# Patient Record
Sex: MALE | Race: BLACK OR AFRICAN AMERICAN | NOT HISPANIC OR LATINO | URBAN - METROPOLITAN AREA
[De-identification: names, ages, dates, MRNs, and addresses within clinical notes are randomized per-mention and may not be internally consistent; named-entity substitution may affect disease eponyms.]

---

## 2023-12-13 ENCOUNTER — INPATIENT (INPATIENT)
Facility: HOSPITAL | Age: 68
LOS: 9 days | Discharge: HOME | DRG: 853 | End: 2023-12-23
Attending: INTERNAL MEDICINE | Admitting: INTERNAL MEDICINE
Payer: MEDICARE

## 2023-12-13 VITALS
OXYGEN SATURATION: 97 % | HEIGHT: 72 IN | HEART RATE: 148 BPM | RESPIRATION RATE: 23 BRPM | DIASTOLIC BLOOD PRESSURE: 67 MMHG | TEMPERATURE: 101 F | SYSTOLIC BLOOD PRESSURE: 114 MMHG | WEIGHT: 133.38 LBS

## 2023-12-13 DIAGNOSIS — J18.9 PNEUMONIA, UNSPECIFIED ORGANISM: ICD-10-CM

## 2023-12-13 LAB
ALBUMIN SERPL ELPH-MCNC: 1.9 G/DL — LOW (ref 3.5–5)
ALBUMIN SERPL ELPH-MCNC: 1.9 G/DL — LOW (ref 3.5–5)
ALP SERPL-CCNC: 76 U/L — SIGNIFICANT CHANGE UP (ref 40–120)
ALP SERPL-CCNC: 76 U/L — SIGNIFICANT CHANGE UP (ref 40–120)
ALT FLD-CCNC: 17 U/L DA — SIGNIFICANT CHANGE UP (ref 10–60)
ALT FLD-CCNC: 17 U/L DA — SIGNIFICANT CHANGE UP (ref 10–60)
ANION GAP SERPL CALC-SCNC: 6 MMOL/L — SIGNIFICANT CHANGE UP (ref 5–17)
ANION GAP SERPL CALC-SCNC: 6 MMOL/L — SIGNIFICANT CHANGE UP (ref 5–17)
APTT BLD: 33.5 SEC — SIGNIFICANT CHANGE UP (ref 24.5–35.6)
APTT BLD: 33.5 SEC — SIGNIFICANT CHANGE UP (ref 24.5–35.6)
AST SERPL-CCNC: 10 U/L — SIGNIFICANT CHANGE UP (ref 10–40)
AST SERPL-CCNC: 10 U/L — SIGNIFICANT CHANGE UP (ref 10–40)
BASOPHILS # BLD AUTO: 0.06 K/UL — SIGNIFICANT CHANGE UP (ref 0–0.2)
BASOPHILS # BLD AUTO: 0.06 K/UL — SIGNIFICANT CHANGE UP (ref 0–0.2)
BASOPHILS NFR BLD AUTO: 0.4 % — SIGNIFICANT CHANGE UP (ref 0–2)
BASOPHILS NFR BLD AUTO: 0.4 % — SIGNIFICANT CHANGE UP (ref 0–2)
BILIRUB SERPL-MCNC: 0.5 MG/DL — SIGNIFICANT CHANGE UP (ref 0.2–1.2)
BILIRUB SERPL-MCNC: 0.5 MG/DL — SIGNIFICANT CHANGE UP (ref 0.2–1.2)
BUN SERPL-MCNC: 28 MG/DL — HIGH (ref 7–18)
BUN SERPL-MCNC: 28 MG/DL — HIGH (ref 7–18)
CALCIUM SERPL-MCNC: 10 MG/DL — SIGNIFICANT CHANGE UP (ref 8.4–10.5)
CALCIUM SERPL-MCNC: 10 MG/DL — SIGNIFICANT CHANGE UP (ref 8.4–10.5)
CHLORIDE SERPL-SCNC: 102 MMOL/L — SIGNIFICANT CHANGE UP (ref 96–108)
CHLORIDE SERPL-SCNC: 102 MMOL/L — SIGNIFICANT CHANGE UP (ref 96–108)
CO2 SERPL-SCNC: 26 MMOL/L — SIGNIFICANT CHANGE UP (ref 22–31)
CO2 SERPL-SCNC: 26 MMOL/L — SIGNIFICANT CHANGE UP (ref 22–31)
CREAT SERPL-MCNC: 1.5 MG/DL — HIGH (ref 0.5–1.3)
CREAT SERPL-MCNC: 1.5 MG/DL — HIGH (ref 0.5–1.3)
EGFR: 51 ML/MIN/1.73M2 — LOW
EGFR: 51 ML/MIN/1.73M2 — LOW
EOSINOPHIL # BLD AUTO: 0 K/UL — SIGNIFICANT CHANGE UP (ref 0–0.5)
EOSINOPHIL # BLD AUTO: 0 K/UL — SIGNIFICANT CHANGE UP (ref 0–0.5)
EOSINOPHIL NFR BLD AUTO: 0 % — SIGNIFICANT CHANGE UP (ref 0–6)
EOSINOPHIL NFR BLD AUTO: 0 % — SIGNIFICANT CHANGE UP (ref 0–6)
GLUCOSE SERPL-MCNC: 241 MG/DL — HIGH (ref 70–99)
GLUCOSE SERPL-MCNC: 241 MG/DL — HIGH (ref 70–99)
HCT VFR BLD CALC: 29.1 % — LOW (ref 39–50)
HCT VFR BLD CALC: 29.1 % — LOW (ref 39–50)
HGB BLD-MCNC: 9.3 G/DL — LOW (ref 13–17)
HGB BLD-MCNC: 9.3 G/DL — LOW (ref 13–17)
IMM GRANULOCYTES NFR BLD AUTO: 0.8 % — SIGNIFICANT CHANGE UP (ref 0–0.9)
IMM GRANULOCYTES NFR BLD AUTO: 0.8 % — SIGNIFICANT CHANGE UP (ref 0–0.9)
INR BLD: 1.24 RATIO — HIGH (ref 0.85–1.18)
INR BLD: 1.24 RATIO — HIGH (ref 0.85–1.18)
LYMPHOCYTES # BLD AUTO: 1.04 K/UL — SIGNIFICANT CHANGE UP (ref 1–3.3)
LYMPHOCYTES # BLD AUTO: 1.04 K/UL — SIGNIFICANT CHANGE UP (ref 1–3.3)
LYMPHOCYTES # BLD AUTO: 7 % — LOW (ref 13–44)
LYMPHOCYTES # BLD AUTO: 7 % — LOW (ref 13–44)
MCHC RBC-ENTMCNC: 25.5 PG — LOW (ref 27–34)
MCHC RBC-ENTMCNC: 25.5 PG — LOW (ref 27–34)
MCHC RBC-ENTMCNC: 32 GM/DL — SIGNIFICANT CHANGE UP (ref 32–36)
MCHC RBC-ENTMCNC: 32 GM/DL — SIGNIFICANT CHANGE UP (ref 32–36)
MCV RBC AUTO: 79.7 FL — LOW (ref 80–100)
MCV RBC AUTO: 79.7 FL — LOW (ref 80–100)
MONOCYTES # BLD AUTO: 0.88 K/UL — SIGNIFICANT CHANGE UP (ref 0–0.9)
MONOCYTES # BLD AUTO: 0.88 K/UL — SIGNIFICANT CHANGE UP (ref 0–0.9)
MONOCYTES NFR BLD AUTO: 5.9 % — SIGNIFICANT CHANGE UP (ref 2–14)
MONOCYTES NFR BLD AUTO: 5.9 % — SIGNIFICANT CHANGE UP (ref 2–14)
NEUTROPHILS # BLD AUTO: 12.75 K/UL — HIGH (ref 1.8–7.4)
NEUTROPHILS # BLD AUTO: 12.75 K/UL — HIGH (ref 1.8–7.4)
NEUTROPHILS NFR BLD AUTO: 85.9 % — HIGH (ref 43–77)
NEUTROPHILS NFR BLD AUTO: 85.9 % — HIGH (ref 43–77)
NRBC # BLD: 0 /100 WBCS — SIGNIFICANT CHANGE UP (ref 0–0)
NRBC # BLD: 0 /100 WBCS — SIGNIFICANT CHANGE UP (ref 0–0)
PLATELET # BLD AUTO: 648 K/UL — HIGH (ref 150–400)
PLATELET # BLD AUTO: 648 K/UL — HIGH (ref 150–400)
POTASSIUM SERPL-MCNC: 5.9 MMOL/L — HIGH (ref 3.5–5.3)
POTASSIUM SERPL-MCNC: 5.9 MMOL/L — HIGH (ref 3.5–5.3)
POTASSIUM SERPL-SCNC: 5.9 MMOL/L — HIGH (ref 3.5–5.3)
POTASSIUM SERPL-SCNC: 5.9 MMOL/L — HIGH (ref 3.5–5.3)
PROT SERPL-MCNC: 7.9 G/DL — SIGNIFICANT CHANGE UP (ref 6–8.3)
PROT SERPL-MCNC: 7.9 G/DL — SIGNIFICANT CHANGE UP (ref 6–8.3)
PROTHROM AB SERPL-ACNC: 14.1 SEC — HIGH (ref 9.5–13)
PROTHROM AB SERPL-ACNC: 14.1 SEC — HIGH (ref 9.5–13)
RBC # BLD: 3.65 M/UL — LOW (ref 4.2–5.8)
RBC # BLD: 3.65 M/UL — LOW (ref 4.2–5.8)
RBC # FLD: 15.4 % — HIGH (ref 10.3–14.5)
RBC # FLD: 15.4 % — HIGH (ref 10.3–14.5)
SODIUM SERPL-SCNC: 134 MMOL/L — LOW (ref 135–145)
SODIUM SERPL-SCNC: 134 MMOL/L — LOW (ref 135–145)
WBC # BLD: 14.85 K/UL — HIGH (ref 3.8–10.5)
WBC # BLD: 14.85 K/UL — HIGH (ref 3.8–10.5)
WBC # FLD AUTO: 14.85 K/UL — HIGH (ref 3.8–10.5)
WBC # FLD AUTO: 14.85 K/UL — HIGH (ref 3.8–10.5)

## 2023-12-13 PROCEDURE — 99285 EMERGENCY DEPT VISIT HI MDM: CPT

## 2023-12-13 PROCEDURE — 71045 X-RAY EXAM CHEST 1 VIEW: CPT | Mod: 26

## 2023-12-13 RX ORDER — CEFEPIME 1 G/1
2000 INJECTION, POWDER, FOR SOLUTION INTRAMUSCULAR; INTRAVENOUS ONCE
Refills: 0 | Status: COMPLETED | OUTPATIENT
Start: 2023-12-13 | End: 2023-12-13

## 2023-12-13 RX ORDER — ACETAMINOPHEN 500 MG
1000 TABLET ORAL ONCE
Refills: 0 | Status: COMPLETED | OUTPATIENT
Start: 2023-12-13 | End: 2023-12-13

## 2023-12-13 RX ORDER — SODIUM CHLORIDE 9 MG/ML
1900 INJECTION INTRAMUSCULAR; INTRAVENOUS; SUBCUTANEOUS ONCE
Refills: 0 | Status: COMPLETED | OUTPATIENT
Start: 2023-12-13 | End: 2023-12-13

## 2023-12-13 RX ORDER — DEXTROSE 50 % IN WATER 50 %
50 SYRINGE (ML) INTRAVENOUS ONCE
Refills: 0 | Status: COMPLETED | OUTPATIENT
Start: 2023-12-13 | End: 2023-12-13

## 2023-12-13 RX ORDER — INSULIN HUMAN 100 [IU]/ML
5 INJECTION, SOLUTION SUBCUTANEOUS ONCE
Refills: 0 | Status: COMPLETED | OUTPATIENT
Start: 2023-12-13 | End: 2023-12-13

## 2023-12-13 RX ORDER — CEFEPIME 1 G/1
2000 INJECTION, POWDER, FOR SOLUTION INTRAMUSCULAR; INTRAVENOUS EVERY 8 HOURS
Refills: 0 | Status: COMPLETED | OUTPATIENT
Start: 2023-12-14 | End: 2023-12-14

## 2023-12-13 RX ORDER — CEFEPIME 1 G/1
INJECTION, POWDER, FOR SOLUTION INTRAMUSCULAR; INTRAVENOUS
Refills: 0 | Status: COMPLETED | OUTPATIENT
Start: 2023-12-13 | End: 2023-12-14

## 2023-12-13 RX ORDER — ALBUTEROL 90 UG/1
10 AEROSOL, METERED ORAL ONCE
Refills: 0 | Status: COMPLETED | OUTPATIENT
Start: 2023-12-13 | End: 2023-12-13

## 2023-12-13 RX ADMIN — Medication 400 MILLIGRAM(S): at 21:40

## 2023-12-13 RX ADMIN — Medication 1000 MILLIGRAM(S): at 22:10

## 2023-12-13 RX ADMIN — SODIUM CHLORIDE 1900 MILLILITER(S): 9 INJECTION INTRAMUSCULAR; INTRAVENOUS; SUBCUTANEOUS at 21:39

## 2023-12-13 RX ADMIN — CEFEPIME 100 MILLIGRAM(S): 1 INJECTION, POWDER, FOR SOLUTION INTRAMUSCULAR; INTRAVENOUS at 21:41

## 2023-12-13 RX ADMIN — Medication 1000 MILLIGRAM(S): at 21:55

## 2023-12-13 RX ADMIN — CEFEPIME 2000 MILLIGRAM(S): 1 INJECTION, POWDER, FOR SOLUTION INTRAMUSCULAR; INTRAVENOUS at 22:11

## 2023-12-13 RX ADMIN — SODIUM CHLORIDE 1900 MILLILITER(S): 9 INJECTION INTRAMUSCULAR; INTRAVENOUS; SUBCUTANEOUS at 22:39

## 2023-12-13 NOTE — ED PROVIDER NOTE - CLINICAL SUMMARY MEDICAL DECISION MAKING FREE TEXT BOX
68-year-old male brought in for fever, shortness of breath and reported hypoxia.  Patient not hypoxic on room air.  Sepsis labs ordered.  Chest x-ray with apparent pneumonia on my examination of the x-ray.  Will admit.

## 2023-12-13 NOTE — ED ADULT NURSE NOTE - ED STAT RN HANDOFF DETAILS
Report given to SALOMÓN Desai. Pt in NAD. Communicated to RN and NP that pt is not eating or taking his pills. Insulin held due to pt not eating. Pills not swallowed by pt. Pt in NAD. IV intact.

## 2023-12-13 NOTE — ED PROVIDER NOTE - PHYSICAL EXAMINATION
General: cachetic appearing male, no acute distress   HEENT: normocephalic, atraumatic   Respiratory: normal work of breathing, lungs clear to auscultation bilaterally   Cardiac: tachycardic   Abdomen: soft, non-tender, no guarding or rebound   MSK: no swelling or tenderness of lower extremities, moving all extremities spontaneously   Skin: warm, dry   Neuro: A&Ox3  Psych: appropriate affect

## 2023-12-13 NOTE — ED ADULT NURSE NOTE - NSFALLRISKINTERV_ED_ALL_ED
Assistance OOB with selected safe patient handling equipment if applicable/Assistance with ambulation/Communicate fall risk and risk factors to all staff, patient, and family/Provide visual cue: yellow wristband, yellow gown, etc/Reinforce activity limits and safety measures with patient and family/Call bell, personal items and telephone in reach/Instruct patient to call for assistance before getting out of bed/chair/stretcher/Non-slip footwear applied when patient is off stretcher/[place] to call system/Physically safe environment - no spills, clutter or unnecessary equipment/Purposeful Proactive Rounding/Room/bathroom lighting operational, light cord in reach Assistance OOB with selected safe patient handling equipment if applicable/Assistance with ambulation/Communicate fall risk and risk factors to all staff, patient, and family/Provide visual cue: yellow wristband, yellow gown, etc/Reinforce activity limits and safety measures with patient and family/Call bell, personal items and telephone in reach/Instruct patient to call for assistance before getting out of bed/chair/stretcher/Non-slip footwear applied when patient is off stretcher/[place] to call system/Physically safe environment - no spills, clutter or unnecessary equipment/Purposeful Proactive Rounding/Room/bathroom lighting operational, light cord in reach

## 2023-12-13 NOTE — ED PROVIDER NOTE - OBJECTIVE STATEMENT
68-year-old male, PMH of traumatic brain injury, pressure ulcer, sent from [facility] for reported fever and oxygen saturation in low 90s.  Patient unable to provide a viable history but states that he feels weak.  Denies any chest pain, trouble breathing, nausea or vomiting. 68-year-old male, PMH of traumatic brain injury, pressure ulcer, sent from [facility] for reported fever and oxygen saturation in low 90s.  Patient unable to provide a viable history but states that he feels weak.  Denies any chest pain, trouble breathing, nausea or vomiting.

## 2023-12-14 DIAGNOSIS — L89.159 PRESSURE ULCER OF SACRAL REGION, UNSPECIFIED STAGE: ICD-10-CM

## 2023-12-14 DIAGNOSIS — U07.1 COVID-19: ICD-10-CM

## 2023-12-14 DIAGNOSIS — N17.9 ACUTE KIDNEY FAILURE, UNSPECIFIED: ICD-10-CM

## 2023-12-14 DIAGNOSIS — S06.9XAA UNSPECIFIED INTRACRANIAL INJURY WITH LOSS OF CONSCIOUSNESS STATUS UNKNOWN, INITIAL ENCOUNTER: ICD-10-CM

## 2023-12-14 DIAGNOSIS — A41.9 SEPSIS, UNSPECIFIED ORGANISM: ICD-10-CM

## 2023-12-14 DIAGNOSIS — G40.909 EPILEPSY, UNSPECIFIED, NOT INTRACTABLE, WITHOUT STATUS EPILEPTICUS: ICD-10-CM

## 2023-12-14 DIAGNOSIS — E87.5 HYPERKALEMIA: ICD-10-CM

## 2023-12-14 DIAGNOSIS — E11.9 TYPE 2 DIABETES MELLITUS WITHOUT COMPLICATIONS: ICD-10-CM

## 2023-12-14 DIAGNOSIS — G93.41 METABOLIC ENCEPHALOPATHY: ICD-10-CM

## 2023-12-14 DIAGNOSIS — Z29.9 ENCOUNTER FOR PROPHYLACTIC MEASURES, UNSPECIFIED: ICD-10-CM

## 2023-12-14 LAB
ALBUMIN SERPL ELPH-MCNC: 1.7 G/DL — LOW (ref 3.5–5)
ALBUMIN SERPL ELPH-MCNC: 1.7 G/DL — LOW (ref 3.5–5)
ALP SERPL-CCNC: 73 U/L — SIGNIFICANT CHANGE UP (ref 40–120)
ALP SERPL-CCNC: 73 U/L — SIGNIFICANT CHANGE UP (ref 40–120)
ALT FLD-CCNC: 15 U/L DA — SIGNIFICANT CHANGE UP (ref 10–60)
ALT FLD-CCNC: 15 U/L DA — SIGNIFICANT CHANGE UP (ref 10–60)
ANION GAP SERPL CALC-SCNC: 9 MMOL/L — SIGNIFICANT CHANGE UP (ref 5–17)
ANION GAP SERPL CALC-SCNC: 9 MMOL/L — SIGNIFICANT CHANGE UP (ref 5–17)
ANISOCYTOSIS BLD QL: SLIGHT — SIGNIFICANT CHANGE UP
ANISOCYTOSIS BLD QL: SLIGHT — SIGNIFICANT CHANGE UP
APPEARANCE UR: ABNORMAL
APPEARANCE UR: ABNORMAL
AST SERPL-CCNC: 11 U/L — SIGNIFICANT CHANGE UP (ref 10–40)
AST SERPL-CCNC: 11 U/L — SIGNIFICANT CHANGE UP (ref 10–40)
B PERT DNA SPEC QL NAA+PROBE: SIGNIFICANT CHANGE UP
B PERT DNA SPEC QL NAA+PROBE: SIGNIFICANT CHANGE UP
BACTERIA # UR AUTO: ABNORMAL /HPF
BACTERIA # UR AUTO: ABNORMAL /HPF
BASOPHILS # BLD AUTO: 0 K/UL — SIGNIFICANT CHANGE UP (ref 0–0.2)
BASOPHILS # BLD AUTO: 0 K/UL — SIGNIFICANT CHANGE UP (ref 0–0.2)
BASOPHILS NFR BLD AUTO: 0 % — SIGNIFICANT CHANGE UP (ref 0–2)
BASOPHILS NFR BLD AUTO: 0 % — SIGNIFICANT CHANGE UP (ref 0–2)
BILIRUB SERPL-MCNC: 0.3 MG/DL — SIGNIFICANT CHANGE UP (ref 0.2–1.2)
BILIRUB SERPL-MCNC: 0.3 MG/DL — SIGNIFICANT CHANGE UP (ref 0.2–1.2)
BILIRUB UR-MCNC: NEGATIVE — SIGNIFICANT CHANGE UP
BILIRUB UR-MCNC: NEGATIVE — SIGNIFICANT CHANGE UP
BUN SERPL-MCNC: 26 MG/DL — HIGH (ref 7–18)
BUN SERPL-MCNC: 26 MG/DL — HIGH (ref 7–18)
C PNEUM DNA SPEC QL NAA+PROBE: SIGNIFICANT CHANGE UP
C PNEUM DNA SPEC QL NAA+PROBE: SIGNIFICANT CHANGE UP
CALCIUM SERPL-MCNC: 9.4 MG/DL — SIGNIFICANT CHANGE UP (ref 8.4–10.5)
CALCIUM SERPL-MCNC: 9.4 MG/DL — SIGNIFICANT CHANGE UP (ref 8.4–10.5)
CHLORIDE SERPL-SCNC: 102 MMOL/L — SIGNIFICANT CHANGE UP (ref 96–108)
CHLORIDE SERPL-SCNC: 102 MMOL/L — SIGNIFICANT CHANGE UP (ref 96–108)
CO2 SERPL-SCNC: 21 MMOL/L — LOW (ref 22–31)
CO2 SERPL-SCNC: 21 MMOL/L — LOW (ref 22–31)
COLOR SPEC: SIGNIFICANT CHANGE UP
COLOR SPEC: SIGNIFICANT CHANGE UP
COMMENT - URINE: SIGNIFICANT CHANGE UP
COMMENT - URINE: SIGNIFICANT CHANGE UP
CREAT SERPL-MCNC: 1.23 MG/DL — SIGNIFICANT CHANGE UP (ref 0.5–1.3)
CREAT SERPL-MCNC: 1.23 MG/DL — SIGNIFICANT CHANGE UP (ref 0.5–1.3)
CRP SERPL-MCNC: 255 MG/L — HIGH
CRP SERPL-MCNC: 255 MG/L — HIGH
DIFF PNL FLD: NEGATIVE — SIGNIFICANT CHANGE UP
DIFF PNL FLD: NEGATIVE — SIGNIFICANT CHANGE UP
EGFR: 65 ML/MIN/1.73M2 — SIGNIFICANT CHANGE UP
EGFR: 65 ML/MIN/1.73M2 — SIGNIFICANT CHANGE UP
EOSINOPHIL # BLD AUTO: 0 K/UL — SIGNIFICANT CHANGE UP (ref 0–0.5)
EOSINOPHIL # BLD AUTO: 0 K/UL — SIGNIFICANT CHANGE UP (ref 0–0.5)
EOSINOPHIL NFR BLD AUTO: 0 % — SIGNIFICANT CHANGE UP (ref 0–6)
EOSINOPHIL NFR BLD AUTO: 0 % — SIGNIFICANT CHANGE UP (ref 0–6)
EPI CELLS # UR: PRESENT
EPI CELLS # UR: PRESENT
FLUAV H1 2009 PAND RNA SPEC QL NAA+PROBE: SIGNIFICANT CHANGE UP
FLUAV H1 2009 PAND RNA SPEC QL NAA+PROBE: SIGNIFICANT CHANGE UP
FLUAV H1 RNA SPEC QL NAA+PROBE: SIGNIFICANT CHANGE UP
FLUAV H1 RNA SPEC QL NAA+PROBE: SIGNIFICANT CHANGE UP
FLUAV H3 RNA SPEC QL NAA+PROBE: SIGNIFICANT CHANGE UP
FLUAV H3 RNA SPEC QL NAA+PROBE: SIGNIFICANT CHANGE UP
FLUAV SUBTYP SPEC NAA+PROBE: SIGNIFICANT CHANGE UP
FLUAV SUBTYP SPEC NAA+PROBE: SIGNIFICANT CHANGE UP
FLUBV RNA SPEC QL NAA+PROBE: SIGNIFICANT CHANGE UP
FLUBV RNA SPEC QL NAA+PROBE: SIGNIFICANT CHANGE UP
GLUCOSE BLDC GLUCOMTR-MCNC: 191 MG/DL — HIGH (ref 70–99)
GLUCOSE BLDC GLUCOMTR-MCNC: 191 MG/DL — HIGH (ref 70–99)
GLUCOSE BLDC GLUCOMTR-MCNC: 208 MG/DL — HIGH (ref 70–99)
GLUCOSE BLDC GLUCOMTR-MCNC: 208 MG/DL — HIGH (ref 70–99)
GLUCOSE BLDC GLUCOMTR-MCNC: 213 MG/DL — HIGH (ref 70–99)
GLUCOSE BLDC GLUCOMTR-MCNC: 213 MG/DL — HIGH (ref 70–99)
GLUCOSE BLDC GLUCOMTR-MCNC: 220 MG/DL — HIGH (ref 70–99)
GLUCOSE BLDC GLUCOMTR-MCNC: 220 MG/DL — HIGH (ref 70–99)
GLUCOSE SERPL-MCNC: 262 MG/DL — HIGH (ref 70–99)
GLUCOSE SERPL-MCNC: 262 MG/DL — HIGH (ref 70–99)
GLUCOSE UR QL: 250 MG/DL
GLUCOSE UR QL: 250 MG/DL
GRAN CASTS # UR COMP ASSIST: PRESENT
GRAN CASTS # UR COMP ASSIST: PRESENT
HADV DNA SPEC QL NAA+PROBE: SIGNIFICANT CHANGE UP
HADV DNA SPEC QL NAA+PROBE: SIGNIFICANT CHANGE UP
HCOV PNL SPEC NAA+PROBE: SIGNIFICANT CHANGE UP
HCOV PNL SPEC NAA+PROBE: SIGNIFICANT CHANGE UP
HCT VFR BLD CALC: 34.9 % — LOW (ref 39–50)
HCT VFR BLD CALC: 34.9 % — LOW (ref 39–50)
HGB BLD-MCNC: 10.7 G/DL — LOW (ref 13–17)
HGB BLD-MCNC: 10.7 G/DL — LOW (ref 13–17)
HIV 1+2 AB+HIV1 P24 AG SERPL QL IA: SIGNIFICANT CHANGE UP
HIV 1+2 AB+HIV1 P24 AG SERPL QL IA: SIGNIFICANT CHANGE UP
HMPV RNA SPEC QL NAA+PROBE: SIGNIFICANT CHANGE UP
HMPV RNA SPEC QL NAA+PROBE: SIGNIFICANT CHANGE UP
HPIV1 RNA SPEC QL NAA+PROBE: SIGNIFICANT CHANGE UP
HPIV1 RNA SPEC QL NAA+PROBE: SIGNIFICANT CHANGE UP
HPIV2 RNA SPEC QL NAA+PROBE: SIGNIFICANT CHANGE UP
HPIV2 RNA SPEC QL NAA+PROBE: SIGNIFICANT CHANGE UP
HPIV3 RNA SPEC QL NAA+PROBE: SIGNIFICANT CHANGE UP
HPIV3 RNA SPEC QL NAA+PROBE: SIGNIFICANT CHANGE UP
HPIV4 RNA SPEC QL NAA+PROBE: SIGNIFICANT CHANGE UP
HPIV4 RNA SPEC QL NAA+PROBE: SIGNIFICANT CHANGE UP
HYALINE CASTS # UR AUTO: PRESENT
HYALINE CASTS # UR AUTO: PRESENT
KETONES UR-MCNC: ABNORMAL MG/DL
KETONES UR-MCNC: ABNORMAL MG/DL
LACTATE SERPL-SCNC: 1.6 MMOL/L — SIGNIFICANT CHANGE UP (ref 0.7–2)
LACTATE SERPL-SCNC: 1.6 MMOL/L — SIGNIFICANT CHANGE UP (ref 0.7–2)
LACTATE SERPL-SCNC: 2.8 MMOL/L — HIGH (ref 0.7–2)
LACTATE SERPL-SCNC: 2.8 MMOL/L — HIGH (ref 0.7–2)
LACTATE SERPL-SCNC: 5.4 MMOL/L — CRITICAL HIGH (ref 0.7–2)
LACTATE SERPL-SCNC: 5.4 MMOL/L — CRITICAL HIGH (ref 0.7–2)
LEUKOCYTE ESTERASE UR-ACNC: NEGATIVE — SIGNIFICANT CHANGE UP
LEUKOCYTE ESTERASE UR-ACNC: NEGATIVE — SIGNIFICANT CHANGE UP
LYMPHOCYTES # BLD AUTO: 12 % — LOW (ref 13–44)
LYMPHOCYTES # BLD AUTO: 12 % — LOW (ref 13–44)
LYMPHOCYTES # BLD AUTO: 2.09 K/UL — SIGNIFICANT CHANGE UP (ref 1–3.3)
LYMPHOCYTES # BLD AUTO: 2.09 K/UL — SIGNIFICANT CHANGE UP (ref 1–3.3)
MAGNESIUM SERPL-MCNC: 1.9 MG/DL — SIGNIFICANT CHANGE UP (ref 1.6–2.6)
MAGNESIUM SERPL-MCNC: 1.9 MG/DL — SIGNIFICANT CHANGE UP (ref 1.6–2.6)
MANUAL SMEAR VERIFICATION: SIGNIFICANT CHANGE UP
MANUAL SMEAR VERIFICATION: SIGNIFICANT CHANGE UP
MCHC RBC-ENTMCNC: 24.7 PG — LOW (ref 27–34)
MCHC RBC-ENTMCNC: 24.7 PG — LOW (ref 27–34)
MCHC RBC-ENTMCNC: 30.7 GM/DL — LOW (ref 32–36)
MCHC RBC-ENTMCNC: 30.7 GM/DL — LOW (ref 32–36)
MCV RBC AUTO: 80.4 FL — SIGNIFICANT CHANGE UP (ref 80–100)
MCV RBC AUTO: 80.4 FL — SIGNIFICANT CHANGE UP (ref 80–100)
MONOCYTES # BLD AUTO: 0.52 K/UL — SIGNIFICANT CHANGE UP (ref 0–0.9)
MONOCYTES # BLD AUTO: 0.52 K/UL — SIGNIFICANT CHANGE UP (ref 0–0.9)
MONOCYTES NFR BLD AUTO: 3 % — SIGNIFICANT CHANGE UP (ref 2–14)
MONOCYTES NFR BLD AUTO: 3 % — SIGNIFICANT CHANGE UP (ref 2–14)
NEUTROPHILS # BLD AUTO: 14.83 K/UL — HIGH (ref 1.8–7.4)
NEUTROPHILS # BLD AUTO: 14.83 K/UL — HIGH (ref 1.8–7.4)
NEUTROPHILS NFR BLD AUTO: 85 % — HIGH (ref 43–77)
NEUTROPHILS NFR BLD AUTO: 85 % — HIGH (ref 43–77)
NITRITE UR-MCNC: NEGATIVE — SIGNIFICANT CHANGE UP
NITRITE UR-MCNC: NEGATIVE — SIGNIFICANT CHANGE UP
NRBC # BLD: 0 /100 — SIGNIFICANT CHANGE UP (ref 0–0)
NRBC # BLD: 0 /100 — SIGNIFICANT CHANGE UP (ref 0–0)
PH UR: 5 — SIGNIFICANT CHANGE UP (ref 5–8)
PH UR: 5 — SIGNIFICANT CHANGE UP (ref 5–8)
PHOSPHATE SERPL-MCNC: 3.3 MG/DL — SIGNIFICANT CHANGE UP (ref 2.5–4.5)
PHOSPHATE SERPL-MCNC: 3.3 MG/DL — SIGNIFICANT CHANGE UP (ref 2.5–4.5)
PLAT MORPH BLD: NORMAL — SIGNIFICANT CHANGE UP
PLAT MORPH BLD: NORMAL — SIGNIFICANT CHANGE UP
PLATELET # BLD AUTO: 352 K/UL — SIGNIFICANT CHANGE UP (ref 150–400)
PLATELET # BLD AUTO: 352 K/UL — SIGNIFICANT CHANGE UP (ref 150–400)
POIKILOCYTOSIS BLD QL AUTO: SLIGHT — SIGNIFICANT CHANGE UP
POIKILOCYTOSIS BLD QL AUTO: SLIGHT — SIGNIFICANT CHANGE UP
POTASSIUM SERPL-MCNC: 4.6 MMOL/L — SIGNIFICANT CHANGE UP (ref 3.5–5.3)
POTASSIUM SERPL-MCNC: 4.6 MMOL/L — SIGNIFICANT CHANGE UP (ref 3.5–5.3)
POTASSIUM SERPL-SCNC: 4.6 MMOL/L — SIGNIFICANT CHANGE UP (ref 3.5–5.3)
POTASSIUM SERPL-SCNC: 4.6 MMOL/L — SIGNIFICANT CHANGE UP (ref 3.5–5.3)
PROT SERPL-MCNC: 7.9 G/DL — SIGNIFICANT CHANGE UP (ref 6–8.3)
PROT SERPL-MCNC: 7.9 G/DL — SIGNIFICANT CHANGE UP (ref 6–8.3)
PROT UR-MCNC: 30 MG/DL
PROT UR-MCNC: 30 MG/DL
RAPID RVP RESULT: DETECTED
RAPID RVP RESULT: DETECTED
RBC # BLD: 4.34 M/UL — SIGNIFICANT CHANGE UP (ref 4.2–5.8)
RBC # BLD: 4.34 M/UL — SIGNIFICANT CHANGE UP (ref 4.2–5.8)
RBC # FLD: 15.8 % — HIGH (ref 10.3–14.5)
RBC # FLD: 15.8 % — HIGH (ref 10.3–14.5)
RBC BLD AUTO: ABNORMAL
RBC BLD AUTO: ABNORMAL
RBC CASTS # UR COMP ASSIST: 2 /HPF — SIGNIFICANT CHANGE UP (ref 0–4)
RBC CASTS # UR COMP ASSIST: 2 /HPF — SIGNIFICANT CHANGE UP (ref 0–4)
RV+EV RNA SPEC QL NAA+PROBE: SIGNIFICANT CHANGE UP
RV+EV RNA SPEC QL NAA+PROBE: SIGNIFICANT CHANGE UP
SARS-COV-2 RNA SPEC QL NAA+PROBE: DETECTED
SARS-COV-2 RNA SPEC QL NAA+PROBE: DETECTED
SODIUM SERPL-SCNC: 132 MMOL/L — LOW (ref 135–145)
SODIUM SERPL-SCNC: 132 MMOL/L — LOW (ref 135–145)
SP GR SPEC: 1.03 — SIGNIFICANT CHANGE UP (ref 1–1.03)
SP GR SPEC: 1.03 — SIGNIFICANT CHANGE UP (ref 1–1.03)
UROBILINOGEN FLD QL: 1 MG/DL — SIGNIFICANT CHANGE UP (ref 0.2–1)
UROBILINOGEN FLD QL: 1 MG/DL — SIGNIFICANT CHANGE UP (ref 0.2–1)
WBC # BLD: 17.45 K/UL — HIGH (ref 3.8–10.5)
WBC # BLD: 17.45 K/UL — HIGH (ref 3.8–10.5)
WBC # FLD AUTO: 17.45 K/UL — HIGH (ref 3.8–10.5)
WBC # FLD AUTO: 17.45 K/UL — HIGH (ref 3.8–10.5)
WBC UR QL: 4 /HPF — SIGNIFICANT CHANGE UP (ref 0–5)
WBC UR QL: 4 /HPF — SIGNIFICANT CHANGE UP (ref 0–5)

## 2023-12-14 PROCEDURE — 74176 CT ABD & PELVIS W/O CONTRAST: CPT | Mod: 26

## 2023-12-14 PROCEDURE — 93010 ELECTROCARDIOGRAM REPORT: CPT

## 2023-12-14 PROCEDURE — 99222 1ST HOSP IP/OBS MODERATE 55: CPT | Mod: GC

## 2023-12-14 RX ORDER — DEXTROSE 50 % IN WATER 50 %
25 SYRINGE (ML) INTRAVENOUS ONCE
Refills: 0 | Status: DISCONTINUED | OUTPATIENT
Start: 2023-12-14 | End: 2023-12-23

## 2023-12-14 RX ORDER — VANCOMYCIN HCL 1 G
1000 VIAL (EA) INTRAVENOUS EVERY 12 HOURS
Refills: 0 | Status: DISCONTINUED | OUTPATIENT
Start: 2023-12-14 | End: 2023-12-19

## 2023-12-14 RX ORDER — CEFEPIME 1 G/1
2000 INJECTION, POWDER, FOR SOLUTION INTRAMUSCULAR; INTRAVENOUS EVERY 8 HOURS
Refills: 0 | Status: DISCONTINUED | OUTPATIENT
Start: 2023-12-14 | End: 2023-12-14

## 2023-12-14 RX ORDER — CEFEPIME 1 G/1
1000 INJECTION, POWDER, FOR SOLUTION INTRAMUSCULAR; INTRAVENOUS EVERY 12 HOURS
Refills: 0 | Status: DISCONTINUED | OUTPATIENT
Start: 2023-12-14 | End: 2023-12-19

## 2023-12-14 RX ORDER — LEVETIRACETAM 250 MG/1
750 TABLET, FILM COATED ORAL
Refills: 0 | Status: DISCONTINUED | OUTPATIENT
Start: 2023-12-14 | End: 2023-12-23

## 2023-12-14 RX ORDER — INSULIN LISPRO 100/ML
VIAL (ML) SUBCUTANEOUS
Refills: 0 | Status: DISCONTINUED | OUTPATIENT
Start: 2023-12-14 | End: 2023-12-14

## 2023-12-14 RX ORDER — INSULIN LISPRO 100/ML
VIAL (ML) SUBCUTANEOUS AT BEDTIME
Refills: 0 | Status: DISCONTINUED | OUTPATIENT
Start: 2023-12-14 | End: 2023-12-14

## 2023-12-14 RX ORDER — POLYETHYLENE GLYCOL 3350 17 G/17G
17 POWDER, FOR SOLUTION ORAL
Refills: 0 | Status: DISCONTINUED | OUTPATIENT
Start: 2023-12-14 | End: 2023-12-23

## 2023-12-14 RX ORDER — INSULIN LISPRO 100/ML
VIAL (ML) SUBCUTANEOUS
Refills: 0 | Status: DISCONTINUED | OUTPATIENT
Start: 2023-12-14 | End: 2023-12-23

## 2023-12-14 RX ORDER — SODIUM CHLORIDE 9 MG/ML
1000 INJECTION INTRAMUSCULAR; INTRAVENOUS; SUBCUTANEOUS
Refills: 0 | Status: DISCONTINUED | OUTPATIENT
Start: 2023-12-14 | End: 2023-12-18

## 2023-12-14 RX ORDER — REMDESIVIR 5 MG/ML
INJECTION INTRAVENOUS
Refills: 0 | Status: COMPLETED | OUTPATIENT
Start: 2023-12-14 | End: 2023-12-18

## 2023-12-14 RX ORDER — INSULIN LISPRO 100/ML
VIAL (ML) SUBCUTANEOUS AT BEDTIME
Refills: 0 | Status: DISCONTINUED | OUTPATIENT
Start: 2023-12-14 | End: 2023-12-23

## 2023-12-14 RX ORDER — SODIUM CHLORIDE 9 MG/ML
1000 INJECTION, SOLUTION INTRAVENOUS ONCE
Refills: 0 | Status: COMPLETED | OUTPATIENT
Start: 2023-12-14 | End: 2023-12-14

## 2023-12-14 RX ORDER — REMDESIVIR 5 MG/ML
200 INJECTION INTRAVENOUS EVERY 24 HOURS
Refills: 0 | Status: COMPLETED | OUTPATIENT
Start: 2023-12-14 | End: 2023-12-14

## 2023-12-14 RX ORDER — ASCORBIC ACID 60 MG
500 TABLET,CHEWABLE ORAL DAILY
Refills: 0 | Status: DISCONTINUED | OUTPATIENT
Start: 2023-12-14 | End: 2023-12-23

## 2023-12-14 RX ORDER — SENNA PLUS 8.6 MG/1
2 TABLET ORAL AT BEDTIME
Refills: 0 | Status: DISCONTINUED | OUTPATIENT
Start: 2023-12-14 | End: 2023-12-23

## 2023-12-14 RX ORDER — INSULIN GLARGINE 100 [IU]/ML
12 INJECTION, SOLUTION SUBCUTANEOUS AT BEDTIME
Refills: 0 | Status: DISCONTINUED | OUTPATIENT
Start: 2023-12-14 | End: 2023-12-18

## 2023-12-14 RX ORDER — ENOXAPARIN SODIUM 100 MG/ML
40 INJECTION SUBCUTANEOUS EVERY 24 HOURS
Refills: 0 | Status: DISCONTINUED | OUTPATIENT
Start: 2023-12-14 | End: 2023-12-14

## 2023-12-14 RX ORDER — INSULIN GLARGINE 100 [IU]/ML
9 INJECTION, SOLUTION SUBCUTANEOUS AT BEDTIME
Refills: 0 | Status: DISCONTINUED | OUTPATIENT
Start: 2023-12-14 | End: 2023-12-14

## 2023-12-14 RX ORDER — VANCOMYCIN HCL 1 G
VIAL (EA) INTRAVENOUS
Refills: 0 | Status: DISCONTINUED | OUTPATIENT
Start: 2023-12-14 | End: 2023-12-19

## 2023-12-14 RX ORDER — COLLAGENASE CLOSTRIDIUM HIST. 250 UNIT/G
1 OINTMENT (GRAM) TOPICAL EVERY 8 HOURS
Refills: 0 | Status: DISCONTINUED | OUTPATIENT
Start: 2023-12-14 | End: 2023-12-23

## 2023-12-14 RX ORDER — ACETAMINOPHEN 500 MG
650 TABLET ORAL EVERY 6 HOURS
Refills: 0 | Status: DISCONTINUED | OUTPATIENT
Start: 2023-12-14 | End: 2023-12-23

## 2023-12-14 RX ORDER — VANCOMYCIN HCL 1 G
1000 VIAL (EA) INTRAVENOUS ONCE
Refills: 0 | Status: COMPLETED | OUTPATIENT
Start: 2023-12-14 | End: 2023-12-14

## 2023-12-14 RX ORDER — INSULIN LISPRO 100/ML
3 VIAL (ML) SUBCUTANEOUS
Refills: 0 | Status: DISCONTINUED | OUTPATIENT
Start: 2023-12-14 | End: 2023-12-14

## 2023-12-14 RX ORDER — REMDESIVIR 5 MG/ML
100 INJECTION INTRAVENOUS EVERY 24 HOURS
Refills: 0 | Status: COMPLETED | OUTPATIENT
Start: 2023-12-15 | End: 2023-12-18

## 2023-12-14 RX ORDER — HEPARIN SODIUM 5000 [USP'U]/ML
5000 INJECTION INTRAVENOUS; SUBCUTANEOUS EVERY 8 HOURS
Refills: 0 | Status: DISCONTINUED | OUTPATIENT
Start: 2023-12-14 | End: 2023-12-19

## 2023-12-14 RX ORDER — DEXAMETHASONE 0.5 MG/5ML
6 ELIXIR ORAL DAILY
Refills: 0 | Status: DISCONTINUED | OUTPATIENT
Start: 2023-12-14 | End: 2023-12-23

## 2023-12-14 RX ORDER — LACTULOSE 10 G/15ML
10 SOLUTION ORAL DAILY
Refills: 0 | Status: DISCONTINUED | OUTPATIENT
Start: 2023-12-14 | End: 2023-12-23

## 2023-12-14 RX ADMIN — HEPARIN SODIUM 5000 UNIT(S): 5000 INJECTION INTRAVENOUS; SUBCUTANEOUS at 23:23

## 2023-12-14 RX ADMIN — INSULIN GLARGINE 12 UNIT(S): 100 INJECTION, SOLUTION SUBCUTANEOUS at 23:24

## 2023-12-14 RX ADMIN — POLYETHYLENE GLYCOL 3350 17 GRAM(S): 17 POWDER, FOR SOLUTION ORAL at 09:12

## 2023-12-14 RX ADMIN — SENNA PLUS 2 TABLET(S): 8.6 TABLET ORAL at 23:25

## 2023-12-14 RX ADMIN — Medication 250 MILLIGRAM(S): at 09:13

## 2023-12-14 RX ADMIN — Medication 6 MILLIGRAM(S): at 17:10

## 2023-12-14 RX ADMIN — Medication 250 MILLIGRAM(S): at 23:23

## 2023-12-14 RX ADMIN — ALBUTEROL 10 MILLIGRAM(S): 90 AEROSOL, METERED ORAL at 00:07

## 2023-12-14 RX ADMIN — HEPARIN SODIUM 5000 UNIT(S): 5000 INJECTION INTRAVENOUS; SUBCUTANEOUS at 09:13

## 2023-12-14 RX ADMIN — INSULIN HUMAN 5 UNIT(S): 100 INJECTION, SOLUTION SUBCUTANEOUS at 00:06

## 2023-12-14 RX ADMIN — SODIUM CHLORIDE 1000 MILLILITER(S): 9 INJECTION, SOLUTION INTRAVENOUS at 02:09

## 2023-12-14 RX ADMIN — CEFEPIME 100 MILLIGRAM(S): 1 INJECTION, POWDER, FOR SOLUTION INTRAMUSCULAR; INTRAVENOUS at 23:23

## 2023-12-14 RX ADMIN — CEFEPIME 100 MILLIGRAM(S): 1 INJECTION, POWDER, FOR SOLUTION INTRAMUSCULAR; INTRAVENOUS at 06:58

## 2023-12-14 RX ADMIN — Medication 50 MILLILITER(S): at 00:07

## 2023-12-14 RX ADMIN — LEVETIRACETAM 750 MILLIGRAM(S): 250 TABLET, FILM COATED ORAL at 09:13

## 2023-12-14 RX ADMIN — Medication 1 APPLICATION(S): at 23:24

## 2023-12-14 RX ADMIN — REMDESIVIR 200 MILLIGRAM(S): 5 INJECTION INTRAVENOUS at 13:45

## 2023-12-14 NOTE — H&P ADULT - PROBLEM SELECTOR PLAN 6
lovenox Pritesh juárez/w keppra - plan as above   - might need MRI of the pelvis w/ bone biopsy to r/o osteomyelitis   - wound care consult  - c/w cefepime and vanc
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
## 2023-12-14 NOTE — H&P ADULT - PROBLEM SELECTOR PLAN 1
- sent from [facility] for reported fever and oxygen saturation in low 90s.   - Pt reporting lethargy/weakness, A&Ox0, weeping purulent sacral ulcer noticed on exam   - Vital signs sig for temp 101 F (resolved, 98.6F),  > 101, 97% on RA, intermittently hypoxic to low 90s on RA   - EKG sinus tachycardia 106 HR  - Labs sig for WC 14, Cr 1.5, lac 2.8  - RVP - covid positive , U/A negative   - CXR shows questionable RML infiltrate  - s/p cefepime 2g and 2.9 L in ED  - Admitted for Sepsis 2/2 pneumonia vs. infected sacral ulcer vs. COVID   - f/u blood cultures, urine cultures   - c/w cefepime and vanc   - IVF for maintenance - sent from [facility] for reported fever and oxygen saturation in low 90s.   - Pt reporting lethargy/weakness, A&Ox0, weeping purulent sacral ulcer noticed on exam   - Vital signs sig for temp 101 F (resolved, 98.6F),  > 101, 97% on RA, intermittently hypoxic to low 90s on RA   - EKG sinus tachycardia 106 HR  - Labs sig for WC 14, Cr 1.5, lac 2.8  - RVP - covid positive , U/A negative   - CXR shows questionable RML infiltrate  - s/p cefepime 2g and 2.9 L in ED  - Admitted for Sepsis 2/2 pneumonia vs. infected sacral ulcer vs. COVID   - f/u blood cultures, urine cultures   - c/w cefepime and vanc   - IVF for maintenance

## 2023-12-14 NOTE — H&P ADULT - NSICDXPASTMEDICALHX_GEN_ALL_CORE_FT
PAST MEDICAL HISTORY:  DM (diabetes mellitus)     Seizure disorder     TBI (traumatic brain injury)

## 2023-12-14 NOTE — CHART NOTE - NSCHARTNOTEFT_GEN_A_CORE
HPI:  68-year-old male, PMH of traumatic brain injury w/ seizure disorder, DM, pressure ulcer, sent from [facility] for reported fever and oxygen saturation in low 90s.  Patient unable to provide a viable history but states that he feels weak.  Denies any chest pain, trouble breathing, nausea or vomiting. Unable to assess mental status, patient not answering orientation questions , A&Ox0.  (14 Dec 2023 02:22)        OBJECTIVE:  Vital Signs Last 24 Hrs  T(C): 37.6 (14 Dec 2023 12:42), Max: 38.3 (13 Dec 2023 20:10)  T(F): 99.6 (14 Dec 2023 12:42), Max: 101 (13 Dec 2023 20:10)  HR: 112 (14 Dec 2023 12:42) (81 - 148)  BP: 107/69 (14 Dec 2023 12:42) (102/68 - 118/77)  BP(mean): --  RR: 18 (14 Dec 2023 12:42) (18 - 23)  SpO2: 98% (14 Dec 2023 12:42) (96% - 99%)    Parameters below as of 14 Dec 2023 12:42  Patient On (Oxygen Delivery Method): room air  O2 Flow (L/min): 2      LABS:                        10.7   17.45 )-----------( 352      ( 14 Dec 2023 10:52 )             34.9     12-14    132<L>  |  102  |  26<H>  ----------------------------<  262<H>  4.6   |  21<L>  |  1.23    Ca    9.4      14 Dec 2023 10:52  Phos  3.3     12-14  Mg     1.9     12-14    TPro  7.9  /  Alb  1.7<L>  /  TBili  0.3  /  DBili  x   /  AST  11  /  ALT  15  /  AlkPhos  73  12-14      ASSESSMENT:  1:  Sepsis - Dr. Narvaez consulted  2:  Hyperkalemia - 5.9 on admission, cocktail given in ED  3:  COVID (+)  4:  Sacral decub - Wound APRN consulted  5:  DM - Uncontrolled but downtrending, pt not eating.  Speech consulted    PLAN:   1:  Cefepime and vanc, MRSA swab  2:  Repeat 4.6  3:  Isolation, supportive care  4:  Cefepime and vanc  5:  DC premeal in standing dose, increase LANTUS to 12U QHS and SSI to mod scale    FOLLOW UP/RESULTS:    1:  Temp curve, BP, ID recs  2:  Trend BMP  3:  Oxygenation and WOB  4:  Wound & ID care recs  5:  Consider increasing regimen if glucose remains uncontrolled in 24H HPI:  68-year-old male, PMH of traumatic brain injury w/ seizure disorder, DM, pressure ulcer, sent from [facility] for reported fever and oxygen saturation in low 90s.  Patient unable to provide a viable history but states that he feels weak.  Denies any chest pain, trouble breathing, nausea or vomiting. Unable to assess mental status, patient not answering orientation questions , A&Ox0.  (14 Dec 2023 02:22)        OBJECTIVE:  Vital Signs Last 24 Hrs  T(C): 37.6 (14 Dec 2023 12:42), Max: 38.3 (13 Dec 2023 20:10)  T(F): 99.6 (14 Dec 2023 12:42), Max: 101 (13 Dec 2023 20:10)  HR: 112 (14 Dec 2023 12:42) (81 - 148)  BP: 107/69 (14 Dec 2023 12:42) (102/68 - 118/77)  BP(mean): --  RR: 18 (14 Dec 2023 12:42) (18 - 23)  SpO2: 98% (14 Dec 2023 12:42) (96% - 99%)    Parameters below as of 14 Dec 2023 12:42  Patient On (Oxygen Delivery Method): room air  O2 Flow (L/min): 2      LABS:                        10.7   17.45 )-----------( 352      ( 14 Dec 2023 10:52 )             34.9     12-14    132<L>  |  102  |  26<H>  ----------------------------<  262<H>  4.6   |  21<L>  |  1.23    Ca    9.4      14 Dec 2023 10:52  Phos  3.3     12-14  Mg     1.9     12-14    TPro  7.9  /  Alb  1.7<L>  /  TBili  0.3  /  DBili  x   /  AST  11  /  ALT  15  /  AlkPhos  73  12-14      ASSESSMENT:  1:  Sepsis - Dr. Narvaez consulted  2:  Hyperkalemia - 5.9 on admission, cocktail given in ED  3:  COVID (+)  4:  Sacral decub - Wound APRN consulted  5:  DM - Uncontrolled but downtrending, pt not eating.  Speech consulted    PLAN:   1:  Cefepime and vanc, MRSA swab  2:  Repeat 4.6  3:  Isolation, supportive care  4:  Cefepime and vanc  5:  DC premeal in standing dose, increase LANTUS to 12U QHS and SSI to mod scale    FOLLOW UP/RESULTS:    1:  Temp curve, BP, ID recs  2:  Trend BMP  3:  Oxygenation and WOB  4:  Wound & ID care recs  5:  Consider increasing regimen if glucose remains uncontrolled in 24H

## 2023-12-14 NOTE — SWALLOW BEDSIDE ASSESSMENT ADULT - ORAL PREPARATORY PHASE
Reduced oral grading/Bolus falls into right lateral sulci Reduced oral grading/Decreased mastication ability Reduced oral grading

## 2023-12-14 NOTE — H&P ADULT - PROBLEM SELECTOR PLAN 3
- hx of TBI in the past with seizure disorder  - c/w keppra COVID + on RVP 12/13  - plan as above  - pt is mildly hypoxic, will consider starting remdesivir cr 1.5, unknown baseline  - likely pre-renal due to dehydration   - c/w IVF, montior Cr in AM

## 2023-12-14 NOTE — H&P ADULT - NSHPPHYSICALEXAM_GEN_ALL_CORE
T(C): 37 (12-14-23 @ 03:15), Max: 38.3 (12-13-23 @ 20:10)  HR: 101 (12-14-23 @ 03:15) (101 - 148)  BP: 102/68 (12-14-23 @ 03:15) (102/68 - 114/67)  RR: 18 (12-14-23 @ 03:15) (18 - 23)  SpO2: 99% (12-14-23 @ 03:15) (97% - 99%)    CONSTITUTIONAL: cachectic elderly male   EYES: PERRLA and symmetric, EOMI, No conjunctival or scleral injection, non-icteric  ENMT: Oral mucosa with dry mucous membranes.   RESP: No respiratory distress, no use of accessory muscles; (+) mildly diminished breath sounds on the right hand side   CV: RRR, +S1S2, no MRG; no JVD; no peripheral edema  GI: Soft, NT, ND, no rebound, no guarding; no palpable masses; no hepatosplenomegaly; no hernia palpated  MSK: unable to assess due to mental status   SKIN: (+) 4cm x 4cm sacral decubitus ulcer noted on the sacrum with probe to bone, purulent drainage   NEURO: unable to assess due to mental status, A&Ox0

## 2023-12-14 NOTE — SWALLOW BEDSIDE ASSESSMENT ADULT - ADDITIONAL RECOMMENDATIONS
+ [place] Free Water Protocol: Pt may have ice chips any time ONLY after oral care is completed, for QOL.

## 2023-12-14 NOTE — H&P ADULT - HISTORY OF PRESENT ILLNESS
68-year-old male, PMH of traumatic brain injury, pressure ulcer, sent from [facility] for reported fever and oxygen saturation in low 90s.  Patient unable to provide a viable history but states that he feels weak.  Denies any chest pain, trouble breathing, nausea or vomiting. 68-year-old male, PMH of traumatic brain injury, pressure ulcer, sent from [facility] for reported fever and oxygen saturation in low 90s.  Patient unable to provide a viable history but states that he feels weak.  Denies any chest pain, trouble breathing, nausea or vomiting. 68-year-old male, PMH of traumatic brain injury w/ seizure disorder, DM, pressure ulcer, sent from [facility] for reported fever and oxygen saturation in low 90s.  Patient unable to provide a viable history but states that he feels weak.  Denies any chest pain, trouble breathing, nausea or vomiting. Unable to assess mental status, patient not answering orientation questions , A&Ox0.  68-year-old male, PMH of traumatic brain injury w/ seizure disorder, DM, pressure ulcer, sent from [facility] for reported fever and oxygen saturation in low 90s.  Patient unable to provide a viable history but states that he feels weak.  Denies any chest pain, trouble breathing, nausea or vomiting. Unable to assess mental status, patient not answering orientation questions , A&Ox0.

## 2023-12-14 NOTE — H&P ADULT - PROBLEM SELECTOR PROBLEM 3
Prophylactic measure TBI (traumatic brain injury) 2019 novel coronavirus disease (COVID-19) LEATHA (acute kidney injury)

## 2023-12-14 NOTE — PHARMACOTHERAPY INTERVENTION NOTE - COMMENTS
Patient identified by [place] LUCILA LIST for pneumonia. MRSA/MSSA nasal PCR ordered. Medication list was reviewed and no additional interventions at this time. Patient identified by [place] LUCILA LIST for pneumonia. MRSA/MSSA nasal PCR ordered. Medication list was reviewed and no additional interventions at this time.

## 2023-12-14 NOTE — PATIENT PROFILE ADULT - ARRIVAL FROM
As medically feasible/tolerable, initiate Nepro @ 20 ml/hr and advance 10 ml/hr q4 hrs until goal rate of 50 ml/hr (x20 hrs) to provide 1000 ml, 1800 kcal, 81g protein, 727 ml free water, and 100% of RDIs for vitamins/minerals; additional free water per MD discretion.
Nursing home

## 2023-12-14 NOTE — ED ADULT NURSE REASSESSMENT NOTE - NS ED NURSE REASSESS COMMENT FT1
Received report from ED holding RN; was instructed to endorse pt to oncoming shift by leadership. Size Of Lesion In Cm: 0.9

## 2023-12-14 NOTE — CONSULT NOTE ADULT - SKIN COMMENTS
sacral DU stage 3-4 with a dull dark base , has some brownish drainage on dressing, no surrounding cellulitis, no palpable bone in the base

## 2023-12-14 NOTE — SWALLOW BEDSIDE ASSESSMENT ADULT - COMMENTS
suctioning to clear all PO; exam terminated. Pt A+Ox0, confused, HOB elevated to 90°. +Mild cachexia. +COVID. Brother at bedside, attests that PTA Pt had fair appetite & needed assistance w/ feeding. Pt required oral care & suctioning to clear oral residue.

## 2023-12-14 NOTE — H&P ADULT - PROBLEM SELECTOR PLAN 9
- t2DM on metformin   - blood sugars 200s  - will start lantus 9u, 3 TID   - ISS fs qhs and before meals

## 2023-12-14 NOTE — CONSULT NOTE ADULT - ASSESSMENT
Pneumonia ( ? bacterial)  COVID - 19 infection with hypoxia  Infected Sacral Decubitus ulcer  Leukocytosis  Fevers    Plan - Cont Vancomycin 1gm iv q12hrs  Cont Maxipime 1gm iv q12hrs add flagyl 500mgs iv q8hrs  Cont Remdesivir 100mgs iv q24hrs from tomorrow  Cont Decadron 6mgs iv q24hrs.

## 2023-12-14 NOTE — SWALLOW BEDSIDE ASSESSMENT ADULT - ORAL PHASE
prolonged oral bolus holding/Decreased anterior-posterior movement of the bolus/Delayed oral transit time Decreased anterior-posterior movement of the bolus/Delayed oral transit time/Stasis in anterior sulcus prolonged oral bolus holding ; base-of-tongue pumping/Decreased anterior-posterior movement of the bolus/Delayed oral transit time/Stasis in anterior sulcus/Stasis in lateral sulci
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
## 2023-12-14 NOTE — PATIENT PROFILE ADULT - FALL HARM RISK - HARM RISK INTERVENTIONS
Assistance OOB with selected safe patient handling equipment/Communicate Risk of Fall with Harm to all staff/Discuss with provider need for PT consult/Monitor gait and stability/Provide patient with walking aids - walker, cane, crutches/Reinforce activity limits and safety measures with patient and family/Tailored Fall Risk Interventions/Visual Cue: Yellow wristband and red socks/Bed in lowest position, wheels locked, appropriate side rails in place/Call bell, personal items and telephone in reach/Instruct patient to call for assistance before getting out of bed or chair/Non-slip footwear when patient is out of bed/[place] to call system/Physically safe environment - no spills, clutter or unnecessary equipment/Purposeful Proactive Rounding/Room/bathroom lighting operational, light cord in reach Assistance OOB with selected safe patient handling equipment/Communicate Risk of Fall with Harm to all staff/Discuss with provider need for PT consult/Monitor gait and stability/Provide patient with walking aids - walker, cane, crutches/Reinforce activity limits and safety measures with patient and family/Tailored Fall Risk Interventions/Visual Cue: Yellow wristband and red socks/Bed in lowest position, wheels locked, appropriate side rails in place/Call bell, personal items and telephone in reach/Instruct patient to call for assistance before getting out of bed or chair/Non-slip footwear when patient is out of bed/[place] to call system/Physically safe environment - no spills, clutter or unnecessary equipment/Purposeful Proactive Rounding/Room/bathroom lighting operational, light cord in reach

## 2023-12-14 NOTE — H&P ADULT - PROBLEM SELECTOR PLAN 7
- t2DM on metformin   - ISS fs qhs and before meals - hx of TBI in the past with seizure disorder  - c/w keppra

## 2023-12-14 NOTE — SWALLOW BEDSIDE ASSESSMENT ADULT - PHARYNGEAL PHASE
Delayed pharyngeal swallow/Decreased laryngeal elevation no swallow palpated/Absent laryngeal elevation/Absent trigger of swallow swallow initiation >30 secs/Delayed pharyngeal swallow/Decreased laryngeal elevation

## 2023-12-14 NOTE — PATIENT PROFILE ADULT - NSPROHMDIABETBLDGLCTARGET_GEN_A_NUR
[facility] MAN  [address] [address]  [phone]        April 21, 2023      Patient: Jeraime Ortiz  YOB: 1927  Date of Visit: 4/17/2023      For admission to inpatient hospice at [facility]  Referring Hilda Youngblood  Certifying Dr Tamia Polo MSW CSW  [phone]       unknown

## 2023-12-14 NOTE — H&P ADULT - PROBLEM SELECTOR PLAN 4
Pritesh juárez/w keppra - plan as above   - might need MRI of the pelvis w/ bone biopsy to r/o osteomyelitis   - wound care consult  - c/w cefepime and vanc K 5.9, s/p humulin 5, and d50 x1 in ED  - monitor K in the AM

## 2023-12-14 NOTE — ED ADULT NURSE REASSESSMENT NOTE - NS ED NURSE REASSESS COMMENT FT1
Report received from night shift RN. Pt awake in bed in no acute distress. Awaiting room assignment,. Safety maintained.

## 2023-12-14 NOTE — SWALLOW BEDSIDE ASSESSMENT ADULT - SWALLOW EVAL: DIAGNOSIS
Pt p/w s&s oropharyngeal dysphagia with neurogenic component c/b poor oral aperture (resulting in limited PO acceptance), impaired bolus formation, prolonged oral bolus holding (>1 min), oral stasis, significantly delayed initiation of swallow, and reduced hyolaryngeal elevation. Suspected premature spillage of bolus to the pharynx. Pt required oral care & suctioning to clear oral residue; oropharyngeal suctioning was administered, yielding copious phlegm/sputum. Pt is not a candidate for PO feeding at this time. +Malnutrition risk

## 2023-12-14 NOTE — CHART NOTE - NSCHARTNOTEFT_GEN_A_CORE
HPI on Admission: 68-year-old male, PMH of traumatic brain injury w/ seizure disorder, DM, pressure ulcer, sent from [facility] for reported fever and oxygen saturation in low 90s.  Patient unable to provide a viable history but states that he feels weak.  Denies any chest pain, trouble breathing, nausea or vomiting. Unable to assess mental status, patient not answering orientation questions , A&Ox0.     Mental status appears to be stable, able to respond to simple questions- yes and no, unable to offer further history    Vital Signs Last 24 Hrs  T(C): 37.6 (14 Dec 2023 12:42), Max: 38.3 (13 Dec 2023 20:10)  T(F): 99.6 (14 Dec 2023 12:42), Max: 101 (13 Dec 2023 20:10)  HR: 112 (14 Dec 2023 12:42) (81 - 148)  BP: 107/69 (14 Dec 2023 12:42) (102/68 - 118/77)  BP(mean): --  RR: 18 (14 Dec 2023 12:42) (18 - 23)  SpO2: 98% (14 Dec 2023 12:42) (96% - 99%)    Parameters below as of 14 Dec 2023 12:42  Patient On (Oxygen Delivery Method): room air  O2 Flow (L/min): 2    Physical Exam: AAOx?, heart regular tachy, lungs clear, diffuse muscle atrophy, temporal wasting, very poor dentition, lower extremity no edema, sacral wound    # Sepsis 2/2 Infected Sacral Ulcer and COVID and pneumonia  - continue cefepime and vancomycin  - vanc trough prior 4th dose  - started remdesivir and dexamethasone for hypoxic respiratory failure due to covid  - Need wound care cons to eval sacral wound +/- surgery    # Acute Hypoxic respiratory Failure   - Resolved  - continue abx and treatment for covid    # Lactic Acidosis  - Worsening today in the afternoon  - giving 500cc bolus over 5 hours and repeat lactate tonight    # LEATHA vs CKD  - improving, continue IVF hydration    # Hyperkalemia  - improved, continue routine monitoring    # Hx of DM  - will need strict glycemic control  - minimal po intake, will continue to monitor avoid hypoglycemia    # Hx of HTN  - Hold anti-hypertensive medications in setting of sepsis    # Hx of TBI, SAH  - Continue home medications Keppra    # Hx of Constipation  - Continue home medication Miralax, Senna    # DVT PPx  - heparin subcutaneous, can be changed to lovenox improvement of renal function    - Aspiration Precautions    FULL CODE    Patient case and management was discussed with ER Attending  I did examine all labs and imaging. HPI on Admission: 68-year-old male, PMH of traumatic brain injury w/ seizure disorder, DM, pressure ulcer, sent from [facility] for reported fever and oxygen saturation in low 90s.  Patient unable to provide a viable history but states that he feels weak.  Denies any chest pain, trouble breathing, nausea or vomiting. Unable to assess mental status, patient not answering orientation questions , A&Ox0.     Mental status appears to be stable, able to respond to simple questions- yes and no, unable to offer further history    Vital Signs Last 24 Hrs  T(C): 37.6 (14 Dec 2023 12:42), Max: 38.3 (13 Dec 2023 20:10)  T(F): 99.6 (14 Dec 2023 12:42), Max: 101 (13 Dec 2023 20:10)  HR: 112 (14 Dec 2023 12:42) (81 - 148)  BP: 107/69 (14 Dec 2023 12:42) (102/68 - 118/77)  BP(mean): --  RR: 18 (14 Dec 2023 12:42) (18 - 23)  SpO2: 98% (14 Dec 2023 12:42) (96% - 99%)    Parameters below as of 14 Dec 2023 12:42  Patient On (Oxygen Delivery Method): room air  O2 Flow (L/min): 2    Physical Exam: AAOx?, heart regular tachy, lungs clear, diffuse muscle atrophy, temporal wasting, very poor dentition, lower extremity no edema, sacral wound    # Sepsis 2/2 Infected Sacral Ulcer and COVID and pneumonia  - continue cefepime and vancomycin  - vanc trough prior 4th dose  - started remdesivir and dexamethasone for hypoxic respiratory failure due to covid  - Need wound care cons to eval sacral wound +/- surgery    # Acute Hypoxic respiratory Failure   - Resolved  - continue abx and treatment for covid    # Lactic Acidosis  - Worsening today in the afternoon  - giving 500cc bolus over 5 hours and repeat lactate tonight    # LEATHA vs CKD  - improving, continue IVF hydration    # Hyperkalemia  - improved, continue routine monitoring    # Hx of DM  - will need strict glycemic control  - minimal po intake, will continue to monitor avoid hypoglycemia    # Hx of HTN  - Hold anti-hypertensive medications in setting of sepsis    # Hx of TBI, SAH  - Continue home medications Keppra    # Hx of Constipation  - Continue home medication Miralax, Senna    # DVT PPx  - heparin subcutaneous, can be changed to lovenox improvement of renal function    - Aspiration Precautions    FULL CODE    Patient case and management was discussed with ER Attending  I did examine all labs and imaging.

## 2023-12-14 NOTE — H&P ADULT - ASSESSMENT
68-year-old male, PMH of traumatic brain injury, pressure ulcer, sent from [facility] for reported fever and oxygen saturation in low 90s, pt reporting lethargy/weakness. Vital signs sig for temp 101 F (resolved, 98.6F),  > 112, 97% on RA. EKG___, Labs sig for WC 14, Cr 1.5, lac 2.8>___. CXR shows questionable RML infiltrate. s/p cefepime 2g and 2.9 L in ED, d50 x1, humulin 5 u. Admitted for Sepsis 2/2 pneumonia.    68-year-old male, PMH of traumatic brain injury, pressure ulcer, sent from [facility] for reported fever and oxygen saturation in low 90s, pt reporting lethargy/weakness. Vital signs sig for temp 101 F (resolved, 98.6F),  > 112, 97% on RA. EKG___, Labs sig for WC 14, Cr 1.5, lac 2.8>___. CXR shows questionable RML infiltrate. s/p cefepime 2g and 2.9 L in ED, d50 x1, humulin 5 u. Admitted for Sepsis 2/2 pneumonia.    68-year-old male, PMH of traumatic brain injury w/ seizure disorder, DM, sacral decubitus ulcer, sent from [facility] for reported fever and oxygen saturation in low 90s. Pt reporting lethargy/weakness, A&Ox0. Vital signs sig for temp 101 F (resolved, 98.6F),  > 101, 97% on RA. EKG sinus tachycardia 106 HR, Labs sig for WC 14, Cr 1.5, lac 2.8. CXR shows questionable RML infiltrate. s/p cefepime 2g and 2.9 L in ED. COVID +. Admitted for Sepsis likely 2/2 sacral ulcer vs. pneumonia.    68-year-old male, PMH of traumatic brain injury w/ seizure disorder, DM, sacral decubitus ulcer, sent from [facility] for reported fever and oxygen saturation in low 90s. Pt reporting lethargy/weakness, A&Ox0. Vital signs sig for temp 101 F (resolved, 98.6F),  > 101, 97% on RA. EKG sinus tachycardia 106 HR, Labs sig for WC 14, Cr 1.5, lac 2.8. CXR shows questionable RML infiltrate. s/p cefepime 2g and 2.9 L in ED. COVID +. Admitted for Sepsis likely 2/2 sacral ulcer vs. pneumonia.    68-year-old male, PMH of traumatic brain injury w/ seizure disorder, DM, sacral decubitus ulcer, sent from [facility] for reported fever and oxygen saturation in low 90s. Pt reporting lethargy/weakness, A&Ox0. Vital signs sig for temp 101 F (resolved, 98.6F),  > 101, 97% on RA. EKG sinus tachycardia 106 HR, Labs sig for WC 14, Cr 1.5, lac 2.8, K 5.9. CXR shows questionable RML infiltrate. s/p cefepime 2g and 2.9 L in ED. COVID +. Admitted for Sepsis likely 2/2 sacral ulcer vs. pneumonia.    68-year-old male, PMH of traumatic brain injury w/ seizure disorder, DM, sacral decubitus ulcer, sent from [facility] for reported fever and oxygen saturation in low 90s. Pt reporting lethargy/weakness, A&Ox0. Vital signs sig for temp 101 F (resolved, 98.6F),  > 101, 97% on RA. EKG sinus tachycardia 106 HR, Labs sig for WC 14, Cr 1.5, lac 2.8, K 5.9. CXR shows questionable RML infiltrate. s/p cefepime 2g and 2.9 L in ED. COVID +. Admitted for Sepsis likely 2/2 sacral ulcer vs. pneumonia.

## 2023-12-14 NOTE — H&P ADULT - PROBLEM SELECTOR PROBLEM 2
MRI Contrast Discharge Instructions    The IV contrast you received today will pass out of your body in your  urine. This will happen in the next 24 hours. You will not feel this process.  Your urine will not change color.    Drink at least 4 extra glasses of water or juice today (unless your doctor  has restricted your fluids). This reduces the stress on your kidneys.  You may take your regular medicines.    If you are on dialysis: It is best to have dialysis today.    If you have a reaction: Most reactions happen right away. If you have  any new symptoms after leaving the hospital (such as hives or swelling),  call your hospital at the correct number below. Or call your family doctor.  If you have breathing distress or wheezing, call 911.    Special instructions: ***    I have read and understand the above information.    Signature:______________________________________ Date:___________    Staff:__________________________________________ Date:___________     Time:__________    [place] [facility] Departments:    ___[place]: [phone]  ___[facility]: [phone]  ___[place]: [phone] ___[facility]: [phone]  ___[facility]: [phone]  ___[facility]: [phone]  ___[phone]  ___[facility]: [phone]  ___[phone]   Sacral pressure ulcer Sacral decubitus ulcer LEATHA (acute kidney injury) Acute metabolic encephalopathy

## 2023-12-14 NOTE — H&P ADULT - PROBLEM SELECTOR PLAN 5
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

## 2023-12-14 NOTE — SWALLOW BEDSIDE ASSESSMENT ADULT - SWALLOW EVAL: ORAL MUSCULATURE
Attending Attestation (For Attendings USE Only)...
unable to assess due to poor participation/comprehension

## 2023-12-14 NOTE — PATIENT PROFILE ADULT - FUNCTIONAL ASSESSMENT - BASIC MOBILITY 6.
1-calculated by average/Not able to assess (calculate score using [facility] averaging method)  1-calculated by average/Not able to assess (calculate score using [facility] averaging method)

## 2023-12-14 NOTE — GOALS OF CARE CONVERSATION - ADVANCED CARE PLANNING - CONVERSATION DETAILS
Unable to reach emergency contact.  Retrieved brothers number from NH records.  Brother confirmed emergency contact in the system.  Discussed patients current diagnosis and his treatment plan.  Discussed goals of care.  Rambo spoke briefly with their mother who was also present at the time of the call and confirmed that patient to remain FULL CODE.  Support offered.  CIERA drafted and placed in chart.

## 2023-12-14 NOTE — H&P ADULT - PROBLEM SELECTOR PLAN 2
- plan as above   - might need MRI of the pelvis w/ bone biopsy to r/o osteomyelitis   - wound care consult  - c/w cefepime and vanc cr 1.5, unknown baseline  - likely pre-renal due to dehydration   - c/w IVF, montior Cr in AM - pt A&Ox0, unknown baseline  - possible acute metabolic encephalopathy 2/2 sepsis  - continue to monitor mental status

## 2023-12-14 NOTE — H&P ADULT - ATTENDING COMMENTS
IMAGING  Chest X-Ray  Pending official read; on my read there is a right perihilar infiltrate with increased vascular markings in right lung.    EKG  Sinus Tachycardia, 106bpm, QTc 441ms, AR 160ms, on my read no ST segment elevation or depression, TWI in aVL and V2, mildly peaked T-waves    HPI  68 year old male patient with pmhx traumatic brain injury, subarachnoid hemorrhage, sacral pressure ulcer, Constipation, DM, HTN, Dry eye syndrome of right lacrimal gland who presented to the ER from [facility] due to fever with O2 saturation in the low 90s.  Patient at bedside in the ER with 96% O2 saturation on room air.  In the ER, patient is SIRS positive with WBC count of 14.85, Fever 101.0F, Tachycardic 148 bpm, Tachypneic 23 bpm. Patient required 6 lpm nasal cannula oxygen, but able to be weaned down due to room air with 96% O2 saturation on pulse ox. Patient has a large sacral ulcer that looks infected. Lactic acid of 2.8. Potassium of 5.9 (non-hemolyzed). Creatinine of 1.50 with unknown baseline.    Review Of Systems included: + fever, + weakness,   no shortness of breath, no chest pain, no nausea, no vomiting, no diarrhea, no constipation, no dysuria, no cough    Physical Exam  General: Awake, Alert, Confused, Oriented x0  Cardiac: Tachycardic, regular rhythm  Pulmonary: CTA b/l  Abdominal: Soft, ND, NT  Back: Large Stage 4 pressure ulcer with purulent drainage  Extremities: No edema    A/P  # Sepsis 2/2 Infected Sacral Ulcer and COVID  # Acute Hypoxia - resolved  # Lactic Acidosis  Patient required 6 lpm Nasal Cannula and with tachypnea 23 bpm, now with good O2 saturation on room air  Lactic acid of 2.8  - IV Cefepime, IV Vancomycin  - Follow up blood cultures  - Follow up repeat lactic acid  - Consult Wound Care    # LEATHA vs CKD  Creatinine of 1.5 in the ER; unknown baseline  - IV Fluid Hydration    # Hyperkalemia  Potassium of 5.9  s/p D50W and 5 units IV regular insulin  - Follow up repeat serum potassium    # Hx of DM  Blood Glucose of 241 in the ER; on Metformin only as a home medication  - Insulin Sliding Scale  - Blood Glucose Monitoring  - Can monitor blood glucose for 24 hours before starting long acting insulin if needed    # Hx of HTN  - Hold anti-hypertensive medications in setting of sepsis    # Hx of TBI, SAH  - Continue home medications Keppra    # Hx of Constipation  - Continue home medication Miralax, Senna    # DVT PPx  - Lovenox    # FEN  - No Beef, No Pork, Diabetic, Low Sodium Soft-Solids/Thin Liquids (per nursing home records)  - Boost (low carb), Pro-Source supplements  - Monitor and replete electrolytes as needed  - IV Fluid Hydration  - Aspiration Precautions    FULL CODE    Patient case and management was discussed with ER Attending  I did examine all labs and imaging IMAGING  Chest X-Ray  Pending official read; on my read there is a right perihilar infiltrate with increased vascular markings in right lung.    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  Patient at bedside in the ER with 96% O2 saturation on room air.  In the ER, patient is SIRS positive with WBC count of 14.85, Fever 101.0F, Tachycardic 148 bpm, Tachypneic 23 bpm. Patient required 6 lpm nasal cannula oxygen, but able to be weaned down due to room air with 96% O2 saturation on pulse ox. Patient has a large sacral ulcer that looks infected. Lactic acid of 2.8. Potassium of 5.9 (non-hemolyzed). Creatinine of 1.50 with unknown baseline.    Review Of Systems included: + fever, + weakness,   no shortness of breath, no chest pain, no nausea, no vomiting, no diarrhea, no constipation, no dysuria, no cough    Physical Exam  General: Awake, Alert, Confused, Oriented x0  Cardiac: Tachycardic, regular rhythm  Pulmonary: CTA b/l  Abdominal: Soft, ND, NT  Back: Large Stage 4 pressure ulcer with purulent drainage  Extremities: No edema    A/P  # Sepsis 2/2 Infected Sacral Ulcer and COVID  # Acute Hypoxia - resolved  # Lactic Acidosis  Patient required 6 lpm Nasal Cannula and with tachypnea 23 bpm, now with good O2 saturation on room air  Lactic acid of 2.8  - IV Cefepime, IV Vancomycin  - Follow up blood cultures  - Follow up repeat lactic acid  - Consult Wound Care    # LEATHA vs CKD  Creatinine of 1.5 in the ER; unknown baseline  - IV Fluid Hydration    # Hyperkalemia  Potassium of 5.9  s/p D50W and 5 units IV regular insulin  - Follow up repeat serum potassium    # Hx of DM  Blood Glucose of 241 in the ER; on Metformin only as a home medication  - Insulin Sliding Scale  - Blood Glucose Monitoring  - Can monitor blood glucose for 24 hours before starting long acting insulin if needed    # Hx of HTN  - Hold anti-hypertensive medications in setting of sepsis    # Hx of TBI, SAH  - Continue home medications Keppra    # Hx of Constipation  - Continue home medication Miralax, Senna    # DVT PPx  - Lovenox    # FEN  - No Beef, No Pork, Diabetic, Low Sodium Soft-Solids/Thin Liquids (per nursing home records)  - Boost (low carb), Pro-Source supplements  - Monitor and replete electrolytes as needed  - IV Fluid Hydration  - Aspiration Precautions    FULL CODE    Patient case and management was discussed with ER Attending  I did examine all labs and imaging

## 2023-12-15 DIAGNOSIS — D64.9 ANEMIA, UNSPECIFIED: ICD-10-CM

## 2023-12-15 DIAGNOSIS — Z01.818 ENCOUNTER FOR OTHER PREPROCEDURAL EXAMINATION: ICD-10-CM

## 2023-12-15 LAB
ANION GAP SERPL CALC-SCNC: 7 MMOL/L — SIGNIFICANT CHANGE UP (ref 5–17)
ANION GAP SERPL CALC-SCNC: 7 MMOL/L — SIGNIFICANT CHANGE UP (ref 5–17)
BLD GP AB SCN SERPL QL: SIGNIFICANT CHANGE UP
BLD GP AB SCN SERPL QL: SIGNIFICANT CHANGE UP
BUN SERPL-MCNC: 26 MG/DL — HIGH (ref 7–18)
BUN SERPL-MCNC: 26 MG/DL — HIGH (ref 7–18)
CALCIUM SERPL-MCNC: 8.4 MG/DL — SIGNIFICANT CHANGE UP (ref 8.4–10.5)
CALCIUM SERPL-MCNC: 8.4 MG/DL — SIGNIFICANT CHANGE UP (ref 8.4–10.5)
CHLORIDE SERPL-SCNC: 107 MMOL/L — SIGNIFICANT CHANGE UP (ref 96–108)
CHLORIDE SERPL-SCNC: 107 MMOL/L — SIGNIFICANT CHANGE UP (ref 96–108)
CO2 SERPL-SCNC: 24 MMOL/L — SIGNIFICANT CHANGE UP (ref 22–31)
CO2 SERPL-SCNC: 24 MMOL/L — SIGNIFICANT CHANGE UP (ref 22–31)
CREAT SERPL-MCNC: 1 MG/DL — SIGNIFICANT CHANGE UP (ref 0.5–1.3)
CREAT SERPL-MCNC: 1 MG/DL — SIGNIFICANT CHANGE UP (ref 0.5–1.3)
CULTURE RESULTS: NO GROWTH — SIGNIFICANT CHANGE UP
CULTURE RESULTS: NO GROWTH — SIGNIFICANT CHANGE UP
EGFR: 83 ML/MIN/1.73M2 — SIGNIFICANT CHANGE UP
EGFR: 83 ML/MIN/1.73M2 — SIGNIFICANT CHANGE UP
GLUCOSE BLDC GLUCOMTR-MCNC: 123 MG/DL — HIGH (ref 70–99)
GLUCOSE BLDC GLUCOMTR-MCNC: 123 MG/DL — HIGH (ref 70–99)
GLUCOSE BLDC GLUCOMTR-MCNC: 131 MG/DL — HIGH (ref 70–99)
GLUCOSE BLDC GLUCOMTR-MCNC: 131 MG/DL — HIGH (ref 70–99)
GLUCOSE BLDC GLUCOMTR-MCNC: 162 MG/DL — HIGH (ref 70–99)
GLUCOSE BLDC GLUCOMTR-MCNC: 162 MG/DL — HIGH (ref 70–99)
GLUCOSE BLDC GLUCOMTR-MCNC: 174 MG/DL — HIGH (ref 70–99)
GLUCOSE BLDC GLUCOMTR-MCNC: 174 MG/DL — HIGH (ref 70–99)
GLUCOSE BLDC GLUCOMTR-MCNC: 226 MG/DL — HIGH (ref 70–99)
GLUCOSE BLDC GLUCOMTR-MCNC: 226 MG/DL — HIGH (ref 70–99)
GLUCOSE SERPL-MCNC: 175 MG/DL — HIGH (ref 70–99)
GLUCOSE SERPL-MCNC: 175 MG/DL — HIGH (ref 70–99)
HCT VFR BLD CALC: 22.1 % — LOW (ref 39–50)
HCT VFR BLD CALC: 22.1 % — LOW (ref 39–50)
HCT VFR BLD CALC: 24.2 % — LOW (ref 39–50)
HCT VFR BLD CALC: 24.2 % — LOW (ref 39–50)
HCV AB S/CO SERPL IA: 0.11 S/CO — SIGNIFICANT CHANGE UP (ref 0–0.99)
HCV AB S/CO SERPL IA: 0.11 S/CO — SIGNIFICANT CHANGE UP (ref 0–0.99)
HCV AB SERPL-IMP: SIGNIFICANT CHANGE UP
HCV AB SERPL-IMP: SIGNIFICANT CHANGE UP
HGB BLD-MCNC: 7.2 G/DL — LOW (ref 13–17)
HGB BLD-MCNC: 7.2 G/DL — LOW (ref 13–17)
HGB BLD-MCNC: 7.9 G/DL — LOW (ref 13–17)
HGB BLD-MCNC: 7.9 G/DL — LOW (ref 13–17)
MAGNESIUM SERPL-MCNC: 1.8 MG/DL — SIGNIFICANT CHANGE UP (ref 1.6–2.6)
MAGNESIUM SERPL-MCNC: 1.8 MG/DL — SIGNIFICANT CHANGE UP (ref 1.6–2.6)
MCHC RBC-ENTMCNC: 25.1 PG — LOW (ref 27–34)
MCHC RBC-ENTMCNC: 25.1 PG — LOW (ref 27–34)
MCHC RBC-ENTMCNC: 25.2 PG — LOW (ref 27–34)
MCHC RBC-ENTMCNC: 25.2 PG — LOW (ref 27–34)
MCHC RBC-ENTMCNC: 32.6 GM/DL — SIGNIFICANT CHANGE UP (ref 32–36)
MCV RBC AUTO: 76.8 FL — LOW (ref 80–100)
MCV RBC AUTO: 76.8 FL — LOW (ref 80–100)
MCV RBC AUTO: 77.3 FL — LOW (ref 80–100)
MCV RBC AUTO: 77.3 FL — LOW (ref 80–100)
MRSA PCR RESULT.: SIGNIFICANT CHANGE UP
MRSA PCR RESULT.: SIGNIFICANT CHANGE UP
NRBC # BLD: 0 /100 WBCS — SIGNIFICANT CHANGE UP (ref 0–0)
PHOSPHATE SERPL-MCNC: 3.2 MG/DL — SIGNIFICANT CHANGE UP (ref 2.5–4.5)
PHOSPHATE SERPL-MCNC: 3.2 MG/DL — SIGNIFICANT CHANGE UP (ref 2.5–4.5)
PLATELET # BLD AUTO: 547 K/UL — HIGH (ref 150–400)
PLATELET # BLD AUTO: 547 K/UL — HIGH (ref 150–400)
PLATELET # BLD AUTO: 559 K/UL — HIGH (ref 150–400)
PLATELET # BLD AUTO: 559 K/UL — HIGH (ref 150–400)
POTASSIUM SERPL-MCNC: 3.9 MMOL/L — SIGNIFICANT CHANGE UP (ref 3.5–5.3)
POTASSIUM SERPL-MCNC: 3.9 MMOL/L — SIGNIFICANT CHANGE UP (ref 3.5–5.3)
POTASSIUM SERPL-SCNC: 3.9 MMOL/L — SIGNIFICANT CHANGE UP (ref 3.5–5.3)
POTASSIUM SERPL-SCNC: 3.9 MMOL/L — SIGNIFICANT CHANGE UP (ref 3.5–5.3)
RBC # BLD: 2.86 M/UL — LOW (ref 4.2–5.8)
RBC # BLD: 2.86 M/UL — LOW (ref 4.2–5.8)
RBC # BLD: 3.15 M/UL — LOW (ref 4.2–5.8)
RBC # BLD: 3.15 M/UL — LOW (ref 4.2–5.8)
RBC # FLD: 15.5 % — HIGH (ref 10.3–14.5)
RBC # FLD: 15.5 % — HIGH (ref 10.3–14.5)
RBC # FLD: 15.9 % — HIGH (ref 10.3–14.5)
RBC # FLD: 15.9 % — HIGH (ref 10.3–14.5)
S AUREUS DNA NOSE QL NAA+PROBE: DETECTED
S AUREUS DNA NOSE QL NAA+PROBE: DETECTED
SODIUM SERPL-SCNC: 138 MMOL/L — SIGNIFICANT CHANGE UP (ref 135–145)
SODIUM SERPL-SCNC: 138 MMOL/L — SIGNIFICANT CHANGE UP (ref 135–145)
SPECIMEN SOURCE: SIGNIFICANT CHANGE UP
SPECIMEN SOURCE: SIGNIFICANT CHANGE UP
VANCOMYCIN TROUGH SERPL-MCNC: 18.1 UG/ML — SIGNIFICANT CHANGE UP (ref 10–20)
VANCOMYCIN TROUGH SERPL-MCNC: 18.1 UG/ML — SIGNIFICANT CHANGE UP (ref 10–20)
WBC # BLD: 18.21 K/UL — HIGH (ref 3.8–10.5)
WBC # BLD: 18.21 K/UL — HIGH (ref 3.8–10.5)
WBC # BLD: 18.54 K/UL — HIGH (ref 3.8–10.5)
WBC # BLD: 18.54 K/UL — HIGH (ref 3.8–10.5)
WBC # FLD AUTO: 18.21 K/UL — HIGH (ref 3.8–10.5)
WBC # FLD AUTO: 18.21 K/UL — HIGH (ref 3.8–10.5)
WBC # FLD AUTO: 18.54 K/UL — HIGH (ref 3.8–10.5)
WBC # FLD AUTO: 18.54 K/UL — HIGH (ref 3.8–10.5)

## 2023-12-15 PROCEDURE — 99233 SBSQ HOSP IP/OBS HIGH 50: CPT | Mod: GC

## 2023-12-15 RX ORDER — METRONIDAZOLE 500 MG
500 TABLET ORAL ONCE
Refills: 0 | Status: COMPLETED | OUTPATIENT
Start: 2023-12-15 | End: 2023-12-15

## 2023-12-15 RX ORDER — SODIUM HYPOCHLORITE 0.125 %
1 SOLUTION, NON-ORAL MISCELLANEOUS DAILY
Refills: 0 | Status: DISCONTINUED | OUTPATIENT
Start: 2023-12-15 | End: 2023-12-23

## 2023-12-15 RX ORDER — METRONIDAZOLE 500 MG
TABLET ORAL
Refills: 0 | Status: DISCONTINUED | OUTPATIENT
Start: 2023-12-15 | End: 2023-12-22

## 2023-12-15 RX ORDER — METRONIDAZOLE 500 MG
500 TABLET ORAL EVERY 8 HOURS
Refills: 0 | Status: DISCONTINUED | OUTPATIENT
Start: 2023-12-15 | End: 2023-12-22

## 2023-12-15 RX ADMIN — LEVETIRACETAM 750 MILLIGRAM(S): 250 TABLET, FILM COATED ORAL at 18:41

## 2023-12-15 RX ADMIN — Medication 1 APPLICATION(S): at 22:01

## 2023-12-15 RX ADMIN — Medication 100 MILLIGRAM(S): at 12:37

## 2023-12-15 RX ADMIN — LEVETIRACETAM 750 MILLIGRAM(S): 250 TABLET, FILM COATED ORAL at 05:52

## 2023-12-15 RX ADMIN — INSULIN GLARGINE 12 UNIT(S): 100 INJECTION, SOLUTION SUBCUTANEOUS at 22:01

## 2023-12-15 RX ADMIN — Medication 6 MILLIGRAM(S): at 05:52

## 2023-12-15 RX ADMIN — HEPARIN SODIUM 5000 UNIT(S): 5000 INJECTION INTRAVENOUS; SUBCUTANEOUS at 05:52

## 2023-12-15 RX ADMIN — REMDESIVIR 200 MILLIGRAM(S): 5 INJECTION INTRAVENOUS at 14:58

## 2023-12-15 RX ADMIN — Medication 1 APPLICATION(S): at 13:36

## 2023-12-15 RX ADMIN — Medication 2: at 12:47

## 2023-12-15 RX ADMIN — Medication 4: at 08:11

## 2023-12-15 RX ADMIN — SENNA PLUS 2 TABLET(S): 8.6 TABLET ORAL at 22:01

## 2023-12-15 RX ADMIN — Medication 1 TABLET(S): at 12:38

## 2023-12-15 RX ADMIN — HEPARIN SODIUM 5000 UNIT(S): 5000 INJECTION INTRAVENOUS; SUBCUTANEOUS at 13:35

## 2023-12-15 RX ADMIN — Medication 250 MILLIGRAM(S): at 07:31

## 2023-12-15 RX ADMIN — Medication 500 MILLIGRAM(S): at 12:38

## 2023-12-15 RX ADMIN — Medication 100 MILLIGRAM(S): at 22:45

## 2023-12-15 RX ADMIN — CEFEPIME 100 MILLIGRAM(S): 1 INJECTION, POWDER, FOR SOLUTION INTRAMUSCULAR; INTRAVENOUS at 08:10

## 2023-12-15 RX ADMIN — HEPARIN SODIUM 5000 UNIT(S): 5000 INJECTION INTRAVENOUS; SUBCUTANEOUS at 22:01

## 2023-12-15 RX ADMIN — Medication 250 MILLIGRAM(S): at 18:41

## 2023-12-15 RX ADMIN — Medication 1 APPLICATION(S): at 05:51

## 2023-12-15 RX ADMIN — CEFEPIME 100 MILLIGRAM(S): 1 INJECTION, POWDER, FOR SOLUTION INTRAMUSCULAR; INTRAVENOUS at 22:00

## 2023-12-15 RX ADMIN — POLYETHYLENE GLYCOL 3350 17 GRAM(S): 17 POWDER, FOR SOLUTION ORAL at 05:52

## 2023-12-15 RX ADMIN — LACTULOSE 10 GRAM(S): 10 SOLUTION ORAL at 14:58

## 2023-12-15 NOTE — CONSULT NOTE ADULT - SUBJECTIVE AND OBJECTIVE BOX
HPI:  68-year-old male, PMH of traumatic brain injury w/ seizure disorder, DM, pressure ulcer, sent from [facility] for reported fever and oxygen saturation in low 90s.  Patient unable to provide a viable history but states that he feels weak.  Denies any chest pain, trouble breathing, nausea or vomiting. Unable to assess mental status, patient not answering orientation questions , A&Ox0.  (14 Dec 2023 02:22)    Patient seen and examined at bedside for surgical consult for evaluation of sacral decubitus ulcer. On isolation for COVID PNA.     PAST MEDICAL & SURGICAL HISTORY:  TBI (traumatic brain injury)  Seizure disorder  DM (diabetes mellitus)    Review of Systems: Contained within HPI    MEDICATIONS  (STANDING):  ascorbic acid 500 milliGRAM(s) Oral daily  cefepime   IVPB 1000 milliGRAM(s) IV Intermittent every 12 hours  collagenase Ointment 1 Application(s) Topical every 8 hours  dexAMETHasone  Injectable 6 milliGRAM(s) IV Push daily  dextrose 50% Injectable 25 Gram(s) IV Push once  heparin   Injectable 5000 Unit(s) SubCutaneous every 8 hours  insulin glargine Injectable (LANTUS) 12 Unit(s) SubCutaneous at bedtime  insulin lispro (ADMELOG) corrective regimen sliding scale   SubCutaneous three times a day before meals  insulin lispro (ADMELOG) corrective regimen sliding scale   SubCutaneous at bedtime  lactulose Syrup 10 Gram(s) Oral daily  levETIRAcetam 750 milliGRAM(s) Oral two times a day  metroNIDAZOLE  IVPB      metroNIDAZOLE  IVPB 500 milliGRAM(s) IV Intermittent once  metroNIDAZOLE  IVPB 500 milliGRAM(s) IV Intermittent every 8 hours  multivitamin 1 Tablet(s) Oral daily  polyethylene glycol 3350 17 Gram(s) Oral two times a day  remdesivir  IVPB 100 milliGRAM(s) IV Intermittent every 24 hours  remdesivir  IVPB   IV Intermittent   senna 2 Tablet(s) Oral at bedtime  sodium chloride 0.9%. 1000 milliLiter(s) (100 mL/Hr) IV Continuous <Continuous>  vancomycin  IVPB 1000 milliGRAM(s) IV Intermittent every 12 hours  vancomycin  IVPB        MEDICATIONS  (PRN):  acetaminophen     Tablet .. 650 milliGRAM(s) Oral every 6 hours PRN Mild Pain (1 - 3)    Allergies: No Known Allergies  FAMILY HISTORY:    Vital Signs Last 24 Hrs  T(C): 37.3 (15 Dec 2023 04:43), Max: 38 (14 Dec 2023 22:28)  T(F): 99.1 (15 Dec 2023 04:43), Max: 100.4 (14 Dec 2023 22:28)  HR: 98 (15 Dec 2023 04:43) (98 - 112)  BP: 108/63 (15 Dec 2023 04:43) (90/62 - 108/63)  BP(mean): 72 (14 Dec 2023 22:28) (72 - 72)  RR: 18 (15 Dec 2023 04:43) (17 - 18)  SpO2: 95% (15 Dec 2023 04:43) (92% - 98%)  Parameters below as of 15 Dec 2023 04:43  Patient On (Oxygen Delivery Method): nasal cannula  O2 Flow (L/min): 2    Physical Exam:  General:  Appears stated age, well-groomed, well-nourished, no distress  Eyes: EOMI  HENT:  WNL, no JVD  Chest: respirations nonlabored  Cardiovascular:  Regular rate & rhythm  Abdomen: soft, NT/ND  BACK: There is a Stage 4 Pressure Injury to the Coccyx (3.5cm x 2.5cm x 1cm) with necrotic tissue, bone, moderate purulent drainage, strong odor, and periwound maceration  Erythema of the overlying skin which extends superiorly to the mid back and inferiorly. Wound with undermining from 5oclock to 7oclock position. Purulent drainage expressed from inferior and superior portion of the wound.   Extremities: no edema bilaterally  Skin: warm and dry  Musculoskeletal: no calf tenderness  Neuro:  Alert  Psych: normal affect    LABS:                        7.9    18.54 )-----------( 547      ( 15 Dec 2023 09:50 )             24.2     12-15    138  |  107  |  26<H>  ----------------------------<  175<H>  3.9   |  24  |  1.00    Ca    8.4      15 Dec 2023 05:49  Phos  3.2     12-15  Mg     1.8     12-15    TPro  7.9  /  Alb  1.7<L>  /  TBili  0.3  /  DBili  x   /  AST  11  /  ALT  15  /  AlkPhos  73  12-14    PT/INR - ( 13 Dec 2023 21:25 )   PT: 14.1 sec;   INR: 1.24 ratio       PTT - ( 13 Dec 2023 21:25 )  PTT:33.5 sec  Urinalysis Basic - ( 15 Dec 2023 05:49 )    Color: x / Appearance: x / SG: x / pH: x  Gluc: 175 mg/dL / Ketone: x  / Bili: x / Urobili: x   Blood: x / Protein: x / Nitrite: x   Leuk Esterase: x / RBC: x / WBC x   Sq Epi: x / Non Sq Epi: x / Bacteria: x    RADIOLOGY & ADDITIONAL STUDIES:  < from: CT Abdomen and Pelvis No Cont (12.14.23 @ 18:33) >  IMPRESSION:  Complex extensive sacral decubitus ulcer with fragmentation/osteomyelitis   of the the coccyx. Locules of subcutaneous gas extending from the   decubitus ulcer superiorly to the soft tissues of the lower back at the   level of the lower lumbosacral spine.    Bibasilar pulmonary opacities, which likely reflect known Covid pneumonia.    Large amount stool in the rectum and colon.    --- End of Report ---    MONCHO DOLAN MD; Attending Radiologist  This document has been electronically signed. Dec 14 2023  7:43PM    < end of copied text >   HPI:  68-year-old male, PMH of traumatic brain injury w/ seizure disorder, DM, pressure ulcer, sent from [facility] for reported fever and oxygen saturation in low 90s.  Patient unable to provide a viable history but states that he feels weak.  Denies any chest pain, trouble breathing, nausea or vomiting. Unable to assess mental status, patient not answering orientation questions , A&Ox0.  (14 Dec 2023 02:22)    Patient seen and examined at bedside for surgical consult for evaluation of sacral decubitus ulcer. On isolation for COVID PNA.     PAST MEDICAL & SURGICAL HISTORY:  TBI (traumatic brain injury)  Seizure disorder  DM (diabetes mellitus)    Review of Systems: Contained within HPI    MEDICATIONS  (STANDING):  ascorbic acid 500 milliGRAM(s) Oral daily  cefepime   IVPB 1000 milliGRAM(s) IV Intermittent every 12 hours  collagenase Ointment 1 Application(s) Topical every 8 hours  dexAMETHasone  Injectable 6 milliGRAM(s) IV Push daily  dextrose 50% Injectable 25 Gram(s) IV Push once  heparin   Injectable 5000 Unit(s) SubCutaneous every 8 hours  insulin glargine Injectable (LANTUS) 12 Unit(s) SubCutaneous at bedtime  insulin lispro (ADMELOG) corrective regimen sliding scale   SubCutaneous three times a day before meals  insulin lispro (ADMELOG) corrective regimen sliding scale   SubCutaneous at bedtime  lactulose Syrup 10 Gram(s) Oral daily  levETIRAcetam 750 milliGRAM(s) Oral two times a day  metroNIDAZOLE  IVPB      metroNIDAZOLE  IVPB 500 milliGRAM(s) IV Intermittent once  metroNIDAZOLE  IVPB 500 milliGRAM(s) IV Intermittent every 8 hours  multivitamin 1 Tablet(s) Oral daily  polyethylene glycol 3350 17 Gram(s) Oral two times a day  remdesivir  IVPB 100 milliGRAM(s) IV Intermittent every 24 hours  remdesivir  IVPB   IV Intermittent   senna 2 Tablet(s) Oral at bedtime  sodium chloride 0.9%. 1000 milliLiter(s) (100 mL/Hr) IV Continuous <Continuous>  vancomycin  IVPB 1000 milliGRAM(s) IV Intermittent every 12 hours  vancomycin  IVPB        MEDICATIONS  (PRN):  acetaminophen     Tablet .. 650 milliGRAM(s) Oral every 6 hours PRN Mild Pain (1 - 3)    Allergies: No Known Allergies  FAMILY HISTORY:    Vital Signs Last 24 Hrs  T(C): 37.3 (15 Dec 2023 04:43), Max: 38 (14 Dec 2023 22:28)  T(F): 99.1 (15 Dec 2023 04:43), Max: 100.4 (14 Dec 2023 22:28)  HR: 98 (15 Dec 2023 04:43) (98 - 112)  BP: 108/63 (15 Dec 2023 04:43) (90/62 - 108/63)  BP(mean): 72 (14 Dec 2023 22:28) (72 - 72)  RR: 18 (15 Dec 2023 04:43) (17 - 18)  SpO2: 95% (15 Dec 2023 04:43) (92% - 98%)  Parameters below as of 15 Dec 2023 04:43  Patient On (Oxygen Delivery Method): nasal cannula  O2 Flow (L/min): 2    Physical Exam:  General:  Appears stated age, well-groomed, well-nourished, no distress  Eyes: EOMI  HENT:  WNL, no JVD  Chest: respirations nonlabored  Cardiovascular:  Regular rate & rhythm  Abdomen: soft, NT/ND  BACK: There is a Stage 4 Pressure Injury to the Coccyx (3.5cm x 2.5cm x 1cm) with necrotic tissue, bone, moderate purulent drainage, strong odor, and periwound maceration  Erythema of the overlying skin which extends superiorly to the mid back and inferiorly. Wound with undermining from 5oclock to 7oclock position. Purulent drainage expressed from inferior and superior portion of the wound.   Extremities: no edema bilaterally  Skin: warm and dry  Musculoskeletal: no calf tenderness  Neuro:  Alert  Psych: normal affect    LABS:                        7.9    18.54 )-----------( 547      ( 15 Dec 2023 09:50 )             24.2     12-15    138  |  107  |  26<H>  ----------------------------<  175<H>  3.9   |  24  |  1.00    Ca    8.4      15 Dec 2023 05:49  Phos  3.2     12-15  Mg     1.8     12-15    TPro  7.9  /  Alb  1.7<L>  /  TBili  0.3  /  DBili  x   /  AST  11  /  ALT  15  /  AlkPhos  73  12-14    PT/INR - ( 13 Dec 2023 21:25 )   PT: 14.1 sec;   INR: 1.24 ratio       PTT - ( 13 Dec 2023 21:25 )  PTT:33.5 sec  Urinalysis Basic - ( 15 Dec 2023 05:49 )    Color: x / Appearance: x / SG: x / pH: x  Gluc: 175 mg/dL / Ketone: x  / Bili: x / Urobili: x   Blood: x / Protein: x / Nitrite: x   Leuk Esterase: x / RBC: x / WBC x   Sq Epi: x / Non Sq Epi: x / Bacteria: x    RADIOLOGY & ADDITIONAL STUDIES:  < from: CT Abdomen and Pelvis No Cont (12.14.23 @ 18:33) >  IMPRESSION:  Complex extensive sacral decubitus ulcer with fragmentation/osteomyelitis   of the the coccyx. Locules of subcutaneous gas extending from the   decubitus ulcer superiorly to the soft tissues of the lower back at the   level of the lower lumbosacral spine.    Bibasilar pulmonary opacities, which likely reflect known Covid pneumonia.    Large amount stool in the rectum and colon.    --- End of Report ---    MONCHO DOLAN MD; Attending Radiologist  This document has been electronically signed. Dec 14 2023  7:43PM    < end of copied text >

## 2023-12-15 NOTE — PROGRESS NOTE ADULT - ASSESSMENT
Pneumonia ( ? bacterial)  COVID - 19 infection with hypoxia  Infected Sacral Decubitus ulcer  Leukocytosis  Fevers    Plan - Cont Vancomycin 1gm iv q12hrs  Cont Maxipime 1gm iv q12hrs add flagyl 500mgs iv q8hrs  Cont Remdesivir 100mgs iv q24hrs  Cont Decadron 6mgs iv q24hrs.   Pneumonia ( ? bacterial)  COVID - 19 infection with hypoxia  Infected Sacral Decubitus ulcer  Leukocytosis  Fevers  Osteomyelitis of coccyx    Plan - Cont Vancomycin 1gm iv q12hrs  Cont Maxipime 1gm iv q12hrs add flagyl 500mgs iv q8hrs  Cont Remdesivir 100mgs iv q24hrs  Cont Decadron 6mgs iv q24hrs.  Patient will need a PICC line and IV antibiotics for 6 weeks to treat for osteomyelitis   Pneumonia ( ? bacterial)  COVID - 19 infection with hypoxia  Infected Sacral Decubitus ulcer  Leukocytosis  Fevers  Osteomyelitis of coccyx    Plan - Cont Vancomycin 1gm iv q12hrs  Cont Maxipime 1gm iv q12hrs   add flagyl 500mgs iv q8hrs  Cont Remdesivir 100mgs iv q24hrs  Cont Decadron 6mgs iv q24hrs.  Patient will need a PICC line and IV antibiotics for 6 weeks to treat for osteomyelitis

## 2023-12-15 NOTE — PROGRESS NOTE ADULT - ASSESSMENT
68-year-old male, PMH of traumatic brain injury w/ seizure disorder, DM, sacral decubitus ulcer, sent from [facility] for reported fever and oxygen saturation in low 90s. Pt reporting lethargy/weakness, A&Ox0. Vital signs sig for temp 101 F (resolved, 98.6F),  > 101, 97% on RA. EKG sinus tachycardia 106 HR, Labs sig for WC 14, Cr 1.5, lac 2.8, K 5.9. CXR shows questionable RML infiltrate. s/p cefepime 2g and 2.9 L in ED. COVID +. Admitted for Sepsis likely 2/2 sacral ulcer vs. pneumonia.    68-year-old male, PMH of traumatic brain injury w/ seizure disorder, DM, sacral decubitus ulcer, sent from [facility] for reported fever and oxygen saturation in low 90s. Pt reporting lethargy/weakness, A&Ox0. Vital signs sig for temp 101 F (resolved, 98.6F),  > 101, 97% on RA. EKG sinus tachycardia 106 HR, Labs sig for WC 14, Cr 1.5, lac 2.8, K 5.9. CXR shows questionable RML infiltrate. s/p cefepime 2g and 2.9 L in ED. COVID +. Admitted for Sepsis likely 2/2 sacral ulcer vs. pneumonia.    68-year-old male, PMH of traumatic brain injury w/ seizure disorder, DM, sacral decubitus ulcer, sent from [facility] for reported fever and oxygen saturation in low 90s. Pt reporting lethargy/weakness, A&Ox0. Vital signs sig for temp 101 F (resolved, 98.6F),  > 101, 97% on RA. EKG sinus tachycardia 106 HR, Labs sig for WC 14, Cr 1.5, lac 2.8, K 5.9. CXR shows questionable RML infiltrate. s/p cefepime 2g and 2.9 L in ED. COVID +. Admitted for Sepsis likely 2/2 sacral ulcer vs. pneumonia.     12/15 flagyl added to vanc/cefepime for anaerobic coverage. surgery will see pt for gas from decub ulcer. Also with osteo of coccyx. Also with likely covid pna, receiving remdesivir thru 12/18 and decadron through 12/24.          68-year-old male, PMH of traumatic brain injury w/ seizure disorder, DM, sacral decubitus ulcer, sent from [facility] for reported fever and oxygen saturation in low 90s. Pt reporting lethargy/weakness, A&Ox0. Vital signs sig for temp 101 F (resolved, 98.6F),  > 101, 97% on RA. EKG sinus tachycardia 106 HR, Labs sig for WC 14, Cr 1.5, lac 2.8, K 5.9. CXR shows questionable RML infiltrate. s/p cefepime 2g and 2.9 L in ED. COVID +. Admitted for Sepsis likely 2/2 sacral ulcer vs. pneumonia.     12/15 flagyl added to vanc/cefepime for anaerobic coverage. surgery will see pt for gas from decub ulcer. Also with osteo of coccyx. Also with likely covid pna, receiving remdesivir thru 12/18 and decadron through 12/24.

## 2023-12-15 NOTE — PROGRESS NOTE ADULT - PROBLEM SELECTOR PLAN 9
- t2DM on metformin   - blood sugars 200s  - will start lantus 9u, 3 TID   - ISS fs qhs and before meals - hx of TBI in the past with seizure disorder  - c/w keppra

## 2023-12-15 NOTE — PROGRESS NOTE ADULT - PROBLEM SELECTOR PLAN 6
- plan as above   - might need MRI of the pelvis w/ bone biopsy to r/o osteomyelitis   - wound care consult  - c/w cefepime and vanc cr 1.5, unknown baseline  - likely pre-renal due to dehydration   resolved

## 2023-12-15 NOTE — PROGRESS NOTE ADULT - PROBLEM SELECTOR PLAN 1
- sent from [facility] for reported fever and oxygen saturation in low 90s.   - Pt reporting lethargy/weakness, A&Ox0, weeping purulent sacral ulcer noticed on exam   - Vital signs sig for temp 101 F (resolved, 98.6F),  > 101, 97% on RA, intermittently hypoxic to low 90s on RA   - EKG sinus tachycardia 106 HR  - Labs sig for WC 14, Cr 1.5, lac 2.8  - RVP - covid positive , U/A negative   - CXR shows questionable RML infiltrate  - s/p cefepime 2g and 2.9 L in ED  - Admitted for Sepsis 2/2 pneumonia vs. infected sacral ulcer vs. COVID   - f/u blood cultures, urine cultures   - c/w cefepime and vanc   - IVF for maintenance - sent from [facility] for reported fever and oxygen saturation in low 90s.   - Pt reporting lethargy/weakness, A&Ox0, weeping purulent sacral ulcer noticed on exam   - Vital signs sig for temp 101 F (resolved, 98.6F),  > 101, 97% on RA, intermittently hypoxic to low 90s on RA   - EKG sinus tachycardia 106 HR  - Labs sig for WC 14, Cr 1.5, lac 2.8  - RVP - covid positive , U/A negative   - CXR shows questionable RML infiltrate  - s/p cefepime 2g and 2.9 L in ED  - Admitted for Sepsis 2/2 pneumonia vs. infected sacral ulcer vs. COVID   - f/u blood cultures, urine cultures   - c/w cefepime and vanc   - IVF for maintenance RCRI: class 1 risk   Croft: .3% risk  bedbound with sinus tachycardia  low to moderate risk for a moderate risk procedure

## 2023-12-15 NOTE — CONSULT NOTE ADULT - ASSESSMENT
68 year old male with large sacral decubitus ulcer with osteomyelitis of the coccyx. Subcutaneous gas likely due to tunneling and undermining of the decubitus ulcer  Patient also COVI19+   Leukocytosis, febrile    - continue IV antibiotics  - aggressive wound care with packing and collagenase  - frequent repositioning and offloading  - will discuss with Dr. Grewal  - updated plan to follow     this note and its recommendations are preliminary until cosigned by an attending   68 year old male with large sacral decubitus ulcer with osteomyelitis of the coccyx. Subcutaneous gas likely due to tunneling and undermining of the decubitus ulcer  Patient also COVI19+   Leukocytosis, febrile    - continue IV antibiotics  - aggressive wound care with packing and collagenase  - frequent repositioning and offloading  - continue medical management and treatment for COVID  - if surgical debridement required will need medical optimization and clearance prior to procedure   - will discuss with Dr. Grewal  - updated plan to follow     this note and its recommendations are preliminary until cosigned by an attending

## 2023-12-15 NOTE — PROGRESS NOTE ADULT - PROBLEM SELECTOR PLAN 2
- pt A&Ox0, unknown baseline  - possible acute metabolic encephalopathy 2/2 sepsis  - continue to monitor mental status - sent from [facility] for reported fever and oxygen saturation in low 90s.   - Pt reporting lethargy/weakness, A&Ox0, weeping purulent sacral ulcer noticed on exam   - Vital signs sig for temp 101 F (resolved, 98.6F),  > 101, 97% on RA, intermittently hypoxic to low 90s on RA   - EKG sinus tachycardia 106 HR  - Labs sig for WC 14, Cr 1.5, lac 2.8  - RVP - covid positive , U/A negative   - CXR shows questionable RML infiltrate  - s/p cefepime 2g and 2.9 L in ED  Admitted for Sepsis 2/2 pneumonia vs. infected sacral ulcer vs. COVID   c/w vanc, cefepime, flagyl   bcx NGTD @24h  - IVF for maintenance - sent from [facility] for reported fever and oxygen saturation in low 90s.   - Pt reporting lethargy/weakness, A&Ox0, weeping purulent sacral ulcer noticed on exam   - Vital signs sig for temp 101 F (resolved, 98.6F),  > 101, 97% on RA, intermittently hypoxic to low 90s on RA   - EKG sinus tachycardia 106 HR  - Labs sig for WC 14, Cr 1.5, lac 2.8  - RVP - covid positive , U/A negative   - CXR shows questionable RML infiltrate  - s/p cefepime 2g and 2.9 L in ED  Admitted for Sepsis 2/2 pneumonia vs. infected sacral ulcer vs. COVID   c/w vanc, cefepime, flagyl   bcx NGTD @24h  - IVF for maintenance

## 2023-12-15 NOTE — PROGRESS NOTE ADULT - ATTENDING COMMENTS
#Sepsis 2/2 sacral ulcer vs COVID pneumonia  #AHRF 2/2 COVID  #Elevated lactate, resolved  #LEATHA, resolved  #Hyperkalemia, resolved  #Seizure disorder  #TBI  #Type 2 DM  #HTN  #DVT ppx    68yM with PMH of TBI, seizure disorder, sacral ulcer, from [facility], who was sent to the ED for concerns for sepsis, admitted for sepsis 2/2 infected sacral decubitus ulcer. Started on vancomycin and cefepime, Flagyl added to regimen. Started on dexamethasone and remdesvir. Patient will need PICC line for longterm abx treatment. Lactate trended until normalized, given IVF. LEATHA and hyperkalemia resolved, likely in setting of poor PO intake and acute illness. Wound care following for sacral ulcer. Surgery consulted for possible debridement. Will need surgery consult. DVT ppx. #Sepsis 2/2 sacral ulcer vs COVID pneumonia  #AHRF 2/2 COVID  #Elevated lactate, resolved  #LEATHA, resolved  #Hyperkalemia, resolved  #Seizure disorder  #TBI  #Type 2 DM  #HTN  #DVT ppx    68yM with PMH of TBI, seizure disorder, sacral ulcer, from [facility], who was sent to the ED for concerns for sepsis, admitted for sepsis 2/2 infected sacral decubitus ulcer. Started on vancomycin and cefepime, Flagyl added to regimen. Started on dexamethasone and remdesvir. Patient will need PICC line for longterm abx treatment. Lactate trended until normalized, given IVF. LEATHA and hyperkalemia resolved, likely in setting of poor PO intake and acute illness. Wound care following for sacral ulcer. Surgery consulted for possible debridement. Will need surgery consult. DVT ppx. #Sepsis 2/2 sacral ulcer vs COVID pneumonia  #AHRF 2/2 COVID  #Elevated lactate, resolved  #LEATHA, resolved  #Hyperkalemia, resolved  #Seizure disorder  #TBI  #Type 2 DM  #HTN  #DVT ppx    68yM with PMH of TBI, seizure disorder, sacral ulcer, from [facility], who was sent to the ED for concerns for sepsis, admitted for sepsis 2/2 infected sacral decubitus ulcer. Patient seen and examied on 12/15, alert, but not oriented. Started on vancomycin and cefepime, Flagyl added to regimen. Started on dexamethasone and remdesvir. Patient will need PICC line for long-term abx treatment. Lactate trended until normalized, given IVF. LEATHA and hyperkalemia resolved, likely in setting of poor PO intake and acute illness. Wound care following for sacral ulcer. Surgery consulted for possible debridement. Will need surgery consult. DVT ppx. #Sepsis 2/2 sacral ulcer vs COVID pneumonia  #AHRF 2/2 COVID  #Elevated lactate, resolved  #LEATHA, resolved  #Hyperkalemia, resolved  #Seizure disorder  #TBI  #Type 2 DM  #HTN  #DVT ppx    68yM with PMH of TBI, seizure disorder, sacral ulcer, from [facility], who was sent to the ED for concerns for sepsis, admitted for sepsis 2/2 infected sacral decubitus ulcer. Patient seen and examied on 12/15, alert, but not oriented. Started on vancomycin and cefepime, Flagyl added to regimen. Started on dexamethasone and remdesvir. Patient will need PICC line for long-term abx treatment. Lactate trended until normalized, given IVF. LEATHA and hyperkalemia resolved, likely in setting of poor PO intake and acute illness. Wound care following for sacral ulcer. Surgery consulted for possible debridement. Will need surgery consult. DVT ppx. #Sepsis 2/2 sacral ulcer vs COVID pneumonia  #AHRF 2/2 COVID  #Elevated lactate, resolved  #LEATHA, resolved  #Hyperkalemia, resolved  #Seizure disorder  #TBI  #Type 2 DM  #HTN  #DVT ppx    68yM with PMH of TBI, seizure disorder, sacral ulcer, from [facility], who was sent to the ED for concerns for sepsis, admitted for sepsis 2/2 infected sacral decubitus ulcer. Patient seen and examied on 12/15, alert, but not oriented. Started on vancomycin and cefepime, Flagyl added to regimen. Started on dexamethasone and remdesvir. Patient will need PICC line for long-term abx treatment. Lactate trended until normalized, given IVF. LEATHA and hyperkalemia resolved, likely in setting of poor PO intake and acute illness. Wound care following for sacral ulcer. Surgery consulted for possible debridement. DVT ppx. #Sepsis 2/2 sacral ulcer vs COVID pneumonia  #AHRF 2/2 COVID  #Elevated lactate, resolved  #LEATHA, resolved  #Hyperkalemia, resolved  #Seizure disorder  #TBI  #Type 2 DM  #HTN  #DVT ppx    68yM with PMH of TBI, seizure disorder, sacral ulcer, from [facility], who was sent to the ED for concerns for sepsis, admitted for sepsis 2/2 infected sacral decubitus ulcer. Patient seen and examied on 12/15, alert, but not oriented. Started on vancomycin and cefepime, Flagyl added to regimen. Started on dexamethasone and remdesvir. Patient will need PICC line for long-term abx treatment. Lactate trended until normalized, given IVF. LEATHA and hyperkalemia resolved, likely in setting of poor PO intake and acute illness. Wound care following for sacral ulcer. Surgery consulted for possible debridement. DVT ppx.

## 2023-12-15 NOTE — PROGRESS NOTE ADULT - PROBLEM SELECTOR PLAN 3
cr 1.5, unknown baseline  - likely pre-renal due to dehydration   - c/w IVF, montior Cr in AM COVID + on RVP 12/13  mildly hypoxic  likely covid pna from CT on admission   remdesivir thru 12/18  decadron thru 12/24

## 2023-12-15 NOTE — PROGRESS NOTE ADULT - PROBLEM SELECTOR PLAN 4
K 5.9, s/p humulin 5, and d50 x1 in ED  - monitor K in the AM - plan as above   - wound care consult  c/w cefepime, vanc, flagyl  surgery consult     Complex extensive sacral decubitus ulcer with fragmentation/osteomyelitis   of the the coccyx. Locules of subcutaneous gas extending from the   decubitus ulcer superiorly to the soft tissues of the lower back at the   level of the lower lumbosacral spine.

## 2023-12-15 NOTE — ADVANCED PRACTICE NURSE CONSULT - ASSESSMENT
This is a 68yr old male patient admitted for Pneumonia, presenting with the following:  -There is a Stage 4 Pressure Injury to the Coccyx (3.5cm x 2.5cm x 1cm) with necrotic tissue, bone, moderate purulent drainage, strong odor, and periwound maceration  -There is a Stage 1 Pressure injury to the Bilateral Heels, as evident by non-blanchable erythema

## 2023-12-15 NOTE — ADVANCED PRACTICE NURSE CONSULT - RECOMMEDATIONS
-Clean the Coccyx wound with normal saline and apply skin prep to the surrounding skin  -Apply Collagenase ointment to the necrotic areas of the Coccyx wound, pack with 0.125% Dakins moistened gauze to the wound bed, and cover with a Foam dressing Daily PRN  -Elevate/float the patients heels using heel protectors and reposition the patient Q 2hrs using wedges or pillows

## 2023-12-15 NOTE — PROGRESS NOTE ADULT - SUBJECTIVE AND OBJECTIVE BOX
68y Male is under our care for     REVIEW OF SYSTEMS:  [  ] Not able to elicit      MEDS:  cefepime   IVPB 1000 milliGRAM(s) IV Intermittent every 12 hours  metroNIDAZOLE  IVPB 500 milliGRAM(s) IV Intermittent once  metroNIDAZOLE  IVPB 500 milliGRAM(s) IV Intermittent every 8 hours  metroNIDAZOLE  IVPB      remdesivir  IVPB 100 milliGRAM(s) IV Intermittent every 24 hours  remdesivir  IVPB   IV Intermittent   vancomycin  IVPB 1000 milliGRAM(s) IV Intermittent every 12 hours  vancomycin  IVPB        ALLERGIES: Allergies    No Known Allergies    Intolerances        VITALS:  Vital Signs Last 24 Hrs  T(C): 37.3 (15 Dec 2023 04:43), Max: 38 (14 Dec 2023 22:28)  T(F): 99.1 (15 Dec 2023 04:43), Max: 100.4 (14 Dec 2023 22:28)  HR: 98 (15 Dec 2023 04:43) (98 - 112)  BP: 108/63 (15 Dec 2023 04:43) (90/62 - 108/63)  BP(mean): 72 (14 Dec 2023 22:28) (72 - 72)  RR: 18 (15 Dec 2023 04:43) (17 - 18)  SpO2: 95% (15 Dec 2023 04:43) (92% - 98%)    Parameters below as of 15 Dec 2023 04:43  Patient On (Oxygen Delivery Method): nasal cannula  O2 Flow (L/min): 2        PHYSICAL EXAM:      LABS/DIAGNOSTIC TESTS:                        7.9    18.54 )-----------( 547      ( 15 Dec 2023 09:50 )             24.2     WBC Count: 18.54 K/uL (12-15 @ 09:50)  WBC Count: 18.21 K/uL (12-15 @ 05:49)  WBC Count: 17.45 K/uL (12-14 @ 10:52)  WBC Count: 14.85 K/uL (12-13 @ 21:25)    12-15    138  |  107  |  26<H>  ----------------------------<  175<H>  3.9   |  24  |  1.00    Ca    8.4      15 Dec 2023 05:49  Phos  3.2     12-15  Mg     1.8     12-15    TPro  7.9  /  Alb  1.7<L>  /  TBili  0.3  /  DBili  x   /  AST  11  /  ALT  15  /  AlkPhos  73  12-14    Lactate, Blood: 1.6 mmol/L (12-14 @ 20:30)  Lactate, Blood: 5.4 mmol/L (12-14 @ 10:52)    CULTURES:   Clean Catch Clean Catch (Midstream)  12-14 @ 02:36   No growth  --  --      .Blood Blood-Peripheral  12-13 @ 21:35   No growth at 24 hours  --  --      .Blood Blood-Peripheral  12-13 @ 21:25   No growth at 24 hours  --  --        RADIOLOGY:   < from: CT Abdomen and Pelvis No Cont (12.14.23 @ 18:33) >    ACC: [id number] EXAM:  CT ABDOMEN AND PELVIS   ORDERED BY: SEPIDEH TAYLOR     PROCEDURE DATE:  12/14/2023          INTERPRETATION:  CLINICAL INFORMATION: Lactic acidosis, sepsis, sacral   wound, pneumonia. Covid.    COMPARISON: None.    CONTRAST/COMPLICATIONS:  IV Contrast: NONE  Oral Contrast: NONE  Complications: None reported at time of study completion    PROCEDURE:  CT of the Abdomen and Pelvis was performed.  Sagittal and coronal reformats were performed.    FINDINGS: Evaluation of the solid organs and vasculature is limited in   the absence of intravenous contrast.  LOWER CHEST: Bibasilar opacities, which may reflect known Covid pneumonia.    LIVER: Within normal limits.  BILE DUCTS: Normal caliber.  GALLBLADDER: Within normal limits.  SPLEEN: Within normal limits.  PANCREAS: Within normal limits.  ADRENALS: Within normal limits.  KIDNEYS/URETERS: No renal calculi or hydronephrosis.    BLADDER: Within normal limits.  REPRODUCTIVE ORGANS: Prostate within normal limits.    BOWEL: Large amount of stool noted in the rectum and colon. No bowel wall   thickening or obstruction. Appendix is normal.  PERITONEUM: No ascites. Small amount of presacral edema is noted. No   pneumoperitoneum or pneumatosis.  VESSELS: Within normal limits.  RETROPERITONEUM/LYMPH NODES: No lymphadenopathy.  ABDOMINAL WALL: Within normal limits.  BONES: Complex sacral decubitus ulcer with suspected involvement of the   coccyx, which appears eroded/fragmented. Locules of associated soft   tissue gas extending from the coccyx superiorly to the soft tissues of   the lumbosacral junction.    IMPRESSION:  Complex extensive sacral decubitus ulcer with fragmentation/osteomyelitis   of the the coccyx. Locules of subcutaneous gas extending from the   decubitus ulcer superiorly to the soft tissues of the lower back at the   level of the lower lumbosacral spine.    Bibasilar pulmonary opacities, which likely reflect known Covid pneumonia.    Large amount stool in the rectum and colon.    --- End of Report ---            MONCHO DOLAN MD; Attending Radiologist  This document has been electronically signed. Dec 14 2023  7:43PM    < end of copied text >   68y Male is under our care for     REVIEW OF SYSTEMS:  [  ] Not able to elicit      MEDS:  cefepime   IVPB 1000 milliGRAM(s) IV Intermittent every 12 hours  metroNIDAZOLE  IVPB 500 milliGRAM(s) IV Intermittent once  metroNIDAZOLE  IVPB 500 milliGRAM(s) IV Intermittent every 8 hours  metroNIDAZOLE  IVPB      remdesivir  IVPB 100 milliGRAM(s) IV Intermittent every 24 hours  remdesivir  IVPB   IV Intermittent   vancomycin  IVPB 1000 milliGRAM(s) IV Intermittent every 12 hours  vancomycin  IVPB        ALLERGIES: Allergies    No Known Allergies    Intolerances        VITALS:  Vital Signs Last 24 Hrs  T(C): 37.3 (15 Dec 2023 04:43), Max: 38 (14 Dec 2023 22:28)  T(F): 99.1 (15 Dec 2023 04:43), Max: 100.4 (14 Dec 2023 22:28)  HR: 98 (15 Dec 2023 04:43) (98 - 112)  BP: 108/63 (15 Dec 2023 04:43) (90/62 - 108/63)  BP(mean): 72 (14 Dec 2023 22:28) (72 - 72)  RR: 18 (15 Dec 2023 04:43) (17 - 18)  SpO2: 95% (15 Dec 2023 04:43) (92% - 98%)    Parameters below as of 15 Dec 2023 04:43  Patient On (Oxygen Delivery Method): nasal cannula  O2 Flow (L/min): 2        PHYSICAL EXAM:      LABS/DIAGNOSTIC TESTS:                        7.9    18.54 )-----------( 547      ( 15 Dec 2023 09:50 )             24.2     WBC Count: 18.54 K/uL (12-15 @ 09:50)  WBC Count: 18.21 K/uL (12-15 @ 05:49)  WBC Count: 17.45 K/uL (12-14 @ 10:52)  WBC Count: 14.85 K/uL (12-13 @ 21:25)    12-15    138  |  107  |  26<H>  ----------------------------<  175<H>  3.9   |  24  |  1.00    Ca    8.4      15 Dec 2023 05:49  Phos  3.2     12-15  Mg     1.8     12-15    TPro  7.9  /  Alb  1.7<L>  /  TBili  0.3  /  DBili  x   /  AST  11  /  ALT  15  /  AlkPhos  73  12-14    Lactate, Blood: 1.6 mmol/L (12-14 @ 20:30)  Lactate, Blood: 5.4 mmol/L (12-14 @ 10:52)    CULTURES:   Clean Catch Clean Catch (Midstream)  12-14 @ 02:36   No growth  --  --      .Blood Blood-Peripheral  12-13 @ 21:35   No growth at 24 hours  --  --      .Blood Blood-Peripheral  12-13 @ 21:25   No growth at 24 hours  --  --        RADIOLOGY:   < from: CT Abdomen and Pelvis No Cont (12.14.23 @ 18:33) >    ACC: [id number] EXAM:  CT ABDOMEN AND PELVIS   ORDERED BY: SEPIDEH TAYLOR     PROCEDURE DATE:  12/14/2023          INTERPRETATION:  CLINICAL INFORMATION: Lactic acidosis, sepsis, sacral   wound, pneumonia. Covid.    COMPARISON: None.    CONTRAST/COMPLICATIONS:  IV Contrast: NONE  Oral Contrast: NONE  Complications: None reported at time of study completion    PROCEDURE:  CT of the Abdomen and Pelvis was performed.  Sagittal and coronal reformats were performed.    FINDINGS: Evaluation of the solid organs and vasculature is limited in   the absence of intravenous contrast.  LOWER CHEST: Bibasilar opacities, which may reflect known Covid pneumonia.    LIVER: Within normal limits.  BILE DUCTS: Normal caliber.  GALLBLADDER: Within normal limits.  SPLEEN: Within normal limits.  PANCREAS: Within normal limits.  ADRENALS: Within normal limits.  KIDNEYS/URETERS: No renal calculi or hydronephrosis.    BLADDER: Within normal limits.  REPRODUCTIVE ORGANS: Prostate within normal limits.    BOWEL: Large amount of stool noted in the rectum and colon. No bowel wall   thickening or obstruction. Appendix is normal.  PERITONEUM: No ascites. Small amount of presacral edema is noted. No   pneumoperitoneum or pneumatosis.  VESSELS: Within normal limits.  RETROPERITONEUM/LYMPH NODES: No lymphadenopathy.  ABDOMINAL WALL: Within normal limits.  BONES: Complex sacral decubitus ulcer with suspected involvement of the   coccyx, which appears eroded/fragmented. Locules of associated soft   tissue gas extending from the coccyx superiorly to the soft tissues of   the lumbosacral junction.    IMPRESSION:  Complex extensive sacral decubitus ulcer with fragmentation/osteomyelitis   of the the coccyx. Locules of subcutaneous gas extending from the   decubitus ulcer superiorly to the soft tissues of the lower back at the   level of the lower lumbosacral spine.    Bibasilar pulmonary opacities, which likely reflect known Covid pneumonia.    Large amount stool in the rectum and colon.    --- End of Report ---            MONCHO DOLAN MD; Attending Radiologist  This document has been electronically signed. Dec 14 2023  7:43PM    < end of copied text >   68y Male is under our care for COVID-19 infection, pneumonia and infected sacral decubitus ulcer.  Patient was seen laying comfortably in bed with no acute distress on room air.  Patient denies any cough or shortness of breath at this time.  CT reveals fragmentation and osteomyelitis of coccyx.    REVIEW OF SYSTEMS:  [  ] Not able to elicit  General: low grade fevers + no malaise  Chest: no cough no sob  GI: no nvd  : no urinary sxs   Skin: no rashes  Musculoskeletal: no trauma no LBP  Neuro: no ha's no dizziness     MEDS:  cefepime   IVPB 1000 milliGRAM(s) IV Intermittent every 12 hours  metroNIDAZOLE  IVPB 500 milliGRAM(s) IV Intermittent once  metroNIDAZOLE  IVPB 500 milliGRAM(s) IV Intermittent every 8 hours  metroNIDAZOLE  IVPB      remdesivir  IVPB 100 milliGRAM(s) IV Intermittent every 24 hours  remdesivir  IVPB   IV Intermittent   vancomycin  IVPB 1000 milliGRAM(s) IV Intermittent every 12 hours  vancomycin  IVPB        ALLERGIES: Allergies    No Known Allergies    Intolerances        VITALS:  Vital Signs Last 24 Hrs  T(C): 37.3 (15 Dec 2023 04:43), Max: 38 (14 Dec 2023 22:28)  T(F): 99.1 (15 Dec 2023 04:43), Max: 100.4 (14 Dec 2023 22:28)  HR: 98 (15 Dec 2023 04:43) (98 - 112)  BP: 108/63 (15 Dec 2023 04:43) (90/62 - 108/63)  BP(mean): 72 (14 Dec 2023 22:28) (72 - 72)  RR: 18 (15 Dec 2023 04:43) (17 - 18)  SpO2: 95% (15 Dec 2023 04:43) (92% - 98%)    Parameters below as of 15 Dec 2023 04:43  Patient On (Oxygen Delivery Method): nasal cannula  O2 Flow (L/min): 2        PHYSICAL EXAM:  HEENT: n/a  Neck: supple no LN's   Respiratory: Bilateral rales  Cardiovascular: S1 S2 reg no murmurs  Gastrointestinal: +BS with soft, nondistended abdomen; nontender  Extremities: no edema  Skin: sacral ulcer- dressing dry and intact  Ortho: n/a  Neuro: AAO x 3      LABS/DIAGNOSTIC TESTS:                        7.9    18.54 )-----------( 547      ( 15 Dec 2023 09:50 )             24.2     WBC Count: 18.54 K/uL (12-15 @ 09:50)  WBC Count: 18.21 K/uL (12-15 @ 05:49)  WBC Count: 17.45 K/uL (12-14 @ 10:52)  WBC Count: 14.85 K/uL (12-13 @ 21:25)    12-15    138  |  107  |  26<H>  ----------------------------<  175<H>  3.9   |  24  |  1.00    Ca    8.4      15 Dec 2023 05:49  Phos  3.2     12-15  Mg     1.8     12-15    TPro  7.9  /  Alb  1.7<L>  /  TBili  0.3  /  DBili  x   /  AST  11  /  ALT  15  /  AlkPhos  73  12-14    Lactate, Blood: 1.6 mmol/L (12-14 @ 20:30)  Lactate, Blood: 5.4 mmol/L (12-14 @ 10:52)    CULTURES:   Clean Catch Clean Catch (Midstream)  12-14 @ 02:36   No growth  --  --      .Blood Blood-Peripheral  12-13 @ 21:35   No growth at 24 hours  --  --      .Blood Blood-Peripheral  12-13 @ 21:25   No growth at 24 hours  --  --        RADIOLOGY:   < from: CT Abdomen and Pelvis No Cont (12.14.23 @ 18:33) >    ACC: [id number] EXAM:  CT ABDOMEN AND PELVIS   ORDERED BY: SEPIDEH TAYLOR     PROCEDURE DATE:  12/14/2023          INTERPRETATION:  CLINICAL INFORMATION: Lactic acidosis, sepsis, sacral   wound, pneumonia. Covid.    COMPARISON: None.    CONTRAST/COMPLICATIONS:  IV Contrast: NONE  Oral Contrast: NONE  Complications: None reported at time of study completion    PROCEDURE:  CT of the Abdomen and Pelvis was performed.  Sagittal and coronal reformats were performed.    FINDINGS: Evaluation of the solid organs and vasculature is limited in   the absence of intravenous contrast.  LOWER CHEST: Bibasilar opacities, which may reflect known Covid pneumonia.    LIVER: Within normal limits.  BILE DUCTS: Normal caliber.  GALLBLADDER: Within normal limits.  SPLEEN: Within normal limits.  PANCREAS: Within normal limits.  ADRENALS: Within normal limits.  KIDNEYS/URETERS: No renal calculi or hydronephrosis.    BLADDER: Within normal limits.  REPRODUCTIVE ORGANS: Prostate within normal limits.    BOWEL: Large amount of stool noted in the rectum and colon. No bowel wall   thickening or obstruction. Appendix is normal.  PERITONEUM: No ascites. Small amount of presacral edema is noted. No   pneumoperitoneum or pneumatosis.  VESSELS: Within normal limits.  RETROPERITONEUM/LYMPH NODES: No lymphadenopathy.  ABDOMINAL WALL: Within normal limits.  BONES: Complex sacral decubitus ulcer with suspected involvement of the   coccyx, which appears eroded/fragmented. Locules of associated soft   tissue gas extending from the coccyx superiorly to the soft tissues of   the lumbosacral junction.    IMPRESSION:  Complex extensive sacral decubitus ulcer with fragmentation/osteomyelitis   of the the coccyx. Locules of subcutaneous gas extending from the   decubitus ulcer superiorly to the soft tissues of the lower back at the   level of the lower lumbosacral spine.    Bibasilar pulmonary opacities, which likely reflect known Covid pneumonia.    Large amount stool in the rectum and colon.    --- End of Report ---            MONCHO DOLAN MD; Attending Radiologist  This document has been electronically signed. Dec 14 2023  7:43PM    < end of copied text >   68y Male is under our care for COVID-19 infection, pneumonia and infected sacral decubitus ulcer.  Patient was seen laying comfortably in bed with no acute distress on room air.  Patient denies any cough or shortness of breath at this time.  CT reveals fragmentation and osteomyelitis of coccyx.    REVIEW OF SYSTEMS:  [  ] Not able to elicit  General: low grade fevers + no malaise  Chest: no cough no sob  GI: no nvd  : no urinary sxs   Skin: no rashes  Musculoskeletal: no trauma no LBP  Neuro: no ha's no dizziness     MEDS:  cefepime   IVPB 1000 milliGRAM(s) IV Intermittent every 12 hours  metroNIDAZOLE  IVPB 500 milliGRAM(s) IV Intermittent once  metroNIDAZOLE  IVPB 500 milliGRAM(s) IV Intermittent every 8 hours  metroNIDAZOLE  IVPB      remdesivir  IVPB 100 milliGRAM(s) IV Intermittent every 24 hours  remdesivir  IVPB   IV Intermittent   vancomycin  IVPB 1000 milliGRAM(s) IV Intermittent every 12 hours  vancomycin  IVPB        ALLERGIES: Allergies    No Known Allergies    Intolerances        VITALS:  Vital Signs Last 24 Hrs  T(C): 37.3 (15 Dec 2023 04:43), Max: 38 (14 Dec 2023 22:28)  T(F): 99.1 (15 Dec 2023 04:43), Max: 100.4 (14 Dec 2023 22:28)  HR: 98 (15 Dec 2023 04:43) (98 - 112)  BP: 108/63 (15 Dec 2023 04:43) (90/62 - 108/63)  BP(mean): 72 (14 Dec 2023 22:28) (72 - 72)  RR: 18 (15 Dec 2023 04:43) (17 - 18)  SpO2: 95% (15 Dec 2023 04:43) (92% - 98%)    Parameters below as of 15 Dec 2023 04:43  Patient On (Oxygen Delivery Method): nasal cannula  O2 Flow (L/min): 2        PHYSICAL EXAM:  HEENT: n/a  Neck: supple no LN's   Respiratory: Bilateral rales  Cardiovascular: S1 S2 reg no murmurs  Gastrointestinal: +BS with soft, nondistended abdomen; nontender  Extremities: no edema  Skin: sacral ulcer- dressing dry and intact  Ortho: n/a  Neuro: AAO x 3      LABS/DIAGNOSTIC TESTS:                        7.9    18.54 )-----------( 547      ( 15 Dec 2023 09:50 )             24.2     WBC Count: 18.54 K/uL (12-15 @ 09:50)  WBC Count: 18.21 K/uL (12-15 @ 05:49)  WBC Count: 17.45 K/uL (12-14 @ 10:52)  WBC Count: 14.85 K/uL (12-13 @ 21:25)    12-15    138  |  107  |  26<H>  ----------------------------<  175<H>  3.9   |  24  |  1.00    Ca    8.4      15 Dec 2023 05:49  Phos  3.2     12-15  Mg     1.8     12-15    TPro  7.9  /  Alb  1.7<L>  /  TBili  0.3  /  DBili  x   /  AST  11  /  ALT  15  /  AlkPhos  73  12-14    Lactate, Blood: 1.6 mmol/L (12-14 @ 20:30)  Lactate, Blood: 5.4 mmol/L (12-14 @ 10:52)    CULTURES:   Clean Catch Clean Catch (Midstream)  12-14 @ 02:36   No growth  --  --      .Blood Blood-Peripheral  12-13 @ 21:35   No growth at 24 hours  --  --      .Blood Blood-Peripheral  12-13 @ 21:25   No growth at 24 hours  --  --        RADIOLOGY:   < from: CT Abdomen and Pelvis No Cont (12.14.23 @ 18:33) >    ACC: [id number] EXAM:  CT ABDOMEN AND PELVIS   ORDERED BY: SEPIDEH TAYLOR     PROCEDURE DATE:  12/14/2023          INTERPRETATION:  CLINICAL INFORMATION: Lactic acidosis, sepsis, sacral   wound, pneumonia. Covid.    COMPARISON: None.    CONTRAST/COMPLICATIONS:  IV Contrast: NONE  Oral Contrast: NONE  Complications: None reported at time of study completion    PROCEDURE:  CT of the Abdomen and Pelvis was performed.  Sagittal and coronal reformats were performed.    FINDINGS: Evaluation of the solid organs and vasculature is limited in   the absence of intravenous contrast.  LOWER CHEST: Bibasilar opacities, which may reflect known Covid pneumonia.    LIVER: Within normal limits.  BILE DUCTS: Normal caliber.  GALLBLADDER: Within normal limits.  SPLEEN: Within normal limits.  PANCREAS: Within normal limits.  ADRENALS: Within normal limits.  KIDNEYS/URETERS: No renal calculi or hydronephrosis.    BLADDER: Within normal limits.  REPRODUCTIVE ORGANS: Prostate within normal limits.    BOWEL: Large amount of stool noted in the rectum and colon. No bowel wall   thickening or obstruction. Appendix is normal.  PERITONEUM: No ascites. Small amount of presacral edema is noted. No   pneumoperitoneum or pneumatosis.  VESSELS: Within normal limits.  RETROPERITONEUM/LYMPH NODES: No lymphadenopathy.  ABDOMINAL WALL: Within normal limits.  BONES: Complex sacral decubitus ulcer with suspected involvement of the   coccyx, which appears eroded/fragmented. Locules of associated soft   tissue gas extending from the coccyx superiorly to the soft tissues of   the lumbosacral junction.    IMPRESSION:  Complex extensive sacral decubitus ulcer with fragmentation/osteomyelitis   of the the coccyx. Locules of subcutaneous gas extending from the   decubitus ulcer superiorly to the soft tissues of the lower back at the   level of the lower lumbosacral spine.    Bibasilar pulmonary opacities, which likely reflect known Covid pneumonia.    Large amount stool in the rectum and colon.    --- End of Report ---            MONCHO DOLAN MD; Attending Radiologist  This document has been electronically signed. Dec 14 2023  7:43PM    < end of copied text >   68y Male is under our care for COVID-19 infection, pneumonia and infected sacral decubitus ulcer.  Patient was seen laying comfortably in bed with no acute distress on room air.  Patient denies any cough or shortness of breath at this time.  CT reveals fragmentation and osteomyelitis of coccyx.    REVIEW OF SYSTEMS:  [  ] Not able to elicit  General: low grade fevers + no malaise  Chest: no cough no sob  GI: no nvd  : no urinary sxs   Skin: no rashes  Musculoskeletal: no trauma no LBP  Neuro: no ha's no dizziness     MEDS:  cefepime   IVPB 1000 milliGRAM(s) IV Intermittent every 12 hours  metroNIDAZOLE  IVPB 500 milliGRAM(s) IV Intermittent once  metroNIDAZOLE  IVPB 500 milliGRAM(s) IV Intermittent every 8 hours  metroNIDAZOLE  IVPB      remdesivir  IVPB 100 milliGRAM(s) IV Intermittent every 24 hours  remdesivir  IVPB   IV Intermittent   vancomycin  IVPB 1000 milliGRAM(s) IV Intermittent every 12 hours  vancomycin  IVPB        ALLERGIES: Allergies    No Known Allergies    Intolerances        VITALS:  Vital Signs Last 24 Hrs  T(C): 37.3 (15 Dec 2023 04:43), Max: 38 (14 Dec 2023 22:28)  T(F): 99.1 (15 Dec 2023 04:43), Max: 100.4 (14 Dec 2023 22:28)  HR: 98 (15 Dec 2023 04:43) (98 - 112)  BP: 108/63 (15 Dec 2023 04:43) (90/62 - 108/63)  BP(mean): 72 (14 Dec 2023 22:28) (72 - 72)  RR: 18 (15 Dec 2023 04:43) (17 - 18)  SpO2: 95% (15 Dec 2023 04:43) (92% - 98%)    Parameters below as of 15 Dec 2023 04:43  Patient On (Oxygen Delivery Method): nasal cannula  O2 Flow (L/min): 2        PHYSICAL EXAM:  HEENT: n/a  Neck: supple no LN's   Respiratory: Bilateral rales  Cardiovascular: S1 S2 reg no murmurs  Gastrointestinal: +BS with soft, nondistended abdomen; nontender  Extremities: no edema  Skin: sacral ulcer- dressing dry and intact  Ortho: n/a  Neuro: AAO x 1-2      LABS/DIAGNOSTIC TESTS:                        7.9    18.54 )-----------( 547      ( 15 Dec 2023 09:50 )             24.2     WBC Count: 18.54 K/uL (12-15 @ 09:50)  WBC Count: 18.21 K/uL (12-15 @ 05:49)  WBC Count: 17.45 K/uL (12-14 @ 10:52)  WBC Count: 14.85 K/uL (12-13 @ 21:25)    12-15    138  |  107  |  26<H>  ----------------------------<  175<H>  3.9   |  24  |  1.00    Ca    8.4      15 Dec 2023 05:49  Phos  3.2     12-15  Mg     1.8     12-15    TPro  7.9  /  Alb  1.7<L>  /  TBili  0.3  /  DBili  x   /  AST  11  /  ALT  15  /  AlkPhos  73  12-14    Lactate, Blood: 1.6 mmol/L (12-14 @ 20:30)  Lactate, Blood: 5.4 mmol/L (12-14 @ 10:52)    CULTURES:   Clean Catch Clean Catch (Midstream)  12-14 @ 02:36   No growth  --  --      .Blood Blood-Peripheral  12-13 @ 21:35   No growth at 24 hours  --  --      .Blood Blood-Peripheral  12-13 @ 21:25   No growth at 24 hours  --  --        RADIOLOGY:   < from: CT Abdomen and Pelvis No Cont (12.14.23 @ 18:33) >    ACC: [id number] EXAM:  CT ABDOMEN AND PELVIS   ORDERED BY: SEPIDEH TAYLOR     PROCEDURE DATE:  12/14/2023          INTERPRETATION:  CLINICAL INFORMATION: Lactic acidosis, sepsis, sacral   wound, pneumonia. Covid.    COMPARISON: None.    CONTRAST/COMPLICATIONS:  IV Contrast: NONE  Oral Contrast: NONE  Complications: None reported at time of study completion    PROCEDURE:  CT of the Abdomen and Pelvis was performed.  Sagittal and coronal reformats were performed.    FINDINGS: Evaluation of the solid organs and vasculature is limited in   the absence of intravenous contrast.  LOWER CHEST: Bibasilar opacities, which may reflect known Covid pneumonia.    LIVER: Within normal limits.  BILE DUCTS: Normal caliber.  GALLBLADDER: Within normal limits.  SPLEEN: Within normal limits.  PANCREAS: Within normal limits.  ADRENALS: Within normal limits.  KIDNEYS/URETERS: No renal calculi or hydronephrosis.    BLADDER: Within normal limits.  REPRODUCTIVE ORGANS: Prostate within normal limits.    BOWEL: Large amount of stool noted in the rectum and colon. No bowel wall   thickening or obstruction. Appendix is normal.  PERITONEUM: No ascites. Small amount of presacral edema is noted. No   pneumoperitoneum or pneumatosis.  VESSELS: Within normal limits.  RETROPERITONEUM/LYMPH NODES: No lymphadenopathy.  ABDOMINAL WALL: Within normal limits.  BONES: Complex sacral decubitus ulcer with suspected involvement of the   coccyx, which appears eroded/fragmented. Locules of associated soft   tissue gas extending from the coccyx superiorly to the soft tissues of   the lumbosacral junction.    IMPRESSION:  Complex extensive sacral decubitus ulcer with fragmentation/osteomyelitis   of the the coccyx. Locules of subcutaneous gas extending from the   decubitus ulcer superiorly to the soft tissues of the lower back at the   level of the lower lumbosacral spine.    Bibasilar pulmonary opacities, which likely reflect known Covid pneumonia.    Large amount stool in the rectum and colon.    --- End of Report ---            MONCHO DOLAN MD; Attending Radiologist  This document has been electronically signed. Dec 14 2023  7:43PM    < end of copied text >   68y Male is under our care for COVID-19 infection, pneumonia and infected sacral decubitus ulcer.  Patient was seen laying comfortably in bed with no acute distress on room air.  Patient denies any cough or shortness of breath at this time.  CT reveals fragmentation and osteomyelitis of coccyx.    REVIEW OF SYSTEMS:  [  ] Not able to elicit  General: low grade fevers + no malaise  Chest: no cough no sob  GI: no nvd  : no urinary sxs   Skin: no rashes  Musculoskeletal: no trauma no LBP  Neuro: no ha's no dizziness     MEDS:  cefepime   IVPB 1000 milliGRAM(s) IV Intermittent every 12 hours  metroNIDAZOLE  IVPB 500 milliGRAM(s) IV Intermittent once  metroNIDAZOLE  IVPB 500 milliGRAM(s) IV Intermittent every 8 hours  metroNIDAZOLE  IVPB      remdesivir  IVPB 100 milliGRAM(s) IV Intermittent every 24 hours  remdesivir  IVPB   IV Intermittent   vancomycin  IVPB 1000 milliGRAM(s) IV Intermittent every 12 hours  vancomycin  IVPB        ALLERGIES: Allergies    No Known Allergies    Intolerances        VITALS:  Vital Signs Last 24 Hrs  T(C): 37.3 (15 Dec 2023 04:43), Max: 38 (14 Dec 2023 22:28)  T(F): 99.1 (15 Dec 2023 04:43), Max: 100.4 (14 Dec 2023 22:28)  HR: 98 (15 Dec 2023 04:43) (98 - 112)  BP: 108/63 (15 Dec 2023 04:43) (90/62 - 108/63)  BP(mean): 72 (14 Dec 2023 22:28) (72 - 72)  RR: 18 (15 Dec 2023 04:43) (17 - 18)  SpO2: 95% (15 Dec 2023 04:43) (92% - 98%)    Parameters below as of 15 Dec 2023 04:43  Patient On (Oxygen Delivery Method): nasal cannula  O2 Flow (L/min): 2        PHYSICAL EXAM:  HEENT: n/a  Neck: supple no LN's   Respiratory: Bilateral rales  Cardiovascular: S1 S2 reg no murmurs  Gastrointestinal: +BS with soft, nondistended abdomen; nontender  Extremities: no edema  Skin: sacral ulcer- dressing dry and intact  Ortho: n/a  Neuro: AAO x 1-2      LABS/DIAGNOSTIC TESTS:                        7.9    18.54 )-----------( 547      ( 15 Dec 2023 09:50 )             24.2     WBC Count: 18.54 K/uL (12-15 @ 09:50)  WBC Count: 18.21 K/uL (12-15 @ 05:49)  WBC Count: 17.45 K/uL (12-14 @ 10:52)  WBC Count: 14.85 K/uL (12-13 @ 21:25)    12-15    138  |  107  |  26<H>  ----------------------------<  175<H>  3.9   |  24  |  1.00    Ca    8.4      15 Dec 2023 05:49  Phos  3.2     12-15  Mg     1.8     12-15    TPro  7.9  /  Alb  1.7<L>  /  TBili  0.3  /  DBili  x   /  AST  11  /  ALT  15  /  AlkPhos  73  12-14    Lactate, Blood: 1.6 mmol/L (12-14 @ 20:30)  Lactate, Blood: 5.4 mmol/L (12-14 @ 10:52)    CULTURES:   Clean Catch Clean Catch (Midstream)  12-14 @ 02:36   No growth  --  --      .Blood Blood-Peripheral  12-13 @ 21:35   No growth at 24 hours  --  --      .Blood Blood-Peripheral  12-13 @ 21:25   No growth at 24 hours  --  --        RADIOLOGY:   < from: CT Abdomen and Pelvis No Cont (12.14.23 @ 18:33) >    ACC: [id number] EXAM:  CT ABDOMEN AND PELVIS   ORDERED BY: SEPIDEH TAYLOR     PROCEDURE DATE:  12/14/2023          INTERPRETATION:  CLINICAL INFORMATION: Lactic acidosis, sepsis, sacral   wound, pneumonia. Covid.    COMPARISON: None.    CONTRAST/COMPLICATIONS:  IV Contrast: NONE  Oral Contrast: NONE  Complications: None reported at time of study completion    PROCEDURE:  CT of the Abdomen and Pelvis was performed.  Sagittal and coronal reformats were performed.    FINDINGS: Evaluation of the solid organs and vasculature is limited in   the absence of intravenous contrast.  LOWER CHEST: Bibasilar opacities, which may reflect known Covid pneumonia.    LIVER: Within normal limits.  BILE DUCTS: Normal caliber.  GALLBLADDER: Within normal limits.  SPLEEN: Within normal limits.  PANCREAS: Within normal limits.  ADRENALS: Within normal limits.  KIDNEYS/URETERS: No renal calculi or hydronephrosis.    BLADDER: Within normal limits.  REPRODUCTIVE ORGANS: Prostate within normal limits.    BOWEL: Large amount of stool noted in the rectum and colon. No bowel wall   thickening or obstruction. Appendix is normal.  PERITONEUM: No ascites. Small amount of presacral edema is noted. No   pneumoperitoneum or pneumatosis.  VESSELS: Within normal limits.  RETROPERITONEUM/LYMPH NODES: No lymphadenopathy.  ABDOMINAL WALL: Within normal limits.  BONES: Complex sacral decubitus ulcer with suspected involvement of the   coccyx, which appears eroded/fragmented. Locules of associated soft   tissue gas extending from the coccyx superiorly to the soft tissues of   the lumbosacral junction.    IMPRESSION:  Complex extensive sacral decubitus ulcer with fragmentation/osteomyelitis   of the the coccyx. Locules of subcutaneous gas extending from the   decubitus ulcer superiorly to the soft tissues of the lower back at the   level of the lower lumbosacral spine.    Bibasilar pulmonary opacities, which likely reflect known Covid pneumonia.    Large amount stool in the rectum and colon.    --- End of Report ---            MONCHO DOLAN MD; Attending Radiologist  This document has been electronically signed. Dec 14 2023  7:43PM    < end of copied text >

## 2023-12-15 NOTE — PROGRESS NOTE ADULT - SUBJECTIVE AND OBJECTIVE BOX
PGY-1 Progress Note discussed with attending    PAGER #: [[phone]] TILL 5:00 PM  PLEASE CONTACT ON CALL TEAM:  - On Call Team (Please refer to Aris) FROM 5:00 PM - 8:30PM  - Nightfloat Team FROM 8:30 -7:30 AM    CHIEF COMPLAINT & BRIEF HOSPITAL COURSE:    INTERVAL HPI/OVERNIGHT EVENTS:   MEDICATIONS  (STANDING):  ascorbic acid 500 milliGRAM(s) Oral daily  cefepime   IVPB 1000 milliGRAM(s) IV Intermittent every 12 hours  collagenase Ointment 1 Application(s) Topical every 8 hours  dexAMETHasone  Injectable 6 milliGRAM(s) IV Push daily  dextrose 50% Injectable 25 Gram(s) IV Push once  heparin   Injectable 5000 Unit(s) SubCutaneous every 8 hours  insulin glargine Injectable (LANTUS) 12 Unit(s) SubCutaneous at bedtime  insulin lispro (ADMELOG) corrective regimen sliding scale   SubCutaneous at bedtime  insulin lispro (ADMELOG) corrective regimen sliding scale   SubCutaneous three times a day before meals  lactulose Syrup 10 Gram(s) Oral daily  levETIRAcetam 750 milliGRAM(s) Oral two times a day  metroNIDAZOLE  IVPB      metroNIDAZOLE  IVPB 500 milliGRAM(s) IV Intermittent once  metroNIDAZOLE  IVPB 500 milliGRAM(s) IV Intermittent every 8 hours  multivitamin 1 Tablet(s) Oral daily  polyethylene glycol 3350 17 Gram(s) Oral two times a day  remdesivir  IVPB 100 milliGRAM(s) IV Intermittent every 24 hours  remdesivir  IVPB   IV Intermittent   senna 2 Tablet(s) Oral at bedtime  sodium chloride 0.9%. 1000 milliLiter(s) (100 mL/Hr) IV Continuous <Continuous>  vancomycin  IVPB 1000 milliGRAM(s) IV Intermittent every 12 hours  vancomycin  IVPB        MEDICATIONS  (PRN):  acetaminophen     Tablet .. 650 milliGRAM(s) Oral every 6 hours PRN Mild Pain (1 - 3)      REVIEW OF SYSTEMS:  CONSTITUTIONAL:  No fevers or chills, good appetite, good general state  EYES/ENT:  No visual changes;  No vertigo or throat pain   NECK:  No neck pain or stiffness  RESPIRATORY:  No cough, wheezing, hemoptysis; No shortness of breath  CARDIOVASCULAR:  No chest pain or palpitations  GASTROINTESTINAL:  No abdominal pain. No nausea, vomiting, or hematemesis; No diarrhea or constipation. No melena or hematochezia.  GENITOURINARY:  No dysuria, frequency or hematuria  NEUROLOGICAL:  No HA, no numbness or LE weakness  MSK: no back pain, no joint pain  SKIN:  No itching, no skin rash    Vital Signs Last 24 Hrs  T(C): 37.3 (15 Dec 2023 04:43), Max: 38 (14 Dec 2023 22:28)  T(F): 99.1 (15 Dec 2023 04:43), Max: 100.4 (14 Dec 2023 22:28)  HR: 98 (15 Dec 2023 04:43) (98 - 112)  BP: 108/63 (15 Dec 2023 04:43) (90/62 - 108/63)  BP(mean): 72 (14 Dec 2023 22:28) (72 - 72)  RR: 18 (15 Dec 2023 04:43) (17 - 18)  SpO2: 95% (15 Dec 2023 04:43) (92% - 98%)    Parameters below as of 15 Dec 2023 04:43  Patient On (Oxygen Delivery Method): nasal cannula  O2 Flow (L/min): 2      PHYSICAL EXAM:  VITALS: Vital Signs Last 24 Hrs  T(C): 37.3 (15 Dec 2023 04:43), Max: 38 (14 Dec 2023 22:28)  T(F): 99.1 (15 Dec 2023 04:43), Max: 100.4 (14 Dec 2023 22:28)  HR: 98 (15 Dec 2023 04:43) (98 - 112)  BP: 108/63 (15 Dec 2023 04:43) (90/62 - 108/63)  BP(mean): 72 (14 Dec 2023 22:28) (72 - 72)  RR: 18 (15 Dec 2023 04:43) (17 - 18)  SpO2: 95% (15 Dec 2023 04:43) (92% - 98%)    Parameters below as of 15 Dec 2023 04:43  Patient On (Oxygen Delivery Method): nasal cannula  O2 Flow (L/min): 2    Gen: AOx3, NAD, non-toxic, pleasant  HEAD:  Atraumatic, Normocephalic  EYES: PERRLA, conjunctiva and sclera clear  ENT: Moist mucous membranes  NECK: Supple, No JVD  CV: S1+S2 normal, no murmurs   Resp: Clear bilat, no resp distress, no crackles/wheezes  Abd: Soft, nontender, +BS  Ext: No LE edema, no cyanosis, LE pulses present  : Julien (+/-)  IV/Skin: No thrombophlebitis, no skin rash  Msk: No low back pain, no arthralgias, no joint swelling  Neuro: AAOx3. No sensory deficits, no motor deficits  Psych: no anxiety, no delusional ideas, no suicidal ideation                          7.9    18.54 )-----------( 547      ( 15 Dec 2023 09:50 )             24.2     12-15    138  |  107  |  26<H>  ----------------------------<  175<H>  3.9   |  24  |  1.00    Ca    8.4      15 Dec 2023 05:49  Phos  3.2     12-15  Mg     1.8     12-15    TPro  7.9  /  Alb  1.7<L>  /  TBili  0.3  /  DBili  x   /  AST  11  /  ALT  15  /  AlkPhos  73  12-14    LIVER FUNCTIONS - ( 14 Dec 2023 10:52 )  Alb: 1.7 g/dL / Pro: 7.9 g/dL / ALK PHOS: 73 U/L / ALT: 15 U/L DA / AST: 11 U/L / GGT: x               PT/INR - ( 13 Dec 2023 21:25 )   PT: 14.1 sec;   INR: 1.24 ratio         PTT - ( 13 Dec 2023 21:25 )  PTT:33.5 sec    CAPILLARY BLOOD GLUCOSE      RADIOLOGY & ADDITIONAL TESTS:                   PGY-1 Progress Note discussed with attending    PAGER #: [[phone]] TILL 5:00 PM  PLEASE CONTACT ON CALL TEAM:  - On Call Team (Please refer to Aris) FROM 5:00 PM - 8:30PM  - Nightfloat Team FROM 8:30 -7:30 AM    CHIEF COMPLAINT & BRIEF HOSPITAL COURSE:    INTERVAL HPI/OVERNIGHT EVENTS:   MEDICATIONS  (STANDING):  ascorbic acid 500 milliGRAM(s) Oral daily  cefepime   IVPB 1000 milliGRAM(s) IV Intermittent every 12 hours  collagenase Ointment 1 Application(s) Topical every 8 hours  dexAMETHasone  Injectable 6 milliGRAM(s) IV Push daily  dextrose 50% Injectable 25 Gram(s) IV Push once  heparin   Injectable 5000 Unit(s) SubCutaneous every 8 hours  insulin glargine Injectable (LANTUS) 12 Unit(s) SubCutaneous at bedtime  insulin lispro (ADMELOG) corrective regimen sliding scale   SubCutaneous at bedtime  insulin lispro (ADMELOG) corrective regimen sliding scale   SubCutaneous three times a day before meals  lactulose Syrup 10 Gram(s) Oral daily  levETIRAcetam 750 milliGRAM(s) Oral two times a day  metroNIDAZOLE  IVPB      metroNIDAZOLE  IVPB 500 milliGRAM(s) IV Intermittent once  metroNIDAZOLE  IVPB 500 milliGRAM(s) IV Intermittent every 8 hours  multivitamin 1 Tablet(s) Oral daily  polyethylene glycol 3350 17 Gram(s) Oral two times a day  remdesivir  IVPB 100 milliGRAM(s) IV Intermittent every 24 hours  remdesivir  IVPB   IV Intermittent   senna 2 Tablet(s) Oral at bedtime  sodium chloride 0.9%. 1000 milliLiter(s) (100 mL/Hr) IV Continuous <Continuous>  vancomycin  IVPB 1000 milliGRAM(s) IV Intermittent every 12 hours  vancomycin  IVPB        MEDICATIONS  (PRN):  acetaminophen     Tablet .. 650 milliGRAM(s) Oral every 6 hours PRN Mild Pain (1 - 3)      REVIEW OF SYSTEMS:  CONSTITUTIONAL:  No fevers or chills, good appetite, good general state  EYES/ENT:  No visual changes;  No vertigo or throat pain   NECK:  No neck pain or stiffness  RESPIRATORY:  No cough, wheezing, hemoptysis; No shortness of breath  CARDIOVASCULAR:  No chest pain or palpitations  GASTROINTESTINAL:  No abdominal pain. No nausea, vomiting, or hematemesis; No diarrhea or constipation. No melena or hematochezia.  GENITOURINARY:  No dysuria, frequency or hematuria  NEUROLOGICAL:  No HA, no numbness or LE weakness  MSK: no back pain, no joint pain  SKIN:  No itching, no skin rash    Vital Signs Last 24 Hrs  T(C): 37.3 (15 Dec 2023 04:43), Max: 38 (14 Dec 2023 22:28)  T(F): 99.1 (15 Dec 2023 04:43), Max: 100.4 (14 Dec 2023 22:28)  HR: 98 (15 Dec 2023 04:43) (98 - 112)  BP: 108/63 (15 Dec 2023 04:43) (90/62 - 108/63)  BP(mean): 72 (14 Dec 2023 22:28) (72 - 72)  RR: 18 (15 Dec 2023 04:43) (17 - 18)  SpO2: 95% (15 Dec 2023 04:43) (92% - 98%)    Parameters below as of 15 Dec 2023 04:43  Patient On (Oxygen Delivery Method): nasal cannula  O2 Flow (L/min): 2      PHYSICAL EXAM:  VITALS: Vital Signs Last 24 Hrs  T(C): 37.3 (15 Dec 2023 04:43), Max: 38 (14 Dec 2023 22:28)  T(F): 99.1 (15 Dec 2023 04:43), Max: 100.4 (14 Dec 2023 22:28)  HR: 98 (15 Dec 2023 04:43) (98 - 112)  BP: 108/63 (15 Dec 2023 04:43) (90/62 - 108/63)  BP(mean): 72 (14 Dec 2023 22:28) (72 - 72)  RR: 18 (15 Dec 2023 04:43) (17 - 18)  SpO2: 95% (15 Dec 2023 04:43) (92% - 98%)    Parameters below as of 15 Dec 2023 04:43  Patient On (Oxygen Delivery Method): nasal cannula  O2 Flow (L/min): 2    Gen: AOx3, NAD, non-toxic, pleasant  HEAD:  Atraumatic, Normocephalic  EYES: PERRLA, conjunctiva and sclera clear  ENT: Moist mucous membranes  NECK: Supple, No JVD  CV: S1+S2 normal, no murmurs   Resp: Clear bilat, no resp distress, no crackles/wheezes  Abd: Soft, nontender, +BS  Ext: No LE edema, no cyanosis, LE pulses present  : Julien (+/-)  IV/Skin: No thrombophlebitis, no skin rash  Msk: No low back pain, no arthralgias, no joint swelling  Neuro: AAOx3. No sensory deficits, no motor deficits  Psych: no anxiety, no delusional ideas, no suicidal ideation                          7.9    18.54 )-----------( 547      ( 15 Dec 2023 09:50 )             24.2     12-15    138  |  107  |  26<H>  ----------------------------<  175<H>  3.9   |  24  |  1.00    Ca    8.4      15 Dec 2023 05:49  Phos  3.2     12-15  Mg     1.8     12-15    TPro  7.9  /  Alb  1.7<L>  /  TBili  0.3  /  DBili  x   /  AST  11  /  ALT  15  /  AlkPhos  73  12-14    LIVER FUNCTIONS - ( 14 Dec 2023 10:52 )  Alb: 1.7 g/dL / Pro: 7.9 g/dL / ALK PHOS: 73 U/L / ALT: 15 U/L DA / AST: 11 U/L / GGT: x               PT/INR - ( 13 Dec 2023 21:25 )   PT: 14.1 sec;   INR: 1.24 ratio         PTT - ( 13 Dec 2023 21:25 )  PTT:33.5 sec    CAPILLARY BLOOD GLUCOSE      RADIOLOGY & ADDITIONAL TESTS:

## 2023-12-15 NOTE — PROGRESS NOTE ADULT - PROBLEM SELECTOR PLAN 5
COVID + on RVP 12/13  - plan as above  - pt is mildly hypoxic, will consider starting remdesivir - pt A&Ox0, unknown baseline  - possible acute metabolic encephalopathy 2/2 sepsis  - continue to monitor mental status

## 2023-12-16 LAB
ANION GAP SERPL CALC-SCNC: 5 MMOL/L — SIGNIFICANT CHANGE UP (ref 5–17)
ANION GAP SERPL CALC-SCNC: 5 MMOL/L — SIGNIFICANT CHANGE UP (ref 5–17)
BASOPHILS # BLD AUTO: 0.01 K/UL — SIGNIFICANT CHANGE UP (ref 0–0.2)
BASOPHILS # BLD AUTO: 0.01 K/UL — SIGNIFICANT CHANGE UP (ref 0–0.2)
BASOPHILS NFR BLD AUTO: 0.1 % — SIGNIFICANT CHANGE UP (ref 0–2)
BASOPHILS NFR BLD AUTO: 0.1 % — SIGNIFICANT CHANGE UP (ref 0–2)
BUN SERPL-MCNC: 29 MG/DL — HIGH (ref 7–18)
BUN SERPL-MCNC: 29 MG/DL — HIGH (ref 7–18)
CALCIUM SERPL-MCNC: 10.1 MG/DL — SIGNIFICANT CHANGE UP (ref 8.4–10.5)
CALCIUM SERPL-MCNC: 10.1 MG/DL — SIGNIFICANT CHANGE UP (ref 8.4–10.5)
CHLORIDE SERPL-SCNC: 108 MMOL/L — SIGNIFICANT CHANGE UP (ref 96–108)
CHLORIDE SERPL-SCNC: 108 MMOL/L — SIGNIFICANT CHANGE UP (ref 96–108)
CO2 SERPL-SCNC: 25 MMOL/L — SIGNIFICANT CHANGE UP (ref 22–31)
CO2 SERPL-SCNC: 25 MMOL/L — SIGNIFICANT CHANGE UP (ref 22–31)
CREAT SERPL-MCNC: 0.9 MG/DL — SIGNIFICANT CHANGE UP (ref 0.5–1.3)
CREAT SERPL-MCNC: 0.9 MG/DL — SIGNIFICANT CHANGE UP (ref 0.5–1.3)
EGFR: 94 ML/MIN/1.73M2 — SIGNIFICANT CHANGE UP
EGFR: 94 ML/MIN/1.73M2 — SIGNIFICANT CHANGE UP
EOSINOPHIL # BLD AUTO: 0.02 K/UL — SIGNIFICANT CHANGE UP (ref 0–0.5)
EOSINOPHIL # BLD AUTO: 0.02 K/UL — SIGNIFICANT CHANGE UP (ref 0–0.5)
EOSINOPHIL NFR BLD AUTO: 0.1 % — SIGNIFICANT CHANGE UP (ref 0–6)
EOSINOPHIL NFR BLD AUTO: 0.1 % — SIGNIFICANT CHANGE UP (ref 0–6)
ERYTHROCYTE [SEDIMENTATION RATE] IN BLOOD: 113 MM/HR — HIGH (ref 0–20)
ERYTHROCYTE [SEDIMENTATION RATE] IN BLOOD: 113 MM/HR — HIGH (ref 0–20)
GLUCOSE BLDC GLUCOMTR-MCNC: 103 MG/DL — HIGH (ref 70–99)
GLUCOSE BLDC GLUCOMTR-MCNC: 103 MG/DL — HIGH (ref 70–99)
GLUCOSE BLDC GLUCOMTR-MCNC: 121 MG/DL — HIGH (ref 70–99)
GLUCOSE BLDC GLUCOMTR-MCNC: 121 MG/DL — HIGH (ref 70–99)
GLUCOSE BLDC GLUCOMTR-MCNC: 147 MG/DL — HIGH (ref 70–99)
GLUCOSE BLDC GLUCOMTR-MCNC: 147 MG/DL — HIGH (ref 70–99)
GLUCOSE BLDC GLUCOMTR-MCNC: 171 MG/DL — HIGH (ref 70–99)
GLUCOSE BLDC GLUCOMTR-MCNC: 171 MG/DL — HIGH (ref 70–99)
GLUCOSE SERPL-MCNC: 80 MG/DL — SIGNIFICANT CHANGE UP (ref 70–99)
GLUCOSE SERPL-MCNC: 80 MG/DL — SIGNIFICANT CHANGE UP (ref 70–99)
HCT VFR BLD CALC: 22.8 % — LOW (ref 39–50)
HCT VFR BLD CALC: 22.8 % — LOW (ref 39–50)
HCT VFR BLD CALC: 24.6 % — LOW (ref 39–50)
HCT VFR BLD CALC: 24.6 % — LOW (ref 39–50)
HCT VFR BLD CALC: 26.1 % — LOW (ref 39–50)
HCT VFR BLD CALC: 26.1 % — LOW (ref 39–50)
HGB BLD-MCNC: 7.5 G/DL — LOW (ref 13–17)
HGB BLD-MCNC: 7.5 G/DL — LOW (ref 13–17)
HGB BLD-MCNC: 8.1 G/DL — LOW (ref 13–17)
HGB BLD-MCNC: 8.1 G/DL — LOW (ref 13–17)
HGB BLD-MCNC: 8.4 G/DL — LOW (ref 13–17)
HGB BLD-MCNC: 8.4 G/DL — LOW (ref 13–17)
IMM GRANULOCYTES NFR BLD AUTO: 0.6 % — SIGNIFICANT CHANGE UP (ref 0–0.9)
IMM GRANULOCYTES NFR BLD AUTO: 0.6 % — SIGNIFICANT CHANGE UP (ref 0–0.9)
LYMPHOCYTES # BLD AUTO: 1.41 K/UL — SIGNIFICANT CHANGE UP (ref 1–3.3)
LYMPHOCYTES # BLD AUTO: 1.41 K/UL — SIGNIFICANT CHANGE UP (ref 1–3.3)
LYMPHOCYTES # BLD AUTO: 9.9 % — LOW (ref 13–44)
LYMPHOCYTES # BLD AUTO: 9.9 % — LOW (ref 13–44)
MAGNESIUM SERPL-MCNC: 2 MG/DL — SIGNIFICANT CHANGE UP (ref 1.6–2.6)
MAGNESIUM SERPL-MCNC: 2 MG/DL — SIGNIFICANT CHANGE UP (ref 1.6–2.6)
MCHC RBC-ENTMCNC: 24.8 PG — LOW (ref 27–34)
MCHC RBC-ENTMCNC: 24.8 PG — LOW (ref 27–34)
MCHC RBC-ENTMCNC: 25.1 PG — LOW (ref 27–34)
MCHC RBC-ENTMCNC: 25.1 PG — LOW (ref 27–34)
MCHC RBC-ENTMCNC: 25.6 PG — LOW (ref 27–34)
MCHC RBC-ENTMCNC: 25.6 PG — LOW (ref 27–34)
MCHC RBC-ENTMCNC: 32.2 GM/DL — SIGNIFICANT CHANGE UP (ref 32–36)
MCHC RBC-ENTMCNC: 32.2 GM/DL — SIGNIFICANT CHANGE UP (ref 32–36)
MCHC RBC-ENTMCNC: 32.9 GM/DL — SIGNIFICANT CHANGE UP (ref 32–36)
MCV RBC AUTO: 75.5 FL — LOW (ref 80–100)
MCV RBC AUTO: 75.5 FL — LOW (ref 80–100)
MCV RBC AUTO: 77.6 FL — LOW (ref 80–100)
MCV RBC AUTO: 77.6 FL — LOW (ref 80–100)
MCV RBC AUTO: 78.1 FL — LOW (ref 80–100)
MCV RBC AUTO: 78.1 FL — LOW (ref 80–100)
MONOCYTES # BLD AUTO: 0.8 K/UL — SIGNIFICANT CHANGE UP (ref 0–0.9)
MONOCYTES # BLD AUTO: 0.8 K/UL — SIGNIFICANT CHANGE UP (ref 0–0.9)
MONOCYTES NFR BLD AUTO: 5.6 % — SIGNIFICANT CHANGE UP (ref 2–14)
MONOCYTES NFR BLD AUTO: 5.6 % — SIGNIFICANT CHANGE UP (ref 2–14)
NEUTROPHILS # BLD AUTO: 11.9 K/UL — HIGH (ref 1.8–7.4)
NEUTROPHILS # BLD AUTO: 11.9 K/UL — HIGH (ref 1.8–7.4)
NEUTROPHILS NFR BLD AUTO: 83.7 % — HIGH (ref 43–77)
NEUTROPHILS NFR BLD AUTO: 83.7 % — HIGH (ref 43–77)
NRBC # BLD: 0 /100 WBCS — SIGNIFICANT CHANGE UP (ref 0–0)
PHOSPHATE SERPL-MCNC: 2.1 MG/DL — LOW (ref 2.5–4.5)
PHOSPHATE SERPL-MCNC: 2.1 MG/DL — LOW (ref 2.5–4.5)
PLATELET # BLD AUTO: 573 K/UL — HIGH (ref 150–400)
PLATELET # BLD AUTO: 573 K/UL — HIGH (ref 150–400)
PLATELET # BLD AUTO: 606 K/UL — HIGH (ref 150–400)
PLATELET # BLD AUTO: 606 K/UL — HIGH (ref 150–400)
PLATELET # BLD AUTO: 618 K/UL — HIGH (ref 150–400)
PLATELET # BLD AUTO: 618 K/UL — HIGH (ref 150–400)
POTASSIUM SERPL-MCNC: 3.7 MMOL/L — SIGNIFICANT CHANGE UP (ref 3.5–5.3)
POTASSIUM SERPL-MCNC: 3.7 MMOL/L — SIGNIFICANT CHANGE UP (ref 3.5–5.3)
POTASSIUM SERPL-SCNC: 3.7 MMOL/L — SIGNIFICANT CHANGE UP (ref 3.5–5.3)
POTASSIUM SERPL-SCNC: 3.7 MMOL/L — SIGNIFICANT CHANGE UP (ref 3.5–5.3)
RBC # BLD: 3.02 M/UL — LOW (ref 4.2–5.8)
RBC # BLD: 3.02 M/UL — LOW (ref 4.2–5.8)
RBC # BLD: 3.17 M/UL — LOW (ref 4.2–5.8)
RBC # BLD: 3.17 M/UL — LOW (ref 4.2–5.8)
RBC # BLD: 3.34 M/UL — LOW (ref 4.2–5.8)
RBC # BLD: 3.34 M/UL — LOW (ref 4.2–5.8)
RBC # FLD: 15.7 % — HIGH (ref 10.3–14.5)
RBC # FLD: 15.7 % — HIGH (ref 10.3–14.5)
RBC # FLD: 15.8 % — HIGH (ref 10.3–14.5)
SODIUM SERPL-SCNC: 138 MMOL/L — SIGNIFICANT CHANGE UP (ref 135–145)
SODIUM SERPL-SCNC: 138 MMOL/L — SIGNIFICANT CHANGE UP (ref 135–145)
WBC # BLD: 13.8 K/UL — HIGH (ref 3.8–10.5)
WBC # BLD: 13.8 K/UL — HIGH (ref 3.8–10.5)
WBC # BLD: 14.23 K/UL — HIGH (ref 3.8–10.5)
WBC # BLD: 14.23 K/UL — HIGH (ref 3.8–10.5)
WBC # BLD: 16.96 K/UL — HIGH (ref 3.8–10.5)
WBC # BLD: 16.96 K/UL — HIGH (ref 3.8–10.5)
WBC # FLD AUTO: 13.8 K/UL — HIGH (ref 3.8–10.5)
WBC # FLD AUTO: 13.8 K/UL — HIGH (ref 3.8–10.5)
WBC # FLD AUTO: 14.23 K/UL — HIGH (ref 3.8–10.5)
WBC # FLD AUTO: 14.23 K/UL — HIGH (ref 3.8–10.5)
WBC # FLD AUTO: 16.96 K/UL — HIGH (ref 3.8–10.5)
WBC # FLD AUTO: 16.96 K/UL — HIGH (ref 3.8–10.5)

## 2023-12-16 PROCEDURE — 99233 SBSQ HOSP IP/OBS HIGH 50: CPT | Mod: GC

## 2023-12-16 RX ORDER — POTASSIUM PHOSPHATE, MONOBASIC POTASSIUM PHOSPHATE, DIBASIC 236; 224 MG/ML; MG/ML
30 INJECTION, SOLUTION INTRAVENOUS ONCE
Refills: 0 | Status: COMPLETED | OUTPATIENT
Start: 2023-12-16 | End: 2023-12-16

## 2023-12-16 RX ADMIN — REMDESIVIR 200 MILLIGRAM(S): 5 INJECTION INTRAVENOUS at 13:54

## 2023-12-16 RX ADMIN — Medication 100 MILLIGRAM(S): at 22:20

## 2023-12-16 RX ADMIN — Medication 2: at 18:20

## 2023-12-16 RX ADMIN — Medication 1 APPLICATION(S): at 22:21

## 2023-12-16 RX ADMIN — Medication 500 MILLIGRAM(S): at 13:42

## 2023-12-16 RX ADMIN — LEVETIRACETAM 750 MILLIGRAM(S): 250 TABLET, FILM COATED ORAL at 06:00

## 2023-12-16 RX ADMIN — POLYETHYLENE GLYCOL 3350 17 GRAM(S): 17 POWDER, FOR SOLUTION ORAL at 22:19

## 2023-12-16 RX ADMIN — Medication 6 MILLIGRAM(S): at 05:59

## 2023-12-16 RX ADMIN — POLYETHYLENE GLYCOL 3350 17 GRAM(S): 17 POWDER, FOR SOLUTION ORAL at 06:00

## 2023-12-16 RX ADMIN — Medication 1 TABLET(S): at 12:53

## 2023-12-16 RX ADMIN — Medication 1 APPLICATION(S): at 10:47

## 2023-12-16 RX ADMIN — LACTULOSE 10 GRAM(S): 10 SOLUTION ORAL at 12:56

## 2023-12-16 RX ADMIN — SENNA PLUS 2 TABLET(S): 8.6 TABLET ORAL at 22:20

## 2023-12-16 RX ADMIN — CEFEPIME 100 MILLIGRAM(S): 1 INJECTION, POWDER, FOR SOLUTION INTRAMUSCULAR; INTRAVENOUS at 09:30

## 2023-12-16 RX ADMIN — Medication 250 MILLIGRAM(S): at 18:43

## 2023-12-16 RX ADMIN — HEPARIN SODIUM 5000 UNIT(S): 5000 INJECTION INTRAVENOUS; SUBCUTANEOUS at 14:04

## 2023-12-16 RX ADMIN — Medication 100 MILLIGRAM(S): at 15:07

## 2023-12-16 RX ADMIN — CEFEPIME 100 MILLIGRAM(S): 1 INJECTION, POWDER, FOR SOLUTION INTRAMUSCULAR; INTRAVENOUS at 22:18

## 2023-12-16 RX ADMIN — POTASSIUM PHOSPHATE, MONOBASIC POTASSIUM PHOSPHATE, DIBASIC 83.33 MILLIMOLE(S): 236; 224 INJECTION, SOLUTION INTRAVENOUS at 10:25

## 2023-12-16 RX ADMIN — INSULIN GLARGINE 12 UNIT(S): 100 INJECTION, SOLUTION SUBCUTANEOUS at 22:18

## 2023-12-16 RX ADMIN — Medication 100 MILLIGRAM(S): at 06:00

## 2023-12-16 RX ADMIN — Medication 1 APPLICATION(S): at 14:25

## 2023-12-16 RX ADMIN — LEVETIRACETAM 750 MILLIGRAM(S): 250 TABLET, FILM COATED ORAL at 18:44

## 2023-12-16 RX ADMIN — Medication 250 MILLIGRAM(S): at 06:00

## 2023-12-16 RX ADMIN — Medication 1 APPLICATION(S): at 05:59

## 2023-12-16 RX ADMIN — HEPARIN SODIUM 5000 UNIT(S): 5000 INJECTION INTRAVENOUS; SUBCUTANEOUS at 22:18

## 2023-12-16 RX ADMIN — HEPARIN SODIUM 5000 UNIT(S): 5000 INJECTION INTRAVENOUS; SUBCUTANEOUS at 06:02

## 2023-12-16 NOTE — DIETITIAN INITIAL EVALUATION ADULT - PROBLEM SELECTOR PLAN 1
- sent from [facility] for reported fever and oxygen saturation in low 90s.   - Pt reporting lethargy/weakness, A&Ox0, weeping purulent sacral ulcer noticed on exam   - Vital signs sig for temp 101 F (resolved, 98.6F),  > 101, 97% on RA, intermittently hypoxic to low 90s on RA   - EKG sinus tachycardia 106 HR  - Labs sig for WC 14, Cr 1.5, lac 2.8  - RVP - covid positive , U/A negative   - CXR shows questionable RML infiltrate  - s/p cefepime 2g and 2.9 L in ED  - Admitted for Sepsis 2/2 pneumonia vs. infected sacral ulcer vs. COVID   - f/u blood cultures, urine cultures   - c/w cefepime and vanc   - IVF for maintenance - sent from [facility] for reported fever and oxygen saturation in low 90s.   - Pt reporting lethargy/weakness, A&Ox0, weeping purulent sacral ulcer noticed on exam   - Vital signs sig for temp 101 F (resolved, 98.6F),  > 101, 97% on RA, intermittently hypoxic to low 90s on RA   - EKG sinus tachycardia 106 HR  - Labs sig for WC 14, Cr 1.5, lac 2.8  - RVP - covid positive , U/A negative   - CXR shows questionable RML infiltrate  - s/p cefepime 2g and 2.9 L in ED  - Admitted for Sepsis 2/2 pneumonia vs. infected sacral ulcer vs. COVID   - f/u blood cultures, urine cultures   - c/w cefepime and vanc   - IVF for maintenance

## 2023-12-16 NOTE — PROGRESS NOTE ADULT - SUBJECTIVE AND OBJECTIVE BOX
Pt with decubitus ulcer with undermining and purulnet drainage. no surroiunding crepitus  ICU Vital Signs Last 24 Hrs  T(C): 36.7 (16 Dec 2023 13:16), Max: 37.1 (15 Dec 2023 20:58)  T(F): 98 (16 Dec 2023 13:16), Max: 98.8 (15 Dec 2023 20:58)  HR: 88 (16 Dec 2023 13:16) (88 - 90)  BP: 110/72 (16 Dec 2023 13:16) (110/72 - 117/71)  BP(mean): --  ABP: --  ABP(mean): --  RR: 18 (16 Dec 2023 13:16) (17 - 18)  SpO2: 96% (16 Dec 2023 13:16) (96% - 99%)    O2 Parameters below as of 16 Dec 2023 13:16  Patient On (Oxygen Delivery Method): room air                              8.4    13.80 )-----------( 573      ( 16 Dec 2023 10:00 )             26.1

## 2023-12-16 NOTE — PROGRESS NOTE ADULT - PROBLEM SELECTOR PLAN 7
hb 9.3>10.7>7.2>7.9  monitor for signs of worsening anemia  transfuse if <7 or precipitous drop  no signs of active bleed at this time

## 2023-12-16 NOTE — DIETITIAN INITIAL EVALUATION ADULT - PERTINENT MEDS FT
MEDICATIONS  (STANDING):  ascorbic acid 500 milliGRAM(s) Oral daily  cefepime   IVPB 1000 milliGRAM(s) IV Intermittent every 12 hours  collagenase Ointment 1 Application(s) Topical every 8 hours  Dakins Solution - 1/4 Strength 1 Application(s) Topical daily  dexAMETHasone  Injectable 6 milliGRAM(s) IV Push daily  dextrose 50% Injectable 25 Gram(s) IV Push once  heparin   Injectable 5000 Unit(s) SubCutaneous every 8 hours  insulin glargine Injectable (LANTUS) 12 Unit(s) SubCutaneous at bedtime  insulin lispro (ADMELOG) corrective regimen sliding scale   SubCutaneous three times a day before meals  insulin lispro (ADMELOG) corrective regimen sliding scale   SubCutaneous at bedtime  lactulose Syrup 10 Gram(s) Oral daily  levETIRAcetam 750 milliGRAM(s) Oral two times a day  metroNIDAZOLE  IVPB      metroNIDAZOLE  IVPB 500 milliGRAM(s) IV Intermittent every 8 hours  multivitamin 1 Tablet(s) Oral daily  polyethylene glycol 3350 17 Gram(s) Oral two times a day  remdesivir  IVPB 100 milliGRAM(s) IV Intermittent every 24 hours  remdesivir  IVPB   IV Intermittent   senna 2 Tablet(s) Oral at bedtime  sodium chloride 0.9%. 1000 milliLiter(s) (100 mL/Hr) IV Continuous <Continuous>  vancomycin  IVPB 1000 milliGRAM(s) IV Intermittent every 12 hours  vancomycin  IVPB        MEDICATIONS  (PRN):  acetaminophen     Tablet .. 650 milliGRAM(s) Oral every 6 hours PRN Mild Pain (1 - 3)

## 2023-12-16 NOTE — DIETITIAN INITIAL EVALUATION ADULT - REASON INDICATOR FOR ASSESSMENT
Nutrition Consult requested for MST Score 2 or >, Pressure Injury Nutrition Consult requested for MST Score 2 or >, Pressure Injury; "Star" pt

## 2023-12-16 NOTE — DIETITIAN INITIAL EVALUATION ADULT - NSFNSPHYEXAMSKINFT_GEN_A_CORE
Pressure Injury 1: coccyx, Stage IV  Pressure Injury 2: none, none  Pressure Injury 3: none, none  Pressure Injury 4: none, none  Pressure Injury 5: none, none  Pressure Injury 6: none, none  Pressure Injury 7: none, none  Pressure Injury 8: none, none  Pressure Injury 9: none, none  Pressure Injury 10: none, none  Pressure Injury 11: none, none Pressure Injury 1: coccyx, Stage IV

## 2023-12-16 NOTE — PROGRESS NOTE ADULT - PROBLEM SELECTOR PLAN 3
COVID + on RVP 12/13  mildly hypoxic  likely covid pna from CT on admission   remdesivir thru 12/18  decadron thru 12/24

## 2023-12-16 NOTE — PROGRESS NOTE ADULT - ATTENDING COMMENTS
#Sepsis 2/2 sacral ulcer vs COVID pneumonia  #AHRF 2/2 COVID  #Elevated lactate, resolved  #LEATHA, resolved  #Hyperkalemia, resolved  #Seizure disorder  #TBI  #Type 2 DM  #HTN  #DVT ppx    68yM with PMH of TBI, seizure disorder, sacral ulcer, from [facility], who was sent to the ED for concerns for sepsis, admitted for sepsis 2/2 infected sacral decubitus ulcer. Patient seen and examined on 12/16. Continue vancomycin and cefepime, Flagyl added to regimen. Started on dexamethasone and remdesvir. Patient will need PICC line for longterm abx treatment. Lactate trended until normalized, given IVF. LEATHA and hyperkalemia resolved, likely in setting of poor PO intake and acute illness. Wound care following for sacral ulcer. Surgery consulted for possible debridement. Will need surgery consult. DVT ppx. #Sepsis 2/2 sacral ulcer vs COVID pneumonia  #AHRF 2/2 COVID  #Elevated lactate, resolved  #LEATHA, resolved  #Hyperkalemia, resolved  #Seizure disorder  #TBI  #Type 2 DM  #HTN  #DVT ppx    68yM with PMH of TBI, seizure disorder, sacral ulcer, from [facility], who was sent to the ED for concerns for sepsis, admitted for sepsis 2/2 infected sacral decubitus ulcer. Patient seen and examined on 12/16. Continue vancomycin and cefepime, Flagyl added to regimen. Started on dexamethasone and remdesvir. Patient will need PICC line for longterm abx treatment. Lactate trended until normalized, given IVF. LEATHA and hyperkalemia resolved, likely in setting of poor PO intake and acute illness. Wound care following for sacral ulcer. Surgery consulted for possible debridement. Will need surgery consult. DVT ppx. #Sepsis 2/2 sacral ulcer vs COVID pneumonia  #AHRF 2/2 COVID  #Elevated lactate, resolved  #LEATHA, resolved  #Hyperkalemia, resolved  #Seizure disorder  #TBI  #Type 2 DM  #HTN  #DVT ppx    68yM with PMH of TBI, seizure disorder, sacral ulcer, from [facility], who was sent to the ED for concerns for sepsis, admitted for sepsis 2/2 infected sacral decubitus ulcer. Patient seen and examined on 12/16. Continue vancomycin, cefepime, Flagyl. Continue dexamethasone and remdesvir, can consider shortened courseof steroids pending improvement in AHRF. Patient will need PICC line for long-term abx treatment. Lactate trended until normalized, given IVF. LEATHA and hyperkalemia resolved, likely in setting of poor PO intake and acute illness. Wound care following for sacral ulcer. Surgery consulted for possible debridement, ulcer with purulent discharge today.  DVT ppx. #Sepsis 2/2 sacral ulcer vs COVID pneumonia  #AHRF 2/2 COVID  #Elevated lactate, resolved  #LEATHA, resolved  #Hyperkalemia, resolved  #Seizure disorder  #TBI  #Type 2 DM  #HTN  #DVT ppx    68yM with PMH of TBI, seizure disorder, sacral ulcer, from [facility], who was sent to the ED for concerns for sepsis, admitted for sepsis 2/2 infected sacral decubitus ulcer. Patient seen and examined on 12/16. Continue vancomycin, cefepime, Flagyl. Continue dexamethasone and remdesvir, can consider shortened courseof steroids pending improvement in AHRF. Patient will need PICC line for long-term abx treatment. Lactate trended until normalized, given IVF. LEATHA and hyperkalemia resolved, likely in setting of poor PO intake and acute illness. Wound care following for sacral ulcer. Surgery consulted for possible debridement, ulcer with purulent discharge today.  DVT ppx.

## 2023-12-16 NOTE — PROGRESS NOTE ADULT - PROBLEM SELECTOR PLAN 4
- plan as above   - wound care consult  c/w cefepime, vanc, flagyl  surgery consult     Complex extensive sacral decubitus ulcer with fragmentation/osteomyelitis   of the the coccyx. Locules of subcutaneous gas extending from the   decubitus ulcer superiorly to the soft tissues of the lower back at the   level of the lower lumbosacral spine. - plan as above   - wound care consult  c/w cefepime, vanc, flagyl  surgery consult -- likely will not perform surgery over the weekend so diet restarted     Complex extensive sacral decubitus ulcer with fragmentation/osteomyelitis   of the the coccyx. Locules of subcutaneous gas extending from the   decubitus ulcer superiorly to the soft tissues of the lower back at the   level of the lower lumbosacral spine.    per nurse, ulcer was gushing grey, malodorous, purulent fluid this morning when she changed the dressings, no erythema seen. - plan as above   - wound care consult  c/w cefepime, vanc, flagyl  surgery consult -- likely will not perform surgery over the weekend so diet restarted     Complex extensive sacral decubitus ulcer with fragmentation/osteomyelitis   of the the coccyx. Locules of subcutaneous gas extending from the   decubitus ulcer superiorly to the soft tissues of the lower back at the   level of the lower lumbosacral spine.    per nurse, ulcer was gushing grey, malodorous, purulent fluid this morning when she changed the dressings, no erythema seen.  Will need PICC line placed for long-term abx

## 2023-12-16 NOTE — DIETITIAN INITIAL EVALUATION ADULT - ORAL INTAKE PTA/DIET HISTORY
Diet Rx at [facility] nursing facility PTA: 2-3g Na, No concentrated sweets, Soft, Thin fluid; No Pork, No Beef; Boost Glucose Control 1 can daily  Diet Rx at [facility] nursing facility PTA: 2-3g Na, No concentrated sweets, Soft, Thin fluid; No Pork, No Beef; Boost Glucose Control 1 can daily

## 2023-12-16 NOTE — PROGRESS NOTE ADULT - SUBJECTIVE AND OBJECTIVE BOX
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  Patient is doing well and has no complaints at this time. Has not had any fevers for the past 36 hours and wbc count has been trending downward. Will need PICC line insertion.    REVIEW OF SYSTEMS:  [  ] Not able to elicit  General: No fevers  no malaise  Chest: no cough no sob  GI: no nvd  : no urinary sxs   Skin: no rashes  Musculoskeletal: no trauma no LBP  Neuro: no ha's no dizziness     MEDS:  cefepime   IVPB 1000 milliGRAM(s) IV Intermittent every 12 hours  metroNIDAZOLE  IVPB 500 milliGRAM(s) IV Intermittent every 8 hours  remdesivir  IVPB 100 milliGRAM(s) IV Intermittent every 24 hours  vancomycin  IVPB 1000 milliGRAM(s) IV Intermittent every 12 hours      ALLERGIES: Allergies  No Known Allergies  Intolerances    VITALS:  Vital Signs Last 24 Hrs  T(C): 36.7 (12-16-23 @ 13:16), Max: 37.1 (12-15-23 @ 20:58)  T(F): 98 (12-16-23 @ 13:16), Max: 98.8 (12-15-23 @ 20:58)  HR: 88 (12-16-23 @ 13:16) (88 - 90)  BP: 110/72 (12-16-23 @ 13:16) (110/72 - 117/71)  BP(mean): --  RR: 18 (12-16-23 @ 13:16) (17 - 18)  SpO2: 96% (12-16-23 @ 13:16) (96% - 99%)    PHYSICAL EXAM:  HEENT: dry oral mucosa with some missing teeth  Neck: supple no LN's   Respiratory: Bilateral rhoncherous sounds with scattered few crepes  Cardiovascular: S1 S2 reg no murmurs  Gastrointestinal: +BS with soft, nondistended abdomen; nontender  Extremities: no edema, mildly contracted right arm; R>L leg atrophy  Skin: sacral ulcer is dressed  Ortho: n/a  Neuro: AAO x 2    LABS/DIAGNOSTIC TESTS:                         8.4    13.80 )-----------( 573      ( 16 Dec 2023 10:00 )             26.1   WBC Count: 13.80 K/uL (12-16 @ 10:00)  WBC Count: 14.23 K/uL (12-16 @ 05:31)  WBC Count: 16.96 K/uL (12-16 @ 00:35)  WBC Count: 18.54 K/uL (12-15 @ 09:50)  WBC Count: 18.21 K/uL (12-15 @ 05:49)    12-16    138  |  108  |  29<H>  ----------------------------<  80  3.7   |  25  |  0.90    Ca    10.1      16 Dec 2023 05:31  Phos  2.1     12-16  Mg     2.0     12-16      TPro  7.9  /  Alb  1.7<L>  /  TBili  0.3  /  DBili  x   /  AST  11  /  ALT  15  /  AlkPhos  73  12-14    Lactate, Blood: 1.6 mmol/L (12-14 @ 20:30)  Lactate, Blood: 5.4 mmol/L (12-14 @ 10:52)    CULTURES:   Clean Catch Clean Catch (Midstream)  12-14 @ 02:36   No growth  --  --      .Blood Blood-Peripheral  12-13 @ 21:35   No growth at 24 hours  --  --      .Blood Blood-Peripheral  12-13 @ 21:25   No growth at 24 hours  --  --      RADIOLOGY:  No new studies

## 2023-12-16 NOTE — DIETITIAN INITIAL EVALUATION ADULT - NUTRITIONGOAL OUTCOME1
To meet nutrition needs consistent with medical condition and goal of care; To promote wound healing

## 2023-12-16 NOTE — PROGRESS NOTE ADULT - SUBJECTIVE AND OBJECTIVE BOX
PGY-1 Progress Note discussed with attending    PAGER #: [[phone]] TILL 5:00 PM  PLEASE CONTACT ON CALL TEAM:  - On Call Team (Please refer to Aris) FROM 5:00 PM - 8:30PM  - Nightfloat Team FROM 8:30 -7:30 AM    CHIEF COMPLAINT & BRIEF HOSPITAL COURSE:    INTERVAL HPI/OVERNIGHT EVENTS:   MEDICATIONS  (STANDING):  ascorbic acid 500 milliGRAM(s) Oral daily  cefepime   IVPB 1000 milliGRAM(s) IV Intermittent every 12 hours  collagenase Ointment 1 Application(s) Topical every 8 hours  Dakins Solution - 1/4 Strength 1 Application(s) Topical daily  dexAMETHasone  Injectable 6 milliGRAM(s) IV Push daily  dextrose 50% Injectable 25 Gram(s) IV Push once  heparin   Injectable 5000 Unit(s) SubCutaneous every 8 hours  insulin glargine Injectable (LANTUS) 12 Unit(s) SubCutaneous at bedtime  insulin lispro (ADMELOG) corrective regimen sliding scale   SubCutaneous at bedtime  insulin lispro (ADMELOG) corrective regimen sliding scale   SubCutaneous three times a day before meals  lactulose Syrup 10 Gram(s) Oral daily  levETIRAcetam 750 milliGRAM(s) Oral two times a day  metroNIDAZOLE  IVPB 500 milliGRAM(s) IV Intermittent every 8 hours  metroNIDAZOLE  IVPB      multivitamin 1 Tablet(s) Oral daily  polyethylene glycol 3350 17 Gram(s) Oral two times a day  remdesivir  IVPB 100 milliGRAM(s) IV Intermittent every 24 hours  remdesivir  IVPB   IV Intermittent   senna 2 Tablet(s) Oral at bedtime  sodium chloride 0.9%. 1000 milliLiter(s) (100 mL/Hr) IV Continuous <Continuous>  vancomycin  IVPB 1000 milliGRAM(s) IV Intermittent every 12 hours  vancomycin  IVPB        MEDICATIONS  (PRN):  acetaminophen     Tablet .. 650 milliGRAM(s) Oral every 6 hours PRN Mild Pain (1 - 3)      REVIEW OF SYSTEMS:  CONSTITUTIONAL:  No fevers or chills, good appetite, good general state  EYES/ENT:  No visual changes;  No vertigo or throat pain   NECK:  No neck pain or stiffness  RESPIRATORY:  No cough, wheezing, hemoptysis; No shortness of breath  CARDIOVASCULAR:  No chest pain or palpitations  GASTROINTESTINAL:  No abdominal pain. No nausea, vomiting, or hematemesis; No diarrhea or constipation. No melena or hematochezia.  GENITOURINARY:  No dysuria, frequency or hematuria  NEUROLOGICAL:  No HA, no numbness or LE weakness  MSK: no back pain, no joint pain  SKIN:  No itching, no skin rash    Vital Signs Last 24 Hrs  T(C): 36.5 (16 Dec 2023 05:25), Max: 37.2 (15 Dec 2023 14:29)  T(F): 97.7 (16 Dec 2023 05:25), Max: 99 (15 Dec 2023 14:29)  HR: 90 (16 Dec 2023 05:25) (90 - 90)  BP: 115/71 (16 Dec 2023 05:25) (104/69 - 117/71)  BP(mean): --  RR: 17 (16 Dec 2023 05:25) (17 - 18)  SpO2: 99% (16 Dec 2023 05:25) (96% - 99%)    Parameters below as of 16 Dec 2023 05:25  Patient On (Oxygen Delivery Method): room air        PHYSICAL EXAM:  VITALS: Vital Signs Last 24 Hrs  T(C): 36.5 (16 Dec 2023 05:25), Max: 37.2 (15 Dec 2023 14:29)  T(F): 97.7 (16 Dec 2023 05:25), Max: 99 (15 Dec 2023 14:29)  HR: 90 (16 Dec 2023 05:25) (90 - 90)  BP: 115/71 (16 Dec 2023 05:25) (104/69 - 117/71)  BP(mean): --  RR: 17 (16 Dec 2023 05:25) (17 - 18)  SpO2: 99% (16 Dec 2023 05:25) (96% - 99%)    Parameters below as of 16 Dec 2023 05:25  Patient On (Oxygen Delivery Method): room air      Gen: AOx1 to self, NAD, non-toxic, pleasant, very  thin  HEAD:  Atraumatic, Normocephalic  EYES: PERRLA, conjunctiva and sclera clear  ENT: Moist mucous membranes  NECK: Supple, No JVD  CV: S1+S2 normal, no murmurs   Resp: Clear bilat, no resp distress, no crackles/wheezes  Abd: Soft, nontender, +BS  Ext: No LE edema, no cyanosis, LE pulses present  : No carter  IV/Skin: No thrombophlebitis, no skin rash; deep sacral wound gushing malodorous, grey, purulent fluid this morning when nurse changed dressings, now packed  Msk: No low back pain, no arthralgias, no joint swelling  Neuro: AAOx1. No sensory deficits, no motor deficits  Psych: no anxiety, no delusional ideas, no suicidal ideation                          8.4    13.80 )-----------( 573      ( 16 Dec 2023 10:00 )             26.1     12-16    138  |  108  |  29<H>  ----------------------------<  80  3.7   |  25  |  0.90    Ca    10.1      16 Dec 2023 05:31  Phos  2.1     12-16  Mg     2.0     12-16                CAPILLARY BLOOD GLUCOSE      RADIOLOGY & ADDITIONAL TESTS:                   PGY-1 Progress Note discussed with attending    PAGER #: [[phone]] TILL 5:00 PM  PLEASE CONTACT ON CALL TEAM:  - On Call Team (Please refer to Aris) FROM 5:00 PM - 8:30PM  - Nightfloat Team FROM 8:30 -7:30 AM    CHIEF COMPLAINT & BRIEF HOSPITAL COURSE:    INTERVAL HPI/OVERNIGHT EVENTS:   MEDICATIONS  (STANDING):  ascorbic acid 500 milliGRAM(s) Oral daily  cefepime   IVPB 1000 milliGRAM(s) IV Intermittent every 12 hours  collagenase Ointment 1 Application(s) Topical every 8 hours  Dakins Solution - 1/4 Strength 1 Application(s) Topical daily  dexAMETHasone  Injectable 6 milliGRAM(s) IV Push daily  dextrose 50% Injectable 25 Gram(s) IV Push once  heparin   Injectable 5000 Unit(s) SubCutaneous every 8 hours  insulin glargine Injectable (LANTUS) 12 Unit(s) SubCutaneous at bedtime  insulin lispro (ADMELOG) corrective regimen sliding scale   SubCutaneous at bedtime  insulin lispro (ADMELOG) corrective regimen sliding scale   SubCutaneous three times a day before meals  lactulose Syrup 10 Gram(s) Oral daily  levETIRAcetam 750 milliGRAM(s) Oral two times a day  metroNIDAZOLE  IVPB 500 milliGRAM(s) IV Intermittent every 8 hours  metroNIDAZOLE  IVPB      multivitamin 1 Tablet(s) Oral daily  polyethylene glycol 3350 17 Gram(s) Oral two times a day  remdesivir  IVPB 100 milliGRAM(s) IV Intermittent every 24 hours  remdesivir  IVPB   IV Intermittent   senna 2 Tablet(s) Oral at bedtime  sodium chloride 0.9%. 1000 milliLiter(s) (100 mL/Hr) IV Continuous <Continuous>  vancomycin  IVPB 1000 milliGRAM(s) IV Intermittent every 12 hours  vancomycin  IVPB        MEDICATIONS  (PRN):  acetaminophen     Tablet .. 650 milliGRAM(s) Oral every 6 hours PRN Mild Pain (1 - 3)      REVIEW OF SYSTEMS:  CONSTITUTIONAL:  No fevers or chills, good appetite, good general state  EYES/ENT:  No visual changes;  No vertigo or throat pain   NECK:  No neck pain or stiffness  RESPIRATORY:  No cough, wheezing, hemoptysis; No shortness of breath  CARDIOVASCULAR:  No chest pain or palpitations  GASTROINTESTINAL:  No abdominal pain. No nausea, vomiting, or hematemesis; No diarrhea or constipation. No melena or hematochezia.  GENITOURINARY:  No dysuria, frequency or hematuria  NEUROLOGICAL:  No HA, no numbness or LE weakness  MSK: no back pain, no joint pain  SKIN:  No itching, no skin rash    Vital Signs Last 24 Hrs  T(C): 36.5 (16 Dec 2023 05:25), Max: 37.2 (15 Dec 2023 14:29)  T(F): 97.7 (16 Dec 2023 05:25), Max: 99 (15 Dec 2023 14:29)  HR: 90 (16 Dec 2023 05:25) (90 - 90)  BP: 115/71 (16 Dec 2023 05:25) (104/69 - 117/71)  BP(mean): --  RR: 17 (16 Dec 2023 05:25) (17 - 18)  SpO2: 99% (16 Dec 2023 05:25) (96% - 99%)    Parameters below as of 16 Dec 2023 05:25  Patient On (Oxygen Delivery Method): room air        PHYSICAL EXAM:  VITALS: Vital Signs Last 24 Hrs  T(C): 36.5 (16 Dec 2023 05:25), Max: 37.2 (15 Dec 2023 14:29)  T(F): 97.7 (16 Dec 2023 05:25), Max: 99 (15 Dec 2023 14:29)  HR: 90 (16 Dec 2023 05:25) (90 - 90)  BP: 115/71 (16 Dec 2023 05:25) (104/69 - 117/71)  BP(mean): --  RR: 17 (16 Dec 2023 05:25) (17 - 18)  SpO2: 99% (16 Dec 2023 05:25) (96% - 99%)    Parameters below as of 16 Dec 2023 05:25  Patient On (Oxygen Delivery Method): room air      Gen: AOx1 to self, NAD, non-toxic, pleasant, very  thin  HEAD:  Atraumatic, Normocephalic  EYES: PERRLA, conjunctiva and sclera clear  ENT: Moist mucous membranes  NECK: Supple, No JVD  CV: S1+S2 normal, no murmurs   Resp: Clear bilat, no resp distress, no crackles/wheezes  Abd: Soft, nontender, +BS  Ext: No LE edema, no cyanosis, LE pulses present  : No carter  IV/Skin: No thrombophlebitis, no skin rash; deep sacral wound gushing malodorous, grey, purulent fluid this morning when nurse changed dressings, now packed  Msk: No low back pain, no arthralgias, no joint swelling  Neuro: AAOx1. No sensory deficits, no motor deficits  Psych: no anxiety, no delusional ideas, no suicidal ideation                          8.4    13.80 )-----------( 573      ( 16 Dec 2023 10:00 )             26.1     12-16    138  |  108  |  29<H>  ----------------------------<  80  3.7   |  25  |  0.90    Ca    10.1      16 Dec 2023 05:31  Phos  2.1     12-16  Mg     2.0     12-16                CAPILLARY BLOOD GLUCOSE      RADIOLOGY & ADDITIONAL TESTS:                   PGY-1 Progress Note discussed with attending    PAGER #: [[phone]] TILL 5:00 PM  PLEASE CONTACT ON CALL TEAM:  - On Call Team (Please refer to Aris) FROM 5:00 PM - 8:30PM  - Nightfloat Team FROM 8:30 -7:30 AM    CHIEF COMPLAINT & BRIEF HOSPITAL COURSE:    INTERVAL HPI/OVERNIGHT EVENTS:   MEDICATIONS  (STANDING):  ascorbic acid 500 milliGRAM(s) Oral daily  cefepime   IVPB 1000 milliGRAM(s) IV Intermittent every 12 hours  collagenase Ointment 1 Application(s) Topical every 8 hours  Dakins Solution - 1/4 Strength 1 Application(s) Topical daily  dexAMETHasone  Injectable 6 milliGRAM(s) IV Push daily  dextrose 50% Injectable 25 Gram(s) IV Push once  heparin   Injectable 5000 Unit(s) SubCutaneous every 8 hours  insulin glargine Injectable (LANTUS) 12 Unit(s) SubCutaneous at bedtime  insulin lispro (ADMELOG) corrective regimen sliding scale   SubCutaneous at bedtime  insulin lispro (ADMELOG) corrective regimen sliding scale   SubCutaneous three times a day before meals  lactulose Syrup 10 Gram(s) Oral daily  levETIRAcetam 750 milliGRAM(s) Oral two times a day  metroNIDAZOLE  IVPB 500 milliGRAM(s) IV Intermittent every 8 hours  metroNIDAZOLE  IVPB      multivitamin 1 Tablet(s) Oral daily  polyethylene glycol 3350 17 Gram(s) Oral two times a day  remdesivir  IVPB 100 milliGRAM(s) IV Intermittent every 24 hours  remdesivir  IVPB   IV Intermittent   senna 2 Tablet(s) Oral at bedtime  sodium chloride 0.9%. 1000 milliLiter(s) (100 mL/Hr) IV Continuous <Continuous>  vancomycin  IVPB 1000 milliGRAM(s) IV Intermittent every 12 hours  vancomycin  IVPB        MEDICATIONS  (PRN):  acetaminophen     Tablet .. 650 milliGRAM(s) Oral every 6 hours PRN Mild Pain (1 - 3)      REVIEW OF SYSTEMS:  CONSTITUTIONAL:  No fevers or chills, good appetite, good general state  EYES/ENT:  No visual changes;  No vertigo or throat pain   NECK:  No neck pain or stiffness  RESPIRATORY:  No cough, wheezing, hemoptysis; No shortness of breath  CARDIOVASCULAR:  No chest pain or palpitations  GASTROINTESTINAL:  No abdominal pain. No nausea, vomiting, or hematemesis; No diarrhea or constipation. No melena or hematochezia.  GENITOURINARY:  No dysuria, frequency or hematuria  NEUROLOGICAL:  No HA, no numbness or LE weakness  MSK: no back pain, no joint pain  SKIN:  No itching, no skin rash    Vital Signs Last 24 Hrs  T(C): 36.5 (16 Dec 2023 05:25), Max: 37.2 (15 Dec 2023 14:29)  T(F): 97.7 (16 Dec 2023 05:25), Max: 99 (15 Dec 2023 14:29)  HR: 90 (16 Dec 2023 05:25) (90 - 90)  BP: 115/71 (16 Dec 2023 05:25) (104/69 - 117/71)  BP(mean): --  RR: 17 (16 Dec 2023 05:25) (17 - 18)  SpO2: 99% (16 Dec 2023 05:25) (96% - 99%)    Parameters below as of 16 Dec 2023 05:25  Patient On (Oxygen Delivery Method): room air      PHYSICAL EXAM:  VITALS: Vital Signs Last 24 Hrs  T(C): 36.5 (16 Dec 2023 05:25), Max: 37.2 (15 Dec 2023 14:29)  T(F): 97.7 (16 Dec 2023 05:25), Max: 99 (15 Dec 2023 14:29)  HR: 90 (16 Dec 2023 05:25) (90 - 90)  BP: 115/71 (16 Dec 2023 05:25) (104/69 - 117/71)  BP(mean): --  RR: 17 (16 Dec 2023 05:25) (17 - 18)  SpO2: 99% (16 Dec 2023 05:25) (96% - 99%)    Parameters below as of 16 Dec 2023 05:25  Patient On (Oxygen Delivery Method): room air      Gen: AOx1 to self, NAD, non-toxic, pleasant, very  thin  HEAD:  Atraumatic, Normocephalic  EYES: PERRLA, conjunctiva and sclera clear  ENT: Moist mucous membranes  NECK: Supple, No JVD  CV: S1+S2 normal, no murmurs   Resp: Clear bilat, no resp distress, no crackles/wheezes  Abd: Soft, nontender, +BS  Ext: No LE edema, no cyanosis, LE pulses present  : No carter  IV/Skin: No thrombophlebitis, no skin rash; deep sacral wound gushing malodorous, grey, purulent fluid this morning when nurse changed dressings, now packed.  no erythema  Msk: No low back pain, no arthralgias, no joint swelling  Neuro: AOx1. No sensory deficits, no motor deficits  Psych: no anxiety, no delusional ideas, no suicidal ideation                          8.4    13.80 )-----------( 573      ( 16 Dec 2023 10:00 )             26.1     12-16    138  |  108  |  29<H>  ----------------------------<  80  3.7   |  25  |  0.90    Ca    10.1      16 Dec 2023 05:31  Phos  2.1     12-16  Mg     2.0     12-16                CAPILLARY BLOOD GLUCOSE      RADIOLOGY & ADDITIONAL TESTS:                   PGY-1 Progress Note discussed with attending    PAGER #: [[phone]] TILL 5:00 PM  PLEASE CONTACT ON CALL TEAM:  - On Call Team (Please refer to Aris) FROM 5:00 PM - 8:30PM  - Nightfloat Team FROM 8:30 -7:30 AM    CHIEF COMPLAINT & BRIEF HOSPITAL COURSE:    INTERVAL HPI/OVERNIGHT EVENTS:   MEDICATIONS  (STANDING):  ascorbic acid 500 milliGRAM(s) Oral daily  cefepime   IVPB 1000 milliGRAM(s) IV Intermittent every 12 hours  collagenase Ointment 1 Application(s) Topical every 8 hours  Dakins Solution - 1/4 Strength 1 Application(s) Topical daily  dexAMETHasone  Injectable 6 milliGRAM(s) IV Push daily  dextrose 50% Injectable 25 Gram(s) IV Push once  heparin   Injectable 5000 Unit(s) SubCutaneous every 8 hours  insulin glargine Injectable (LANTUS) 12 Unit(s) SubCutaneous at bedtime  insulin lispro (ADMELOG) corrective regimen sliding scale   SubCutaneous at bedtime  insulin lispro (ADMELOG) corrective regimen sliding scale   SubCutaneous three times a day before meals  lactulose Syrup 10 Gram(s) Oral daily  levETIRAcetam 750 milliGRAM(s) Oral two times a day  metroNIDAZOLE  IVPB 500 milliGRAM(s) IV Intermittent every 8 hours  metroNIDAZOLE  IVPB      multivitamin 1 Tablet(s) Oral daily  polyethylene glycol 3350 17 Gram(s) Oral two times a day  remdesivir  IVPB 100 milliGRAM(s) IV Intermittent every 24 hours  remdesivir  IVPB   IV Intermittent   senna 2 Tablet(s) Oral at bedtime  sodium chloride 0.9%. 1000 milliLiter(s) (100 mL/Hr) IV Continuous <Continuous>  vancomycin  IVPB 1000 milliGRAM(s) IV Intermittent every 12 hours  vancomycin  IVPB        MEDICATIONS  (PRN):  acetaminophen     Tablet .. 650 milliGRAM(s) Oral every 6 hours PRN Mild Pain (1 - 3)      REVIEW OF SYSTEMS:  CONSTITUTIONAL:  No fevers or chills, good appetite, good general state  EYES/ENT:  No visual changes;  No vertigo or throat pain   NECK:  No neck pain or stiffness  RESPIRATORY:  No cough, wheezing, hemoptysis; No shortness of breath  CARDIOVASCULAR:  No chest pain or palpitations  GASTROINTESTINAL:  No abdominal pain. No nausea, vomiting, or hematemesis; No diarrhea or constipation. No melena or hematochezia.  GENITOURINARY:  No dysuria, frequency or hematuria  NEUROLOGICAL:  No HA, no numbness or LE weakness  MSK: no back pain, no joint pain  SKIN:  No itching, no skin rash    Vital Signs Last 24 Hrs  T(C): 36.5 (16 Dec 2023 05:25), Max: 37.2 (15 Dec 2023 14:29)  T(F): 97.7 (16 Dec 2023 05:25), Max: 99 (15 Dec 2023 14:29)  HR: 90 (16 Dec 2023 05:25) (90 - 90)  BP: 115/71 (16 Dec 2023 05:25) (104/69 - 117/71)  BP(mean): --  RR: 17 (16 Dec 2023 05:25) (17 - 18)  SpO2: 99% (16 Dec 2023 05:25) (96% - 99%)    Parameters below as of 16 Dec 2023 05:25  Patient On (Oxygen Delivery Method): room air      PHYSICAL EXAM:  VITALS: Vital Signs Last 24 Hrs  T(C): 36.5 (16 Dec 2023 05:25), Max: 37.2 (15 Dec 2023 14:29)  T(F): 97.7 (16 Dec 2023 05:25), Max: 99 (15 Dec 2023 14:29)  HR: 90 (16 Dec 2023 05:25) (90 - 90)  BP: 115/71 (16 Dec 2023 05:25) (104/69 - 117/71)  BP(mean): --  RR: 17 (16 Dec 2023 05:25) (17 - 18)  SpO2: 99% (16 Dec 2023 05:25) (96% - 99%)    Parameters below as of 16 Dec 2023 05:25  Patient On (Oxygen Delivery Method): room air      Gen: AOx1 to self, NAD, non-toxic, pleasant, very  thin  HEAD:  Atraumatic, Normocephalic  EYES: PERRLA, conjunctiva and sclera clear  ENT: Moist mucous membranes  NECK: Supple, No JVD  CV: S1+S2 normal, no murmurs   Resp: Clear bilat, no resp distress, no crackles/wheezes  Abd: Soft, nontender, +BS  Ext: No LE edema, no cyanosis, LE pulses present  : No carter  IV/Skin: No thrombophlebitis, no skin rash; deep sacral wound gushing malodorous, grey, purulent fluid this morning when nurse changed dressings, now packed.  no erythema  Msk: No low back pain, no arthralgias, no joint swelling  Neuro: AOx1. No sensory deficits, no motor deficits  Psych: no anxiety, no delusional ideas, no suicidal ideation                          8.4    13.80 )-----------( 573      ( 16 Dec 2023 10:00 )             26.1     12-16    138  |  108  |  29<H>  ----------------------------<  80  3.7   |  25  |  0.90    Ca    10.1      16 Dec 2023 05:31  Phos  2.1     12-16  Mg     2.0     12-16                CAPILLARY BLOOD GLUCOSE      RADIOLOGY & ADDITIONAL TESTS:

## 2023-12-16 NOTE — PROGRESS NOTE ADULT - PROBLEM SELECTOR PLAN 2
- sent from [facility] for reported fever and oxygen saturation in low 90s.   - Pt reporting lethargy/weakness, A&Ox0, weeping purulent sacral ulcer noticed on exam   - Vital signs sig for temp 101 F (resolved, 98.6F),  > 101, 97% on RA, intermittently hypoxic to low 90s on RA   - EKG sinus tachycardia 106 HR  - Labs sig for WC 14, Cr 1.5, lac 2.8  - RVP - covid positive , U/A negative   - CXR shows questionable RML infiltrate  - s/p cefepime 2g and 2.9 L in ED  Admitted for Sepsis 2/2 pneumonia vs. infected sacral ulcer vs. COVID   c/w vanc, cefepime, flagyl   bcx NGTD @24h  - IVF for maintenance - sent from [facility] for reported fever and oxygen saturation in low 90s.   - Pt reporting lethargy/weakness, A&Ox0, weeping purulent sacral ulcer noticed on exam   - Vital signs sig for temp 101 F (resolved, 98.6F),  > 101, 97% on RA, intermittently hypoxic to low 90s on RA   - EKG sinus tachycardia 106 HR  - Labs sig for WC 14, Cr 1.5, lac 2.8  - RVP - covid positive , U/A negative   - CXR shows questionable RML infiltrate  - s/p cefepime 2g and 2.9 L in ED  Admitted for Sepsis 2/2 pneumonia vs. infected sacral ulcer vs. COVID   c/w vanc, cefepime, flagyl   bcx NGTD @24h  - IVF for maintenance

## 2023-12-16 NOTE — PROGRESS NOTE ADULT - PROBLEM SELECTOR PLAN 1
RCRI: class 1 risk   Croft: .3% risk  bedbound with sinus tachycardia  low to moderate risk for a moderate risk procedure

## 2023-12-16 NOTE — PROGRESS NOTE ADULT - PROBLEM SELECTOR PLAN 5
- pt A&Ox0, unknown baseline  - possible acute metabolic encephalopathy 2/2 sepsis  - continue to monitor mental status

## 2023-12-16 NOTE — DIETITIAN INITIAL EVALUATION ADULT - PERTINENT LABORATORY DATA
12-16    138  |  108  |  29<H>  ----------------------------<  80  3.7   |  25  |  0.90    Ca    10.1      16 Dec 2023 05:31  Phos  2.1     12-16  Mg     2.0     12-16    TPro  7.9  /  Alb  1.7<L>  /  TBili  0.3  /  DBili  x   /  AST  11  /  ALT  15  /  AlkPhos  73  12-14  POCT Blood Glucose.: 121 mg/dL (12-16-23 @ 08:19)

## 2023-12-16 NOTE — DIETITIAN INITIAL EVALUATION ADULT - PROBLEM SELECTOR PLAN 6
- plan as above   - might need MRI of the pelvis w/ bone biopsy to r/o osteomyelitis   - wound care consult  - c/w cefepime and vanc

## 2023-12-16 NOTE — PROGRESS NOTE ADULT - ASSESSMENT
Pneumonia ( ? 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  ·	Will need a PICC line and IV antibiotics for 6 weeks to treat for osteomyelitis.

## 2023-12-16 NOTE — PROGRESS NOTE ADULT - ASSESSMENT
decubitus ulcer with purulent drainage, draining  sacral osteomeylitis  afebrile, leukocytosis improving on abx  planning for picc with long term abx  Dr Grewal to see.   decubitus ulcer with purulent drainage, draining  sacral osteomeylitis  afebrile, leukocytosis improving on abx  wet to dry sterile gauze daily with offloading wound  planning for picc with long term abx  Dr Grewal to see.

## 2023-12-16 NOTE — DIETITIAN INITIAL EVALUATION ADULT - OTHER INFO
Pt from skilled nursing facility, confused, COVID19+tena, in airborne/contact isolation, Limited intake/wt change history data available at present; s/p swallow evaluation 12/14/23 with NPO recommended; NPO x 3-4d in-house; Unknown food allergies per Chart

## 2023-12-16 NOTE — PROGRESS NOTE ADULT - ASSESSMENT
68-year-old male, PMH of traumatic brain injury w/ seizure disorder, DM, sacral decubitus ulcer, sent from [facility] for reported fever and oxygen saturation in low 90s. Pt reporting lethargy/weakness, A&Ox0. Vital signs sig for temp 101 F (resolved, 98.6F),  > 101, 97% on RA. EKG sinus tachycardia 106 HR, Labs sig for WC 14, Cr 1.5, lac 2.8, K 5.9. CXR shows questionable RML infiltrate. s/p cefepime 2g and 2.9 L in ED. COVID +. Admitted for Sepsis likely 2/2 sacral ulcer vs. pneumonia.     12/15 flagyl added to vanc/cefepime for anaerobic coverage. surgery will see pt for gas from decub ulcer. Also with osteo of coccyx. Also with likely covid pna, receiving remdesivir thru 12/18 and decadron through 12/24.   12/16 surgery was called and case was discussed. pt will not be debrided today, so diet was resumed. per nurse, ulcer was gushing grey, malodorous, purulent fluid this morning when she changed the dressings, no erythema seen.          68-year-old male, PMH of traumatic brain injury w/ seizure disorder, DM, sacral decubitus ulcer, sent from [facility] for reported fever and oxygen saturation in low 90s. Pt reporting lethargy/weakness, A&Ox0. Vital signs sig for temp 101 F (resolved, 98.6F),  > 101, 97% on RA. EKG sinus tachycardia 106 HR, Labs sig for WC 14, Cr 1.5, lac 2.8, K 5.9. CXR shows questionable RML infiltrate. s/p cefepime 2g and 2.9 L in ED. COVID +. Admitted for Sepsis likely 2/2 sacral ulcer vs. pneumonia.     12/15 flagyl added to vanc/cefepime for anaerobic coverage. surgery will see pt for gas from decub ulcer. Also with osteo of coccyx. Also with likely covid pna, receiving remdesivir thru 12/18 and decadron through 12/24.   12/16 surgery was called and case was discussed. pt will not be debrided today, so diet was resumed. per nurse, ulcer was gushing grey, malodorous, purulent fluid this morning when she changed the dressings, no erythema seen.          68-year-old male, PMH of traumatic brain injury w/ seizure disorder, DM, sacral decubitus ulcer, sent from [facility] for reported fever and oxygen saturation in low 90s. Pt reporting lethargy/weakness, A&Ox0. Vital signs sig for temp 101 F (resolved, 98.6F),  > 101, 97% on RA. EKG sinus tachycardia 106 HR, Labs sig for WC 14, Cr 1.5, lac 2.8, K 5.9. CXR shows questionable RML infiltrate. s/p cefepime 2g and 2.9 L in ED. COVID +. Admitted for Sepsis likely 2/2 sacral ulcer vs. pneumonia.     12/15 flagyl added to vanc/cefepime for anaerobic coverage. surgery will see pt for gas from decub ulcer. Also with osteo of coccyx. Also with likely covid pna, receiving remdesivir thru 12/18 and decadron through 12/24.   12/16 surgery was called and case was discussed. pt will not be debrided today, so diet was resumed. per nurse, ulcer was gushing grey, malodorous, purulent fluid this morning when she changed the dressings, no erythema seen.            68-year-old male, PMH of traumatic brain injury w/ seizure disorder, DM, sacral decubitus ulcer, sent from [facility] for reported fever and oxygen saturation in low 90s. Pt reporting lethargy/weakness, A&Ox0. Vital signs sig for temp 101 F (resolved, 98.6F),  > 101, 97% on RA. EKG sinus tachycardia 106 HR, Labs sig for WC 14, Cr 1.5, lac 2.8, K 5.9. CXR shows questionable RML infiltrate. s/p cefepime 2g and 2.9 L in ED. COVID +. Admitted for Sepsis likely 2/2 sacral ulcer vs. pneumonia.     12/15 flagyl added to vanc/cefepime for anaerobic coverage. surgery will see pt for gas from decub ulcer. Also with osteo of coccyx. Also with likely covid pna, receiving remdesivir thru 12/18 and decadron through 12/24.   12/16 surgery was called and case was discussed. pt will not be debrided today, so diet was resumed. per nurse, ulcer was gushing grey, malodorous, purulent fluid this morning when she changed the dressings, no erythema seen.

## 2023-12-17 LAB
ANION GAP SERPL CALC-SCNC: 4 MMOL/L — LOW (ref 5–17)
ANION GAP SERPL CALC-SCNC: 4 MMOL/L — LOW (ref 5–17)
BASOPHILS # BLD AUTO: 0.02 K/UL — SIGNIFICANT CHANGE UP (ref 0–0.2)
BASOPHILS # BLD AUTO: 0.02 K/UL — SIGNIFICANT CHANGE UP (ref 0–0.2)
BASOPHILS NFR BLD AUTO: 0.2 % — SIGNIFICANT CHANGE UP (ref 0–2)
BASOPHILS NFR BLD AUTO: 0.2 % — SIGNIFICANT CHANGE UP (ref 0–2)
BUN SERPL-MCNC: 23 MG/DL — HIGH (ref 7–18)
BUN SERPL-MCNC: 23 MG/DL — HIGH (ref 7–18)
CALCIUM SERPL-MCNC: 9 MG/DL — SIGNIFICANT CHANGE UP (ref 8.4–10.5)
CALCIUM SERPL-MCNC: 9 MG/DL — SIGNIFICANT CHANGE UP (ref 8.4–10.5)
CHLORIDE SERPL-SCNC: 108 MMOL/L — SIGNIFICANT CHANGE UP (ref 96–108)
CHLORIDE SERPL-SCNC: 108 MMOL/L — SIGNIFICANT CHANGE UP (ref 96–108)
CO2 SERPL-SCNC: 25 MMOL/L — SIGNIFICANT CHANGE UP (ref 22–31)
CO2 SERPL-SCNC: 25 MMOL/L — SIGNIFICANT CHANGE UP (ref 22–31)
CREAT SERPL-MCNC: 0.94 MG/DL — SIGNIFICANT CHANGE UP (ref 0.5–1.3)
CREAT SERPL-MCNC: 0.94 MG/DL — SIGNIFICANT CHANGE UP (ref 0.5–1.3)
EGFR: 89 ML/MIN/1.73M2 — SIGNIFICANT CHANGE UP
EGFR: 89 ML/MIN/1.73M2 — SIGNIFICANT CHANGE UP
EOSINOPHIL # BLD AUTO: 0.02 K/UL — SIGNIFICANT CHANGE UP (ref 0–0.5)
EOSINOPHIL # BLD AUTO: 0.02 K/UL — SIGNIFICANT CHANGE UP (ref 0–0.5)
EOSINOPHIL NFR BLD AUTO: 0.2 % — SIGNIFICANT CHANGE UP (ref 0–6)
EOSINOPHIL NFR BLD AUTO: 0.2 % — SIGNIFICANT CHANGE UP (ref 0–6)
GLUCOSE BLDC GLUCOMTR-MCNC: 110 MG/DL — HIGH (ref 70–99)
GLUCOSE BLDC GLUCOMTR-MCNC: 110 MG/DL — HIGH (ref 70–99)
GLUCOSE BLDC GLUCOMTR-MCNC: 130 MG/DL — HIGH (ref 70–99)
GLUCOSE BLDC GLUCOMTR-MCNC: 130 MG/DL — HIGH (ref 70–99)
GLUCOSE BLDC GLUCOMTR-MCNC: 90 MG/DL — SIGNIFICANT CHANGE UP (ref 70–99)
GLUCOSE BLDC GLUCOMTR-MCNC: 90 MG/DL — SIGNIFICANT CHANGE UP (ref 70–99)
GLUCOSE BLDC GLUCOMTR-MCNC: 98 MG/DL — SIGNIFICANT CHANGE UP (ref 70–99)
GLUCOSE BLDC GLUCOMTR-MCNC: 98 MG/DL — SIGNIFICANT CHANGE UP (ref 70–99)
GLUCOSE SERPL-MCNC: 81 MG/DL — SIGNIFICANT CHANGE UP (ref 70–99)
GLUCOSE SERPL-MCNC: 81 MG/DL — SIGNIFICANT CHANGE UP (ref 70–99)
HCT VFR BLD CALC: 24.7 % — LOW (ref 39–50)
HCT VFR BLD CALC: 24.7 % — LOW (ref 39–50)
HGB BLD-MCNC: 8 G/DL — LOW (ref 13–17)
HGB BLD-MCNC: 8 G/DL — LOW (ref 13–17)
IMM GRANULOCYTES NFR BLD AUTO: 0.6 % — SIGNIFICANT CHANGE UP (ref 0–0.9)
IMM GRANULOCYTES NFR BLD AUTO: 0.6 % — SIGNIFICANT CHANGE UP (ref 0–0.9)
LYMPHOCYTES # BLD AUTO: 1.65 K/UL — SIGNIFICANT CHANGE UP (ref 1–3.3)
LYMPHOCYTES # BLD AUTO: 1.65 K/UL — SIGNIFICANT CHANGE UP (ref 1–3.3)
LYMPHOCYTES # BLD AUTO: 12.5 % — LOW (ref 13–44)
LYMPHOCYTES # BLD AUTO: 12.5 % — LOW (ref 13–44)
MAGNESIUM SERPL-MCNC: 2 MG/DL — SIGNIFICANT CHANGE UP (ref 1.6–2.6)
MAGNESIUM SERPL-MCNC: 2 MG/DL — SIGNIFICANT CHANGE UP (ref 1.6–2.6)
MCHC RBC-ENTMCNC: 24.7 PG — LOW (ref 27–34)
MCHC RBC-ENTMCNC: 24.7 PG — LOW (ref 27–34)
MCHC RBC-ENTMCNC: 32.4 GM/DL — SIGNIFICANT CHANGE UP (ref 32–36)
MCHC RBC-ENTMCNC: 32.4 GM/DL — SIGNIFICANT CHANGE UP (ref 32–36)
MCV RBC AUTO: 76.2 FL — LOW (ref 80–100)
MCV RBC AUTO: 76.2 FL — LOW (ref 80–100)
MONOCYTES # BLD AUTO: 0.92 K/UL — HIGH (ref 0–0.9)
MONOCYTES # BLD AUTO: 0.92 K/UL — HIGH (ref 0–0.9)
MONOCYTES NFR BLD AUTO: 7 % — SIGNIFICANT CHANGE UP (ref 2–14)
MONOCYTES NFR BLD AUTO: 7 % — SIGNIFICANT CHANGE UP (ref 2–14)
NEUTROPHILS # BLD AUTO: 10.51 K/UL — HIGH (ref 1.8–7.4)
NEUTROPHILS # BLD AUTO: 10.51 K/UL — HIGH (ref 1.8–7.4)
NEUTROPHILS NFR BLD AUTO: 79.5 % — HIGH (ref 43–77)
NEUTROPHILS NFR BLD AUTO: 79.5 % — HIGH (ref 43–77)
NRBC # BLD: 0 /100 WBCS — SIGNIFICANT CHANGE UP (ref 0–0)
NRBC # BLD: 0 /100 WBCS — SIGNIFICANT CHANGE UP (ref 0–0)
PHOSPHATE SERPL-MCNC: 2.7 MG/DL — SIGNIFICANT CHANGE UP (ref 2.5–4.5)
PHOSPHATE SERPL-MCNC: 2.7 MG/DL — SIGNIFICANT CHANGE UP (ref 2.5–4.5)
PLATELET # BLD AUTO: 620 K/UL — HIGH (ref 150–400)
PLATELET # BLD AUTO: 620 K/UL — HIGH (ref 150–400)
POTASSIUM SERPL-MCNC: 5.1 MMOL/L — SIGNIFICANT CHANGE UP (ref 3.5–5.3)
POTASSIUM SERPL-MCNC: 5.1 MMOL/L — SIGNIFICANT CHANGE UP (ref 3.5–5.3)
POTASSIUM SERPL-SCNC: 5.1 MMOL/L — SIGNIFICANT CHANGE UP (ref 3.5–5.3)
POTASSIUM SERPL-SCNC: 5.1 MMOL/L — SIGNIFICANT CHANGE UP (ref 3.5–5.3)
RBC # BLD: 3.24 M/UL — LOW (ref 4.2–5.8)
RBC # BLD: 3.24 M/UL — LOW (ref 4.2–5.8)
RBC # FLD: 15.9 % — HIGH (ref 10.3–14.5)
RBC # FLD: 15.9 % — HIGH (ref 10.3–14.5)
SODIUM SERPL-SCNC: 137 MMOL/L — SIGNIFICANT CHANGE UP (ref 135–145)
SODIUM SERPL-SCNC: 137 MMOL/L — SIGNIFICANT CHANGE UP (ref 135–145)
WBC # BLD: 13.2 K/UL — HIGH (ref 3.8–10.5)
WBC # BLD: 13.2 K/UL — HIGH (ref 3.8–10.5)
WBC # FLD AUTO: 13.2 K/UL — HIGH (ref 3.8–10.5)
WBC # FLD AUTO: 13.2 K/UL — HIGH (ref 3.8–10.5)

## 2023-12-17 PROCEDURE — 99233 SBSQ HOSP IP/OBS HIGH 50: CPT | Mod: GC

## 2023-12-17 RX ADMIN — Medication 500 MILLIGRAM(S): at 12:09

## 2023-12-17 RX ADMIN — Medication 250 MILLIGRAM(S): at 18:33

## 2023-12-17 RX ADMIN — LEVETIRACETAM 750 MILLIGRAM(S): 250 TABLET, FILM COATED ORAL at 05:40

## 2023-12-17 RX ADMIN — CEFEPIME 100 MILLIGRAM(S): 1 INJECTION, POWDER, FOR SOLUTION INTRAMUSCULAR; INTRAVENOUS at 21:26

## 2023-12-17 RX ADMIN — Medication 1 APPLICATION(S): at 13:58

## 2023-12-17 RX ADMIN — LEVETIRACETAM 750 MILLIGRAM(S): 250 TABLET, FILM COATED ORAL at 18:33

## 2023-12-17 RX ADMIN — HEPARIN SODIUM 5000 UNIT(S): 5000 INJECTION INTRAVENOUS; SUBCUTANEOUS at 05:39

## 2023-12-17 RX ADMIN — HEPARIN SODIUM 5000 UNIT(S): 5000 INJECTION INTRAVENOUS; SUBCUTANEOUS at 14:01

## 2023-12-17 RX ADMIN — CEFEPIME 100 MILLIGRAM(S): 1 INJECTION, POWDER, FOR SOLUTION INTRAMUSCULAR; INTRAVENOUS at 05:39

## 2023-12-17 RX ADMIN — Medication 100 MILLIGRAM(S): at 21:26

## 2023-12-17 RX ADMIN — Medication 100 MILLIGRAM(S): at 13:58

## 2023-12-17 RX ADMIN — REMDESIVIR 200 MILLIGRAM(S): 5 INJECTION INTRAVENOUS at 13:56

## 2023-12-17 RX ADMIN — Medication 1 APPLICATION(S): at 05:44

## 2023-12-17 RX ADMIN — LACTULOSE 10 GRAM(S): 10 SOLUTION ORAL at 12:10

## 2023-12-17 RX ADMIN — Medication 1 TABLET(S): at 12:09

## 2023-12-17 RX ADMIN — Medication 1 APPLICATION(S): at 12:08

## 2023-12-17 RX ADMIN — INSULIN GLARGINE 12 UNIT(S): 100 INJECTION, SOLUTION SUBCUTANEOUS at 21:37

## 2023-12-17 RX ADMIN — SENNA PLUS 2 TABLET(S): 8.6 TABLET ORAL at 21:26

## 2023-12-17 RX ADMIN — Medication 1 APPLICATION(S): at 21:37

## 2023-12-17 RX ADMIN — POLYETHYLENE GLYCOL 3350 17 GRAM(S): 17 POWDER, FOR SOLUTION ORAL at 18:33

## 2023-12-17 RX ADMIN — Medication 6 MILLIGRAM(S): at 05:39

## 2023-12-17 RX ADMIN — Medication 100 MILLIGRAM(S): at 05:40

## 2023-12-17 RX ADMIN — Medication 250 MILLIGRAM(S): at 05:41

## 2023-12-17 RX ADMIN — HEPARIN SODIUM 5000 UNIT(S): 5000 INJECTION INTRAVENOUS; SUBCUTANEOUS at 21:26

## 2023-12-17 RX ADMIN — POLYETHYLENE GLYCOL 3350 17 GRAM(S): 17 POWDER, FOR SOLUTION ORAL at 05:40

## 2023-12-17 NOTE — PROGRESS NOTE ADULT - ASSESSMENT
Pneumonia ( ? bacterial)  COVID - 19 infection   Infected Sacral Decubitus ulcer with Osteomyelitis of coccyx  Leukocytosis - mild    Plan:  ·	Cont Vancomycin 1gm iv q12hrs  ·	Cont Maxipime 1gm iv q12hrs   ·	Cont Flagyl 500mgs iv q8hrs  ·	Cont Remdesivir 100mgs iv q24hrs  ·	Cont Decadron 6mgs iv q24hrs.  ·	Will need a PICC line and IV antibiotics for 6 weeks to treat for osteomyelitis.

## 2023-12-17 NOTE — PROGRESS NOTE ADULT - ASSESSMENT
#Sepsis 2/2 sacral ulcer vs COVID pneumonia  #AHRF 2/2 COVID  #Elevated lactate, resolved  #LEATHA, resolved  #Hyperkalemia, resolved  #Seizure disorder  #TBI  #Type 2 DM  #HTN  #DVT ppx    68yM with PMH of TBI, seizure disorder, sacral ulcer, from [facility], who was sent to the ED for concerns for sepsis, admitted for sepsis 2/2 infected sacral decubitus ulcer. Continue vancomycin, cefepime, Flagyl. Continue dexamethasone and remdesvir, can consider shortened courseof steroids pending improvement in AHRF. Patient will need PICC line for long-term abx treatment. Lactate trended until normalized, given IVF. LEATHA and hyperkalemia resolved, likely in setting of poor PO intake and acute illness. Wound care following for sacral ulcer. Surgery consulted for possible debridement.  DVT ppx. #Sepsis 2/2 sacral ulcer vs COVID pneumonia  #AHRF 2/2 COVID  #Elevated lactate, resolved  #LEATHA, resolved  #Hyperkalemia, resolved  #Seizure disorder  #TBI  #Type 2 DM  #HTN  #DVT ppx    68yM with PMH of TBI, seizure disorder, sacral ulcer, from [facility], who was sent to the ED for concerns for sepsis, admitted for sepsis 2/2 infected sacral decubitus ulcer. Continue vancomycin, cefepime, Flagyl. Continue dexamethasone and remdesvir, can consider shortened courseof steroids pending improvement in AHRF. Patient will need PICC line for long-term abx treatment. Lactate trended until normalized, given IVF. LEATHA and hyperkalemia resolved, likely in setting of poor PO intake and acute illness. Wound care following for sacral ulcer. Surgery consulted for possible debridement.  DVT ppx.

## 2023-12-17 NOTE — PROGRESS NOTE ADULT - SUBJECTIVE AND OBJECTIVE BOX
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  Patient is doing well and continues to deny any new complaints. Has not had any fevers for the past 2 days and leukocytosis remains mild. Will need PICC line insertion.    REVIEW OF SYSTEMS:  [  ] Not able to elicit  General: No fevers  no malaise  Chest: no cough no sob  GI: no nvd  : no urinary sxs   Skin: no rashes  Musculoskeletal: no trauma no LBP  Neuro: no ha's no dizziness     MEDS:  cefepime   IVPB 1000 milliGRAM(s) IV Intermittent every 12 hours  metroNIDAZOLE  IVPB 500 milliGRAM(s) IV Intermittent every 8 hours  remdesivir  IVPB 100 milliGRAM(s) IV Intermittent every 24 hours  vancomycin  IVPB 1000 milliGRAM(s) IV Intermittent every 12 hours      ALLERGIES: Allergies  No Known Allergies  Intolerances    VITALS:  Vital Signs Last 24 Hrs  T(C): 37.5 (12-17-23 @ 05:31), Max: 37.5 (12-17-23 @ 05:31)  T(F): 99.5 (12-17-23 @ 05:31), Max: 99.5 (12-17-23 @ 05:31)  HR: 93 (12-17-23 @ 05:31) (88 - 93)  BP: 105/67 (12-17-23 @ 05:31) (105/67 - 112/74)  BP(mean): --  RR: 16 (12-17-23 @ 05:31) (16 - 18)  SpO2: 95% (12-17-23 @ 05:31) (95% - 100%)    PHYSICAL EXAM:  HEENT: dry oral mucosa with some missing teeth  Neck: supple no LN's   Respiratory: Bilateral rhoncherous sounds with scattered few crepes  Cardiovascular: S1 S2 reg no murmurs  Gastrointestinal: +BS with soft, nondistended abdomen; nontender  Extremities: no edema, mildly contracted right arm; R>L leg atrophy  Skin: sacral ulcer is dressed  Ortho: n/a  Neuro: awake and alert for most part    LABS/DIAGNOSTIC TESTS:                        8.0    13.20 )-----------( 620      ( 17 Dec 2023 05:26 )             24.7   WBC Count: 13.20 K/uL (12-17 @ 05:26)  WBC Count: 13.80 K/uL (12-16 @ 10:00)  WBC Count: 14.23 K/uL (12-16 @ 05:31)  WBC Count: 16.96 K/uL (12-16 @ 00:35)  WBC Count: 18.54 K/uL (12-15 @ 09:50)    12-17    137  |  108  |  23<H>  ----------------------------<  81  5.1   |  25  |  0.94    Ca    9.0      17 Dec 2023 05:26  Phos  2.7     12-17  Mg     2.0     12-17          CULTURES:   Clean Catch Clean Catch (Midstream)  12-14 @ 02:36   No growth  --  --      .Blood Blood-Peripheral  12-13 @ 21:35   No growth at 24 hours  --  --      .Blood Blood-Peripheral  12-13 @ 21:25   No growth at 24 hours  --  --      RADIOLOGY:  No new studies

## 2023-12-17 NOTE — PROGRESS NOTE ADULT - SUBJECTIVE AND OBJECTIVE BOX
S:    REVIEW OF SYSTEMS:  CONSTITUTIONAL: No weakness, fevers or chills  EYES: No visual changes  ENT: No vertigo or throat pain   NECK: No pain or stiffness  RESPIRATORY: No cough, wheezing, hemoptysis; No shortness of breath  CARDIOVASCULAR: No chest pain or palpitations  GASTROINTESTINAL: No abdominal or epigastric pain. No nausea, vomiting, or hematemesis; No diarrhea or constipation. No melena or hematochezia.  GENITOURINARY: No dysuria, frequency or hematuria  NEUROLOGICAL: No numbness or weakness  SKIN: No itching, rashes  PSYCH: No depression, anxiety    O:  Vital Signs Last 24 Hrs  T(C): 37.5 (17 Dec 2023 05:31), Max: 37.5 (17 Dec 2023 05:31)  T(F): 99.5 (17 Dec 2023 05:31), Max: 99.5 (17 Dec 2023 05:31)  HR: 93 (17 Dec 2023 05:31) (88 - 93)  BP: 105/67 (17 Dec 2023 05:31) (105/67 - 112/74)  BP(mean): --  RR: 16 (17 Dec 2023 05:31) (16 - 18)  SpO2: 95% (17 Dec 2023 05:31) (95% - 100%)    Parameters below as of 17 Dec 2023 05:31  Patient On (Oxygen Delivery Method): room air      Constitutional/General: Well developed, vitals reviewed  EYE: Symmetrical pupils, conjunctiva clear   ENT: Good dentition, oropharynx clear  NECK: No visual masses, no JVD  CHEST: No respiratory distress, bilateral symmetrical chest rise  ABDOMEN: Nondistended, no visual masses  SKIN: No rash, warm, dry  NEURO: A+Ox3, Cranial nerves grossly intact, moves all extremities, follows commands  PSYCH: Normal mood, normal affect      acetaminophen     Tablet .. 650 milliGRAM(s) Oral every 6 hours PRN  ascorbic acid 500 milliGRAM(s) Oral daily  cefepime   IVPB 1000 milliGRAM(s) IV Intermittent every 12 hours  collagenase Ointment 1 Application(s) Topical every 8 hours  Dakins Solution - 1/4 Strength 1 Application(s) Topical daily  dexAMETHasone  Injectable 6 milliGRAM(s) IV Push daily  dextrose 50% Injectable 25 Gram(s) IV Push once  heparin   Injectable 5000 Unit(s) SubCutaneous every 8 hours  insulin glargine Injectable (LANTUS) 12 Unit(s) SubCutaneous at bedtime  insulin lispro (ADMELOG) corrective regimen sliding scale   SubCutaneous at bedtime  insulin lispro (ADMELOG) corrective regimen sliding scale   SubCutaneous three times a day before meals  lactulose Syrup 10 Gram(s) Oral daily  levETIRAcetam 750 milliGRAM(s) Oral two times a day  metroNIDAZOLE  IVPB      metroNIDAZOLE  IVPB 500 milliGRAM(s) IV Intermittent every 8 hours  multivitamin 1 Tablet(s) Oral daily  polyethylene glycol 3350 17 Gram(s) Oral two times a day  remdesivir  IVPB 100 milliGRAM(s) IV Intermittent every 24 hours  remdesivir  IVPB   IV Intermittent   senna 2 Tablet(s) Oral at bedtime  sodium chloride 0.9%. 1000 milliLiter(s) IV Continuous <Continuous>  vancomycin  IVPB 1000 milliGRAM(s) IV Intermittent every 12 hours  vancomycin  IVPB                                8.0    13.20 )-----------( 620      ( 17 Dec 2023 05:26 )             24.7       12-17    137  |  108  |  23<H>  ----------------------------<  81  5.1   |  25  |  0.94    Ca    9.0      17 Dec 2023 05:26  Phos  2.7     12-17  Mg     2.0     12-17         S: Patient seen and examined at bedside. No acute events overnight. Appears comfortable. On room air.     REVIEW OF SYSTEMS:  Unable to obtain due to patient mentation    O:  Vital Signs Last 24 Hrs  T(C): 37.5 (17 Dec 2023 05:31), Max: 37.5 (17 Dec 2023 05:31)  T(F): 99.5 (17 Dec 2023 05:31), Max: 99.5 (17 Dec 2023 05:31)  HR: 93 (17 Dec 2023 05:31) (88 - 93)  BP: 105/67 (17 Dec 2023 05:31) (105/67 - 112/74)  BP(mean): --  RR: 16 (17 Dec 2023 05:31) (16 - 18)  SpO2: 95% (17 Dec 2023 05:31) (95% - 100%)    Parameters below as of 17 Dec 2023 05:31  Patient On (Oxygen Delivery Method): room air      Constitutional/General: Chronically ill appearing, vitals reviewed  EYE: Symmetrical pupils, conjunctiva clear   ENT: Poor dentition, oropharynx clear  NECK: No visual masses, no JVD  CHEST: No respiratory distress, bilateral symmetrical chest rise  ABDOMEN: Nondistended, no visual masses  SKIN: No rash, warm, dry  NEURO: Awake and alert, Cranial nerves grossly intact      acetaminophen     Tablet .. 650 milliGRAM(s) Oral every 6 hours PRN  ascorbic acid 500 milliGRAM(s) Oral daily  cefepime   IVPB 1000 milliGRAM(s) IV Intermittent every 12 hours  collagenase Ointment 1 Application(s) Topical every 8 hours  Dakins Solution - 1/4 Strength 1 Application(s) Topical daily  dexAMETHasone  Injectable 6 milliGRAM(s) IV Push daily  dextrose 50% Injectable 25 Gram(s) IV Push once  heparin   Injectable 5000 Unit(s) SubCutaneous every 8 hours  insulin glargine Injectable (LANTUS) 12 Unit(s) SubCutaneous at bedtime  insulin lispro (ADMELOG) corrective regimen sliding scale   SubCutaneous at bedtime  insulin lispro (ADMELOG) corrective regimen sliding scale   SubCutaneous three times a day before meals  lactulose Syrup 10 Gram(s) Oral daily  levETIRAcetam 750 milliGRAM(s) Oral two times a day  metroNIDAZOLE  IVPB      metroNIDAZOLE  IVPB 500 milliGRAM(s) IV Intermittent every 8 hours  multivitamin 1 Tablet(s) Oral daily  polyethylene glycol 3350 17 Gram(s) Oral two times a day  remdesivir  IVPB 100 milliGRAM(s) IV Intermittent every 24 hours  remdesivir  IVPB   IV Intermittent   senna 2 Tablet(s) Oral at bedtime  sodium chloride 0.9%. 1000 milliLiter(s) IV Continuous <Continuous>  vancomycin  IVPB 1000 milliGRAM(s) IV Intermittent every 12 hours  vancomycin  IVPB                                8.0    13.20 )-----------( 620      ( 17 Dec 2023 05:26 )             24.7       12-17    137  |  108  |  23<H>  ----------------------------<  81  5.1   |  25  |  0.94    Ca    9.0      17 Dec 2023 05:26  Phos  2.7     12-17  Mg     2.0     12-17

## 2023-12-18 ENCOUNTER — TRANSCRIPTION ENCOUNTER (OUTPATIENT)
Age: 68
End: 2023-12-18

## 2023-12-18 LAB
ANION GAP SERPL CALC-SCNC: 6 MMOL/L — SIGNIFICANT CHANGE UP (ref 5–17)
ANION GAP SERPL CALC-SCNC: 6 MMOL/L — SIGNIFICANT CHANGE UP (ref 5–17)
BASOPHILS # BLD AUTO: 0.04 K/UL — SIGNIFICANT CHANGE UP (ref 0–0.2)
BASOPHILS # BLD AUTO: 0.04 K/UL — SIGNIFICANT CHANGE UP (ref 0–0.2)
BASOPHILS NFR BLD AUTO: 0.2 % — SIGNIFICANT CHANGE UP (ref 0–2)
BASOPHILS NFR BLD AUTO: 0.2 % — SIGNIFICANT CHANGE UP (ref 0–2)
BUN SERPL-MCNC: 17 MG/DL — SIGNIFICANT CHANGE UP (ref 7–18)
BUN SERPL-MCNC: 17 MG/DL — SIGNIFICANT CHANGE UP (ref 7–18)
CALCIUM SERPL-MCNC: 8.5 MG/DL — SIGNIFICANT CHANGE UP (ref 8.4–10.5)
CALCIUM SERPL-MCNC: 8.5 MG/DL — SIGNIFICANT CHANGE UP (ref 8.4–10.5)
CHLORIDE SERPL-SCNC: 107 MMOL/L — SIGNIFICANT CHANGE UP (ref 96–108)
CHLORIDE SERPL-SCNC: 107 MMOL/L — SIGNIFICANT CHANGE UP (ref 96–108)
CO2 SERPL-SCNC: 26 MMOL/L — SIGNIFICANT CHANGE UP (ref 22–31)
CO2 SERPL-SCNC: 26 MMOL/L — SIGNIFICANT CHANGE UP (ref 22–31)
CREAT SERPL-MCNC: 0.87 MG/DL — SIGNIFICANT CHANGE UP (ref 0.5–1.3)
CREAT SERPL-MCNC: 0.87 MG/DL — SIGNIFICANT CHANGE UP (ref 0.5–1.3)
EGFR: 95 ML/MIN/1.73M2 — SIGNIFICANT CHANGE UP
EGFR: 95 ML/MIN/1.73M2 — SIGNIFICANT CHANGE UP
EOSINOPHIL # BLD AUTO: 0.05 K/UL — SIGNIFICANT CHANGE UP (ref 0–0.5)
EOSINOPHIL # BLD AUTO: 0.05 K/UL — SIGNIFICANT CHANGE UP (ref 0–0.5)
EOSINOPHIL NFR BLD AUTO: 0.3 % — SIGNIFICANT CHANGE UP (ref 0–6)
EOSINOPHIL NFR BLD AUTO: 0.3 % — SIGNIFICANT CHANGE UP (ref 0–6)
GLUCOSE BLDC GLUCOMTR-MCNC: 117 MG/DL — HIGH (ref 70–99)
GLUCOSE BLDC GLUCOMTR-MCNC: 117 MG/DL — HIGH (ref 70–99)
GLUCOSE BLDC GLUCOMTR-MCNC: 148 MG/DL — HIGH (ref 70–99)
GLUCOSE BLDC GLUCOMTR-MCNC: 148 MG/DL — HIGH (ref 70–99)
GLUCOSE BLDC GLUCOMTR-MCNC: 163 MG/DL — HIGH (ref 70–99)
GLUCOSE BLDC GLUCOMTR-MCNC: 163 MG/DL — HIGH (ref 70–99)
GLUCOSE BLDC GLUCOMTR-MCNC: 56 MG/DL — LOW (ref 70–99)
GLUCOSE BLDC GLUCOMTR-MCNC: 56 MG/DL — LOW (ref 70–99)
GLUCOSE BLDC GLUCOMTR-MCNC: 84 MG/DL — SIGNIFICANT CHANGE UP (ref 70–99)
GLUCOSE BLDC GLUCOMTR-MCNC: 84 MG/DL — SIGNIFICANT CHANGE UP (ref 70–99)
GLUCOSE SERPL-MCNC: 58 MG/DL — LOW (ref 70–99)
GLUCOSE SERPL-MCNC: 58 MG/DL — LOW (ref 70–99)
HCT VFR BLD CALC: 23.4 % — LOW (ref 39–50)
HCT VFR BLD CALC: 23.4 % — LOW (ref 39–50)
HGB BLD-MCNC: 7.4 G/DL — LOW (ref 13–17)
HGB BLD-MCNC: 7.4 G/DL — LOW (ref 13–17)
IMM GRANULOCYTES NFR BLD AUTO: 0.9 % — SIGNIFICANT CHANGE UP (ref 0–0.9)
IMM GRANULOCYTES NFR BLD AUTO: 0.9 % — SIGNIFICANT CHANGE UP (ref 0–0.9)
LYMPHOCYTES # BLD AUTO: 11.7 % — LOW (ref 13–44)
LYMPHOCYTES # BLD AUTO: 11.7 % — LOW (ref 13–44)
LYMPHOCYTES # BLD AUTO: 2.27 K/UL — SIGNIFICANT CHANGE UP (ref 1–3.3)
LYMPHOCYTES # BLD AUTO: 2.27 K/UL — SIGNIFICANT CHANGE UP (ref 1–3.3)
MAGNESIUM SERPL-MCNC: 1.9 MG/DL — SIGNIFICANT CHANGE UP (ref 1.6–2.6)
MAGNESIUM SERPL-MCNC: 1.9 MG/DL — SIGNIFICANT CHANGE UP (ref 1.6–2.6)
MCHC RBC-ENTMCNC: 24.6 PG — LOW (ref 27–34)
MCHC RBC-ENTMCNC: 24.6 PG — LOW (ref 27–34)
MCHC RBC-ENTMCNC: 31.6 GM/DL — LOW (ref 32–36)
MCHC RBC-ENTMCNC: 31.6 GM/DL — LOW (ref 32–36)
MCV RBC AUTO: 77.7 FL — LOW (ref 80–100)
MCV RBC AUTO: 77.7 FL — LOW (ref 80–100)
MONOCYTES # BLD AUTO: 1.36 K/UL — HIGH (ref 0–0.9)
MONOCYTES # BLD AUTO: 1.36 K/UL — HIGH (ref 0–0.9)
MONOCYTES NFR BLD AUTO: 7 % — SIGNIFICANT CHANGE UP (ref 2–14)
MONOCYTES NFR BLD AUTO: 7 % — SIGNIFICANT CHANGE UP (ref 2–14)
NEUTROPHILS # BLD AUTO: 15.51 K/UL — HIGH (ref 1.8–7.4)
NEUTROPHILS # BLD AUTO: 15.51 K/UL — HIGH (ref 1.8–7.4)
NEUTROPHILS NFR BLD AUTO: 79.9 % — HIGH (ref 43–77)
NEUTROPHILS NFR BLD AUTO: 79.9 % — HIGH (ref 43–77)
NRBC # BLD: 0 /100 WBCS — SIGNIFICANT CHANGE UP (ref 0–0)
NRBC # BLD: 0 /100 WBCS — SIGNIFICANT CHANGE UP (ref 0–0)
PHOSPHATE SERPL-MCNC: 2.5 MG/DL — SIGNIFICANT CHANGE UP (ref 2.5–4.5)
PHOSPHATE SERPL-MCNC: 2.5 MG/DL — SIGNIFICANT CHANGE UP (ref 2.5–4.5)
PLATELET # BLD AUTO: 607 K/UL — HIGH (ref 150–400)
PLATELET # BLD AUTO: 607 K/UL — HIGH (ref 150–400)
POTASSIUM SERPL-MCNC: 3.9 MMOL/L — SIGNIFICANT CHANGE UP (ref 3.5–5.3)
POTASSIUM SERPL-MCNC: 3.9 MMOL/L — SIGNIFICANT CHANGE UP (ref 3.5–5.3)
POTASSIUM SERPL-SCNC: 3.9 MMOL/L — SIGNIFICANT CHANGE UP (ref 3.5–5.3)
POTASSIUM SERPL-SCNC: 3.9 MMOL/L — SIGNIFICANT CHANGE UP (ref 3.5–5.3)
RBC # BLD: 3.01 M/UL — LOW (ref 4.2–5.8)
RBC # BLD: 3.01 M/UL — LOW (ref 4.2–5.8)
RBC # FLD: 15.9 % — HIGH (ref 10.3–14.5)
RBC # FLD: 15.9 % — HIGH (ref 10.3–14.5)
SODIUM SERPL-SCNC: 139 MMOL/L — SIGNIFICANT CHANGE UP (ref 135–145)
SODIUM SERPL-SCNC: 139 MMOL/L — SIGNIFICANT CHANGE UP (ref 135–145)
VANCOMYCIN TROUGH SERPL-MCNC: 20.2 UG/ML — HIGH (ref 10–20)
VANCOMYCIN TROUGH SERPL-MCNC: 20.2 UG/ML — HIGH (ref 10–20)
WBC # BLD: 19.4 K/UL — HIGH (ref 3.8–10.5)
WBC # BLD: 19.4 K/UL — HIGH (ref 3.8–10.5)
WBC # FLD AUTO: 19.4 K/UL — HIGH (ref 3.8–10.5)
WBC # FLD AUTO: 19.4 K/UL — HIGH (ref 3.8–10.5)

## 2023-12-18 PROCEDURE — 99233 SBSQ HOSP IP/OBS HIGH 50: CPT | Mod: GC

## 2023-12-18 RX ORDER — DEXTROSE 50 % IN WATER 50 %
50 SYRINGE (ML) INTRAVENOUS ONCE
Refills: 0 | Status: COMPLETED | OUTPATIENT
Start: 2023-12-18 | End: 2023-12-18

## 2023-12-18 RX ORDER — MUPIROCIN 20 MG/G
1 OINTMENT TOPICAL
Refills: 0 | Status: COMPLETED | OUTPATIENT
Start: 2023-12-18 | End: 2023-12-23

## 2023-12-18 RX ORDER — SODIUM CHLORIDE 9 MG/ML
1000 INJECTION, SOLUTION INTRAVENOUS
Refills: 0 | Status: DISCONTINUED | OUTPATIENT
Start: 2023-12-18 | End: 2023-12-18

## 2023-12-18 RX ORDER — INSULIN GLARGINE 100 [IU]/ML
6 INJECTION, SOLUTION SUBCUTANEOUS AT BEDTIME
Refills: 0 | Status: DISCONTINUED | OUTPATIENT
Start: 2023-12-18 | End: 2023-12-19

## 2023-12-18 RX ORDER — CHLORHEXIDINE GLUCONATE 213 G/1000ML
1 SOLUTION TOPICAL
Refills: 0 | Status: DISCONTINUED | OUTPATIENT
Start: 2023-12-18 | End: 2023-12-23

## 2023-12-18 RX ORDER — SODIUM CHLORIDE 9 MG/ML
1000 INJECTION, SOLUTION INTRAVENOUS
Refills: 0 | Status: DISCONTINUED | OUTPATIENT
Start: 2023-12-18 | End: 2023-12-20

## 2023-12-18 RX ADMIN — SODIUM CHLORIDE 60 MILLILITER(S): 9 INJECTION, SOLUTION INTRAVENOUS at 21:27

## 2023-12-18 RX ADMIN — Medication 50 MILLILITER(S): at 07:23

## 2023-12-18 RX ADMIN — POLYETHYLENE GLYCOL 3350 17 GRAM(S): 17 POWDER, FOR SOLUTION ORAL at 18:36

## 2023-12-18 RX ADMIN — Medication 100 MILLIGRAM(S): at 11:47

## 2023-12-18 RX ADMIN — SODIUM CHLORIDE 60 MILLILITER(S): 9 INJECTION, SOLUTION INTRAVENOUS at 18:54

## 2023-12-18 RX ADMIN — CEFEPIME 100 MILLIGRAM(S): 1 INJECTION, POWDER, FOR SOLUTION INTRAMUSCULAR; INTRAVENOUS at 20:16

## 2023-12-18 RX ADMIN — LEVETIRACETAM 750 MILLIGRAM(S): 250 TABLET, FILM COATED ORAL at 06:44

## 2023-12-18 RX ADMIN — HEPARIN SODIUM 5000 UNIT(S): 5000 INJECTION INTRAVENOUS; SUBCUTANEOUS at 06:44

## 2023-12-18 RX ADMIN — Medication 250 MILLIGRAM(S): at 06:51

## 2023-12-18 RX ADMIN — Medication 100 MILLIGRAM(S): at 21:26

## 2023-12-18 RX ADMIN — SENNA PLUS 2 TABLET(S): 8.6 TABLET ORAL at 21:20

## 2023-12-18 RX ADMIN — HEPARIN SODIUM 5000 UNIT(S): 5000 INJECTION INTRAVENOUS; SUBCUTANEOUS at 21:21

## 2023-12-18 RX ADMIN — Medication 6 MILLIGRAM(S): at 06:43

## 2023-12-18 RX ADMIN — Medication 1 APPLICATION(S): at 14:47

## 2023-12-18 RX ADMIN — Medication 100 MILLIGRAM(S): at 06:51

## 2023-12-18 RX ADMIN — HEPARIN SODIUM 5000 UNIT(S): 5000 INJECTION INTRAVENOUS; SUBCUTANEOUS at 14:46

## 2023-12-18 RX ADMIN — SODIUM CHLORIDE 60 MILLILITER(S): 9 INJECTION, SOLUTION INTRAVENOUS at 16:01

## 2023-12-18 RX ADMIN — LACTULOSE 10 GRAM(S): 10 SOLUTION ORAL at 11:48

## 2023-12-18 RX ADMIN — MUPIROCIN 1 APPLICATION(S): 20 OINTMENT TOPICAL at 18:35

## 2023-12-18 RX ADMIN — LEVETIRACETAM 750 MILLIGRAM(S): 250 TABLET, FILM COATED ORAL at 18:57

## 2023-12-18 RX ADMIN — Medication 500 MILLIGRAM(S): at 11:47

## 2023-12-18 RX ADMIN — Medication 1 APPLICATION(S): at 06:42

## 2023-12-18 RX ADMIN — REMDESIVIR 200 MILLIGRAM(S): 5 INJECTION INTRAVENOUS at 14:47

## 2023-12-18 RX ADMIN — Medication 1 APPLICATION(S): at 21:43

## 2023-12-18 RX ADMIN — Medication 250 MILLIGRAM(S): at 18:35

## 2023-12-18 RX ADMIN — CHLORHEXIDINE GLUCONATE 1 APPLICATION(S): 213 SOLUTION TOPICAL at 11:53

## 2023-12-18 RX ADMIN — Medication 1 TABLET(S): at 11:47

## 2023-12-18 RX ADMIN — CEFEPIME 100 MILLIGRAM(S): 1 INJECTION, POWDER, FOR SOLUTION INTRAMUSCULAR; INTRAVENOUS at 06:43

## 2023-12-18 RX ADMIN — INSULIN GLARGINE 6 UNIT(S): 100 INJECTION, SOLUTION SUBCUTANEOUS at 21:42

## 2023-12-18 RX ADMIN — POLYETHYLENE GLYCOL 3350 17 GRAM(S): 17 POWDER, FOR SOLUTION ORAL at 06:45

## 2023-12-18 NOTE — DISCHARGE NOTE PROVIDER - NSDCMRMEDTOKEN_GEN_ALL_CORE_FT
acetaminophen 325 mg oral tablet: 2 tab(s) orally every 6 hours as needed for  mild pain  enoxaparin 40 mg/0.4 mL injectable solution: 40 milligram(s) subcutaneously once a day  lactulose 10 g/15 mL oral syrup: 15 milliliter(s) orally once a day  levETIRAcetam 750 mg oral tablet: 1 tab(s) orally 2 times a day  metFORMIN 500 mg oral tablet: 1 tab(s) orally 2 times a day  Multiple Vitamins oral tablet: 1 tab(s) orally once a day  polyethylene glycol 3350 oral powder for reconstitution: 17 gram(s) orally 2 times a day  Santyl 250 units/g topical ointment: Apply topically to affected area every 8 hours  senna (sennosides) 8.6 mg oral tablet: 2 tab(s) orally once a day  Vitamin C 500 mg oral capsule: 1 cap(s) orally once a day   acetaminophen 325 mg oral tablet: 2 tab(s) orally every 6 hours as needed for  mild pain  chlorhexidine 2% topical pad: Apply topically to affected area 2 times a day 1 Apply topically to affected area  enoxaparin 40 mg/0.4 mL injectable solution: 40 milligram(s) subcutaneously once a day  lactulose 10 g/15 mL oral syrup: 15 milliliter(s) orally once a day  levETIRAcetam 750 mg oral tablet: 1 tab(s) orally 2 times a day  meropenem 1000 mg intravenous injection: 1,000 milligram(s) intravenously every 8 hours Until 1/25/24  metFORMIN 500 mg oral tablet: 1 tab(s) orally 2 times a day  Multiple Vitamins oral tablet: 1 tab(s) orally once a day  Normal Saline Flush 0.9% injectable solution: 1 application intravenous prn Every 1 hour, PRN Pre/Post blood products, medications, blood draws, and to maintain line  polyethylene glycol 3350 oral powder for reconstitution: 17 gram(s) orally 2 times a day  Santyl 250 units/g topical ointment: Apply topically to affected area every 8 hours  senna (sennosides) 8.6 mg oral tablet: 2 tab(s) orally once a day  sodium hypochlorite 0.125% topical solution: 1 Apply topically to affected area once a day  Vitamin C 500 mg oral capsule: 1 cap(s) orally once a day

## 2023-12-18 NOTE — PROGRESS NOTE ADULT - ASSESSMENT
68-year-old male, PMH of traumatic brain injury w/ seizure disorder, DM, sacral decubitus ulcer, sent from [facility] for reported fever and oxygen saturation in low 90s. Pt reporting lethargy/weakness, A&Ox0. Vital signs sig for temp 101 F (resolved, 98.6F),  > 101, 97% on RA. EKG sinus tachycardia 106 HR, Labs sig for WC 14, Cr 1.5, lac 2.8, K 5.9. CXR shows questionable RML infiltrate. s/p cefepime 2g and 2.9 L in ED. COVID +. Admitted for Sepsis likely 2/2 sacral ulcer and COVID pnuemonia.                68-year-old male, PMH of traumatic brain injury w/ seizure disorder, DM, sacral decubitus ulcer, sent from [facility] for reported fever and oxygen saturation in low 90s. Pt reporting lethargy/weakness, A&Ox0. Vital signs sig for temp 101 F (resolved, 98.6F),  > 101, 97% on RA. EKG sinus tachycardia 106 HR, Labs sig for WC 14, Cr 1.5, lac 2.8, K 5.9. CXR shows questionable RML infiltrate. s/p cefepime 2g and 2.9 L in ED. COVID +. Admitted for Sepsis likely 2/2 sacral ulcer and COVID pnuemonia.

## 2023-12-18 NOTE — PROGRESS NOTE ADULT - ASSESSMENT
Pneumonia ( ? bacterial)  COVID - 19 infection   Infected Sacral Decubitus ulcer with Osteomyelitis of coccyx  Leukocytosis - secondary to steroids    Plan:  ·	Cont Vancomycin 1gm iv q12hrs  ·	Cont Maxipime 1gm iv q12hrs , will increase to 2gms iv q12hrs tomorrow  ·	Cont Flagyl 500mgs iv q8hrs  ·	Cont Remdesivir 100mgs iv q24hrs  ·	Cont Decadron 6mgs iv q24hrs.  ·	Will need a PICC line and IV antibiotics for 6 weeks to treat for osteomyelitis.  ·	repeat vanco trough.

## 2023-12-18 NOTE — DISCHARGE NOTE PROVIDER - ATTENDING DISCHARGE PHYSICAL EXAMINATION:
Alert cooperative man in NAD  Vital Signs Last 24 Hrs  T(C): 36.7 (23 Dec 2023 13:08), Max: 36.7 (22 Dec 2023 22:27)  T(F): 98.1 (23 Dec 2023 13:08), Max: 98.1 (22 Dec 2023 22:27)  HR: 88 (23 Dec 2023 13:08) (83 - 88)  BP: 125/73 (23 Dec 2023 13:08) (103/65 - 125/73)  BP(mean): --  RR: 17 (23 Dec 2023 13:08) (17 - 18)  SpO2: 99% (23 Dec 2023 13:08) (97% - 99%)    Parameters below as of 23 Dec 2023 13:08  Patient On (Oxygen Delivery Method): room air  Lungs, clear  Cor, RRR  Abdomen, soft  Neurological, hemiparesis with increased tone

## 2023-12-18 NOTE — PROGRESS NOTE ADULT - PROBLEM SELECTOR PLAN 10
- t2DM on metformin   - blood sugars 200s  - s/p lantus 9u, 3 TID   - ISS fs qhs and before meals  12/18 Fasting gluc 58, lantus decreased to 6U, admelog dc

## 2023-12-18 NOTE — DISCHARGE NOTE PROVIDER - HOSPITAL COURSE
68-year-old male, PMH of traumatic brain injury w/ seizure disorder, DM, sacral decubitus ulcer, sent from [facility] for reported fever and oxygen saturation in low 90s. Pt reporting lethargy/weakness, A&Ox0. Vital signs sig for temp 101 F (resolved, 98.6F),  > 101, 97% on RA. EKG sinus tachycardia 106 HR, Labs sig for WC 14, Cr 1.5, lac 2.8, K 5.9. CXR showing diffuse right lung pneumonia. CT  showing bibasilar opacities, which may reflect known Covid pneumonia. s/p cefepime 2g and 2.9 L in ED. COVID +. Admitted for Sepsis likely 2/2 sacral ulcer and COVID pneumonia. ID and surgery was consulted. Patient started on remdesivir and decadron. CT Abdomen showing extensive sacral decubitus ulcer with fragmentation/osteomyelitis of the the coccyx. Patient treated with cefepime and vancomycin and flagyl (for anaerobic coverage).      68-year-old male, PMH of traumatic brain injury w/ seizure disorder, DM, sacral decubitus ulcer, sent from [facility] for reported fever and oxygen saturation in low 90s. Pt reporting lethargy/weakness, A&Ox0. Vital signs sig for temp 101 F (resolved, 98.6F),  > 101, 97% on RA. EKG sinus tachycardia 106 HR, Labs sig for WC 14, Cr 1.5, lac 2.8, K 5.9. CXR showing diffuse right lung pneumonia. CT  showing bibasilar opacities, which may reflect known Covid pneumonia. s/p cefepime 2g and 2.9 L in ED. COVID +. Admitted for Sepsis likely 2/2 sacral ulcer and COVID pneumonia. ID and surgery was consulted. Patient started on remdesivir and decadron. CT Abdomen showing extensive sacral decubitus ulcer with fragmentation/osteomyelitis of the the coccyx. Patient treated with cefepime and vancomycin and flagyl (for anaerobic coverage).      68-year-old male, PMH of traumatic brain injury w/ seizure disorder, DM, sacral decubitus ulcer, sent from [facility] for reported fever and oxygen saturation in low 90s. Pt reporting lethargy/weakness, A&Ox0. Vital signs sig for temp 101 F (resolved, 98.6F),  > 101, 97% on RA. EKG sinus tachycardia 106 HR, Labs sig for WC 14, Cr 1.5, lac 2.8, K 5.9. CXR showing diffuse right lung pneumonia. CT  showing bibasilar opacities, which may reflect known Covid pneumonia. s/p cefepime 2g and 2.9 L in ED. COVID +. Admitted for Sepsis likely 2/2 sacral ulcer and COVID pneumonia. ID and surgery was consulted. Patient started on remdesivir and decadron. CT Abdomen showing extensive sacral decubitus ulcer with fragmentation/osteomyelitis of the the coccyx. Patient treated with cefepime and vancomycin and flagyl (for anaerobic coverage). Patient had debridement of sacral ulcer on 12/20 and had a PICC line placed on 12/21. Patient to continue IV antibiotics to complete 6 week course for osteomyelitis.     Patient is able to ambulate and tolerate diet prior to discharge. Patient is stable for discharge per attending and is advised to follow up with PCP as outpatient.   Please refer to patient's complete medical chart with documents for a full hospital course, for this is only a brief summary.       68-year-old male, PMH of traumatic brain injury w/ seizure disorder, DM, sacral decubitus ulcer, sent from [facility] for reported fever and oxygen saturation in low 90s. Pt reporting lethargy/weakness, A&Ox0. Vital signs sig for temp 101 F (resolved, 98.6F),  > 101, 97% on RA. EKG sinus tachycardia 106 HR, Labs sig for WC 14, Cr 1.5, lac 2.8, K 5.9. CXR showing diffuse right lung pneumonia. CT  showing bibasilar opacities, which may reflect known Covid pneumonia. s/p cefepime 2g and 2.9 L in ED. COVID +. Admitted for Sepsis likely 2/2 sacral ulcer and COVID pneumonia. ID and surgery was consulted. Patient started on remdesivir and decadron. CT Abdomen showing extensive sacral decubitus ulcer with fragmentation/osteomyelitis of the the coccyx. Patient treated with cefepime and vancomycin and flagyl (for anaerobic coverage). Patient had debridement of sacral ulcer on 12/20 and had a PICC line placed on 12/21. Patient to continue IV antibiotics to complete 6 week course for osteomyelitis.     Patient is able to ambulate and tolerate diet prior to discharge. Patient is stable for discharge per attending and is advised to follow up with PCP as outpatient.   Please refer to patient's complete medical chart with documents for a full hospital course, for this is only a brief summary.       68-year-old male, PMH of traumatic brain injury w/ seizure disorder, DM, sacral decubitus ulcer, sent from [facility] for reported fever and oxygen saturation in low 90s. Pt reporting lethargy/weakness, A&Ox0. Vital signs sig for temp 101 F (resolved, 98.6F),  > 101, 97% on RA. EKG sinus tachycardia 106 HR, Labs sig for WC 14, Cr 1.5, lac 2.8, K 5.9. CXR showing diffuse right lung pneumonia. CT  showing bibasilar opacities, which may reflect known Covid pneumonia. s/p cefepime 2g and 2.9 L in ED. COVID +. Admitted for Sepsis likely 2/2 sacral ulcer and COVID pneumonia. ID and surgery was consulted. Patient started on remdesivir and decadron. CT Abdomen showing extensive sacral decubitus ulcer with fragmentation/osteomyelitis of the the coccyx. Patient treated with cefepime and vancomycin and flagyl (for anaerobic coverage). Patient had debridement of sacral ulcer on 12/20 and had a PICC line placed on 12/21. Patient to continue Meropenem to complete 6 week course for osteomyelitis.     Patient is able to ambulate and tolerate diet prior to discharge. Patient is stable for discharge per attending and is advised to follow up with PCP as outpatient.   Please refer to patient's complete medical chart with documents for a full hospital course, for this is only a brief summary.       68-year-old male, PMH of traumatic brain injury w/ seizure disorder, DM, sacral decubitus ulcer, sent from [facility] for reported fever and oxygen saturation in low 90s. Pt reporting lethargy/weakness, A&Ox0. Vital signs sig for temp 101 F (resolved, 98.6F),  > 101, 97% on RA. EKG sinus tachycardia 106 HR, Labs sig for WC 14, Cr 1.5, lac 2.8, K 5.9. CXR showing diffuse right lung pneumonia. CT  showing bibasilar opacities, which may reflect known Covid pneumonia. s/p cefepime 2g and 2.9 L in ED. COVID +. Admitted for Sepsis likely 2/2 sacral ulcer and COVID pneumonia. ID and surgery was consulted. Patient started on remdesivir and decadron. CT Abdomen showing extensive sacral decubitus ulcer with fragmentation/osteomyelitis of the the coccyx. Patient treated with cefepime and vancomycin and flagyl (for anaerobic coverage). Patient had debridement of sacral ulcer on 12/20 and had a PICC line placed on 12/21. Patient to continue Meropenem to complete 6 week course for osteomyelitis.     Patient is able to ambulate and tolerate diet prior to discharge. Patient is stable for discharge per attending and is advised to follow up with PCP as outpatient.   Please refer to patient's complete medical chart with documents for a full hospital course, for this is only a brief summary.

## 2023-12-18 NOTE — DISCHARGE NOTE PROVIDER - NSDCCPCAREPLAN_GEN_ALL_CORE_FT
PRINCIPAL DISCHARGE DIAGNOSIS  Diagnosis: Acute metabolic encephalopathy  Assessment and Plan of Treatment: You presented to the hospital with altered mental status and low oxygen levels. The altered mental status was likely in the setting of sepsis due to your sacral ulcer and COVID pneumonia. You were treated with IV fluids, IV antibiotics for the sacral ulcer and remdesivir and steroids for COVID. Your mental status returned to baseline following the treatment.      SECONDARY DISCHARGE DIAGNOSES  Diagnosis: Sepsis due to skin infection  Assessment and Plan of Treatment: You were diagnosed with Sepsis which is a very serious condition resulting from your body's reaction to an infection. You had Sepsis secondary to your sacral ulcer infection and COVID Pneumonia. You were found to have elevated white blood cell count, fever and a fast heart rate. You tested positive for COVID and your chest xray showed signs of pneumonia, for which you were treated with remdesivir and steroid. CT scan of your abdomen showed extensive sacaral ulcer with soft tissue infection and osteomyelitis. You were seen by an infectious disease doctor and their recommendations were followed. You were treated with IV antibiotics.    Diagnosis: 2019 novel coronavirus disease (COVID-19)  Assessment and Plan of Treatment: You presented to the hospital with shortness of breath and low oxygen levels. You were found to have elevated white blood cell count, fever and a fast heart rate. You tested positive for COVID and your chest xray showed signs of pneumonia, for which you were treated with remdesivir and steroid.    Diagnosis: Seizure disorder  Assessment and Plan of Treatment:     Diagnosis: DM (diabetes mellitus)  Assessment and Plan of Treatment: You have history of diabetes. Your blood sugar was manged with insulin in the hospital. Your HbA1c was XXXX during this admission.  You need to continue monitoring your blood sugar levels closely and maintain healthy lifestyle by eating healthy diabetic regimen.  Please continue to take your medications as prescribed. Please follow up with your primary care provider upon discharge for further recommendations.     PRINCIPAL DISCHARGE DIAGNOSIS  Diagnosis: Acute metabolic encephalopathy  Assessment and Plan of Treatment: You presented to the hospital with altered mental status and low oxygen levels. The altered mental status was likely in the setting of sepsis due to your sacral ulcer and COVID pneumonia. You were treated with IV fluids, IV antibiotics for the sacral ulcer and remdesivir and steroids for COVID. Your mental status returned to baseline following the treatment.      SECONDARY DISCHARGE DIAGNOSES  Diagnosis: Sepsis due to skin infection  Assessment and Plan of Treatment: You were diagnosed with Sepsis which is a very serious condition resulting from your body's reaction to an infection. You had Sepsis secondary to your sacral ulcer infection and COVID Pneumonia. You were found to have elevated white blood cell count, fever and a fast heart rate. You tested positive for COVID and your chest xray showed signs of pneumonia, for which you were treated with remdesivir and steroid. CT scan of your abdomen showed extensive sacaral ulcer with soft tissue infection and osteomyelitis. You were seen by an infectious disease doctor and their recommendations were followed. You were treated with IV antibiotics. Your ulcerated skin tissue was surgical removed. A PICC line was placed so you can complete your 6 week IV antibiotic course until 1/25/23.    Diagnosis: 2019 novel coronavirus disease (COVID-19)  Assessment and Plan of Treatment: You presented to the hospital with shortness of breath and low oxygen levels. You were found to have elevated white blood cell count, fever and a fast heart rate. You tested positive for COVID and your chest xray showed signs of pneumonia, for which you were treated with remdesivir and steroid. Please continue to take DECARDRON 6mg DAILY UNTIL 12/24/23.    Diagnosis: Anemia  Assessment and Plan of Treatment: Your hemoglobin downtrended during the hospital stay. You had no signs of acute bleeding - no loss with bowel movements, coughing or acute trauma. You were given 1 unit of red blood cell following which your hemoglobin elevated appropriately. The loss of blood was likely coming from your ulcerated skin infection. You remained asymptomatic throughout the event and your hemoglobin remained stable after surgery.    Diagnosis: DM (diabetes mellitus)  Assessment and Plan of Treatment: You have history of diabetes. Your blood sugar was manged with insulin in the hospital. Your HbA1c was XXXX during this admission.  You need to continue monitoring your blood sugar levels closely and maintain healthy lifestyle by eating healthy diabetic regimen.  Please continue to take your medications as prescribed. Please follow up with your primary care provider upon discharge for further recommendations.    Diagnosis: Seizure disorder  Assessment and Plan of Treatment: You have a history of seizures which is a burst of uncontrolled electrical activity between brain cells causing temporary abnormalities in muscle tone or movements, behavior, sensation or state of awareness. You were continued on your home medication Keppra. Please take your medication as prescribed and follow up with neurologist upon discharge to adjust medications as needed.     PRINCIPAL DISCHARGE DIAGNOSIS  Diagnosis: Acute metabolic encephalopathy  Assessment and Plan of Treatment: You presented to the hospital with altered mental status and low oxygen levels. The altered mental status was likely in the setting of sepsis due to your sacral ulcer and COVID pneumonia. You were treated with IV fluids, IV antibiotics for the sacral ulcer and remdesivir and steroids for COVID. Your mental status returned to baseline following the treatment.      SECONDARY DISCHARGE DIAGNOSES  Diagnosis: Sepsis due to skin infection  Assessment and Plan of Treatment: You were diagnosed with Sepsis which is a very serious condition resulting from your body's reaction to an infection. You had Sepsis secondary to your sacral ulcer infection and COVID Pneumonia. You were found to have elevated white blood cell count, fever and a fast heart rate. You tested positive for COVID and your chest xray showed signs of pneumonia, for which you were treated with remdesivir and steroid. CT scan of your abdomen showed extensive sacaral ulcer with soft tissue infection and osteomyelitis. You were seen by an infectious disease doctor and their recommendations were followed. You were treated with IV antibiotics. Your ulcerated skin tissue was surgical removed. A PICC line was placed so you will take Meropenem 1g every 8 hours until 1/25/23.    Diagnosis: 2019 novel coronavirus disease (COVID-19)  Assessment and Plan of Treatment: You presented to the hospital with shortness of breath and low oxygen levels. You were found to have elevated white blood cell count, fever and a fast heart rate. You tested positive for COVID and your chest xray showed signs of pneumonia, for which you were treated with remdesivir and steroid. Please continue to take DECARDRON 6mg DAILY UNTIL 12/24/23.    Diagnosis: DM (diabetes mellitus)  Assessment and Plan of Treatment: You have history of diabetes. Your blood sugar was manged with insulin in the hospital. Your HbA1c was XXXX during this admission.  You need to continue monitoring your blood sugar levels closely and maintain healthy lifestyle by eating healthy diabetic regimen.  Please continue to take your medications as prescribed. Please follow up with your primary care provider upon discharge for further recommendations.    Diagnosis: Seizure disorder  Assessment and Plan of Treatment: You have a history of seizures which is a burst of uncontrolled electrical activity between brain cells causing temporary abnormalities in muscle tone or movements, behavior, sensation or state of awareness. You were continued on your home medication Keppra. Please take your medication as prescribed and follow up with neurologist upon discharge to adjust medications as needed.    Diagnosis: Anemia  Assessment and Plan of Treatment: Your hemoglobin downtrended during the hospital stay. You had no signs of acute bleeding - no loss with bowel movements, coughing or acute trauma. You were given 1 unit of red blood cell following which your hemoglobin elevated appropriately. The loss of blood was likely coming from your ulcerated skin infection. You remained asymptomatic throughout the event and your hemoglobin remained stable after surgery.     PRINCIPAL DISCHARGE DIAGNOSIS  Diagnosis: Acute metabolic encephalopathy  Assessment and Plan of Treatment: You presented to the hospital with altered mental status and low oxygen levels. The altered mental status was likely in the setting of sepsis due to your sacral ulcer and COVID pneumonia. You were treated with IV fluids, IV antibiotics for the sacral ulcer and remdesivir and steroids for COVID. Your mental status returned to baseline following the treatment.      SECONDARY DISCHARGE DIAGNOSES  Diagnosis: Sepsis due to skin infection  Assessment and Plan of Treatment: You were diagnosed with Sepsis which is a very serious condition resulting from your body's reaction to an infection. You had Sepsis secondary to your sacral ulcer infection and COVID Pneumonia. You were found to have elevated white blood cell count, fever and a fast heart rate. You tested positive for COVID and your chest xray showed signs of pneumonia, for which you were treated with remdesivir and steroid. CT scan of your abdomen showed extensive sacaral ulcer with soft tissue infection and osteomyelitis. You were seen by an infectious disease doctor and their recommendations were followed. You were treated with IV antibiotics. Your ulcerated skin tissue was surgical removed. A PICC line was placed so you will take Meropenem 1g every 8 hours until 1/25/23.    Diagnosis: 2019 novel coronavirus disease (COVID-19)  Assessment and Plan of Treatment: You presented to the hospital with shortness of breath and low oxygen levels. You were found to have elevated white blood cell count, fever and a fast heart rate. You tested positive for COVID and your chest xray showed signs of pneumonia, for which you were treated with remdesivir and steroid. You report resolution of your symptoms and your oxygen levels are within normal limits on room air.    Diagnosis: Anemia  Assessment and Plan of Treatment: Your hemoglobin downtrended during the hospital stay. You had no signs of acute bleeding - no loss with bowel movements, coughing or acute trauma. You were given 1 unit of red blood cell following which your hemoglobin elevated appropriately. The loss of blood was likely coming from your ulcerated skin infection. You remained asymptomatic throughout the event and your hemoglobin remained stable after surgery.    Diagnosis: DM (diabetes mellitus)  Assessment and Plan of Treatment: You have history of diabetes. Your blood sugar was manged with insulin in the hospital. Your HbA1c was XXXX during this admission.  You need to continue monitoring your blood sugar levels closely and maintain healthy lifestyle by eating healthy diabetic regimen.  Please continue to take your medications as prescribed. Please follow up with your primary care provider upon discharge for further recommendations.    Diagnosis: Seizure disorder  Assessment and Plan of Treatment: You have a history of seizures which is a burst of uncontrolled electrical activity between brain cells causing temporary abnormalities in muscle tone or movements, behavior, sensation or state of awareness. You were continued on your home medication Keppra. Please take your medication as prescribed and follow up with neurologist upon discharge to adjust medications as needed.

## 2023-12-18 NOTE — PROGRESS NOTE ADULT - PROBLEM SELECTOR PLAN 4
- pt A&Ox0, unknown baseline  - possible acute metabolic encephalopathy 2/2 sepsis  - continue to monitor mental status  AOx1-2 currently

## 2023-12-18 NOTE — PROGRESS NOTE ADULT - PROBLEM SELECTOR PLAN 6
hb 9.3>10.7>7.2>7.9 > 7.4   monitor for signs of worsening anemia  transfuse if <7 or precipitous drop  no signs of active bleed at this time

## 2023-12-18 NOTE — PROGRESS NOTE ADULT - PROBLEM SELECTOR PLAN 3
- plan as above   - wound care consult  c/w cefepime, vanc, flagyl  surgery consult -- likely will not perform surgery over the weekend so diet restarted     Complex extensive sacral decubitus ulcer with fragmentation/osteomyelitis of the the coccyx. Locules of ubcutaneous gas extending from the decubitus ulcer superiorly to the soft tissues of the lower back at the level of the lower lumbosacral spine.    per nurse, ulcer was gushing grey, malodorous, purulent fluid this morning when she changed the dressings, no erythema seen.  Will need PICC line placed for long-term abx  - 12/18 leukocytosis worsening with 3 Abx and continuous purulent ulcer; followed up with surgery  ? debridement

## 2023-12-18 NOTE — PROGRESS NOTE ADULT - SUBJECTIVE AND OBJECTIVE BOX
Chief complaint: Patient is a 68y old  Male who presents with a chief complaint of Pneumonia due to infectious organism     (16 Dec 2023 10:46)      LUISA MÁRQUEZ is a 68y year old Male     INTERVAL HPI/OVERNIGHT EVENTS: Patient's fasting glucose 58 in the morning - s/p D5 IVP. Patient examined at bedside this AM.  Patient denies acute complaints - no SOB, dizziness, and other Sx/Sx of anemia       REVIEW OF SYSTEMS:  CONSTITUTIONAL: No fever, weight loss, or fatigue  EYES: No eye pain, visual disturbances, or discharge  ENMT:  No difficulty hearing, tinnitus, vertigo; No sinus or throat pain  NECK: No pain or stiffness  RESPIRATORY: No cough, wheezing, chills or hemoptysis; No shortness of breath  CARDIOVASCULAR: No chest pain, palpitations, dizziness, or leg swelling  GASTROINTESTINAL: No abdominal or epigastric pain. No nausea, vomiting, or hematemesis; No diarrhea or constipation. No melena or hematochezia.  GENITOURINARY: No dysuria, frequency, hematuria, or incontinence  NEUROLOGICAL: No headaches, memory loss, loss of strength, numbness, or tremors  SKIN: No itching, burning, rashes, or lesions   ENDOCRINE: No heat or cold intolerance  MUSCULOSKELETAL: No joint pain or swelling; No muscle, back, or extremity pain  PSYCHIATRIC: No depression, anxiety, mood swings, or difficulty sleeping  HEME/LYMPH: No easy bruising, or bleeding gums  ALLERY AND IMMUNOLOGIC: No hives or eczema    FAMILY HISTORY:      T(C): 36.6 (12-18-23 @ 13:16), Max: 37.3 (12-17-23 @ 21:25)  HR: 110 (12-18-23 @ 13:16) (103 - 110)  BP: 119/71 (12-18-23 @ 13:16) (97/65 - 119/71)  RR: 18 (12-18-23 @ 13:16) (18 - 18)  SpO2: 100% (12-18-23 @ 13:16) (98% - 100%)  Wt(kg): --Vital Signs Last 24 Hrs  T(C): 36.6 (18 Dec 2023 13:16), Max: 37.3 (17 Dec 2023 21:25)  T(F): 97.9 (18 Dec 2023 13:16), Max: 99.1 (17 Dec 2023 21:25)  HR: 110 (18 Dec 2023 13:16) (103 - 110)  BP: 119/71 (18 Dec 2023 13:16) (97/65 - 119/71)  BP(mean): --  RR: 18 (18 Dec 2023 13:16) (18 - 18)  SpO2: 100% (18 Dec 2023 13:16) (98% - 100%)    Parameters below as of 18 Dec 2023 13:16  Patient On (Oxygen Delivery Method): room air        PHYSICAL EXAM:  GENERAL: NAD, sitting comfortably in bed; frail-appearing   HEAD:  Atraumatic, Normocephalic  EYES: EOMI, PERRLA, conjunctiva and sclera clear  ENMT: No tonsillar erythema, exudates, or enlargement; Moist mucous membranes.   NECK: Supple, No JVD  NERVOUS SYSTEM:  Alert & Oriented X2, Poor concentration; Motor Strength 2/5 B/L upper and lower extremities, resting hand tremor   CHEST/LUNG: Clear to percussion bilaterally; No rales, rhonchi, wheezing, or rubs  HEART: Regular rate and rhythm; No murmurs, rubs, or gallops  ABDOMEN: Soft, Nontender, Nondistended; Bowel sounds present  EXTREMITIES: No clubbing, cyanosis, or edema  SKIN: Purulent foul-smelling decubitus ulcer      Consultant(s) Notes Reviewed:  [x ] YES  [ ] NO  Care Discussed with Consultants/Other Providers [ x] YES  [ ] NO    LABS:                        7.4    19.40 )-----------( 607      ( 18 Dec 2023 05:30 )             23.4       12-18    139  |  107  |  17  ----------------------------<  58<L>  3.9   |  26  |  0.87    Ca    8.5      18 Dec 2023 05:30  Phos  2.5     12-18  Mg     1.9     12-18          RADIOLOGY & ADDITIONAL TESTS:    Imaging Personally Reviewed:  [ ] YES  [ ] NO  acetaminophen     Tablet .. 650 milliGRAM(s) Oral every 6 hours PRN  ascorbic acid 500 milliGRAM(s) Oral daily  cefepime   IVPB 1000 milliGRAM(s) IV Intermittent every 12 hours  chlorhexidine 2% Cloths 1 Application(s) Topical <User Schedule>  collagenase Ointment 1 Application(s) Topical every 8 hours  Dakins Solution - 1/4 Strength 1 Application(s) Topical daily  dexAMETHasone  Injectable 6 milliGRAM(s) IV Push daily  dextrose 50% Injectable 25 Gram(s) IV Push once  heparin   Injectable 5000 Unit(s) SubCutaneous every 8 hours  insulin glargine Injectable (LANTUS) 12 Unit(s) SubCutaneous at bedtime  insulin lispro (ADMELOG) corrective regimen sliding scale   SubCutaneous three times a day before meals  insulin lispro (ADMELOG) corrective regimen sliding scale   SubCutaneous at bedtime  lactulose Syrup 10 Gram(s) Oral daily  levETIRAcetam 750 milliGRAM(s) Oral two times a day  metroNIDAZOLE  IVPB 500 milliGRAM(s) IV Intermittent every 8 hours  metroNIDAZOLE  IVPB      multivitamin 1 Tablet(s) Oral daily  mupirocin 2% Ointment 1 Application(s) Both Nostrils two times a day  polyethylene glycol 3350 17 Gram(s) Oral two times a day  senna 2 Tablet(s) Oral at bedtime  sodium chloride 0.9%. 1000 milliLiter(s) IV Continuous <Continuous>  vancomycin  IVPB 1000 milliGRAM(s) IV Intermittent every 12 hours  vancomycin  IVPB          HEALTH ISSUES - PROBLEM Dx:  TBI (traumatic brain injury)    Prophylactic measure    Sacral decubitus ulcer    Sepsis    Seizure disorder    DM (diabetes mellitus)    LETAHA (acute kidney injury)    2019 novel coronavirus disease (COVID-19)    Acute metabolic encephalopathy    Hyperkalemia    Preoperative clearance    Anemia           Chief complaint: Patient is a 68y old  Male who presents with a chief complaint of Pneumonia due to infectious organism     (16 Dec 2023 10:46)      LUISA MÁRQUEZ is a 68y year old Male     INTERVAL HPI/OVERNIGHT EVENTS: Patient's fasting glucose 58 in the morning - s/p D5 IVP. Patient examined at bedside this AM.  Patient denies acute complaints - no SOB, dizziness, and other Sx/Sx of anemia       REVIEW OF SYSTEMS:  CONSTITUTIONAL: No fever, weight loss, or fatigue  EYES: No eye pain, visual disturbances, or discharge  ENMT:  No difficulty hearing, tinnitus, vertigo; No sinus or throat pain  NECK: No pain or stiffness  RESPIRATORY: No cough, wheezing, chills or hemoptysis; No shortness of breath  CARDIOVASCULAR: No chest pain, palpitations, dizziness, or leg swelling  GASTROINTESTINAL: No abdominal or epigastric pain. No nausea, vomiting, or hematemesis; No diarrhea or constipation. No melena or hematochezia.  GENITOURINARY: No dysuria, frequency, hematuria, or incontinence  NEUROLOGICAL: No headaches, memory loss, loss of strength, numbness, or tremors  SKIN: No itching, burning, rashes, or lesions   ENDOCRINE: No heat or cold intolerance  MUSCULOSKELETAL: No joint pain or swelling; No muscle, back, or extremity pain  PSYCHIATRIC: No depression, anxiety, mood swings, or difficulty sleeping  HEME/LYMPH: No easy bruising, or bleeding gums  ALLERY AND IMMUNOLOGIC: No hives or eczema    FAMILY HISTORY:      T(C): 36.6 (12-18-23 @ 13:16), Max: 37.3 (12-17-23 @ 21:25)  HR: 110 (12-18-23 @ 13:16) (103 - 110)  BP: 119/71 (12-18-23 @ 13:16) (97/65 - 119/71)  RR: 18 (12-18-23 @ 13:16) (18 - 18)  SpO2: 100% (12-18-23 @ 13:16) (98% - 100%)  Wt(kg): --Vital Signs Last 24 Hrs  T(C): 36.6 (18 Dec 2023 13:16), Max: 37.3 (17 Dec 2023 21:25)  T(F): 97.9 (18 Dec 2023 13:16), Max: 99.1 (17 Dec 2023 21:25)  HR: 110 (18 Dec 2023 13:16) (103 - 110)  BP: 119/71 (18 Dec 2023 13:16) (97/65 - 119/71)  BP(mean): --  RR: 18 (18 Dec 2023 13:16) (18 - 18)  SpO2: 100% (18 Dec 2023 13:16) (98% - 100%)    Parameters below as of 18 Dec 2023 13:16  Patient On (Oxygen Delivery Method): room air        PHYSICAL EXAM:  GENERAL: NAD, sitting comfortably in bed; frail-appearing   HEAD:  Atraumatic, Normocephalic  EYES: EOMI, PERRLA, conjunctiva and sclera clear  ENMT: No tonsillar erythema, exudates, or enlargement; Moist mucous membranes.   NECK: Supple, No JVD  NERVOUS SYSTEM:  Alert & Oriented X2, Poor concentration; Motor Strength 2/5 B/L upper and lower extremities, resting hand tremor   CHEST/LUNG: Clear to percussion bilaterally; No rales, rhonchi, wheezing, or rubs  HEART: Regular rate and rhythm; No murmurs, rubs, or gallops  ABDOMEN: Soft, Nontender, Nondistended; Bowel sounds present  EXTREMITIES: No clubbing, cyanosis, or edema  SKIN: Purulent foul-smelling decubitus ulcer      Consultant(s) Notes Reviewed:  [x ] YES  [ ] NO  Care Discussed with Consultants/Other Providers [ x] YES  [ ] NO    LABS:                        7.4    19.40 )-----------( 607      ( 18 Dec 2023 05:30 )             23.4       12-18    139  |  107  |  17  ----------------------------<  58<L>  3.9   |  26  |  0.87    Ca    8.5      18 Dec 2023 05:30  Phos  2.5     12-18  Mg     1.9     12-18          RADIOLOGY & ADDITIONAL TESTS:    Imaging Personally Reviewed:  [ ] YES  [ ] NO  acetaminophen     Tablet .. 650 milliGRAM(s) Oral every 6 hours PRN  ascorbic acid 500 milliGRAM(s) Oral daily  cefepime   IVPB 1000 milliGRAM(s) IV Intermittent every 12 hours  chlorhexidine 2% Cloths 1 Application(s) Topical <User Schedule>  collagenase Ointment 1 Application(s) Topical every 8 hours  Dakins Solution - 1/4 Strength 1 Application(s) Topical daily  dexAMETHasone  Injectable 6 milliGRAM(s) IV Push daily  dextrose 50% Injectable 25 Gram(s) IV Push once  heparin   Injectable 5000 Unit(s) SubCutaneous every 8 hours  insulin glargine Injectable (LANTUS) 12 Unit(s) SubCutaneous at bedtime  insulin lispro (ADMELOG) corrective regimen sliding scale   SubCutaneous three times a day before meals  insulin lispro (ADMELOG) corrective regimen sliding scale   SubCutaneous at bedtime  lactulose Syrup 10 Gram(s) Oral daily  levETIRAcetam 750 milliGRAM(s) Oral two times a day  metroNIDAZOLE  IVPB 500 milliGRAM(s) IV Intermittent every 8 hours  metroNIDAZOLE  IVPB      multivitamin 1 Tablet(s) Oral daily  mupirocin 2% Ointment 1 Application(s) Both Nostrils two times a day  polyethylene glycol 3350 17 Gram(s) Oral two times a day  senna 2 Tablet(s) Oral at bedtime  sodium chloride 0.9%. 1000 milliLiter(s) IV Continuous <Continuous>  vancomycin  IVPB 1000 milliGRAM(s) IV Intermittent every 12 hours  vancomycin  IVPB          HEALTH ISSUES - PROBLEM Dx:  TBI (traumatic brain injury)    Prophylactic measure    Sacral decubitus ulcer    Sepsis    Seizure disorder    DM (diabetes mellitus)    LEATHA (acute kidney injury)    2019 novel coronavirus disease (COVID-19)    Acute metabolic encephalopathy    Hyperkalemia    Preoperative clearance    Anemia

## 2023-12-18 NOTE — PROGRESS NOTE ADULT - ATTENDING COMMENTS
#Sepsis 2/2 sacral ulcer vs COVID pneumonia  #AHRF 2/2 COVID  #Elevated lactate, resolved  #LEATHA, resolved  #Hyperkalemia, resolved  #Seizure disorder  #TBI  #Type 2 DM  #HTN  #DVT ppx    68yM with PMH of TBI, seizure disorder, sacral ulcer, from [facility], who was sent to the ED for concerns for sepsis, admitted for sepsis 2/2 infected sacral decubitus ulcer. Continue vancomycin, cefepime, Flagyl; blood cultures negative. Continue dexamethasone and remdesvir, can consider shortened course of steroids pending improvement in AHRF. Patient will need PICC line for long-term abx treatment, discussed with Dr. Elkins. LEATHA and hyperkalemia resolved, likely in setting of poor PO intake and acute illness. Wound care following for sacral ulcer. Surgery consulted for possible debridement, discussed with Dr. Grewal, who will re-evaluate, due to recent leakage of purulent fluid. Rest of plan as above. DVT ppx. #Sepsis 2/2 sacral ulcer vs COVID pneumonia  #AHRF 2/2 COVID  #Elevated lactate, resolved  #LEATHA, resolved  #Hyperkalemia, resolved  #Seizure disorder  #TBI  #Type 2 DM  #HTN  #DVT ppx    68yM with PMH of TBI, seizure disorder, sacral ulcer, from [facility], who was sent to the ED for concerns for sepsis, admitted for sepsis 2/2 infected sacral decubitus ulcer. Continue vancomycin, cefepime, Flagyl; blood cultures negative. Continue dexamethasone and remdesvir, can consider shortened course of steroids pending improvement in AHRF. Patient will need PICC line for long-term abx treatment, discussed with Dr. Elkins. LEATHA and hyperkalemia resolved, likely in setting of poor PO intake and acute illness. Wound care following for sacral ulcer. Surgery consulted for possible debridement, discussed with Dr. Grewal, who will re-evaluate, due to recent leakage of purulent fluid. Rest of plan as above. DVT ppx.

## 2023-12-18 NOTE — DISCHARGE NOTE PROVIDER - CARE PROVIDER_API CALL
Steffanie Sutherland  Internal Medicine  [address] [place] [address]  Phone: [phone]  Fax: [phone]  Established Patient  Follow Up Time:    Steffanie Sutherland  Internal Medicine  [address] [place] [address]  Phone: [phone]  Fax: [phone]  Established Patient  Follow Up Time:

## 2023-12-18 NOTE — PROGRESS NOTE ADULT - SUBJECTIVE AND OBJECTIVE BOX
68y Male    Meds:  cefepime   IVPB 1000 milliGRAM(s) IV Intermittent every 12 hours  metroNIDAZOLE  IVPB      metroNIDAZOLE  IVPB 500 milliGRAM(s) IV Intermittent every 8 hours  vancomycin  IVPB 1000 milliGRAM(s) IV Intermittent every 12 hours  vancomycin  IVPB        Allergies    No Known Allergies    Intolerances        VITALS:  Vital Signs Last 24 Hrs  T(C): 36.6 (18 Dec 2023 13:16), Max: 37.3 (17 Dec 2023 21:25)  T(F): 97.9 (18 Dec 2023 13:16), Max: 99.1 (17 Dec 2023 21:25)  HR: 110 (18 Dec 2023 13:16) (103 - 110)  BP: 119/71 (18 Dec 2023 13:16) (97/65 - 119/71)  BP(mean): --  RR: 18 (18 Dec 2023 13:16) (18 - 18)  SpO2: 100% (18 Dec 2023 13:16) (98% - 100%)    Parameters below as of 18 Dec 2023 13:16  Patient On (Oxygen Delivery Method): room air        LABS/DIAGNOSTIC TESTS:                          7.4    19.40 )-----------( 607      ( 18 Dec 2023 05:30 )             23.4         12-18    139  |  107  |  17  ----------------------------<  58<L>  3.9   |  26  |  0.87    Ca    8.5      18 Dec 2023 05:30  Phos  2.5     12-18  Mg     1.9     12-18            CULTURES: Clean Catch Clean Catch (Midstream)  12-14 @ 02:36   No growth  --  --      .Blood Blood-Peripheral  12-13 @ 21:35   No growth at 4 days  --  --      .Blood Blood-Peripheral  12-13 @ 21:25   No growth at 4 days  --  --            RADIOLOGY:      ROS:  [  ] UNABLE TO ELICIT 68y Male who is much better clinically, he is awake alert and talking fairly normally, he is answering all my questions appropriately, he is not c/o anything at this time , he was seen by surgery and apparently has some pus pockets in his sacral wound for which he is planning to take him to the OR to drain. He has no fevers , not coughing much and is not SOB, his WBC count has increased because of steroids. His Vancomycin trough is marginally elevated at 20.2 and so will repeat the Vanco trough prior to changing the dose. Will likely increase his Maxipime dose tomorrow.    Meds:  cefepime   IVPB 1000 milliGRAM(s) IV Intermittent every 12 hours  metroNIDAZOLE  IVPB      metroNIDAZOLE  IVPB 500 milliGRAM(s) IV Intermittent every 8 hours  vancomycin  IVPB 1000 milliGRAM(s) IV Intermittent every 12 hours  vancomycin  IVPB        Allergies    No Known Allergies    Intolerances        VITALS:  Vital Signs Last 24 Hrs  T(C): 36.6 (18 Dec 2023 13:16), Max: 37.3 (17 Dec 2023 21:25)  T(F): 97.9 (18 Dec 2023 13:16), Max: 99.1 (17 Dec 2023 21:25)  HR: 110 (18 Dec 2023 13:16) (103 - 110)  BP: 119/71 (18 Dec 2023 13:16) (97/65 - 119/71)  BP(mean): --  RR: 18 (18 Dec 2023 13:16) (18 - 18)  SpO2: 100% (18 Dec 2023 13:16) (98% - 100%)    Parameters below as of 18 Dec 2023 13:16  Patient On (Oxygen Delivery Method): room air        LABS/DIAGNOSTIC TESTS:                          7.4    19.40 )-----------( 607      ( 18 Dec 2023 05:30 )             23.4         12-18    139  |  107  |  17  ----------------------------<  58<L>  3.9   |  26  |  0.87    Ca    8.5      18 Dec 2023 05:30  Phos  2.5     12-18  Mg     1.9     12-18            CULTURES: Clean Catch Clean Catch (Midstream)  12-14 @ 02:36   No growth  --  --      .Blood Blood-Peripheral  12-13 @ 21:35   No growth at 4 days  --  --      .Blood Blood-Peripheral  12-13 @ 21:25   No growth at 4 days  --  --            RADIOLOGY:      ROS:  [  ] UNABLE TO ELICIT

## 2023-12-18 NOTE — PROGRESS NOTE ADULT - PROBLEM SELECTOR PLAN 1
- sent from [facility] for reported fever and oxygen saturation in low 90s.   - Pt reporting lethargy/weakness, A&Ox0, weeping purulent sacral ulcer noticed on exam   - Vital signs sig for temp 101 F (resolved, 98.6F),  > 101, 97% on RA, intermittently hypoxic to low 90s on RA   - EKG sinus tachycardia 106 HR  - Labs sig for WC 14, Cr 1.5, lac 2.8  - RVP - covid positive , U/A negative   - CXR: Diffuse right lung pneumonia.  - s/p cefepime 2g and 2.9 L in ED  Admitted for Sepsis 2/2 pneumonia vs. infected sacral ulcer vs. COVID   c/w vanc, cefepime, flagyl   bcx NGTD 4d   - IVF for maintenance - sent from [facility] for reported fever and oxygen saturation in low 90s.   - Pt reporting lethargy/weakness, A&Ox0, weeping purulent sacral ulcer noticed on exam   - Vital signs sig for temp 101 F (resolved, 98.6F),  > 101, 97% on RA, intermittently hypoxic to low 90s on RA   - EKG sinus tachycardia 106 HR  - Labs sig for WC 14, Cr 1.5, lac 2.8  - RVP - covid positive , U/A negative   - CXR: Diffuse right lung pneumonia.  - s/p cefepime 2g and 2.9 L in ED  Admitted for Sepsis 2/2 pneumonia vs. infected sacral ulcer vs. COVID   c/w vanc, cefepime, flagyl   bcx NGTD 4d   - IVF for maintenance - sent from [facility] for reported fever and oxygen saturation in low 90s.   - Pt reporting lethargy/weakness, A&Ox0, weeping purulent sacral ulcer noticed on exam   - Vital signs sig for temp 101 F (resolved, 98.6F),  > 101, 97% on RA, intermittently hypoxic to low 90s on RA   - EKG sinus tachycardia 106 HR  - Labs sig for WC 14, Cr 1.5, lac 2.8  - RVP - covid positive , U/A negative   - CXR: Diffuse right lung pneumonia.  - s/p cefepime 2g and 2.9 L in ED  Admitted for Sepsis 2/2 pneumonia vs. infected sacral ulcer vs. COVID   c/w vanc, cefepime, flagyl   bcx NGTD 4d   Vanc trough 20.2   - IVF for maintenance - sent from [facility] for reported fever and oxygen saturation in low 90s.   - Pt reporting lethargy/weakness, A&Ox0, weeping purulent sacral ulcer noticed on exam   - Vital signs sig for temp 101 F (resolved, 98.6F),  > 101, 97% on RA, intermittently hypoxic to low 90s on RA   - EKG sinus tachycardia 106 HR  - Labs sig for WC 14, Cr 1.5, lac 2.8  - RVP - covid positive , U/A negative   - CXR: Diffuse right lung pneumonia.  - s/p cefepime 2g and 2.9 L in ED  Admitted for Sepsis 2/2 pneumonia vs. infected sacral ulcer vs. COVID   c/w vanc, cefepime, flagyl   bcx NGTD 4d   Vanc trough 20.2   - IVF for maintenance

## 2023-12-19 ENCOUNTER — TRANSCRIPTION ENCOUNTER (OUTPATIENT)
Age: 68
End: 2023-12-19

## 2023-12-19 DIAGNOSIS — Z01.818 ENCOUNTER FOR OTHER PREPROCEDURAL EXAMINATION: ICD-10-CM

## 2023-12-19 LAB
ANION GAP SERPL CALC-SCNC: 8 MMOL/L — SIGNIFICANT CHANGE UP (ref 5–17)
ANION GAP SERPL CALC-SCNC: 8 MMOL/L — SIGNIFICANT CHANGE UP (ref 5–17)
BASOPHILS # BLD AUTO: 0.04 K/UL — SIGNIFICANT CHANGE UP (ref 0–0.2)
BASOPHILS # BLD AUTO: 0.04 K/UL — SIGNIFICANT CHANGE UP (ref 0–0.2)
BASOPHILS NFR BLD AUTO: 0.3 % — SIGNIFICANT CHANGE UP (ref 0–2)
BASOPHILS NFR BLD AUTO: 0.3 % — SIGNIFICANT CHANGE UP (ref 0–2)
BLD GP AB SCN SERPL QL: SIGNIFICANT CHANGE UP
BLD GP AB SCN SERPL QL: SIGNIFICANT CHANGE UP
BUN SERPL-MCNC: 13 MG/DL — SIGNIFICANT CHANGE UP (ref 7–18)
BUN SERPL-MCNC: 13 MG/DL — SIGNIFICANT CHANGE UP (ref 7–18)
CALCIUM SERPL-MCNC: 8 MG/DL — LOW (ref 8.4–10.5)
CALCIUM SERPL-MCNC: 8 MG/DL — LOW (ref 8.4–10.5)
CHLORIDE SERPL-SCNC: 105 MMOL/L — SIGNIFICANT CHANGE UP (ref 96–108)
CHLORIDE SERPL-SCNC: 105 MMOL/L — SIGNIFICANT CHANGE UP (ref 96–108)
CO2 SERPL-SCNC: 23 MMOL/L — SIGNIFICANT CHANGE UP (ref 22–31)
CO2 SERPL-SCNC: 23 MMOL/L — SIGNIFICANT CHANGE UP (ref 22–31)
CREAT SERPL-MCNC: 0.79 MG/DL — SIGNIFICANT CHANGE UP (ref 0.5–1.3)
CREAT SERPL-MCNC: 0.79 MG/DL — SIGNIFICANT CHANGE UP (ref 0.5–1.3)
CULTURE RESULTS: SIGNIFICANT CHANGE UP
EGFR: 98 ML/MIN/1.73M2 — SIGNIFICANT CHANGE UP
EGFR: 98 ML/MIN/1.73M2 — SIGNIFICANT CHANGE UP
EOSINOPHIL # BLD AUTO: 0.05 K/UL — SIGNIFICANT CHANGE UP (ref 0–0.5)
EOSINOPHIL # BLD AUTO: 0.05 K/UL — SIGNIFICANT CHANGE UP (ref 0–0.5)
EOSINOPHIL NFR BLD AUTO: 0.3 % — SIGNIFICANT CHANGE UP (ref 0–6)
EOSINOPHIL NFR BLD AUTO: 0.3 % — SIGNIFICANT CHANGE UP (ref 0–6)
GLUCOSE BLDC GLUCOMTR-MCNC: 125 MG/DL — HIGH (ref 70–99)
GLUCOSE BLDC GLUCOMTR-MCNC: 125 MG/DL — HIGH (ref 70–99)
GLUCOSE BLDC GLUCOMTR-MCNC: 145 MG/DL — HIGH (ref 70–99)
GLUCOSE BLDC GLUCOMTR-MCNC: 145 MG/DL — HIGH (ref 70–99)
GLUCOSE BLDC GLUCOMTR-MCNC: 155 MG/DL — HIGH (ref 70–99)
GLUCOSE BLDC GLUCOMTR-MCNC: 155 MG/DL — HIGH (ref 70–99)
GLUCOSE BLDC GLUCOMTR-MCNC: 92 MG/DL — SIGNIFICANT CHANGE UP (ref 70–99)
GLUCOSE BLDC GLUCOMTR-MCNC: 92 MG/DL — SIGNIFICANT CHANGE UP (ref 70–99)
GLUCOSE SERPL-MCNC: 79 MG/DL — SIGNIFICANT CHANGE UP (ref 70–99)
GLUCOSE SERPL-MCNC: 79 MG/DL — SIGNIFICANT CHANGE UP (ref 70–99)
HCT VFR BLD CALC: 21.8 % — LOW (ref 39–50)
HCT VFR BLD CALC: 21.8 % — LOW (ref 39–50)
HCT VFR BLD CALC: 22.4 % — LOW (ref 39–50)
HCT VFR BLD CALC: 22.4 % — LOW (ref 39–50)
HCT VFR BLD CALC: 28.2 % — LOW (ref 39–50)
HCT VFR BLD CALC: 28.2 % — LOW (ref 39–50)
HGB BLD-MCNC: 7.1 G/DL — LOW (ref 13–17)
HGB BLD-MCNC: 7.1 G/DL — LOW (ref 13–17)
HGB BLD-MCNC: 7.2 G/DL — LOW (ref 13–17)
HGB BLD-MCNC: 7.2 G/DL — LOW (ref 13–17)
HGB BLD-MCNC: 9.2 G/DL — LOW (ref 13–17)
HGB BLD-MCNC: 9.2 G/DL — LOW (ref 13–17)
IMM GRANULOCYTES NFR BLD AUTO: 1 % — HIGH (ref 0–0.9)
IMM GRANULOCYTES NFR BLD AUTO: 1 % — HIGH (ref 0–0.9)
LYMPHOCYTES # BLD AUTO: 13.4 % — SIGNIFICANT CHANGE UP (ref 13–44)
LYMPHOCYTES # BLD AUTO: 13.4 % — SIGNIFICANT CHANGE UP (ref 13–44)
LYMPHOCYTES # BLD AUTO: 2.12 K/UL — SIGNIFICANT CHANGE UP (ref 1–3.3)
LYMPHOCYTES # BLD AUTO: 2.12 K/UL — SIGNIFICANT CHANGE UP (ref 1–3.3)
MAGNESIUM SERPL-MCNC: 2 MG/DL — SIGNIFICANT CHANGE UP (ref 1.6–2.6)
MAGNESIUM SERPL-MCNC: 2 MG/DL — SIGNIFICANT CHANGE UP (ref 1.6–2.6)
MCHC RBC-ENTMCNC: 25 PG — LOW (ref 27–34)
MCHC RBC-ENTMCNC: 25 PG — LOW (ref 27–34)
MCHC RBC-ENTMCNC: 25.1 PG — LOW (ref 27–34)
MCHC RBC-ENTMCNC: 25.1 PG — LOW (ref 27–34)
MCHC RBC-ENTMCNC: 25.6 PG — LOW (ref 27–34)
MCHC RBC-ENTMCNC: 25.6 PG — LOW (ref 27–34)
MCHC RBC-ENTMCNC: 32.1 GM/DL — SIGNIFICANT CHANGE UP (ref 32–36)
MCHC RBC-ENTMCNC: 32.1 GM/DL — SIGNIFICANT CHANGE UP (ref 32–36)
MCHC RBC-ENTMCNC: 32.6 GM/DL — SIGNIFICANT CHANGE UP (ref 32–36)
MCV RBC AUTO: 77 FL — LOW (ref 80–100)
MCV RBC AUTO: 77 FL — LOW (ref 80–100)
MCV RBC AUTO: 77.8 FL — LOW (ref 80–100)
MCV RBC AUTO: 77.8 FL — LOW (ref 80–100)
MCV RBC AUTO: 78.3 FL — LOW (ref 80–100)
MCV RBC AUTO: 78.3 FL — LOW (ref 80–100)
MONOCYTES # BLD AUTO: 1.34 K/UL — HIGH (ref 0–0.9)
MONOCYTES # BLD AUTO: 1.34 K/UL — HIGH (ref 0–0.9)
MONOCYTES NFR BLD AUTO: 8.5 % — SIGNIFICANT CHANGE UP (ref 2–14)
MONOCYTES NFR BLD AUTO: 8.5 % — SIGNIFICANT CHANGE UP (ref 2–14)
NEUTROPHILS # BLD AUTO: 12.1 K/UL — HIGH (ref 1.8–7.4)
NEUTROPHILS # BLD AUTO: 12.1 K/UL — HIGH (ref 1.8–7.4)
NEUTROPHILS NFR BLD AUTO: 76.5 % — SIGNIFICANT CHANGE UP (ref 43–77)
NEUTROPHILS NFR BLD AUTO: 76.5 % — SIGNIFICANT CHANGE UP (ref 43–77)
NRBC # BLD: 0 /100 WBCS — SIGNIFICANT CHANGE UP (ref 0–0)
PHOSPHATE SERPL-MCNC: 2.5 MG/DL — SIGNIFICANT CHANGE UP (ref 2.5–4.5)
PHOSPHATE SERPL-MCNC: 2.5 MG/DL — SIGNIFICANT CHANGE UP (ref 2.5–4.5)
PLATELET # BLD AUTO: 610 K/UL — HIGH (ref 150–400)
PLATELET # BLD AUTO: 610 K/UL — HIGH (ref 150–400)
PLATELET # BLD AUTO: 655 K/UL — HIGH (ref 150–400)
PLATELET # BLD AUTO: 655 K/UL — HIGH (ref 150–400)
PLATELET # BLD AUTO: 669 K/UL — HIGH (ref 150–400)
PLATELET # BLD AUTO: 669 K/UL — HIGH (ref 150–400)
POTASSIUM SERPL-MCNC: 3.9 MMOL/L — SIGNIFICANT CHANGE UP (ref 3.5–5.3)
POTASSIUM SERPL-MCNC: 3.9 MMOL/L — SIGNIFICANT CHANGE UP (ref 3.5–5.3)
POTASSIUM SERPL-SCNC: 3.9 MMOL/L — SIGNIFICANT CHANGE UP (ref 3.5–5.3)
POTASSIUM SERPL-SCNC: 3.9 MMOL/L — SIGNIFICANT CHANGE UP (ref 3.5–5.3)
RBC # BLD: 2.83 M/UL — LOW (ref 4.2–5.8)
RBC # BLD: 2.83 M/UL — LOW (ref 4.2–5.8)
RBC # BLD: 2.88 M/UL — LOW (ref 4.2–5.8)
RBC # BLD: 2.88 M/UL — LOW (ref 4.2–5.8)
RBC # BLD: 3.6 M/UL — LOW (ref 4.2–5.8)
RBC # BLD: 3.6 M/UL — LOW (ref 4.2–5.8)
RBC # FLD: 15.9 % — HIGH (ref 10.3–14.5)
SODIUM SERPL-SCNC: 136 MMOL/L — SIGNIFICANT CHANGE UP (ref 135–145)
SODIUM SERPL-SCNC: 136 MMOL/L — SIGNIFICANT CHANGE UP (ref 135–145)
SPECIMEN SOURCE: SIGNIFICANT CHANGE UP
VANCOMYCIN TROUGH SERPL-MCNC: 21.6 UG/ML — HIGH (ref 10–20)
VANCOMYCIN TROUGH SERPL-MCNC: 21.6 UG/ML — HIGH (ref 10–20)
WBC # BLD: 15.81 K/UL — HIGH (ref 3.8–10.5)
WBC # BLD: 15.81 K/UL — HIGH (ref 3.8–10.5)
WBC # BLD: 15.88 K/UL — HIGH (ref 3.8–10.5)
WBC # BLD: 15.88 K/UL — HIGH (ref 3.8–10.5)
WBC # BLD: 18.09 K/UL — HIGH (ref 3.8–10.5)
WBC # BLD: 18.09 K/UL — HIGH (ref 3.8–10.5)
WBC # FLD AUTO: 15.81 K/UL — HIGH (ref 3.8–10.5)
WBC # FLD AUTO: 15.81 K/UL — HIGH (ref 3.8–10.5)
WBC # FLD AUTO: 15.88 K/UL — HIGH (ref 3.8–10.5)
WBC # FLD AUTO: 15.88 K/UL — HIGH (ref 3.8–10.5)
WBC # FLD AUTO: 18.09 K/UL — HIGH (ref 3.8–10.5)
WBC # FLD AUTO: 18.09 K/UL — HIGH (ref 3.8–10.5)

## 2023-12-19 PROCEDURE — 99232 SBSQ HOSP IP/OBS MODERATE 35: CPT | Mod: GC

## 2023-12-19 RX ORDER — CEFEPIME 1 G/1
2000 INJECTION, POWDER, FOR SOLUTION INTRAMUSCULAR; INTRAVENOUS EVERY 12 HOURS
Refills: 0 | Status: DISCONTINUED | OUTPATIENT
Start: 2023-12-19 | End: 2023-12-22

## 2023-12-19 RX ORDER — INSULIN GLARGINE 100 [IU]/ML
4 INJECTION, SOLUTION SUBCUTANEOUS AT BEDTIME
Refills: 0 | Status: DISCONTINUED | OUTPATIENT
Start: 2023-12-19 | End: 2023-12-20

## 2023-12-19 RX ADMIN — Medication 1 APPLICATION(S): at 13:51

## 2023-12-19 RX ADMIN — Medication 1 APPLICATION(S): at 13:53

## 2023-12-19 RX ADMIN — Medication 1 APPLICATION(S): at 21:47

## 2023-12-19 RX ADMIN — Medication 1 APPLICATION(S): at 05:34

## 2023-12-19 RX ADMIN — MUPIROCIN 1 APPLICATION(S): 20 OINTMENT TOPICAL at 21:50

## 2023-12-19 RX ADMIN — LEVETIRACETAM 750 MILLIGRAM(S): 250 TABLET, FILM COATED ORAL at 05:35

## 2023-12-19 RX ADMIN — SENNA PLUS 2 TABLET(S): 8.6 TABLET ORAL at 21:47

## 2023-12-19 RX ADMIN — SODIUM CHLORIDE 60 MILLILITER(S): 9 INJECTION, SOLUTION INTRAVENOUS at 10:47

## 2023-12-19 RX ADMIN — Medication 100 MILLIGRAM(S): at 13:55

## 2023-12-19 RX ADMIN — Medication 1 TABLET(S): at 13:04

## 2023-12-19 RX ADMIN — Medication 500 MILLIGRAM(S): at 13:04

## 2023-12-19 RX ADMIN — POLYETHYLENE GLYCOL 3350 17 GRAM(S): 17 POWDER, FOR SOLUTION ORAL at 18:37

## 2023-12-19 RX ADMIN — HEPARIN SODIUM 5000 UNIT(S): 5000 INJECTION INTRAVENOUS; SUBCUTANEOUS at 05:34

## 2023-12-19 RX ADMIN — Medication 250 MILLIGRAM(S): at 05:34

## 2023-12-19 RX ADMIN — CEFEPIME 100 MILLIGRAM(S): 1 INJECTION, POWDER, FOR SOLUTION INTRAMUSCULAR; INTRAVENOUS at 18:40

## 2023-12-19 RX ADMIN — LEVETIRACETAM 750 MILLIGRAM(S): 250 TABLET, FILM COATED ORAL at 18:39

## 2023-12-19 RX ADMIN — LACTULOSE 10 GRAM(S): 10 SOLUTION ORAL at 13:04

## 2023-12-19 RX ADMIN — CHLORHEXIDINE GLUCONATE 1 APPLICATION(S): 213 SOLUTION TOPICAL at 05:35

## 2023-12-19 RX ADMIN — HEPARIN SODIUM 5000 UNIT(S): 5000 INJECTION INTRAVENOUS; SUBCUTANEOUS at 13:53

## 2023-12-19 RX ADMIN — HEPARIN SODIUM 5000 UNIT(S): 5000 INJECTION INTRAVENOUS; SUBCUTANEOUS at 21:47

## 2023-12-19 RX ADMIN — Medication 6 MILLIGRAM(S): at 05:34

## 2023-12-19 RX ADMIN — MUPIROCIN 1 APPLICATION(S): 20 OINTMENT TOPICAL at 05:34

## 2023-12-19 RX ADMIN — Medication 100 MILLIGRAM(S): at 05:34

## 2023-12-19 RX ADMIN — CEFEPIME 100 MILLIGRAM(S): 1 INJECTION, POWDER, FOR SOLUTION INTRAMUSCULAR; INTRAVENOUS at 10:47

## 2023-12-19 RX ADMIN — Medication 100 MILLIGRAM(S): at 21:48

## 2023-12-19 NOTE — CHART NOTE - NSCHARTNOTEFT_GEN_A_CORE
Preop Dx: sacral decubitus ulcer  Surgeon: Dr. Grewal  Procedure: debridement of sacral decubitus ulcer    Vital Signs Last 24 Hrs  T(C): 37.1 (19 Dec 2023 16:15), Max: 37.2 (18 Dec 2023 21:10)  T(F): 98.8 (19 Dec 2023 16:15), Max: 99 (18 Dec 2023 21:10)  HR: 86 (19 Dec 2023 16:15) (86 - 105)  BP: 117/69 (19 Dec 2023 16:15) (107/63 - 133/69)  RR: 18 (19 Dec 2023 16:15) (18 - 18)  SpO2: 100% (19 Dec 2023 16:15) (97% - 100%)    Parameters below as of 19 Dec 2023 16:15  Patient On (Oxygen Delivery Method): room air      LABS                      9.2    15.88 )-----------( 669      ( 19 Dec 2023 17:30 )             28.2     12-19  136  |  105  |  13  ----------------------------<  79  3.9   |  23  |  0.79    Ca    8.0<L>      19 Dec 2023 06:22  Phos  2.5     12-19  Mg     2.0     12-19          68y Male with sacral decubitus ulcer    - OR 12/20/23 with Dr. Grewal  - NPO past midnight, except medications  - IVF while NPO  - type and screen, coags     RCRI: class 1 risk   Croft: .3% risk  bedbound with sinus tachycardia  low to moderate risk for a low risk procedure  Planned for sacaral ulcer debridement on 12/20.

## 2023-12-19 NOTE — PROGRESS NOTE ADULT - PROBLEM SELECTOR PLAN 4
- plan as above   - wound care consult  c/w cefepime, vanc, flagyl  surgery consult -- likely will not perform surgery over the weekend so diet restarted     Complex extensive sacral decubitus ulcer with fragmentation/osteomyelitis of the the coccyx. Locules of ubcutaneous gas extending from the decubitus ulcer superiorly to the soft tissues of the lower back at the level of the lower lumbosacral spine.    per nurse, ulcer was gushing grey, malodorous, purulent fluid this morning when she changed the dressings, no erythema seen.  Will need PICC line placed for long-term abx  - 12/18 leukocytosis worsening with 3 Abx and continuous purulent ulcer; followed up with surgery  ? debridement - plan as above   - wound care consult  c/w cefepime, vanc, flagyl  surgery consult    Complex extensive sacral decubitus ulcer with fragmentation/osteomyelitis of the the coccyx. Locules of subcutaneous gas extending from the decubitus ulcer superiorly to the soft tissues of the lower back at the level of the lower lumbosacral spine.    per nurse, ulcer was gushing grey, malodorous, purulent fluid this morning when she changed the dressings, no erythema seen.  Will need PICC line placed for long-term abx  - 12/18 leukocytosis worsening with 3 Abx and continuous purulent ulcer; followed up with surgery? debridement

## 2023-12-19 NOTE — PHARMACOTHERAPY INTERVENTION NOTE - COMMENTS
LEATHA resolved and creatinine now back to baseline (CrCl = 78 mL/min), suggest to dose adjust cefepime to 2 gram IVPB every 12 hours. Discussed with Dr. Elkins.

## 2023-12-19 NOTE — PROGRESS NOTE ADULT - PROBLEM SELECTOR PLAN 10
- c/w keppra  - feeding assistance - hx of TBI in the past with seizure disorder  - right sided contraction   - c/w keppra

## 2023-12-19 NOTE — PROGRESS NOTE ADULT - PROBLEM SELECTOR PLAN 7
hb 9.3>10.7>7.2>7.9 > 7.4 > 7.1  monitor for signs of worsening anemia  transfuse if <7 or precipitous drop  no signs of active bleed at this time  s/p 12/19 1pRBC - t2DM on metformin   - blood sugars 200s  - s/p lantus 9u, 3 TID   - ISS fs qhs and before meals  12/18 Fasting gluc 58, lantus decreased to 6U, admelog dc  - c/w NS+D5 for maintenance

## 2023-12-19 NOTE — PROGRESS NOTE ADULT - PROBLEM SELECTOR PLAN 11
- t2DM on metformin   - blood sugars 200s  - s/p lantus 9u, 3 TID   - ISS fs qhs and before meals  12/18 Fasting gluc 58, lantus decreased to 6U, admelog dc  - c/w NS+D5 for maintenance - c/w keppra  - feeding assistance

## 2023-12-19 NOTE — PROGRESS NOTE ADULT - ASSESSMENT
68-year-old male, PMH of traumatic brain injury w/ seizure disorder, DM, sacral decubitus ulcer, sent from [facility] for reported fever and oxygen saturation in low 90s. Pt reporting lethargy/weakness, A&Ox0. Vital signs sig for temp 101 F (resolved, 98.6F),  > 101, 97% on RA. EKG sinus tachycardia 106 HR, Labs sig for WC 14, Cr 1.5, lac 2.8, K 5.9. CXR shows questionable RML infiltrate. s/p cefepime 2g and 2.9 L in ED. COVID +. Admitted for Sepsis likely 2/2 sacral ulcer and COVID pneumonia                68-year-old male, PMH of traumatic brain injury w/ seizure disorder, DM, sacral decubitus ulcer, sent from [facility] for reported fever and oxygen saturation in low 90s. Pt reporting lethargy/weakness, A&Ox0. Vital signs sig for temp 101 F (resolved, 98.6F),  > 101, 97% on RA. EKG sinus tachycardia 106 HR, Labs sig for WC 14, Cr 1.5, lac 2.8, K 5.9. CXR shows questionable RML infiltrate. s/p cefepime 2g and 2.9 L in ED. COVID +. Admitted for Sepsis likely 2/2 sacral ulcer and COVID pneumonia                68-year-old male, PMH of traumatic brain injury w/ seizure disorder, DM, sacral decubitus ulcer, sent from [facility] for reported fever and oxygen saturation in low 90s. Pt reporting lethargy/weakness, A&Ox0. Vital signs sig for temp 101 F (resolved, 98.6F),  > 101, 97% on RA. EKG sinus tachycardia 106 HR, Labs sig for WC 14, Cr 1.5, lac 2.8, K 5.9. CXR shows questionable RML infiltrate. s/p cefepime 2g and 2.9 L in ED. COVID +. Admitted for Sepsis likely 2/2 sacral ulcer and COVID pneumonia.                68-year-old male, PMH of traumatic brain injury w/ seizure disorder, DM, sacral decubitus ulcer, sent from [facility] for reported fever and oxygen saturation in low 90s. Pt reporting lethargy/weakness, A&Ox0. Vital signs sig for temp 101 F (resolved, 98.6F),  > 101, 97% on RA. EKG sinus tachycardia 106 HR, Labs sig for WC 14, Cr 1.5, lac 2.8, K 5.9. CXR shows questionable RML infiltrate. s/p cefepime 2g and 2.9 L in ED. COVID +. Admitted for Sepsis likely 2/2 sacral ulcer and COVID pneumonia.

## 2023-12-19 NOTE — PROGRESS NOTE ADULT - PROBLEM SELECTOR PLAN 6
cr 1.5, unknown baseline  - likely pre-renal due to dehydration   RESOLVED hb 9.3>10.7>7.2>7.9 > 7.4 > 7.1  monitor for signs of worsening anemia  transfuse if <7 or precipitous drop  no signs of active bleed at this time  s/p 12/19 1pRBC

## 2023-12-19 NOTE — PROGRESS NOTE ADULT - PROBLEM SELECTOR PLAN 5
- pt A&Ox0, unknown baseline  - possible acute metabolic encephalopathy 2/2 sepsis  - continue to monitor mental status  AOx1 currently

## 2023-12-19 NOTE — PROGRESS NOTE ADULT - ASSESSMENT
Pneumonia ( ? bacterial)  COVID - 19 infection   Infected Sacral Decubitus ulcer with Osteomyelitis of coccyx  Leukocytosis - secondary to steroids    Plan:  ·	Cont Vancomycin 1gm iv q12hrs  ·	Cont Maxipime 2gms iv q12hrs   ·	Cont Flagyl 500mgs iv q8hrs  ·	Cont Remdesivir 100mgs iv q24hrs  ·	Cont Decadron 6mgs iv q24hrs.  ·	Will need a PICC line and IV antibiotics for 6 weeks to treat for osteomyelitis.
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
## 2023-12-19 NOTE — PROGRESS NOTE ADULT - PROBLEM SELECTOR PLAN 8
K 5.9, s/p humulin 5, and d50 x1 in ED  - monitor K in the AM  RESOLVED cr 1.5, unknown baseline  - likely pre-renal due to dehydration   RESOLVED

## 2023-12-19 NOTE — PROGRESS NOTE ADULT - PROBLEM SELECTOR PLAN 3
COVID + on RVP 12/13  mildly hypoxic  likely covid pna from CT on admission   remdesivir thru 12/18  decadron thru 12/24 COVID + on RVP 12/13  mildly hypoxic, now improved  likely covid pna from CT on admission   s/p remdesivir  decadron thru 12/24

## 2023-12-19 NOTE — CHART NOTE - NSCHARTNOTEFT_GEN_A_CORE
Vanc trough has slightly elevated to 21, the evening reduced to 750 to repeat vanc trough before the 4th dose. Vanc trough has slightly elevated to 21, Evening dose held for now, repeat VT in the morning prior to adjustment.

## 2023-12-19 NOTE — PROGRESS NOTE ADULT - PROBLEM SELECTOR PLAN 9
- hx of TBI in the past with seizure disorder  - right sided contraction   - c/w keppra K 5.9, s/p humulin 5, and d50 x1 in ED  - monitor K in the AM  RESOLVED
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
## 2023-12-19 NOTE — PROGRESS NOTE ADULT - PROBLEM SELECTOR PLAN 1
RCRI: class 1 risk   Croft: .3% risk  bedbound with sinus tachycardia  low to moderate risk for a low risk procedure. RCRI: class 1 risk   Croft: .3% risk  bedbound with sinus tachycardia  low to moderate risk for a low risk procedure  Planned for sacaral ulcer debridement on 12/20

## 2023-12-19 NOTE — PHARMACOTHERAPY INTERVENTION NOTE - COMMENTS
Last VT on 12/18 @ 17:30 = 20.2, likely will require dose reduction to 750 mg every 12 hours. Discussed with Dr. Elkins, would like to repeat trough first, ordered for 17:00 later today.

## 2023-12-19 NOTE — PROGRESS NOTE ADULT - SUBJECTIVE AND OBJECTIVE BOX
68y Male    Meds:  cefepime   IVPB 2000 milliGRAM(s) IV Intermittent every 12 hours  metroNIDAZOLE  IVPB      metroNIDAZOLE  IVPB 500 milliGRAM(s) IV Intermittent every 8 hours  vancomycin  IVPB 1000 milliGRAM(s) IV Intermittent every 12 hours  vancomycin  IVPB        Allergies    No Known Allergies    Intolerances        VITALS:  Vital Signs Last 24 Hrs  T(C): 37.1 (19 Dec 2023 16:15), Max: 37.2 (18 Dec 2023 21:10)  T(F): 98.8 (19 Dec 2023 16:15), Max: 99 (18 Dec 2023 21:10)  HR: 86 (19 Dec 2023 16:15) (86 - 105)  BP: 117/69 (19 Dec 2023 16:15) (107/63 - 133/69)  BP(mean): --  RR: 18 (19 Dec 2023 16:15) (18 - 18)  SpO2: 100% (19 Dec 2023 16:15) (97% - 100%)    Parameters below as of 19 Dec 2023 16:15  Patient On (Oxygen Delivery Method): room air        LABS/DIAGNOSTIC TESTS:                          7.1    15.81 )-----------( 610      ( 19 Dec 2023 06:22 )             21.8         12-19    136  |  105  |  13  ----------------------------<  79  3.9   |  23  |  0.79    Ca    8.0<L>      19 Dec 2023 06:22  Phos  2.5     12-19  Mg     2.0     12-19            CULTURES: Clean Catch Clean Catch (Midstream)  12-14 @ 02:36   No growth  --  --      .Blood Blood-Peripheral  12-13 @ 21:35   No growth at 5 days  --  --      .Blood Blood-Peripheral  12-13 @ 21:25   No growth at 5 days  --  --            RADIOLOGY:      ROS:  [  ] UNABLE TO ELICIT 68y Male who is doing well , he is talking normally , he has a slight cough but no SOB, he has no fevers or chills, no nausea , vomiting or diarrhea, no urinary symptoms either. He is going to go for debridement of his sacral ulcer tomorrow.    Meds:  cefepime   IVPB 2000 milliGRAM(s) IV Intermittent every 12 hours  metroNIDAZOLE  IVPB      metroNIDAZOLE  IVPB 500 milliGRAM(s) IV Intermittent every 8 hours  vancomycin  IVPB 1000 milliGRAM(s) IV Intermittent every 12 hours  vancomycin  IVPB        Allergies    No Known Allergies    Intolerances        VITALS:  Vital Signs Last 24 Hrs  T(C): 37.1 (19 Dec 2023 16:15), Max: 37.2 (18 Dec 2023 21:10)  T(F): 98.8 (19 Dec 2023 16:15), Max: 99 (18 Dec 2023 21:10)  HR: 86 (19 Dec 2023 16:15) (86 - 105)  BP: 117/69 (19 Dec 2023 16:15) (107/63 - 133/69)  BP(mean): --  RR: 18 (19 Dec 2023 16:15) (18 - 18)  SpO2: 100% (19 Dec 2023 16:15) (97% - 100%)    Parameters below as of 19 Dec 2023 16:15  Patient On (Oxygen Delivery Method): room air        LABS/DIAGNOSTIC TESTS:      Vancomycin Level, Trough: 15.9:                           7.1    15.81 )-----------( 610      ( 19 Dec 2023 06:22 )             21.8         12-19    136  |  105  |  13  ----------------------------<  79  3.9   |  23  |  0.79    Ca    8.0<L>      19 Dec 2023 06:22  Phos  2.5     12-19  Mg     2.0     12-19            CULTURES: Clean Catch Clean Catch (Midstream)  12-14 @ 02:36   No growth  --  --      .Blood Blood-Peripheral  12-13 @ 21:35   No growth at 5 days  --  --      .Blood Blood-Peripheral  12-13 @ 21:25   No growth at 5 days  --  --            RADIOLOGY:      ROS:  [  ] UNABLE TO ELICIT

## 2023-12-20 ENCOUNTER — TRANSCRIPTION ENCOUNTER (OUTPATIENT)
Age: 68
End: 2023-12-20

## 2023-12-20 DIAGNOSIS — K59.00 CONSTIPATION, UNSPECIFIED: ICD-10-CM

## 2023-12-20 LAB
ANION GAP SERPL CALC-SCNC: 6 MMOL/L — SIGNIFICANT CHANGE UP (ref 5–17)
ANION GAP SERPL CALC-SCNC: 6 MMOL/L — SIGNIFICANT CHANGE UP (ref 5–17)
ANION GAP SERPL CALC-SCNC: 8 MMOL/L — SIGNIFICANT CHANGE UP (ref 5–17)
ANION GAP SERPL CALC-SCNC: 8 MMOL/L — SIGNIFICANT CHANGE UP (ref 5–17)
APTT BLD: 37.5 SEC — HIGH (ref 24.5–35.6)
APTT BLD: 37.5 SEC — HIGH (ref 24.5–35.6)
BASOPHILS # BLD AUTO: 0.03 K/UL — SIGNIFICANT CHANGE UP (ref 0–0.2)
BASOPHILS # BLD AUTO: 0.03 K/UL — SIGNIFICANT CHANGE UP (ref 0–0.2)
BASOPHILS # BLD AUTO: 0.04 K/UL — SIGNIFICANT CHANGE UP (ref 0–0.2)
BASOPHILS # BLD AUTO: 0.04 K/UL — SIGNIFICANT CHANGE UP (ref 0–0.2)
BASOPHILS NFR BLD AUTO: 0.2 % — SIGNIFICANT CHANGE UP (ref 0–2)
BUN SERPL-MCNC: 18 MG/DL — SIGNIFICANT CHANGE UP (ref 7–18)
BUN SERPL-MCNC: 18 MG/DL — SIGNIFICANT CHANGE UP (ref 7–18)
BUN SERPL-MCNC: 19 MG/DL — HIGH (ref 7–18)
BUN SERPL-MCNC: 19 MG/DL — HIGH (ref 7–18)
CALCIUM SERPL-MCNC: 8.1 MG/DL — LOW (ref 8.4–10.5)
CALCIUM SERPL-MCNC: 8.1 MG/DL — LOW (ref 8.4–10.5)
CALCIUM SERPL-MCNC: 8.2 MG/DL — LOW (ref 8.4–10.5)
CALCIUM SERPL-MCNC: 8.2 MG/DL — LOW (ref 8.4–10.5)
CHLORIDE SERPL-SCNC: 109 MMOL/L — HIGH (ref 96–108)
CO2 SERPL-SCNC: 20 MMOL/L — LOW (ref 22–31)
CREAT SERPL-MCNC: 0.9 MG/DL — SIGNIFICANT CHANGE UP (ref 0.5–1.3)
CREAT SERPL-MCNC: 0.9 MG/DL — SIGNIFICANT CHANGE UP (ref 0.5–1.3)
CREAT SERPL-MCNC: 0.95 MG/DL — SIGNIFICANT CHANGE UP (ref 0.5–1.3)
CREAT SERPL-MCNC: 0.95 MG/DL — SIGNIFICANT CHANGE UP (ref 0.5–1.3)
EGFR: 88 ML/MIN/1.73M2 — SIGNIFICANT CHANGE UP
EGFR: 88 ML/MIN/1.73M2 — SIGNIFICANT CHANGE UP
EGFR: 94 ML/MIN/1.73M2 — SIGNIFICANT CHANGE UP
EGFR: 94 ML/MIN/1.73M2 — SIGNIFICANT CHANGE UP
EOSINOPHIL # BLD AUTO: 0 K/UL — SIGNIFICANT CHANGE UP (ref 0–0.5)
EOSINOPHIL # BLD AUTO: 0 K/UL — SIGNIFICANT CHANGE UP (ref 0–0.5)
EOSINOPHIL # BLD AUTO: 0.04 K/UL — SIGNIFICANT CHANGE UP (ref 0–0.5)
EOSINOPHIL # BLD AUTO: 0.04 K/UL — SIGNIFICANT CHANGE UP (ref 0–0.5)
EOSINOPHIL NFR BLD AUTO: 0 % — SIGNIFICANT CHANGE UP (ref 0–6)
EOSINOPHIL NFR BLD AUTO: 0 % — SIGNIFICANT CHANGE UP (ref 0–6)
EOSINOPHIL NFR BLD AUTO: 0.2 % — SIGNIFICANT CHANGE UP (ref 0–6)
EOSINOPHIL NFR BLD AUTO: 0.2 % — SIGNIFICANT CHANGE UP (ref 0–6)
GLUCOSE BLDC GLUCOMTR-MCNC: 146 MG/DL — HIGH (ref 70–99)
GLUCOSE BLDC GLUCOMTR-MCNC: 146 MG/DL — HIGH (ref 70–99)
GLUCOSE BLDC GLUCOMTR-MCNC: 148 MG/DL — HIGH (ref 70–99)
GLUCOSE BLDC GLUCOMTR-MCNC: 148 MG/DL — HIGH (ref 70–99)
GLUCOSE BLDC GLUCOMTR-MCNC: 151 MG/DL — HIGH (ref 70–99)
GLUCOSE BLDC GLUCOMTR-MCNC: 151 MG/DL — HIGH (ref 70–99)
GLUCOSE BLDC GLUCOMTR-MCNC: 152 MG/DL — HIGH (ref 70–99)
GLUCOSE BLDC GLUCOMTR-MCNC: 152 MG/DL — HIGH (ref 70–99)
GLUCOSE BLDC GLUCOMTR-MCNC: 155 MG/DL — HIGH (ref 70–99)
GLUCOSE BLDC GLUCOMTR-MCNC: 155 MG/DL — HIGH (ref 70–99)
GLUCOSE BLDC GLUCOMTR-MCNC: 176 MG/DL — HIGH (ref 70–99)
GLUCOSE BLDC GLUCOMTR-MCNC: 176 MG/DL — HIGH (ref 70–99)
GLUCOSE BLDC GLUCOMTR-MCNC: 198 MG/DL — HIGH (ref 70–99)
GLUCOSE BLDC GLUCOMTR-MCNC: 198 MG/DL — HIGH (ref 70–99)
GLUCOSE SERPL-MCNC: 128 MG/DL — HIGH (ref 70–99)
GLUCOSE SERPL-MCNC: 128 MG/DL — HIGH (ref 70–99)
GLUCOSE SERPL-MCNC: 169 MG/DL — HIGH (ref 70–99)
GLUCOSE SERPL-MCNC: 169 MG/DL — HIGH (ref 70–99)
HCT VFR BLD CALC: 26.1 % — LOW (ref 39–50)
HCT VFR BLD CALC: 26.1 % — LOW (ref 39–50)
HCT VFR BLD CALC: 29.6 % — LOW (ref 39–50)
HCT VFR BLD CALC: 29.6 % — LOW (ref 39–50)
HGB BLD-MCNC: 8.2 G/DL — LOW (ref 13–17)
HGB BLD-MCNC: 8.2 G/DL — LOW (ref 13–17)
HGB BLD-MCNC: 9.5 G/DL — LOW (ref 13–17)
HGB BLD-MCNC: 9.5 G/DL — LOW (ref 13–17)
IMM GRANULOCYTES NFR BLD AUTO: 0.9 % — SIGNIFICANT CHANGE UP (ref 0–0.9)
INR BLD: 1.39 RATIO — HIGH (ref 0.85–1.18)
INR BLD: 1.39 RATIO — HIGH (ref 0.85–1.18)
LYMPHOCYTES # BLD AUTO: 1.55 K/UL — SIGNIFICANT CHANGE UP (ref 1–3.3)
LYMPHOCYTES # BLD AUTO: 1.55 K/UL — SIGNIFICANT CHANGE UP (ref 1–3.3)
LYMPHOCYTES # BLD AUTO: 1.64 K/UL — SIGNIFICANT CHANGE UP (ref 1–3.3)
LYMPHOCYTES # BLD AUTO: 1.64 K/UL — SIGNIFICANT CHANGE UP (ref 1–3.3)
LYMPHOCYTES # BLD AUTO: 8.3 % — LOW (ref 13–44)
LYMPHOCYTES # BLD AUTO: 8.3 % — LOW (ref 13–44)
LYMPHOCYTES # BLD AUTO: 9 % — LOW (ref 13–44)
LYMPHOCYTES # BLD AUTO: 9 % — LOW (ref 13–44)
MAGNESIUM SERPL-MCNC: 2 MG/DL — SIGNIFICANT CHANGE UP (ref 1.6–2.6)
MAGNESIUM SERPL-MCNC: 2 MG/DL — SIGNIFICANT CHANGE UP (ref 1.6–2.6)
MCHC RBC-ENTMCNC: 25 PG — LOW (ref 27–34)
MCHC RBC-ENTMCNC: 25 PG — LOW (ref 27–34)
MCHC RBC-ENTMCNC: 25.3 PG — LOW (ref 27–34)
MCHC RBC-ENTMCNC: 25.3 PG — LOW (ref 27–34)
MCHC RBC-ENTMCNC: 31.4 GM/DL — LOW (ref 32–36)
MCHC RBC-ENTMCNC: 31.4 GM/DL — LOW (ref 32–36)
MCHC RBC-ENTMCNC: 32.1 GM/DL — SIGNIFICANT CHANGE UP (ref 32–36)
MCHC RBC-ENTMCNC: 32.1 GM/DL — SIGNIFICANT CHANGE UP (ref 32–36)
MCV RBC AUTO: 78.9 FL — LOW (ref 80–100)
MCV RBC AUTO: 78.9 FL — LOW (ref 80–100)
MCV RBC AUTO: 79.6 FL — LOW (ref 80–100)
MCV RBC AUTO: 79.6 FL — LOW (ref 80–100)
MONOCYTES # BLD AUTO: 1.29 K/UL — HIGH (ref 0–0.9)
MONOCYTES # BLD AUTO: 1.29 K/UL — HIGH (ref 0–0.9)
MONOCYTES # BLD AUTO: 1.34 K/UL — HIGH (ref 0–0.9)
MONOCYTES # BLD AUTO: 1.34 K/UL — HIGH (ref 0–0.9)
MONOCYTES NFR BLD AUTO: 6.9 % — SIGNIFICANT CHANGE UP (ref 2–14)
MONOCYTES NFR BLD AUTO: 6.9 % — SIGNIFICANT CHANGE UP (ref 2–14)
MONOCYTES NFR BLD AUTO: 7.4 % — SIGNIFICANT CHANGE UP (ref 2–14)
MONOCYTES NFR BLD AUTO: 7.4 % — SIGNIFICANT CHANGE UP (ref 2–14)
NEUTROPHILS # BLD AUTO: 14.96 K/UL — HIGH (ref 1.8–7.4)
NEUTROPHILS # BLD AUTO: 14.96 K/UL — HIGH (ref 1.8–7.4)
NEUTROPHILS # BLD AUTO: 15.49 K/UL — HIGH (ref 1.8–7.4)
NEUTROPHILS # BLD AUTO: 15.49 K/UL — HIGH (ref 1.8–7.4)
NEUTROPHILS NFR BLD AUTO: 82.5 % — HIGH (ref 43–77)
NEUTROPHILS NFR BLD AUTO: 82.5 % — HIGH (ref 43–77)
NEUTROPHILS NFR BLD AUTO: 83.5 % — HIGH (ref 43–77)
NEUTROPHILS NFR BLD AUTO: 83.5 % — HIGH (ref 43–77)
NRBC # BLD: 0 /100 WBCS — SIGNIFICANT CHANGE UP (ref 0–0)
PHOSPHATE SERPL-MCNC: 2.7 MG/DL — SIGNIFICANT CHANGE UP (ref 2.5–4.5)
PHOSPHATE SERPL-MCNC: 2.7 MG/DL — SIGNIFICANT CHANGE UP (ref 2.5–4.5)
PLATELET # BLD AUTO: 666 K/UL — HIGH (ref 150–400)
PLATELET # BLD AUTO: 666 K/UL — HIGH (ref 150–400)
PLATELET # BLD AUTO: 691 K/UL — HIGH (ref 150–400)
PLATELET # BLD AUTO: 691 K/UL — HIGH (ref 150–400)
POTASSIUM SERPL-MCNC: 4.4 MMOL/L — SIGNIFICANT CHANGE UP (ref 3.5–5.3)
POTASSIUM SERPL-MCNC: 4.4 MMOL/L — SIGNIFICANT CHANGE UP (ref 3.5–5.3)
POTASSIUM SERPL-MCNC: 4.9 MMOL/L — SIGNIFICANT CHANGE UP (ref 3.5–5.3)
POTASSIUM SERPL-MCNC: 4.9 MMOL/L — SIGNIFICANT CHANGE UP (ref 3.5–5.3)
POTASSIUM SERPL-SCNC: 4.4 MMOL/L — SIGNIFICANT CHANGE UP (ref 3.5–5.3)
POTASSIUM SERPL-SCNC: 4.4 MMOL/L — SIGNIFICANT CHANGE UP (ref 3.5–5.3)
POTASSIUM SERPL-SCNC: 4.9 MMOL/L — SIGNIFICANT CHANGE UP (ref 3.5–5.3)
POTASSIUM SERPL-SCNC: 4.9 MMOL/L — SIGNIFICANT CHANGE UP (ref 3.5–5.3)
PROTHROM AB SERPL-ACNC: 15.7 SEC — HIGH (ref 9.5–13)
PROTHROM AB SERPL-ACNC: 15.7 SEC — HIGH (ref 9.5–13)
RBC # BLD: 3.28 M/UL — LOW (ref 4.2–5.8)
RBC # BLD: 3.28 M/UL — LOW (ref 4.2–5.8)
RBC # BLD: 3.75 M/UL — LOW (ref 4.2–5.8)
RBC # BLD: 3.75 M/UL — LOW (ref 4.2–5.8)
RBC # FLD: 16.2 % — HIGH (ref 10.3–14.5)
RBC # FLD: 16.2 % — HIGH (ref 10.3–14.5)
RBC # FLD: 16.3 % — HIGH (ref 10.3–14.5)
RBC # FLD: 16.3 % — HIGH (ref 10.3–14.5)
SODIUM SERPL-SCNC: 135 MMOL/L — SIGNIFICANT CHANGE UP (ref 135–145)
SODIUM SERPL-SCNC: 135 MMOL/L — SIGNIFICANT CHANGE UP (ref 135–145)
SODIUM SERPL-SCNC: 137 MMOL/L — SIGNIFICANT CHANGE UP (ref 135–145)
SODIUM SERPL-SCNC: 137 MMOL/L — SIGNIFICANT CHANGE UP (ref 135–145)
VANCOMYCIN TROUGH SERPL-MCNC: 15.9 UG/ML — SIGNIFICANT CHANGE UP (ref 10–20)
VANCOMYCIN TROUGH SERPL-MCNC: 15.9 UG/ML — SIGNIFICANT CHANGE UP (ref 10–20)
WBC # BLD: 18.13 K/UL — HIGH (ref 3.8–10.5)
WBC # BLD: 18.13 K/UL — HIGH (ref 3.8–10.5)
WBC # BLD: 18.58 K/UL — HIGH (ref 3.8–10.5)
WBC # BLD: 18.58 K/UL — HIGH (ref 3.8–10.5)
WBC # FLD AUTO: 18.13 K/UL — HIGH (ref 3.8–10.5)
WBC # FLD AUTO: 18.13 K/UL — HIGH (ref 3.8–10.5)
WBC # FLD AUTO: 18.58 K/UL — HIGH (ref 3.8–10.5)
WBC # FLD AUTO: 18.58 K/UL — HIGH (ref 3.8–10.5)

## 2023-12-20 PROCEDURE — 99232 SBSQ HOSP IP/OBS MODERATE 35: CPT | Mod: GC

## 2023-12-20 PROCEDURE — 11044 DBRDMT BONE 1ST 20 SQ CM/<: CPT

## 2023-12-20 RX ORDER — VANCOMYCIN HCL 1 G
500 VIAL (EA) INTRAVENOUS EVERY 12 HOURS
Refills: 0 | Status: DISCONTINUED | OUTPATIENT
Start: 2023-12-20 | End: 2023-12-22

## 2023-12-20 RX ORDER — INSULIN GLARGINE 100 [IU]/ML
6 INJECTION, SOLUTION SUBCUTANEOUS AT BEDTIME
Refills: 0 | Status: DISCONTINUED | OUTPATIENT
Start: 2023-12-20 | End: 2023-12-21

## 2023-12-20 RX ORDER — SODIUM CHLORIDE 9 MG/ML
1000 INJECTION, SOLUTION INTRAVENOUS
Refills: 0 | Status: DISCONTINUED | OUTPATIENT
Start: 2023-12-20 | End: 2023-12-23

## 2023-12-20 RX ORDER — FENTANYL CITRATE 50 UG/ML
50 INJECTION INTRAVENOUS ONCE
Refills: 0 | Status: DISCONTINUED | OUTPATIENT
Start: 2023-12-20 | End: 2023-12-20

## 2023-12-20 RX ORDER — FENTANYL CITRATE 50 UG/ML
25 INJECTION INTRAVENOUS
Refills: 0 | Status: DISCONTINUED | OUTPATIENT
Start: 2023-12-20 | End: 2023-12-20

## 2023-12-20 RX ORDER — VANCOMYCIN HCL 1 G
VIAL (EA) INTRAVENOUS
Refills: 0 | Status: DISCONTINUED | OUTPATIENT
Start: 2023-12-20 | End: 2023-12-22

## 2023-12-20 RX ORDER — VANCOMYCIN HCL 1 G
500 VIAL (EA) INTRAVENOUS ONCE
Refills: 0 | Status: COMPLETED | OUTPATIENT
Start: 2023-12-20 | End: 2023-12-20

## 2023-12-20 RX ADMIN — SODIUM CHLORIDE 50 MILLILITER(S): 9 INJECTION, SOLUTION INTRAVENOUS at 11:34

## 2023-12-20 RX ADMIN — Medication 100 MILLIGRAM(S): at 11:34

## 2023-12-20 RX ADMIN — MUPIROCIN 1 APPLICATION(S): 20 OINTMENT TOPICAL at 06:48

## 2023-12-20 RX ADMIN — Medication 1 APPLICATION(S): at 22:39

## 2023-12-20 RX ADMIN — INSULIN GLARGINE 6 UNIT(S): 100 INJECTION, SOLUTION SUBCUTANEOUS at 22:39

## 2023-12-20 RX ADMIN — Medication 100 MILLIGRAM(S): at 13:45

## 2023-12-20 RX ADMIN — LEVETIRACETAM 750 MILLIGRAM(S): 250 TABLET, FILM COATED ORAL at 19:12

## 2023-12-20 RX ADMIN — MUPIROCIN 1 APPLICATION(S): 20 OINTMENT TOPICAL at 18:07

## 2023-12-20 RX ADMIN — CEFEPIME 100 MILLIGRAM(S): 1 INJECTION, POWDER, FOR SOLUTION INTRAMUSCULAR; INTRAVENOUS at 17:53

## 2023-12-20 RX ADMIN — Medication 100 MILLIGRAM(S): at 22:39

## 2023-12-20 RX ADMIN — CHLORHEXIDINE GLUCONATE 1 APPLICATION(S): 213 SOLUTION TOPICAL at 05:51

## 2023-12-20 RX ADMIN — Medication 2: at 08:26

## 2023-12-20 RX ADMIN — SENNA PLUS 2 TABLET(S): 8.6 TABLET ORAL at 22:39

## 2023-12-20 RX ADMIN — Medication 2: at 17:27

## 2023-12-20 RX ADMIN — POLYETHYLENE GLYCOL 3350 17 GRAM(S): 17 POWDER, FOR SOLUTION ORAL at 17:53

## 2023-12-20 RX ADMIN — Medication 1 APPLICATION(S): at 05:52

## 2023-12-20 RX ADMIN — Medication 100 MILLIGRAM(S): at 05:51

## 2023-12-20 RX ADMIN — CEFEPIME 100 MILLIGRAM(S): 1 INJECTION, POWDER, FOR SOLUTION INTRAMUSCULAR; INTRAVENOUS at 06:48

## 2023-12-20 RX ADMIN — Medication 6 MILLIGRAM(S): at 05:51

## 2023-12-20 RX ADMIN — Medication 100 MILLIGRAM(S): at 22:38

## 2023-12-20 NOTE — PROGRESS NOTE ADULT - ASSESSMENT
68-year-old male, PMH of traumatic brain injury w/ seizure disorder, DM, sacral decubitus ulcer, sent from [facility] for reported fever and oxygen saturation in low 90s. Pt reporting lethargy/weakness, A&Ox0. Vital signs sig for temp 101 F (resolved, 98.6F),  > 101, 97% on RA. EKG sinus tachycardia 106 HR, Labs sig for WC 14, Cr 1.5, lac 2.8, K 5.9. CXR shows questionable RML infiltrate. s/p cefepime 2g and 2.9 L in ED. COVID +. Admitted for Sepsis likely 2/2 sacral ulcer and COVID pneumonia.                68-year-old male, PMH of traumatic brain injury w/ seizure disorder, DM, sacral decubitus ulcer, sent from [facility] for reported fever and oxygen saturation in low 90s. Pt reporting lethargy/weakness, A&Ox0. Vital signs sig for temp 101 F (resolved, 98.6F),  > 101, 97% on RA. EKG sinus tachycardia 106 HR, Labs sig for WC 14, Cr 1.5, lac 2.8, K 5.9. CXR shows questionable RML infiltrate. s/p cefepime 2g and 2.9 L in ED. COVID +. Admitted for Sepsis likely 2/2 sacral ulcer and COVID pneumonia.

## 2023-12-20 NOTE — PROGRESS NOTE ADULT - ATTENDING COMMENTS
68yM with PMH of TBI, seizure disorder, sacral ulcer, from [facility], who was sent to the ED for concerns for sepsis, admitted for sepsis 2/2 infected sacral decubitus ulcer. Continue vancomycin, cefepime, Flagyl; blood cultures negative. Continue dexamethasone and remdesvir, can consider shortened course of steroids pending improvement in AHRF. Patient will need PICC line for long-term abx treatment, discussed with Dr. Elkins. LEATHA and hyperkalemia resolved, likely in setting of poor PO intake and acute illness. Wound care following for sacral ulcer. Surgery consulted for debridement, discussed with Dr. Grewal.  Plan for debridement of sacral ulcer in OR today.     Alert man in NAD  Vital Signs Last 24 Hrs  T(C): 36.9 (20 Dec 2023 22:34), Max: 37 (20 Dec 2023 08:42)  T(F): 98.4 (20 Dec 2023 22:34), Max: 98.6 (20 Dec 2023 08:42)  HR: 99 (20 Dec 2023 22:34) (80 - 99)  BP: 109/73 (20 Dec 2023 22:34) (106/70 - 130/67)  BP(mean): 80 (20 Dec 2023 13:55) (80 - 97)  RR: 18 (20 Dec 2023 22:34) (12 - 18)  SpO2: 99% (20 Dec 2023 22:34) (98% - 100%)    Parameters below as of 20 Dec 2023 22:34  Patient On (Oxygen Delivery Method): room air    Lungs, clear  Cor, RRR  ABdomen, soft  Neurological, alert, verbal, hemiparesis with increased tone.                           9.5    18.13 )-----------( 691      ( 20 Dec 2023 14:20 )  after tx one unit PRBC yesterday.             29.6   12-20    135  |  109<H>  |  19<H>  ----------------------------<  169<H>  4.4   |  20<L>  |  0.90    Ca    8.2<L>      20 Dec 2023 14:20  Phos  2.7     12-20  Mg     2.0     12-20        < from: 12 Lead ECG (12.14.23 @ 00:08) >      Diagnosis Line Sinus tachycardia  Cannot rule out Septal infarct , age undetermined  Abnormal ECG    < end of copied text >    < from: CT Abdomen and Pelvis No Cont (12.14.23 @ 18:33) >    Complex extensive sacral decubitus ulcer with fragmentation/osteomyelitis   of the the coccyx. Locules of subcutaneous gas extending from the   decubitus ulcer superiorly to the soft tissues of the lower back at the   level of the lower lumbosacral spine.    < end of copied text >      #Sepsis 2/2 sacral ulcer vs COVID pneumonia, clinically improved  #AHRF 2/2 COVID pneumonia, resolved  #Elevated lactate, resolved  #LEATHA, resolved  #Hyperkalemia, resolved  #Seizure disorder  #TBI  #Type 2 DM  #HTN  Patient is optimized for procedure to control source of sepsis. 68yM with PMH of TBI, seizure disorder, sacral ulcer, from [facility], who was sent to the ED for concerns for sepsis, admitted for sepsis 2/2 infected sacral decubitus ulcer. Continue vancomycin, cefepime, Flagyl; blood cultures negative. Continue dexamethasone and remdesvir, can consider shortened course of steroids pending improvement in AHRF. Patient will need PICC line for long-term abx treatment, discussed with Dr. Elkins. LEATHA and hyperkalemia resolved, likely in setting of poor PO intake and acute illness. Wound care following for sacral ulcer. Surgery consulted for debridement, discussed with Dr. Grewal.  Plan for debridement of sacral ulcer in OR today.     Alert man in NAD  Vital Signs Last 24 Hrs  T(C): 36.9 (20 Dec 2023 22:34), Max: 37 (20 Dec 2023 08:42)  T(F): 98.4 (20 Dec 2023 22:34), Max: 98.6 (20 Dec 2023 08:42)  HR: 99 (20 Dec 2023 22:34) (80 - 99)  BP: 109/73 (20 Dec 2023 22:34) (106/70 - 130/67)  BP(mean): 80 (20 Dec 2023 13:55) (80 - 97)  RR: 18 (20 Dec 2023 22:34) (12 - 18)  SpO2: 99% (20 Dec 2023 22:34) (98% - 100%)    Parameters below as of 20 Dec 2023 22:34  Patient On (Oxygen Delivery Method): room air    Lungs, clear  Cor, RRR  ABdomen, soft  Neurological, alert, verbal, hemiparesis with increased tone.                           9.5    18.13 )-----------( 691      ( 20 Dec 2023 14:20 )  after tx one unit PRBC yesterday.             29.6   12-20    135  |  109<H>  |  19<H>  ----------------------------<  169<H>  4.4   |  20<L>  |  0.90    Ca    8.2<L>      20 Dec 2023 14:20  Phos  2.7     12-20  Mg     2.0     12-20        < from: 12 Lead ECG (12.14.23 @ 00:08) >      Diagnosis Line Sinus tachycardia  Cannot rule out Septal infarct , age undetermined  Abnormal ECG    < end of copied text >    < from: CT Abdomen and Pelvis No Cont (12.14.23 @ 18:33) >    Complex extensive sacral decubitus ulcer with fragmentation/osteomyelitis   of the the coccyx. Locules of subcutaneous gas extending from the   decubitus ulcer superiorly to the soft tissues of the lower back at the   level of the lower lumbosacral spine.    < end of copied text >      #Sepsis 2/2 sacral ulcer vs COVID pneumonia, clinically improved  #AHRF 2/2 COVID pneumonia, resolved  #Elevated lactate, resolved  #LEATHA, resolved  #Hyperkalemia, resolved  #Seizure disorder  #TBI  #Type 2 DM  #HTN  Patient is optimized for procedure to control source of sepsis.

## 2023-12-20 NOTE — PROGRESS NOTE ADULT - PROBLEM SELECTOR PLAN 5
- pt A&Ox0, unknown baseline  - possible acute metabolic encephalopathy 2/2 sepsis  - continue to monitor mental status  AOx1 currently hb 9.3>10.7>7.2>7.9 > 7.4 > 7.1 > 9.2 > 8.2 > 9.2 > 9.5  monitor for signs of worsening anemia  transfuse if <7 or precipitous drop  no signs of active bleed at this time  hgb increased from 7.1 to 9.2 s/p 12/19 1pRBC

## 2023-12-20 NOTE — PHARMACOTHERAPY INTERVENTION NOTE - COMMENTS
Currently, on vancomycin 1 gram IVPB every 12 hours for treatment of infected sacral decubitus ulcer. Last vancomycin trough on 12/19 @ 17:30 = 21.6 mcg/mL, calculated AUC:RENETTA = 758. Suggest to decrease dose to 500 mg IVPB every 12 hour.

## 2023-12-20 NOTE — PROGRESS NOTE ADULT - ASSESSMENT
Pneumonia ( ? bacterial)  COVID - 19 infection   Infected Sacral Decubitus ulcer with Osteomyelitis of coccyx  Leukocytosis - secondary to steroids    Plan:  ·	Cont Vancomycin 1gm iv q12hrs  ·	Cont Maxipime 2gms iv q12hrs   ·	Cont Flagyl 500mgs iv q8hrs  ·	Cont Decadron 6mgs iv q24hrs.  ·	Will need a PICC line and IV antibiotics for 6 weeks to treat for osteomyelitis.  ·	will likely switch his antibiotics to Meropenem 1 gm iv q8hrs upon discharge to complete his 6 week course.

## 2023-12-20 NOTE — PROGRESS NOTE ADULT - PROBLEM SELECTOR PLAN 4
- plan as above   - wound care consult  c/w cefepime, vanc, flagyl  surgery consult    Complex extensive sacral decubitus ulcer with fragmentation/osteomyelitis of the the coccyx. Locules of subcutaneous gas extending from the decubitus ulcer superiorly to the soft tissues of the lower back at the level of the lower lumbosacral spine.    per nurse, ulcer was gushing grey, malodorous, purulent fluid this morning when she changed the dressings, no erythema seen.  Will need PICC line placed for long-term abx  - 12/18 leukocytosis worsening with 3 Abx and continuous purulent ulcer; followed up with surgery? debridement - pt A&Ox0, unknown baseline  - possible acute metabolic encephalopathy 2/2 sepsis  - continue to monitor mental status  AOx1 currently

## 2023-12-20 NOTE — PROGRESS NOTE ADULT - PROBLEM SELECTOR PLAN 6
hb 9.3>10.7>7.2>7.9 > 7.4 > 7.1  monitor for signs of worsening anemia  transfuse if <7 or precipitous drop  no signs of active bleed at this time  s/p 12/19 1pRBC hb 9.3>10.7>7.2>7.9 > 7.4 > 7.1 > 9.2 > 8.2  monitor for signs of worsening anemia  transfuse if <7 or precipitous drop  no signs of active bleed at this time  hgb increased from 7.1 to 9.2 s/p 12/19 1pRBC, then decreased to 8.2 following morning 12/20 - t2DM on metformin   - blood sugars 200s  - s/p lantus 9u, 3 TID   - ISS fs qhs and before meals  12/18 Fasting gluc 58, lantus decreased to 6U, admelog dc  - d/c NS+D5 for maintenance, reinstated lantus 6U

## 2023-12-20 NOTE — BRIEF OPERATIVE NOTE - NSICDXBRIEFPROCEDURE_GEN_ALL_CORE_FT
PROCEDURES:  Debridement, sacrum 20-Dec-2023 12:53:17  Juanis Armstrong   PROCEDURES:  Debridement, sacrum 20-Dec-2023 12:53:17  Juanis Amrstrong

## 2023-12-20 NOTE — PROGRESS NOTE ADULT - PROBLEM SELECTOR PLAN 1
RCRI: class 1 risk   Croft: .3% risk  bedbound with sinus tachycardia  low to moderate risk for a low risk procedure  Planned for sacaral ulcer debridement on 12/20 RCRI: class 1 risk   Croft: .3% risk  bedbound with sinus tachycardia  low to moderate risk for a low risk procedure  S/p sacaral ulcer debridement 12/20 Ulcer gushing grey, malodorous, purulent fluid without erythema  CT abdomen: Complex extensive sacral decubitus ulcer with fragmentation/osteomyelitis of the the coccyx. Locules of subcutaneous gas extending from the decubitus ulcer superiorly to the soft tissues of the lower back at the level of the lower lumbosacral spine.   - wound care consult  c/w cefepime, vanc, flagyl  surgery consult  will need PICC line placed for long-term abx  12/20 s/p ulcer debridement

## 2023-12-20 NOTE — PHARMACOTHERAPY INTERVENTION NOTE - NSPHARMCOMMASP
ASP - Dose optimization/Non-Renal dose adjustment
ASP - Renal dose adjustment
ASP - Lab/ test recommended
ASP - Lab/ test recommended
ASP - Renal dose adjustment

## 2023-12-20 NOTE — BRIEF OPERATIVE NOTE - OPERATION/FINDINGS
wound class 4  7cm x 11cm open wound with 4cm undermining caudally, 3cm tunneling into right buttock, and 2cm tunneling into left buttock

## 2023-12-20 NOTE — PROGRESS NOTE ADULT - PROBLEM SELECTOR PLAN 7
- t2DM on metformin   - blood sugars 200s  - s/p lantus 9u, 3 TID   - ISS fs qhs and before meals  12/18 Fasting gluc 58, lantus decreased to 6U, admelog dc  - c/w NS+D5 for maintenance - t2DM on metformin   - blood sugars 200s  - s/p lantus 9u, 3 TID   - ISS fs qhs and before meals  12/18 Fasting gluc 58, lantus decreased to 6U, admelog dc  - d/c NS+D5 for maintenance, reinstated lantus 6U 12/14 CT abd and pelvis - Large amount stool in the rectum and colon  c/w lactulose, miralax, senna

## 2023-12-20 NOTE — PROGRESS NOTE ADULT - PROBLEM SELECTOR PLAN 8
cr 1.5, unknown baseline  - likely pre-renal due to dehydration   RESOLVED 12/14 CT abd and pelvis - Large amount stool in the rectum and colon  hold lactulose, miralax, senna - pt is NPO for sacral ulcer debridement today 12/20

## 2023-12-20 NOTE — CHART NOTE - NSCHARTNOTEFT_GEN_A_CORE
Assessment:     Factors impacting intake: [ ] none [ ] nausea  [ ] vomiting [ ] diarrhea [ ] constipation  [ ]chewing problems [ ] swallowing issues  [ ] other:     Diet Presciption: Diet, Pureed:   Supplement Feeding Modality:  Oral  Ensure Enlive Cans or Servings Per Day:  1       Frequency:  Three Times a day (12-19-23 @ 16:08)    Intake:     Daily   % Weight Change    Pertinent Medications: MEDICATIONS  (STANDING):  ascorbic acid 500 milliGRAM(s) Oral daily  cefepime   IVPB 2000 milliGRAM(s) IV Intermittent every 12 hours  chlorhexidine 2% Cloths 1 Application(s) Topical <User Schedule>  collagenase Ointment 1 Application(s) Topical every 8 hours  Dakins Solution - 1/4 Strength 1 Application(s) Topical daily  dexAMETHasone  Injectable 6 milliGRAM(s) IV Push daily  dextrose 50% Injectable 25 Gram(s) IV Push once  insulin glargine Injectable (LANTUS) 6 Unit(s) SubCutaneous at bedtime  insulin lispro (ADMELOG) corrective regimen sliding scale   SubCutaneous three times a day before meals  insulin lispro (ADMELOG) corrective regimen sliding scale   SubCutaneous at bedtime  lactated ringers. 1000 milliLiter(s) (50 mL/Hr) IV Continuous <Continuous>  lactulose Syrup 10 Gram(s) Oral daily  levETIRAcetam 750 milliGRAM(s) Oral two times a day  metroNIDAZOLE  IVPB 500 milliGRAM(s) IV Intermittent every 8 hours  metroNIDAZOLE  IVPB      multivitamin 1 Tablet(s) Oral daily  mupirocin 2% Ointment 1 Application(s) Both Nostrils two times a day  polyethylene glycol 3350 17 Gram(s) Oral two times a day  senna 2 Tablet(s) Oral at bedtime  vancomycin  IVPB 500 milliGRAM(s) IV Intermittent every 12 hours  vancomycin  IVPB        MEDICATIONS  (PRN):  acetaminophen     Tablet .. 650 milliGRAM(s) Oral every 6 hours PRN Mild Pain (1 - 3)    Pertinent Labs: 12-20 Na135 mmol/L Glu 169 mg/dL<H> K+ 4.4 mmol/L Cr  0.90 mg/dL BUN 19 mg/dL<H> 12-20 Phos 2.7 mg/dL 12-14 Alb 1.7 g/dL<L>     CAPILLARY BLOOD GLUCOSE      POCT Blood Glucose.: 152 mg/dL (20 Dec 2023 13:03)  POCT Blood Glucose.: 176 mg/dL (20 Dec 2023 11:41)  POCT Blood Glucose.: 155 mg/dL (20 Dec 2023 08:03)  POCT Blood Glucose.: 148 mg/dL (20 Dec 2023 05:48)  POCT Blood Glucose.: 146 mg/dL (20 Dec 2023 00:14)  POCT Blood Glucose.: 155 mg/dL (19 Dec 2023 21:13)  POCT Blood Glucose.: 125 mg/dL (19 Dec 2023 16:56)      Skin:     Estimated Needs:   [ ] no change since previous assessment  [ ] recalculated:     Previous Nutrition Diagnosis:   [ ] Inadequate Energy Intake [ ]Inadequate Oral Intake [ ] Excessive Energy Intake   [ ] Underweight [ ] Increased Nutrient Needs [ ] Overweight/Obesity  [ ] Swallowing Difficult   [ ] Altered GI Function [ ] Unintended Weight Loss [ ] Food & Nutrition Related Knowledge Deficit [ ] Malnutrition   [ ] Not Ready for Diet/Life Style Changes     Nutrition Diagnosis is [ ] ongoing  [ ] Improving   [ ] resolved [ ] not applicable     New Nutrition Diagnosis: [ ] not applicable       Interventions:   Recommend  [ ] Change Diet To:  [ ] Nutrition Supplement  [ ] Nutrition Support  [ ] Other:     Monitoring and Evaluation:   [ ] PO intake [ x ] Tolerance to diet prescription [ x ] weights [ x ] labs[ x ] follow up per protocol  [ ] other: Assessment:       Nutrition consult requested for poor oral intake; Chart reviewed, pt went for surgery when visited today, I&D sacral decubitis, confused, poor historian per chart; Needs total feeding assistance; h/o DM, finger stick noted, on Liberalized diet Rx due to poor intake; NPO for surgery, spoke to MD to add oral nutritional supplement when diet resumed    Factors impacting intake: [ x ] other: change in mental status; acute on chronic comorbidities including Covid19 infection, h/o TBI    Diet Prescription:   Diet, Pureed:   NPO after MN 12/19/23    Daily % Weight Change    Pertinent Medications: MEDICATIONS  (STANDING):  ascorbic acid 500 milliGRAM(s) Oral daily  cefepime   IVPB 2000 milliGRAM(s) IV Intermittent every 12 hours  chlorhexidine 2% Cloths 1 Application(s) Topical <User Schedule>  collagenase Ointment 1 Application(s) Topical every 8 hours  Dakins Solution - 1/4 Strength 1 Application(s) Topical daily  dexAMETHasone  Injectable 6 milliGRAM(s) IV Push daily  dextrose 50% Injectable 25 Gram(s) IV Push once  insulin glargine Injectable (LANTUS) 6 Unit(s) SubCutaneous at bedtime  insulin lispro (ADMELOG) corrective regimen sliding scale   SubCutaneous three times a day before meals  insulin lispro (ADMELOG) corrective regimen sliding scale   SubCutaneous at bedtime  lactated ringers. 1000 milliLiter(s) (50 mL/Hr) IV Continuous <Continuous>  lactulose Syrup 10 Gram(s) Oral daily  levETIRAcetam 750 milliGRAM(s) Oral two times a day  metroNIDAZOLE  IVPB 500 milliGRAM(s) IV Intermittent every 8 hours  metroNIDAZOLE  IVPB      multivitamin 1 Tablet(s) Oral daily  mupirocin 2% Ointment 1 Application(s) Both Nostrils two times a day  polyethylene glycol 3350 17 Gram(s) Oral two times a day  senna 2 Tablet(s) Oral at bedtime  vancomycin  IVPB 500 milliGRAM(s) IV Intermittent every 12 hours  vancomycin  IVPB        MEDICATIONS  (PRN):  acetaminophen     Tablet .. 650 milliGRAM(s) Oral every 6 hours PRN Mild Pain (1 - 3)    Pertinent Labs: 12-20 Na135 mmol/L Glu 169 mg/dL<H> K+ 4.4 mmol/L Cr  0.90 mg/dL BUN 19 mg/dL<H> 12-20 Phos 2.7 mg/dL 12-14 Alb 1.7 g/dL<L>     CAPILLARY BLOOD GLUCOSE    POCT Blood Glucose.: 152 mg/dL (20 Dec 2023 13:03)  POCT Blood Glucose.: 176 mg/dL (20 Dec 2023 11:41)  POCT Blood Glucose.: 155 mg/dL (20 Dec 2023 08:03)  POCT Blood Glucose.: 148 mg/dL (20 Dec 2023 05:48)  POCT Blood Glucose.: 146 mg/dL (20 Dec 2023 00:14)  POCT Blood Glucose.: 155 mg/dL (19 Dec 2023 21:13)  POCT Blood Glucose.: 125 mg/dL (19 Dec 2023 16:56)    Skin: Pressure Injury: stage I, III, IV    Estimated Needs:   [ x ] no change since previous assessment  [ ] recalculated:     Previous Nutrition Diagnosis:   [ x ] Increased Nutrient Needs     Nutrition Diagnosis is [ x ] ongoing  [ ] Improving   [ ] resolved [ ] not applicable     New Nutrition Diagnosis: [ x ] not applicable     Interventions/Recommend  [ x ] When diet resumed:  Ensure Enlive 1can (240ml) x tid (1050kcal, 60g protein)                     Least restricted diet due to medical conditions and poor oral intake   [ x ] Nutrition Supplement: On Vit C, MVI Rx   [ ] Nutrition Support  [ x ] Other: Discussed with MD         Nursing to continue feeding assistance and encouragement, aspiration precaution     Monitoring and Evaluation:   [ x ] PO intake [ x ] Tolerance to diet prescription [ x ] weights [ x ] labs[ x ] follow up per protocol  [ ] other: Assessment:       Nutrition consult requested for poor oral intake; Chart reviewed, pt went for surgery when visited today, I&D sacral decubitis, confused, poor historian per chart; Needs total feeding assistance; h/o DM, finger stick noted, on Liberalized diet Rx due to poor intake; NPO for surgery, spoke to MD to add oral nutritional supplement when diet resumed    Factors impacting intake: [ x ] other: change in mental status; acute on chronic comorbidities including Covid19 infection, h/o TBI    Diet Prescription:   Diet, Pureed:   NPO after MN 12/19/23    Daily % Weight Change: No data     Pertinent Medications: MEDICATIONS  (STANDING):  ascorbic acid 500 milliGRAM(s) Oral daily  cefepime   IVPB 2000 milliGRAM(s) IV Intermittent every 12 hours  chlorhexidine 2% Cloths 1 Application(s) Topical <User Schedule>  collagenase Ointment 1 Application(s) Topical every 8 hours  Dakins Solution - 1/4 Strength 1 Application(s) Topical daily  dexAMETHasone  Injectable 6 milliGRAM(s) IV Push daily  dextrose 50% Injectable 25 Gram(s) IV Push once  insulin glargine Injectable (LANTUS) 6 Unit(s) SubCutaneous at bedtime  insulin lispro (ADMELOG) corrective regimen sliding scale   SubCutaneous three times a day before meals  insulin lispro (ADMELOG) corrective regimen sliding scale   SubCutaneous at bedtime  lactated ringers. 1000 milliLiter(s) (50 mL/Hr) IV Continuous <Continuous>  lactulose Syrup 10 Gram(s) Oral daily  levETIRAcetam 750 milliGRAM(s) Oral two times a day  metroNIDAZOLE  IVPB 500 milliGRAM(s) IV Intermittent every 8 hours  metroNIDAZOLE  IVPB      multivitamin 1 Tablet(s) Oral daily  mupirocin 2% Ointment 1 Application(s) Both Nostrils two times a day  polyethylene glycol 3350 17 Gram(s) Oral two times a day  senna 2 Tablet(s) Oral at bedtime  vancomycin  IVPB 500 milliGRAM(s) IV Intermittent every 12 hours  vancomycin  IVPB        MEDICATIONS  (PRN):  acetaminophen     Tablet .. 650 milliGRAM(s) Oral every 6 hours PRN Mild Pain (1 - 3)    Pertinent Labs: 12-20 Na135 mmol/L Glu 169 mg/dL<H> K+ 4.4 mmol/L Cr  0.90 mg/dL BUN 19 mg/dL<H> 12-20 Phos 2.7 mg/dL 12-14 Alb 1.7 g/dL<L>     CAPILLARY BLOOD GLUCOSE    POCT Blood Glucose.: 152 mg/dL (20 Dec 2023 13:03)  POCT Blood Glucose.: 176 mg/dL (20 Dec 2023 11:41)  POCT Blood Glucose.: 155 mg/dL (20 Dec 2023 08:03)  POCT Blood Glucose.: 148 mg/dL (20 Dec 2023 05:48)  POCT Blood Glucose.: 146 mg/dL (20 Dec 2023 00:14)  POCT Blood Glucose.: 155 mg/dL (19 Dec 2023 21:13)  POCT Blood Glucose.: 125 mg/dL (19 Dec 2023 16:56)    Skin: Pressure Injury: stage I, III, IV    Estimated Needs:   [ x ] no change since previous assessment  [ ] recalculated:     Previous Nutrition Diagnosis:   [ x ] Increased Nutrient Needs     Nutrition Diagnosis is [ x ] ongoing  [ ] Improving   [ ] resolved [ ] not applicable     New Nutrition Diagnosis: [ x ] not applicable     Interventions/Recommend  [ x ] When diet resumed:  Ensure Enlive 1can (240ml) x tid (1050kcal, 60g protein)                     Least restricted diet due to medical conditions and poor oral intake   [ x ] Nutrition Supplement: On Vit C, MVI Rx   [ ] Nutrition Support  [ x ] Other: Discussed with MD       Please verify Ht; Recheck wt with calibrated bedscale when feasible       Nursing to continue feeding assistance and encouragement, aspiration precaution     Monitoring and Evaluation:   [ x ] PO intake [ x ] Tolerance to diet prescription [ x ] weights [ x ] labs[ x ] follow up per protocol  [ ] other:

## 2023-12-20 NOTE — PROGRESS NOTE ADULT - PROBLEM SELECTOR PLAN 3
COVID + on RVP 12/13  mildly hypoxic, now improved  likely covid pna from CT on admission   s/p remdesivir  decadron thru 12/24

## 2023-12-20 NOTE — PROGRESS NOTE ADULT - SUBJECTIVE AND OBJECTIVE BOX
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      metroNIDAZOLE  IVPB 500 milliGRAM(s) IV Intermittent every 8 hours  multivitamin 1 Tablet(s) Oral daily  mupirocin 2% Ointment 1 Application(s) Both Nostrils two times a day  polyethylene glycol 3350 17 Gram(s) Oral two times a day  senna 2 Tablet(s) Oral at bedtime  vancomycin  IVPB          HEALTH ISSUES - PROBLEM Dx:  Preoperative examination    Sepsis    2019 novel coronavirus disease (COVID-19)    Sacral decubitus ulcer    Acute metabolic encephalopathy    LEATHA (acute kidney injury)    Anemia    Hyperkalemia    TBI (traumatic brain injury)    Seizure disorder    DM (diabetes mellitus)    Prophylactic measure    Preoperative clearance

## 2023-12-20 NOTE — PROGRESS NOTE ADULT - PROBLEM SELECTOR PLAN 2
- sent from [facility] for reported fever and oxygen saturation in low 90s.   - Pt reporting lethargy/weakness, A&Ox0, weeping purulent sacral ulcer noticed on exam   - Vital signs sig for temp 101 F (resolved, 98.6F),  > 101, 97% on RA, intermittently hypoxic to low 90s on RA   - EKG sinus tachycardia 106 HR  - Labs sig for WC 14, Cr 1.5, lac 2.8  - RVP - covid positive , U/A negative   - CXR: Diffuse right lung pneumonia.  - s/p cefepime 2g and 2.9 L in ED  Admitted for Sepsis 2/2 pneumonia vs. infected sacral ulcer vs. COVID   c/w vanc, cefepime, flagyl   bcx NGTD 4d   Vanc trough 20.2 - sent from [facility] for reported fever and oxygen saturation in low 90s.   - Pt reporting lethargy/weakness, A&Ox0, weeping purulent sacral ulcer noticed on exam   - Vital signs sig for temp 101 F (resolved, 98.6F),  > 101, 97% on RA, intermittently hypoxic to low 90s on RA   - EKG sinus tachycardia 106 HR  - Labs sig for WC 14, Cr 1.5, lac 2.8  - RVP - covid positive , U/A negative   - CXR: Diffuse right lung pneumonia.  - s/p cefepime 2g and 2.9 L in ED  Admitted for Sepsis 2/2 pneumonia vs. infected sacral ulcer vs. COVID   c/w vanc, cefepime, flagyl   bcx NGTD 4d   Vanc trough 20.2 - sent from [facility] for reported fever and oxygen saturation in low 90s.   - Pt reporting lethargy/weakness, A&Ox0, weeping purulent sacral ulcer noticed on exam   - Vital signs sig for temp 101 F (resolved, 98.6F),  > 101, 97% on RA, intermittently hypoxic to low 90s on RA   - EKG sinus tachycardia 106 HR  - Labs sig for WC 14, Cr 1.5, lac 2.8  - RVP - covid positive , U/A negative   - CXR: Diffuse right lung pneumonia.  - s/p cefepime 2g and 2.9 L in ED  Admitted for Sepsis 2/2 pneumonia vs. infected sacral ulcer vs. COVID   c/w vanc, cefepime, flagyl   bcx NGTD 4d   Vanc trough 15.9 - sent from [facility] for reported fever and oxygen saturation in low 90s.   - Pt reporting lethargy/weakness, A&Ox0, weeping purulent sacral ulcer noticed on exam   - Vital signs sig for temp 101 F (resolved, 98.6F),  > 101, 97% on RA, intermittently hypoxic to low 90s on RA   - EKG sinus tachycardia 106 HR  - Labs sig for WC 14, Cr 1.5, lac 2.8  - RVP - covid positive , U/A negative   - CXR: Diffuse right lung pneumonia.  - s/p cefepime 2g and 2.9 L in ED  Admitted for Sepsis 2/2 pneumonia vs. infected sacral ulcer vs. COVID   c/w vanc, cefepime, flagyl   bcx NGTD 4d   Vanc trough 15.9

## 2023-12-20 NOTE — PROGRESS NOTE ADULT - SUBJECTIVE AND OBJECTIVE BOX
68y Male    Meds:  cefepime   IVPB 2000 milliGRAM(s) IV Intermittent every 12 hours  metroNIDAZOLE  IVPB 500 milliGRAM(s) IV Intermittent every 8 hours  metroNIDAZOLE  IVPB      vancomycin  IVPB      vancomycin  IVPB 500 milliGRAM(s) IV Intermittent every 12 hours    Allergies    No Known Allergies    Intolerances        VITALS:  Vital Signs Last 24 Hrs  T(C): 36.8 (20 Dec 2023 13:55), Max: 37 (20 Dec 2023 08:42)  T(F): 98.2 (20 Dec 2023 13:55), Max: 98.6 (20 Dec 2023 08:42)  HR: 95 (20 Dec 2023 13:55) (80 - 99)  BP: 108/68 (20 Dec 2023 13:55) (106/70 - 130/67)  BP(mean): 80 (20 Dec 2023 13:55) (80 - 97)  RR: 18 (20 Dec 2023 13:55) (12 - 18)  SpO2: 99% (20 Dec 2023 13:55) (98% - 100%)    Parameters below as of 20 Dec 2023 13:55  Patient On (Oxygen Delivery Method): room air        LABS/DIAGNOSTIC TESTS:                          9.5    18.13 )-----------( 691      ( 20 Dec 2023 14:20 )             29.6         12-20    135  |  109<H>  |  19<H>  ----------------------------<  169<H>  4.4   |  20<L>  |  0.90    Ca    8.2<L>      20 Dec 2023 14:20  Phos  2.7     12-20  Mg     2.0     12-20            CULTURES: Clean Catch Clean Catch (Midstream)  12-14 @ 02:36   No growth  --  --      .Blood Blood-Peripheral  12-13 @ 21:35   No growth at 5 days  --  --      .Blood Blood-Peripheral  12-13 @ 21:25   No growth at 5 days  --  --            RADIOLOGY:      ROS:  [  ] UNABLE TO ELICIT 68y Male who is doing well , surgery opened up his ulcer , he has has no fevers or chills, he is barely coughing and has no SOB, no diarrhea and no pain at the site of his DU. His WBC count is still high.    Meds:  cefepime   IVPB 2000 milliGRAM(s) IV Intermittent every 12 hours  metroNIDAZOLE  IVPB 500 milliGRAM(s) IV Intermittent every 8 hours  metroNIDAZOLE  IVPB      vancomycin  IVPB      vancomycin  IVPB 500 milliGRAM(s) IV Intermittent every 12 hours    Allergies    No Known Allergies    Intolerances        VITALS:  Vital Signs Last 24 Hrs  T(C): 36.8 (20 Dec 2023 13:55), Max: 37 (20 Dec 2023 08:42)  T(F): 98.2 (20 Dec 2023 13:55), Max: 98.6 (20 Dec 2023 08:42)  HR: 95 (20 Dec 2023 13:55) (80 - 99)  BP: 108/68 (20 Dec 2023 13:55) (106/70 - 130/67)  BP(mean): 80 (20 Dec 2023 13:55) (80 - 97)  RR: 18 (20 Dec 2023 13:55) (12 - 18)  SpO2: 99% (20 Dec 2023 13:55) (98% - 100%)    Parameters below as of 20 Dec 2023 13:55  Patient On (Oxygen Delivery Method): room air        LABS/DIAGNOSTIC TESTS:                          9.5    18.13 )-----------( 691      ( 20 Dec 2023 14:20 )             29.6         12-20    135  |  109<H>  |  19<H>  ----------------------------<  169<H>  4.4   |  20<L>  |  0.90    Ca    8.2<L>      20 Dec 2023 14:20  Phos  2.7     12-20  Mg     2.0     12-20            CULTURES: Clean Catch Clean Catch (Midstream)  12-14 @ 02:36   No growth  --  --      .Blood Blood-Peripheral  12-13 @ 21:35   No growth at 5 days  --  --      .Blood Blood-Peripheral  12-13 @ 21:25   No growth at 5 days  --  --            RADIOLOGY:      ROS:  [  ] UNABLE TO ELICIT

## 2023-12-21 LAB
ANION GAP SERPL CALC-SCNC: 5 MMOL/L — SIGNIFICANT CHANGE UP (ref 5–17)
ANION GAP SERPL CALC-SCNC: 5 MMOL/L — SIGNIFICANT CHANGE UP (ref 5–17)
BASOPHILS # BLD AUTO: 0.02 K/UL — SIGNIFICANT CHANGE UP (ref 0–0.2)
BASOPHILS # BLD AUTO: 0.02 K/UL — SIGNIFICANT CHANGE UP (ref 0–0.2)
BASOPHILS NFR BLD AUTO: 0.1 % — SIGNIFICANT CHANGE UP (ref 0–2)
BASOPHILS NFR BLD AUTO: 0.1 % — SIGNIFICANT CHANGE UP (ref 0–2)
BUN SERPL-MCNC: 21 MG/DL — HIGH (ref 7–18)
BUN SERPL-MCNC: 21 MG/DL — HIGH (ref 7–18)
CALCIUM SERPL-MCNC: 8.1 MG/DL — LOW (ref 8.4–10.5)
CALCIUM SERPL-MCNC: 8.1 MG/DL — LOW (ref 8.4–10.5)
CHLORIDE SERPL-SCNC: 107 MMOL/L — SIGNIFICANT CHANGE UP (ref 96–108)
CHLORIDE SERPL-SCNC: 107 MMOL/L — SIGNIFICANT CHANGE UP (ref 96–108)
CO2 SERPL-SCNC: 25 MMOL/L — SIGNIFICANT CHANGE UP (ref 22–31)
CO2 SERPL-SCNC: 25 MMOL/L — SIGNIFICANT CHANGE UP (ref 22–31)
CREAT SERPL-MCNC: 1.01 MG/DL — SIGNIFICANT CHANGE UP (ref 0.5–1.3)
CREAT SERPL-MCNC: 1.01 MG/DL — SIGNIFICANT CHANGE UP (ref 0.5–1.3)
EGFR: 82 ML/MIN/1.73M2 — SIGNIFICANT CHANGE UP
EGFR: 82 ML/MIN/1.73M2 — SIGNIFICANT CHANGE UP
EOSINOPHIL # BLD AUTO: 0.02 K/UL — SIGNIFICANT CHANGE UP (ref 0–0.5)
EOSINOPHIL # BLD AUTO: 0.02 K/UL — SIGNIFICANT CHANGE UP (ref 0–0.5)
EOSINOPHIL NFR BLD AUTO: 0.1 % — SIGNIFICANT CHANGE UP (ref 0–6)
EOSINOPHIL NFR BLD AUTO: 0.1 % — SIGNIFICANT CHANGE UP (ref 0–6)
GLUCOSE BLDC GLUCOMTR-MCNC: 195 MG/DL — HIGH (ref 70–99)
GLUCOSE BLDC GLUCOMTR-MCNC: 195 MG/DL — HIGH (ref 70–99)
GLUCOSE BLDC GLUCOMTR-MCNC: 196 MG/DL — HIGH (ref 70–99)
GLUCOSE BLDC GLUCOMTR-MCNC: 196 MG/DL — HIGH (ref 70–99)
GLUCOSE BLDC GLUCOMTR-MCNC: 206 MG/DL — HIGH (ref 70–99)
GLUCOSE BLDC GLUCOMTR-MCNC: 206 MG/DL — HIGH (ref 70–99)
GLUCOSE BLDC GLUCOMTR-MCNC: 262 MG/DL — HIGH (ref 70–99)
GLUCOSE BLDC GLUCOMTR-MCNC: 262 MG/DL — HIGH (ref 70–99)
GLUCOSE SERPL-MCNC: 234 MG/DL — HIGH (ref 70–99)
GLUCOSE SERPL-MCNC: 234 MG/DL — HIGH (ref 70–99)
HCT VFR BLD CALC: 24.3 % — LOW (ref 39–50)
HCT VFR BLD CALC: 24.3 % — LOW (ref 39–50)
HCT VFR BLD CALC: 26.4 % — LOW (ref 39–50)
HCT VFR BLD CALC: 26.4 % — LOW (ref 39–50)
HGB BLD-MCNC: 7.7 G/DL — LOW (ref 13–17)
HGB BLD-MCNC: 7.7 G/DL — LOW (ref 13–17)
HGB BLD-MCNC: 8.2 G/DL — LOW (ref 13–17)
HGB BLD-MCNC: 8.2 G/DL — LOW (ref 13–17)
IMM GRANULOCYTES NFR BLD AUTO: 0.9 % — SIGNIFICANT CHANGE UP (ref 0–0.9)
IMM GRANULOCYTES NFR BLD AUTO: 0.9 % — SIGNIFICANT CHANGE UP (ref 0–0.9)
LYMPHOCYTES # BLD AUTO: 1.67 K/UL — SIGNIFICANT CHANGE UP (ref 1–3.3)
LYMPHOCYTES # BLD AUTO: 1.67 K/UL — SIGNIFICANT CHANGE UP (ref 1–3.3)
LYMPHOCYTES # BLD AUTO: 10.9 % — LOW (ref 13–44)
LYMPHOCYTES # BLD AUTO: 10.9 % — LOW (ref 13–44)
MAGNESIUM SERPL-MCNC: 2 MG/DL — SIGNIFICANT CHANGE UP (ref 1.6–2.6)
MAGNESIUM SERPL-MCNC: 2 MG/DL — SIGNIFICANT CHANGE UP (ref 1.6–2.6)
MANUAL SMEAR VERIFICATION: SIGNIFICANT CHANGE UP
MANUAL SMEAR VERIFICATION: SIGNIFICANT CHANGE UP
MCHC RBC-ENTMCNC: 24.9 PG — LOW (ref 27–34)
MCHC RBC-ENTMCNC: 24.9 PG — LOW (ref 27–34)
MCHC RBC-ENTMCNC: 25.2 PG — LOW (ref 27–34)
MCHC RBC-ENTMCNC: 25.2 PG — LOW (ref 27–34)
MCHC RBC-ENTMCNC: 31.1 GM/DL — LOW (ref 32–36)
MCHC RBC-ENTMCNC: 31.1 GM/DL — LOW (ref 32–36)
MCHC RBC-ENTMCNC: 31.7 GM/DL — LOW (ref 32–36)
MCHC RBC-ENTMCNC: 31.7 GM/DL — LOW (ref 32–36)
MCV RBC AUTO: 78.6 FL — LOW (ref 80–100)
MCV RBC AUTO: 78.6 FL — LOW (ref 80–100)
MCV RBC AUTO: 81 FL — SIGNIFICANT CHANGE UP (ref 80–100)
MCV RBC AUTO: 81 FL — SIGNIFICANT CHANGE UP (ref 80–100)
MONOCYTES # BLD AUTO: 1.53 K/UL — HIGH (ref 0–0.9)
MONOCYTES # BLD AUTO: 1.53 K/UL — HIGH (ref 0–0.9)
MONOCYTES NFR BLD AUTO: 10 % — SIGNIFICANT CHANGE UP (ref 2–14)
MONOCYTES NFR BLD AUTO: 10 % — SIGNIFICANT CHANGE UP (ref 2–14)
NEUTROPHILS # BLD AUTO: 11.91 K/UL — HIGH (ref 1.8–7.4)
NEUTROPHILS # BLD AUTO: 11.91 K/UL — HIGH (ref 1.8–7.4)
NEUTROPHILS NFR BLD AUTO: 78 % — HIGH (ref 43–77)
NEUTROPHILS NFR BLD AUTO: 78 % — HIGH (ref 43–77)
NRBC # BLD: 0 /100 WBCS — SIGNIFICANT CHANGE UP (ref 0–0)
PHOSPHATE SERPL-MCNC: 1.6 MG/DL — LOW (ref 2.5–4.5)
PHOSPHATE SERPL-MCNC: 1.6 MG/DL — LOW (ref 2.5–4.5)
PLAT MORPH BLD: NORMAL — SIGNIFICANT CHANGE UP
PLAT MORPH BLD: NORMAL — SIGNIFICANT CHANGE UP
PLATELET # BLD AUTO: 666 K/UL — HIGH (ref 150–400)
PLATELET # BLD AUTO: 666 K/UL — HIGH (ref 150–400)
PLATELET # BLD AUTO: 729 K/UL — HIGH (ref 150–400)
PLATELET # BLD AUTO: 729 K/UL — HIGH (ref 150–400)
PLATELET COUNT - ESTIMATE: NORMAL — SIGNIFICANT CHANGE UP
PLATELET COUNT - ESTIMATE: NORMAL — SIGNIFICANT CHANGE UP
POTASSIUM SERPL-MCNC: 4 MMOL/L — SIGNIFICANT CHANGE UP (ref 3.5–5.3)
POTASSIUM SERPL-MCNC: 4 MMOL/L — SIGNIFICANT CHANGE UP (ref 3.5–5.3)
POTASSIUM SERPL-SCNC: 4 MMOL/L — SIGNIFICANT CHANGE UP (ref 3.5–5.3)
POTASSIUM SERPL-SCNC: 4 MMOL/L — SIGNIFICANT CHANGE UP (ref 3.5–5.3)
RBC # BLD: 3.09 M/UL — LOW (ref 4.2–5.8)
RBC # BLD: 3.09 M/UL — LOW (ref 4.2–5.8)
RBC # BLD: 3.26 M/UL — LOW (ref 4.2–5.8)
RBC # BLD: 3.26 M/UL — LOW (ref 4.2–5.8)
RBC # FLD: 16.4 % — HIGH (ref 10.3–14.5)
RBC # FLD: 16.4 % — HIGH (ref 10.3–14.5)
RBC # FLD: 16.8 % — HIGH (ref 10.3–14.5)
RBC # FLD: 16.8 % — HIGH (ref 10.3–14.5)
RBC BLD AUTO: NORMAL — SIGNIFICANT CHANGE UP
RBC BLD AUTO: NORMAL — SIGNIFICANT CHANGE UP
SODIUM SERPL-SCNC: 137 MMOL/L — SIGNIFICANT CHANGE UP (ref 135–145)
SODIUM SERPL-SCNC: 137 MMOL/L — SIGNIFICANT CHANGE UP (ref 135–145)
WBC # BLD: 15.28 K/UL — HIGH (ref 3.8–10.5)
WBC # BLD: 15.28 K/UL — HIGH (ref 3.8–10.5)
WBC # BLD: 16.72 K/UL — HIGH (ref 3.8–10.5)
WBC # BLD: 16.72 K/UL — HIGH (ref 3.8–10.5)
WBC # FLD AUTO: 15.28 K/UL — HIGH (ref 3.8–10.5)
WBC # FLD AUTO: 15.28 K/UL — HIGH (ref 3.8–10.5)
WBC # FLD AUTO: 16.72 K/UL — HIGH (ref 3.8–10.5)
WBC # FLD AUTO: 16.72 K/UL — HIGH (ref 3.8–10.5)

## 2023-12-21 PROCEDURE — 36573 INSJ PICC RS&I 5 YR+: CPT | Mod: LT

## 2023-12-21 PROCEDURE — 36573 INSJ PICC RS&I 5 YR+: CPT

## 2023-12-21 PROCEDURE — 99232 SBSQ HOSP IP/OBS MODERATE 35: CPT | Mod: GC

## 2023-12-21 RX ORDER — CHLORHEXIDINE GLUCONATE 213 G/1000ML
1 SOLUTION TOPICAL DAILY
Refills: 0 | Status: DISCONTINUED | OUTPATIENT
Start: 2023-12-22 | End: 2023-12-23

## 2023-12-21 RX ORDER — INSULIN GLARGINE 100 [IU]/ML
9 INJECTION, SOLUTION SUBCUTANEOUS AT BEDTIME
Refills: 0 | Status: DISCONTINUED | OUTPATIENT
Start: 2023-12-21 | End: 2023-12-23

## 2023-12-21 RX ORDER — SODIUM CHLORIDE 9 MG/ML
10 INJECTION INTRAMUSCULAR; INTRAVENOUS; SUBCUTANEOUS
Refills: 0 | Status: DISCONTINUED | OUTPATIENT
Start: 2023-12-21 | End: 2023-12-23

## 2023-12-21 RX ADMIN — Medication 4: at 08:25

## 2023-12-21 RX ADMIN — MUPIROCIN 1 APPLICATION(S): 20 OINTMENT TOPICAL at 17:32

## 2023-12-21 RX ADMIN — Medication 1 APPLICATION(S): at 22:00

## 2023-12-21 RX ADMIN — Medication 6: at 12:39

## 2023-12-21 RX ADMIN — Medication 100 MILLIGRAM(S): at 11:25

## 2023-12-21 RX ADMIN — LACTULOSE 10 GRAM(S): 10 SOLUTION ORAL at 11:24

## 2023-12-21 RX ADMIN — Medication 100 MILLIGRAM(S): at 15:15

## 2023-12-21 RX ADMIN — Medication 6 MILLIGRAM(S): at 06:24

## 2023-12-21 RX ADMIN — CHLORHEXIDINE GLUCONATE 1 APPLICATION(S): 213 SOLUTION TOPICAL at 06:23

## 2023-12-21 RX ADMIN — Medication 100 MILLIGRAM(S): at 06:22

## 2023-12-21 RX ADMIN — Medication 1 TABLET(S): at 11:24

## 2023-12-21 RX ADMIN — SENNA PLUS 2 TABLET(S): 8.6 TABLET ORAL at 21:59

## 2023-12-21 RX ADMIN — MUPIROCIN 1 APPLICATION(S): 20 OINTMENT TOPICAL at 07:01

## 2023-12-21 RX ADMIN — Medication 100 MILLIGRAM(S): at 21:59

## 2023-12-21 RX ADMIN — LEVETIRACETAM 750 MILLIGRAM(S): 250 TABLET, FILM COATED ORAL at 06:22

## 2023-12-21 RX ADMIN — Medication 2: at 17:31

## 2023-12-21 RX ADMIN — Medication 1 APPLICATION(S): at 15:14

## 2023-12-21 RX ADMIN — CEFEPIME 100 MILLIGRAM(S): 1 INJECTION, POWDER, FOR SOLUTION INTRAMUSCULAR; INTRAVENOUS at 17:32

## 2023-12-21 RX ADMIN — Medication 500 MILLIGRAM(S): at 11:32

## 2023-12-21 RX ADMIN — LEVETIRACETAM 750 MILLIGRAM(S): 250 TABLET, FILM COATED ORAL at 17:31

## 2023-12-21 RX ADMIN — Medication 1 APPLICATION(S): at 06:21

## 2023-12-21 RX ADMIN — CEFEPIME 100 MILLIGRAM(S): 1 INJECTION, POWDER, FOR SOLUTION INTRAMUSCULAR; INTRAVENOUS at 06:23

## 2023-12-21 RX ADMIN — POLYETHYLENE GLYCOL 3350 17 GRAM(S): 17 POWDER, FOR SOLUTION ORAL at 06:25

## 2023-12-21 RX ADMIN — INSULIN GLARGINE 9 UNIT(S): 100 INJECTION, SOLUTION SUBCUTANEOUS at 22:01

## 2023-12-21 RX ADMIN — Medication 100 MILLIGRAM(S): at 22:00

## 2023-12-21 RX ADMIN — Medication 1 APPLICATION(S): at 12:39

## 2023-12-21 RX ADMIN — POLYETHYLENE GLYCOL 3350 17 GRAM(S): 17 POWDER, FOR SOLUTION ORAL at 17:31

## 2023-12-21 NOTE — PROGRESS NOTE ADULT - SUBJECTIVE AND OBJECTIVE BOX
Chief complaint: Patient is a 68y old  Male who presents with a chief complaint of Sepsis 2/2 cellulitis and COVID (20 Dec 2023 10:00)      LUISA MÁRQUEZ is a 68y year old Male     INTERVAL HPI/OVERNIGHT EVENTS: No acute events overnight.  Patient examined at bedside this AM.  Patient denies acute complaints. Patient alert and more talkative. Patient has more energy and is eating more food than before.         REVIEW OF SYSTEMS:  CONSTITUTIONAL: No fever, weight loss, or fatigue  EYES: No eye pain, visual disturbances, or discharge  ENMT:  No difficulty hearing, tinnitus, vertigo; No sinus or throat pain  NECK: No pain or stiffness  RESPIRATORY: No cough, wheezing, chills or hemoptysis; No shortness of breath  CARDIOVASCULAR: No chest pain, palpitations, dizziness, or leg swelling  GASTROINTESTINAL: +constipation; No abdominal or epigastric pain. No nausea, vomiting, or hematemesis; No diarrhea. No melena or hematochezia.  GENITOURINARY: No dysuria, frequency, hematuria, or incontinence  NEUROLOGICAL: No headaches, memory loss, loss of strength, numbness, or tremors  SKIN: No itching, burning, rashes, or lesions   ENDOCRINE: No heat or cold intolerance  MUSCULOSKELETAL: +right arm pain; No joint pain or swelling; No muscle, back pain  HEME/LYMPH: No easy bruising, or bleeding gums  ALLERY AND IMMUNOLOGIC: No hives or eczema    FAMILY HISTORY:      T(C): 36.8 (12-21-23 @ 05:45), Max: 36.9 (12-20-23 @ 22:34)  HR: 99 (12-21-23 @ 05:45) (93 - 99)  BP: 114/69 (12-21-23 @ 05:45) (108/68 - 130/67)  RR: 18 (12-21-23 @ 05:45) (12 - 18)  SpO2: 100% (12-21-23 @ 05:45) (99% - 100%)  Wt(kg): --Vital Signs Last 24 Hrs  T(C): 36.8 (21 Dec 2023 05:45), Max: 36.9 (20 Dec 2023 22:34)  T(F): 98.2 (21 Dec 2023 05:45), Max: 98.4 (20 Dec 2023 22:34)  HR: 99 (21 Dec 2023 05:45) (93 - 99)  BP: 114/69 (21 Dec 2023 05:45) (108/68 - 130/67)  BP(mean): 80 (20 Dec 2023 13:55) (80 - 97)  RR: 18 (21 Dec 2023 05:45) (12 - 18)  SpO2: 100% (21 Dec 2023 05:45) (99% - 100%)    Parameters below as of 21 Dec 2023 05:45  Patient On (Oxygen Delivery Method): room air        PHYSICAL EXAM:  GENERAL: NAD  HEAD:  Atraumatic, Normocephalic  EYES: EOMI, PERRLA, conjunctiva and sclera clear  ENMT: No tonsillar erythema, exudates, or enlargement; Moist mucous membranes.   NECK: Supple, No JVD  NERVOUS SYSTEM:  Alert & Oriented X1 (self only), Poor concentration; left upper and lower extremity tone functional, right upper extremity contraction.  CHEST/LUNG: Clear to percussion bilaterally; No rales, rhonchi, wheezing, or rubs  HEART: Regular rate and rhythm; No murmurs, rubs, or gallops  ABDOMEN: Soft, Nontender, Nondistended; Bowel sounds present  EXTREMITIES: No clubbing, cyanosis, or edema  SKIN: Debridement site packed but patient bled through the packing, pad and sheets     Consultant(s) Notes Reviewed:  [x ] YES  [ ] NO  Care Discussed with Consultants/Other Providers [ x] YES  [ ] NO    LABS:                        7.7    15.28 )-----------( 729      ( 21 Dec 2023 06:31 )             24.3       12-21    137  |  107  |  21<H>  ----------------------------<  234<H>  4.0   |  25  |  1.01    Ca    8.1<L>      21 Dec 2023 06:31  Phos  1.6     12-21  Mg     2.0     12-21          RADIOLOGY & ADDITIONAL TESTS:    Imaging Personally Reviewed:  [ ] YES  [ ] NO  acetaminophen     Tablet .. 650 milliGRAM(s) Oral every 6 hours PRN  ascorbic acid 500 milliGRAM(s) Oral daily  cefepime   IVPB 2000 milliGRAM(s) IV Intermittent every 12 hours  chlorhexidine 2% Cloths 1 Application(s) Topical <User Schedule>  collagenase Ointment 1 Application(s) Topical every 8 hours  Dakins Solution - 1/4 Strength 1 Application(s) Topical daily  dexAMETHasone  Injectable 6 milliGRAM(s) IV Push daily  dextrose 50% Injectable 25 Gram(s) IV Push once  insulin glargine Injectable (LANTUS) 6 Unit(s) SubCutaneous at bedtime  insulin lispro (ADMELOG) corrective regimen sliding scale   SubCutaneous at bedtime  insulin lispro (ADMELOG) corrective regimen sliding scale   SubCutaneous three times a day before meals  lactated ringers. 1000 milliLiter(s) IV Continuous <Continuous>  lactulose Syrup 10 Gram(s) Oral daily  levETIRAcetam 750 milliGRAM(s) Oral two times a day  metroNIDAZOLE  IVPB      metroNIDAZOLE  IVPB 500 milliGRAM(s) IV Intermittent every 8 hours  multivitamin 1 Tablet(s) Oral daily  mupirocin 2% Ointment 1 Application(s) Both Nostrils two times a day  polyethylene glycol 3350 17 Gram(s) Oral two times a day  senna 2 Tablet(s) Oral at bedtime  vancomycin  IVPB 500 milliGRAM(s) IV Intermittent every 12 hours  vancomycin  IVPB          HEALTH ISSUES - PROBLEM Dx:  Preoperative examination    Sacral decubitus ulcer    Sepsis    2019 novel coronavirus disease (COVID-19)    Acute metabolic encephalopathy    Anemia    DM (diabetes mellitus)    Constipation    LEATHA (acute kidney injury)    Hyperkalemia    TBI (traumatic brain injury)    Seizure disorder    Prophylactic measure    Preoperative clearance           Chief complaint: Patient is a 68y old  Male who presents with a chief complaint of Sepsis 2/2 cellulitis and COVID (20 Dec 2023 10:00)      LUISA MÁRQUEZ is a 68y year old Male     INTERVAL HPI/OVERNIGHT EVENTS: No acute events overnight.  Patient examined at bedside this AM.  Patient denies acute complaints. Patient alert and more talkative. Patient has more energy and is eating more food than before.         REVIEW OF SYSTEMS:  CONSTITUTIONAL: No fever, weight loss, or fatigue  EYES: No eye pain, visual disturbances, or discharge  ENMT:  No difficulty hearing, tinnitus, vertigo; No sinus or throat pain  NECK: No pain or stiffness  RESPIRATORY: No cough, wheezing, chills or hemoptysis; No shortness of breath  CARDIOVASCULAR: No chest pain, palpitations, dizziness, or leg swelling  GASTROINTESTINAL: +constipation; No abdominal or epigastric pain. No nausea, vomiting, or hematemesis; No diarrhea. No melena or hematochezia.  GENITOURINARY: No dysuria, frequency, hematuria, or incontinence  NEUROLOGICAL: No headaches, memory loss, loss of strength, numbness, or tremors  SKIN: No itching, burning, rashes, or lesions   ENDOCRINE: No heat or cold intolerance  MUSCULOSKELETAL: +right arm pain; No joint pain or swelling; No muscle, back pain  HEME/LYMPH: No easy bruising, or bleeding gums  ALLERY AND IMMUNOLOGIC: No hives or eczema    FAMILY HISTORY:      T(C): 36.8 (12-21-23 @ 05:45), Max: 36.9 (12-20-23 @ 22:34)  HR: 99 (12-21-23 @ 05:45) (93 - 99)  BP: 114/69 (12-21-23 @ 05:45) (108/68 - 130/67)  RR: 18 (12-21-23 @ 05:45) (12 - 18)  SpO2: 100% (12-21-23 @ 05:45) (99% - 100%)  Wt(kg): --Vital Signs Last 24 Hrs  T(C): 36.8 (21 Dec 2023 05:45), Max: 36.9 (20 Dec 2023 22:34)  T(F): 98.2 (21 Dec 2023 05:45), Max: 98.4 (20 Dec 2023 22:34)  HR: 99 (21 Dec 2023 05:45) (93 - 99)  BP: 114/69 (21 Dec 2023 05:45) (108/68 - 130/67)  BP(mean): 80 (20 Dec 2023 13:55) (80 - 97)  RR: 18 (21 Dec 2023 05:45) (12 - 18)  SpO2: 100% (21 Dec 2023 05:45) (99% - 100%)    Parameters below as of 21 Dec 2023 05:45  Patient On (Oxygen Delivery Method): room air        PHYSICAL EXAM:  GENERAL: NAD  HEAD:  Atraumatic, Normocephalic  EYES: EOMI, PERRLA, conjunctiva and sclera clear  ENMT: No tonsillar erythema, exudates, or enlargement; Moist mucous membranes.   NECK: Supple, No JVD  NERVOUS SYSTEM:  Alert & Oriented X1 (self only), Poor concentration; left upper and lower extremity tone functional, right upper extremity contraction.  CHEST/LUNG: Clear to percussion bilaterally; No rales, rhonchi, wheezing, or rubs  HEART: Regular rate and rhythm; No murmurs, rubs, or gallops  ABDOMEN: Soft, Nontender, Nondistended; Bowel sounds present  EXTREMITIES: Right arm swelling - IV infiltrated - pulses intact; No clubbing, cyanosis, or edema  SKIN: Debridement site packed but patient bled through the packing, pad and sheets     Consultant(s) Notes Reviewed:  [x ] YES  [ ] NO  Care Discussed with Consultants/Other Providers [ x] YES  [ ] NO    LABS:                        7.7    15.28 )-----------( 729      ( 21 Dec 2023 06:31 )             24.3       12-21    137  |  107  |  21<H>  ----------------------------<  234<H>  4.0   |  25  |  1.01    Ca    8.1<L>      21 Dec 2023 06:31  Phos  1.6     12-21  Mg     2.0     12-21          RADIOLOGY & ADDITIONAL TESTS:    Imaging Personally Reviewed:  [ ] YES  [ ] NO  acetaminophen     Tablet .. 650 milliGRAM(s) Oral every 6 hours PRN  ascorbic acid 500 milliGRAM(s) Oral daily  cefepime   IVPB 2000 milliGRAM(s) IV Intermittent every 12 hours  chlorhexidine 2% Cloths 1 Application(s) Topical <User Schedule>  collagenase Ointment 1 Application(s) Topical every 8 hours  Dakins Solution - 1/4 Strength 1 Application(s) Topical daily  dexAMETHasone  Injectable 6 milliGRAM(s) IV Push daily  dextrose 50% Injectable 25 Gram(s) IV Push once  insulin glargine Injectable (LANTUS) 6 Unit(s) SubCutaneous at bedtime  insulin lispro (ADMELOG) corrective regimen sliding scale   SubCutaneous at bedtime  insulin lispro (ADMELOG) corrective regimen sliding scale   SubCutaneous three times a day before meals  lactated ringers. 1000 milliLiter(s) IV Continuous <Continuous>  lactulose Syrup 10 Gram(s) Oral daily  levETIRAcetam 750 milliGRAM(s) Oral two times a day  metroNIDAZOLE  IVPB      metroNIDAZOLE  IVPB 500 milliGRAM(s) IV Intermittent every 8 hours  multivitamin 1 Tablet(s) Oral daily  mupirocin 2% Ointment 1 Application(s) Both Nostrils two times a day  polyethylene glycol 3350 17 Gram(s) Oral two times a day  senna 2 Tablet(s) Oral at bedtime  vancomycin  IVPB 500 milliGRAM(s) IV Intermittent every 12 hours  vancomycin  IVPB          HEALTH ISSUES - PROBLEM Dx:  Preoperative examination    Sacral decubitus ulcer    Sepsis    2019 novel coronavirus disease (COVID-19)    Acute metabolic encephalopathy    Anemia    DM (diabetes mellitus)    Constipation    LEATHA (acute kidney injury)    Hyperkalemia    TBI (traumatic brain injury)    Seizure disorder    Prophylactic measure    Preoperative clearance

## 2023-12-21 NOTE — PROGRESS NOTE ADULT - NEGATIVE RESPIRATORY AND THORAX SYMPTOMS
no dyspnea/no pleuritic chest pain
no dyspnea/no pleuritic chest pain
no dyspnea/no cough/no pleuritic chest pain
no dyspnea/no cough/no pleuritic chest pain

## 2023-12-21 NOTE — PROGRESS NOTE ADULT - PROBLEM SELECTOR PLAN 5
hb 9.3>10.7>7.2>7.9 > 7.4 > 7.1 > 9.2 > 8.2 > 9.2 > 9.5  monitor for signs of worsening anemia  transfuse if <7 or precipitous drop  no signs of active bleed at this time  hgb increased from 7.1 to 9.2 s/p 12/19 1pRBC  12/21 debridement site bleeding through dressing and pad hb 9.3>10.7>7.2>7.9 > 7.4 > 7.1 > 9.2 > 8.2 > 9.2 > 9.5 >7.7  monitor for signs of worsening anemia  transfuse if <7 or precipitous drop  no signs of active bleed at this time  hgb increased from 7.1 to 9.2 s/p 12/19 1pRBC  12/21 debridement site bleeding through dressing and pad hb 9.3>10.7>7.2>7.9 > 7.4 > 7.1 > 9.2 > 8.2 > 9.2 > 9.5 >7.7 >8.2   monitor for signs of worsening anemia  transfuse if <7 or precipitous drop  no signs of active bleed at this time  hgb increased from 7.1 to 9.2 s/p 12/19 1pRBC  12/21 debridement site bleeding through dressing and pad - repeat afternoon Hgb stable 8.2

## 2023-12-21 NOTE — PROGRESS NOTE ADULT - PROBLEM SELECTOR PLAN 6
- t2DM on metformin   - blood sugars 200s  - s/p lantus 9u, 3 TID   - ISS fs qhs and before meals  12/18 Fasting gluc 58, lantus decreased to 6U, admelog dc  - d/c NS+D5 for maintenance, reinstated lantus 6U - t2DM on metformin   - blood sugars 200s  - s/p lantus 9u, 3 TID   - ISS fs qhs and before meals  12/18 Fasting gluc 58, lantus decreased to 6U, admelog dc  - d/c NS+D5 for maintenance, reinstated lantus 6U  patient eating better 12/21 lantus increased to 9U

## 2023-12-21 NOTE — PROGRESS NOTE ADULT - PROBLEM SELECTOR PLAN 1
Ulcer gushing grey, malodorous, purulent fluid without erythema  CT abdomen: Complex extensive sacral decubitus ulcer with fragmentation/osteomyelitis of the the coccyx. Locules of subcutaneous gas extending from the decubitus ulcer superiorly to the soft tissues of the lower back at the level of the lower lumbosacral spine.   - wound care consult  c/w cefepime, vanc, flagyl  surgery consult  will need PICC line placed for long-term abx  12/20 s/p ulcer debridement Ulcer gushing grey, malodorous, purulent fluid without erythema  CT abdomen: Complex extensive sacral decubitus ulcer with fragmentation/osteomyelitis of the the coccyx. Locules of subcutaneous gas extending from the decubitus ulcer superiorly to the soft tissues of the lower back at the level of the lower lumbosacral spine.   - wound care consult  c/w cefepime, vanc, flagyl  12/20 s/p ulcer debridement  pending PICC line placement for long-term abx

## 2023-12-21 NOTE — PROCEDURE NOTE - ADDITIONAL PROCEDURE DETAILS
37 cm 4Fr  PICC inserted via the left basilic vein.  Sterile dressing applied.  Tip location SVC/ RA Junction.

## 2023-12-21 NOTE — PROGRESS NOTE ADULT - ATTENDING COMMENTS
68yM with PMH of TBI, seizure disorder, sacral ulcer, from [facility], who was sent to the ED for concerns for sepsis, admitted for sepsis 2/2 infected sacral decubitus ulcer. Continue vancomycin, cefepime, Flagyl; blood cultures negative. Continue dexamethasone and remdesvir, can consider shortened course of steroids pending improvement in AHRF. Patient will need PICC line for long-term abx treatment, discussed with Dr. Elkins. LEATHA and hyperkalemia resolved, likely in setting of poor PO intake and acute illness. Wound care following for sacral ulcer. Surgery consulted for debridement, discussed with Dr. Grewal.  Patient had debridment of sacral decubitus ulcer yesterday:  · Operative Findings	wound class 4  7cm x 11cm open wound with 4cm undermining caudally, 3cm tunneling into right buttock, and 2cm tunneling into left buttock    Today there was some blood-soaking of the dressing.  After the dressing was changed, there has been no more evidence of bleeding.     Alert man in NAD  Vital Signs Last 24 Hrs  T(C): 37.1 (21 Dec 2023 13:49), Max: 37.1 (21 Dec 2023 13:49)  T(F): 98.8 (21 Dec 2023 13:49), Max: 98.8 (21 Dec 2023 13:49)  HR: 89 (21 Dec 2023 13:49) (89 - 99)  BP: 116/78 (21 Dec 2023 13:49) (109/73 - 116/78)  BP(mean): --  RR: 18 (21 Dec 2023 13:49) (18 - 18)  SpO2: 99% (21 Dec 2023 13:49) (99% - 100%)    Parameters below as of 21 Dec 2023 13:49  Patient On (Oxygen Delivery Method): room air    Lungs, clear  Cor, RRR  ABdomen, soft  Neurological, alert, verbal, hemiparesis with increased tone.                         8.2    16.72 )-----------( 666      ( 21 Dec 2023 13:15 )             26.4   12-21    137  |  107  |  21<H>  ----------------------------<  234<H>  4.0   |  25  |  1.01    Ca    8.1<L>      21 Dec 2023 06:31  Phos  1.6     12-21  Mg     2.0     12-21    < from: 12 Lead ECG (12.14.23 @ 00:08) >  Diagnosis Line Sinus tachycardia  Cannot rule out Septal infarct , age undetermined  Abnormal ECG    < end of copied text >    < from: CT Abdomen and Pelvis No Cont (12.14.23 @ 18:33) >    Complex extensive sacral decubitus ulcer with fragmentation/osteomyelitis   of the the coccyx. Locules of subcutaneous gas extending from the   decubitus ulcer superiorly to the soft tissues of the lower back at the   level of the lower lumbosacral spine.    < end of copied text >      #Sepsis 2/2 sacral ulcer vs COVID pneumonia, clinically improved  #AHRF 2/2 COVID pneumonia, resolved  #Elevated lactate, resolved  #LEATHA, resolved  #Hyperkalemia, resolved  #Seizure disorder  #TBI  #Type 2 DM  #HTN  PICC line has been placed.  Patient can complete his course of IV antibiotics (six weeks, started December 14) in subacute medicine. 68yM with PMH of TBI, seizure disorder, sacral ulcer, from [facility], who was sent to the ED for concerns for sepsis, admitted for sepsis 2/2 infected sacral decubitus ulcer. Continue vancomycin, cefepime, Flagyl; blood cultures negative. Continue dexamethasone and remdesvir, can consider shortened course of steroids pending improvement in AHRF. Patient will need PICC line for long-term abx treatment, discussed with Dr. Elkins. LEATHA and hyperkalemia resolved, likely in setting of poor PO intake and acute illness. Wound care following for sacral ulcer. Surgery consulted for debridement, discussed with Dr. Grewal.  Patient had debridment of sacral decubitus ulcer yesterday:  · Operative Findings	wound class 4  7cm x 11cm open wound with 4cm undermining caudally, 3cm tunneling into right buttock, and 2cm tunneling into left buttock    Today there was some blood-soaking of the dressing.  After the dressing was changed, there has been no more evidence of bleeding.     Alert man in NAD  Vital Signs Last 24 Hrs  T(C): 37.1 (21 Dec 2023 13:49), Max: 37.1 (21 Dec 2023 13:49)  T(F): 98.8 (21 Dec 2023 13:49), Max: 98.8 (21 Dec 2023 13:49)  HR: 89 (21 Dec 2023 13:49) (89 - 99)  BP: 116/78 (21 Dec 2023 13:49) (109/73 - 116/78)  BP(mean): --  RR: 18 (21 Dec 2023 13:49) (18 - 18)  SpO2: 99% (21 Dec 2023 13:49) (99% - 100%)    Parameters below as of 21 Dec 2023 13:49  Patient On (Oxygen Delivery Method): room air    Lungs, clear  Cor, RRR  ABdomen, soft  Neurological, alert, verbal, hemiparesis with increased tone.                         8.2    16.72 )-----------( 666      ( 21 Dec 2023 13:15 )             26.4   12-21    137  |  107  |  21<H>  ----------------------------<  234<H>  4.0   |  25  |  1.01    Ca    8.1<L>      21 Dec 2023 06:31  Phos  1.6     12-21  Mg     2.0     12-21    < from: 12 Lead ECG (12.14.23 @ 00:08) >  Diagnosis Line Sinus tachycardia  Cannot rule out Septal infarct , age undetermined  Abnormal ECG    < end of copied text >    < from: CT Abdomen and Pelvis No Cont (12.14.23 @ 18:33) >    Complex extensive sacral decubitus ulcer with fragmentation/osteomyelitis   of the the coccyx. Locules of subcutaneous gas extending from the   decubitus ulcer superiorly to the soft tissues of the lower back at the   level of the lower lumbosacral spine.    < end of copied text >      #Sepsis 2/2 sacral ulcer vs COVID pneumonia, clinically improved  #AHRF 2/2 COVID pneumonia, resolved  #Elevated lactate, resolved  #LEATHA, resolved  #Hyperkalemia, resolved  #Seizure disorder  #TBI  #Type 2 DM  #HTN  PICC line has been placed.  Patient can complete his course of IV antibiotics (six weeks, started December 14) in subacute medicine.

## 2023-12-21 NOTE — PROGRESS NOTE ADULT - SUBJECTIVE AND OBJECTIVE BOX
68y Male    Meds:  cefepime   IVPB 2000 milliGRAM(s) IV Intermittent every 12 hours  metroNIDAZOLE  IVPB      metroNIDAZOLE  IVPB 500 milliGRAM(s) IV Intermittent every 8 hours  vancomycin  IVPB      vancomycin  IVPB 500 milliGRAM(s) IV Intermittent every 12 hours    Allergies    No Known Allergies    Intolerances        VITALS:  Vital Signs Last 24 Hrs  T(C): 37.1 (21 Dec 2023 13:49), Max: 37.1 (21 Dec 2023 13:49)  T(F): 98.8 (21 Dec 2023 13:49), Max: 98.8 (21 Dec 2023 13:49)  HR: 89 (21 Dec 2023 13:49) (89 - 99)  BP: 116/78 (21 Dec 2023 13:49) (109/73 - 116/78)  BP(mean): --  RR: 18 (21 Dec 2023 13:49) (18 - 18)  SpO2: 99% (21 Dec 2023 13:49) (99% - 100%)    Parameters below as of 21 Dec 2023 13:49  Patient On (Oxygen Delivery Method): room air        LABS/DIAGNOSTIC TESTS:                          8.2    16.72 )-----------( 666      ( 21 Dec 2023 13:15 )             26.4         12-21    137  |  107  |  21<H>  ----------------------------<  234<H>  4.0   |  25  |  1.01    Ca    8.1<L>      21 Dec 2023 06:31  Phos  1.6     12-21  Mg     2.0     12-21            CULTURES: Clean Catch Clean Catch (Midstream)  12-14 @ 02:36   No growth  --  --      .Blood Blood-Peripheral  12-13 @ 21:35   No growth at 5 days  --  --      .Blood Blood-Peripheral  12-13 @ 21:25   No growth at 5 days  --  --            RADIOLOGY:      ROS:  [  ] UNABLE TO ELICIT 68y Male who is doing well post sacral ulcer debridement, he has no fevers or chills, no diarrhea, no back pain , no coughing or SOB , no other complaints. He is for possible DC to NH tomorrow. No sacral cultures were taken and he has no MRSA growing in blood or nares PCR and so will switch his abxs to Meropenem from tomorrow.    Meds:  cefepime   IVPB 2000 milliGRAM(s) IV Intermittent every 12 hours  metroNIDAZOLE  IVPB      metroNIDAZOLE  IVPB 500 milliGRAM(s) IV Intermittent every 8 hours  vancomycin  IVPB      vancomycin  IVPB 500 milliGRAM(s) IV Intermittent every 12 hours    Allergies    No Known Allergies    Intolerances        VITALS:  Vital Signs Last 24 Hrs  T(C): 37.1 (21 Dec 2023 13:49), Max: 37.1 (21 Dec 2023 13:49)  T(F): 98.8 (21 Dec 2023 13:49), Max: 98.8 (21 Dec 2023 13:49)  HR: 89 (21 Dec 2023 13:49) (89 - 99)  BP: 116/78 (21 Dec 2023 13:49) (109/73 - 116/78)  BP(mean): --  RR: 18 (21 Dec 2023 13:49) (18 - 18)  SpO2: 99% (21 Dec 2023 13:49) (99% - 100%)    Parameters below as of 21 Dec 2023 13:49  Patient On (Oxygen Delivery Method): room air        LABS/DIAGNOSTIC TESTS:                          8.2    16.72 )-----------( 666      ( 21 Dec 2023 13:15 )             26.4         12-21    137  |  107  |  21<H>  ----------------------------<  234<H>  4.0   |  25  |  1.01    Ca    8.1<L>      21 Dec 2023 06:31  Phos  1.6     12-21  Mg     2.0     12-21            CULTURES: Clean Catch Clean Catch (Midstream)  12-14 @ 02:36   No growth  --  --      .Blood Blood-Peripheral  12-13 @ 21:35   No growth at 5 days  --  --      .Blood Blood-Peripheral  12-13 @ 21:25   No growth at 5 days  --  --            RADIOLOGY:      ROS:  [  ] UNABLE TO ELICIT

## 2023-12-21 NOTE — PROGRESS NOTE ADULT - ASSESSMENT
68-year-old male, PMH of traumatic brain injury w/ seizure disorder, DM, sacral decubitus ulcer, sent from [facility] for reported fever and oxygen saturation in low 90s. Pt reporting lethargy/weakness, A&Ox0. Vital signs sig for temp 101 F (resolved, 98.6F),  > 101, 97% on RA. EKG sinus tachycardia 106 HR, Labs sig for WC 14, Cr 1.5, lac 2.8, K 5.9. CXR shows questionable RML infiltrate. s/p cefepime 2g and 2.9 L in ED. COVID +. Admitted for Sepsis likely 2/2 sacral ulcer and COVID pneumonia.                68-year-old male, PMH of traumatic brain injury w/ seizure disorder, DM, sacral decubitus ulcer, sent from [facility] for reported fever and oxygen saturation in low 90s. Pt reporting lethargy/weakness, A&Ox0. Vital signs sig for temp 101 F (resolved, 98.6F),  > 101, 97% on RA. EKG sinus tachycardia 106 HR, Labs sig for WC 14, Cr 1.5, lac 2.8, K 5.9. CXR shows questionable RML infiltrate. s/p cefepime 2g and 2.9 L in ED. COVID +. Admitted for Sepsis likely 2/2 sacral ulcer and COVID pneumonia.

## 2023-12-21 NOTE — PROGRESS NOTE ADULT - ASSESSMENT
Pneumonia ( ? bacterial) - resolved  COVID - 19 infection   Infected Sacral Decubitus ulcer with Osteomyelitis of coccyx  Leukocytosis - secondary to steroids    Plan:  ·	Cont Vancomycin 1gm iv q12hrs  ·	Cont Maxipime 2gms iv q12hrs   ·	Cont Flagyl 500mgs iv q8hrs  ·	Cont Decadron 6mgs iv q24hrs.  ·	Will need a PICC line and IV antibiotics for 6 weeks to treat for osteomyelitis.  ·	will likely switch his antibiotics to Meropenem 1 gm iv q8hrs upon discharge to complete his 6 week course.

## 2023-12-21 NOTE — PROGRESS NOTE ADULT - PROBLEM SELECTOR PLAN 2
- sent from [facility] for reported fever and oxygen saturation in low 90s.   - Pt reporting lethargy/weakness, A&Ox0, weeping purulent sacral ulcer noticed on exam   - Vital signs sig for temp 101 F (resolved, 98.6F),  > 101, 97% on RA, intermittently hypoxic to low 90s on RA   - EKG sinus tachycardia 106 HR  - Labs sig for WC 14, Cr 1.5, lac 2.8  - RVP - covid positive , U/A negative   - CXR: Diffuse right lung pneumonia.  - s/p cefepime 2g and 2.9 L in ED  Admitted for Sepsis 2/2 pneumonia vs. infected sacral ulcer vs. COVID   c/w vanc, cefepime, flagyl   bcx NGTD 4d   Vanc trough 15.9 - sent from [facility] for reported fever and oxygen saturation in low 90s.   - Pt reporting lethargy/weakness, A&Ox0, weeping purulent sacral ulcer noticed on exam   - Vital signs sig for temp 101 F (resolved, 98.6F),  > 101, 97% on RA, intermittently hypoxic to low 90s on RA   - EKG sinus tachycardia 106 HR  - Labs sig for WC 14, Cr 1.5, lac 2.8  - RVP - covid positive , U/A negative   - CXR: Diffuse right lung pneumonia.  - s/p cefepime 2g and 2.9 L in ED  Admitted for Sepsis 2/2 pneumonia vs. infected sacral ulcer vs. COVID   c/w vanc, cefepime, flagyl   bcx NGTD 4d   Vanc trough 15.9

## 2023-12-22 ENCOUNTER — TRANSCRIPTION ENCOUNTER (OUTPATIENT)
Age: 68
End: 2023-12-22

## 2023-12-22 LAB
A1C WITH ESTIMATED AVERAGE GLUCOSE RESULT: 7 % — HIGH (ref 4–5.6)
A1C WITH ESTIMATED AVERAGE GLUCOSE RESULT: 7 % — HIGH (ref 4–5.6)
ANION GAP SERPL CALC-SCNC: 6 MMOL/L — SIGNIFICANT CHANGE UP (ref 5–17)
ANION GAP SERPL CALC-SCNC: 6 MMOL/L — SIGNIFICANT CHANGE UP (ref 5–17)
BASOPHILS # BLD AUTO: 0.03 K/UL — SIGNIFICANT CHANGE UP (ref 0–0.2)
BASOPHILS # BLD AUTO: 0.03 K/UL — SIGNIFICANT CHANGE UP (ref 0–0.2)
BASOPHILS NFR BLD AUTO: 0.2 % — SIGNIFICANT CHANGE UP (ref 0–2)
BASOPHILS NFR BLD AUTO: 0.2 % — SIGNIFICANT CHANGE UP (ref 0–2)
BUN SERPL-MCNC: 19 MG/DL — HIGH (ref 7–18)
BUN SERPL-MCNC: 19 MG/DL — HIGH (ref 7–18)
CALCIUM SERPL-MCNC: 8.4 MG/DL — SIGNIFICANT CHANGE UP (ref 8.4–10.5)
CALCIUM SERPL-MCNC: 8.4 MG/DL — SIGNIFICANT CHANGE UP (ref 8.4–10.5)
CHLORIDE SERPL-SCNC: 105 MMOL/L — SIGNIFICANT CHANGE UP (ref 96–108)
CHLORIDE SERPL-SCNC: 105 MMOL/L — SIGNIFICANT CHANGE UP (ref 96–108)
CO2 SERPL-SCNC: 27 MMOL/L — SIGNIFICANT CHANGE UP (ref 22–31)
CO2 SERPL-SCNC: 27 MMOL/L — SIGNIFICANT CHANGE UP (ref 22–31)
CREAT SERPL-MCNC: 0.82 MG/DL — SIGNIFICANT CHANGE UP (ref 0.5–1.3)
CREAT SERPL-MCNC: 0.82 MG/DL — SIGNIFICANT CHANGE UP (ref 0.5–1.3)
EGFR: 97 ML/MIN/1.73M2 — SIGNIFICANT CHANGE UP
EGFR: 97 ML/MIN/1.73M2 — SIGNIFICANT CHANGE UP
EOSINOPHIL # BLD AUTO: 0.05 K/UL — SIGNIFICANT CHANGE UP (ref 0–0.5)
EOSINOPHIL # BLD AUTO: 0.05 K/UL — SIGNIFICANT CHANGE UP (ref 0–0.5)
EOSINOPHIL NFR BLD AUTO: 0.4 % — SIGNIFICANT CHANGE UP (ref 0–6)
EOSINOPHIL NFR BLD AUTO: 0.4 % — SIGNIFICANT CHANGE UP (ref 0–6)
ESTIMATED AVERAGE GLUCOSE: 154 MG/DL — HIGH (ref 68–114)
ESTIMATED AVERAGE GLUCOSE: 154 MG/DL — HIGH (ref 68–114)
GLUCOSE BLDC GLUCOMTR-MCNC: 150 MG/DL — HIGH (ref 70–99)
GLUCOSE BLDC GLUCOMTR-MCNC: 150 MG/DL — HIGH (ref 70–99)
GLUCOSE BLDC GLUCOMTR-MCNC: 180 MG/DL — HIGH (ref 70–99)
GLUCOSE BLDC GLUCOMTR-MCNC: 180 MG/DL — HIGH (ref 70–99)
GLUCOSE BLDC GLUCOMTR-MCNC: 303 MG/DL — HIGH (ref 70–99)
GLUCOSE BLDC GLUCOMTR-MCNC: 303 MG/DL — HIGH (ref 70–99)
GLUCOSE BLDC GLUCOMTR-MCNC: 315 MG/DL — HIGH (ref 70–99)
GLUCOSE BLDC GLUCOMTR-MCNC: 315 MG/DL — HIGH (ref 70–99)
GLUCOSE SERPL-MCNC: 186 MG/DL — HIGH (ref 70–99)
GLUCOSE SERPL-MCNC: 186 MG/DL — HIGH (ref 70–99)
HCT VFR BLD CALC: 24.7 % — LOW (ref 39–50)
HCT VFR BLD CALC: 24.7 % — LOW (ref 39–50)
HGB BLD-MCNC: 7.8 G/DL — LOW (ref 13–17)
HGB BLD-MCNC: 7.8 G/DL — LOW (ref 13–17)
IMM GRANULOCYTES NFR BLD AUTO: 1.1 % — HIGH (ref 0–0.9)
IMM GRANULOCYTES NFR BLD AUTO: 1.1 % — HIGH (ref 0–0.9)
LYMPHOCYTES # BLD AUTO: 1.84 K/UL — SIGNIFICANT CHANGE UP (ref 1–3.3)
LYMPHOCYTES # BLD AUTO: 1.84 K/UL — SIGNIFICANT CHANGE UP (ref 1–3.3)
LYMPHOCYTES # BLD AUTO: 13 % — SIGNIFICANT CHANGE UP (ref 13–44)
LYMPHOCYTES # BLD AUTO: 13 % — SIGNIFICANT CHANGE UP (ref 13–44)
MAGNESIUM SERPL-MCNC: 1.9 MG/DL — SIGNIFICANT CHANGE UP (ref 1.6–2.6)
MAGNESIUM SERPL-MCNC: 1.9 MG/DL — SIGNIFICANT CHANGE UP (ref 1.6–2.6)
MCHC RBC-ENTMCNC: 25 PG — LOW (ref 27–34)
MCHC RBC-ENTMCNC: 25 PG — LOW (ref 27–34)
MCHC RBC-ENTMCNC: 31.6 GM/DL — LOW (ref 32–36)
MCHC RBC-ENTMCNC: 31.6 GM/DL — LOW (ref 32–36)
MCV RBC AUTO: 79.2 FL — LOW (ref 80–100)
MCV RBC AUTO: 79.2 FL — LOW (ref 80–100)
MONOCYTES # BLD AUTO: 1.27 K/UL — HIGH (ref 0–0.9)
MONOCYTES # BLD AUTO: 1.27 K/UL — HIGH (ref 0–0.9)
MONOCYTES NFR BLD AUTO: 8.9 % — SIGNIFICANT CHANGE UP (ref 2–14)
MONOCYTES NFR BLD AUTO: 8.9 % — SIGNIFICANT CHANGE UP (ref 2–14)
MRSA PCR RESULT.: SIGNIFICANT CHANGE UP
MRSA PCR RESULT.: SIGNIFICANT CHANGE UP
NEUTROPHILS # BLD AUTO: 10.86 K/UL — HIGH (ref 1.8–7.4)
NEUTROPHILS # BLD AUTO: 10.86 K/UL — HIGH (ref 1.8–7.4)
NEUTROPHILS NFR BLD AUTO: 76.4 % — SIGNIFICANT CHANGE UP (ref 43–77)
NEUTROPHILS NFR BLD AUTO: 76.4 % — SIGNIFICANT CHANGE UP (ref 43–77)
NRBC # BLD: 0 /100 WBCS — SIGNIFICANT CHANGE UP (ref 0–0)
NRBC # BLD: 0 /100 WBCS — SIGNIFICANT CHANGE UP (ref 0–0)
PHOSPHATE SERPL-MCNC: 1.5 MG/DL — LOW (ref 2.5–4.5)
PHOSPHATE SERPL-MCNC: 1.5 MG/DL — LOW (ref 2.5–4.5)
PLATELET # BLD AUTO: 490 K/UL — HIGH (ref 150–400)
PLATELET # BLD AUTO: 490 K/UL — HIGH (ref 150–400)
POTASSIUM SERPL-MCNC: 4.2 MMOL/L — SIGNIFICANT CHANGE UP (ref 3.5–5.3)
POTASSIUM SERPL-MCNC: 4.2 MMOL/L — SIGNIFICANT CHANGE UP (ref 3.5–5.3)
POTASSIUM SERPL-SCNC: 4.2 MMOL/L — SIGNIFICANT CHANGE UP (ref 3.5–5.3)
POTASSIUM SERPL-SCNC: 4.2 MMOL/L — SIGNIFICANT CHANGE UP (ref 3.5–5.3)
RBC # BLD: 3.12 M/UL — LOW (ref 4.2–5.8)
RBC # BLD: 3.12 M/UL — LOW (ref 4.2–5.8)
RBC # FLD: 17.1 % — HIGH (ref 10.3–14.5)
RBC # FLD: 17.1 % — HIGH (ref 10.3–14.5)
S AUREUS DNA NOSE QL NAA+PROBE: DETECTED
S AUREUS DNA NOSE QL NAA+PROBE: DETECTED
SODIUM SERPL-SCNC: 138 MMOL/L — SIGNIFICANT CHANGE UP (ref 135–145)
SODIUM SERPL-SCNC: 138 MMOL/L — SIGNIFICANT CHANGE UP (ref 135–145)
VANCOMYCIN TROUGH SERPL-MCNC: 17.9 UG/ML — SIGNIFICANT CHANGE UP (ref 10–20)
VANCOMYCIN TROUGH SERPL-MCNC: 17.9 UG/ML — SIGNIFICANT CHANGE UP (ref 10–20)
WBC # BLD: 14.2 K/UL — HIGH (ref 3.8–10.5)
WBC # BLD: 14.2 K/UL — HIGH (ref 3.8–10.5)
WBC # FLD AUTO: 14.2 K/UL — HIGH (ref 3.8–10.5)
WBC # FLD AUTO: 14.2 K/UL — HIGH (ref 3.8–10.5)

## 2023-12-22 PROCEDURE — 99232 SBSQ HOSP IP/OBS MODERATE 35: CPT | Mod: GC

## 2023-12-22 RX ORDER — MEROPENEM 1 G/30ML
1000 INJECTION INTRAVENOUS
Qty: 38 | Refills: 0
Start: 2023-12-22 | End: 2024-01-28

## 2023-12-22 RX ORDER — MEROPENEM 1 G/30ML
1000 INJECTION INTRAVENOUS EVERY 8 HOURS
Refills: 0 | Status: DISCONTINUED | OUTPATIENT
Start: 2023-12-22 | End: 2023-12-23

## 2023-12-22 RX ORDER — CHLORHEXIDINE GLUCONATE 213 G/1000ML
1 SOLUTION TOPICAL
Qty: 0 | Refills: 0 | DISCHARGE
Start: 2023-12-22

## 2023-12-22 RX ORDER — SODIUM HYPOCHLORITE 0.125 %
1 SOLUTION, NON-ORAL MISCELLANEOUS
Qty: 0 | Refills: 0 | DISCHARGE
Start: 2023-12-22

## 2023-12-22 RX ORDER — SODIUM CHLORIDE 9 MG/ML
1 INJECTION INTRAMUSCULAR; INTRAVENOUS; SUBCUTANEOUS
Qty: 0 | Refills: 0 | DISCHARGE
Start: 2023-12-22

## 2023-12-22 RX ADMIN — CHLORHEXIDINE GLUCONATE 1 APPLICATION(S): 213 SOLUTION TOPICAL at 12:56

## 2023-12-22 RX ADMIN — Medication 2: at 08:22

## 2023-12-22 RX ADMIN — MEROPENEM 100 MILLIGRAM(S): 1 INJECTION INTRAVENOUS at 22:12

## 2023-12-22 RX ADMIN — POLYETHYLENE GLYCOL 3350 17 GRAM(S): 17 POWDER, FOR SOLUTION ORAL at 06:46

## 2023-12-22 RX ADMIN — Medication 6 MILLIGRAM(S): at 06:31

## 2023-12-22 RX ADMIN — Medication 100 MILLIGRAM(S): at 10:27

## 2023-12-22 RX ADMIN — MUPIROCIN 1 APPLICATION(S): 20 OINTMENT TOPICAL at 18:19

## 2023-12-22 RX ADMIN — Medication 1 APPLICATION(S): at 22:01

## 2023-12-22 RX ADMIN — CEFEPIME 100 MILLIGRAM(S): 1 INJECTION, POWDER, FOR SOLUTION INTRAMUSCULAR; INTRAVENOUS at 06:31

## 2023-12-22 RX ADMIN — CHLORHEXIDINE GLUCONATE 1 APPLICATION(S): 213 SOLUTION TOPICAL at 06:34

## 2023-12-22 RX ADMIN — LEVETIRACETAM 750 MILLIGRAM(S): 250 TABLET, FILM COATED ORAL at 06:30

## 2023-12-22 RX ADMIN — MEROPENEM 100 MILLIGRAM(S): 1 INJECTION INTRAVENOUS at 16:52

## 2023-12-22 RX ADMIN — LEVETIRACETAM 750 MILLIGRAM(S): 250 TABLET, FILM COATED ORAL at 18:11

## 2023-12-22 RX ADMIN — Medication 1 APPLICATION(S): at 06:34

## 2023-12-22 RX ADMIN — Medication 1 TABLET(S): at 12:54

## 2023-12-22 RX ADMIN — Medication 100 MILLIGRAM(S): at 06:33

## 2023-12-22 RX ADMIN — MUPIROCIN 1 APPLICATION(S): 20 OINTMENT TOPICAL at 06:58

## 2023-12-22 RX ADMIN — Medication 8: at 12:56

## 2023-12-22 RX ADMIN — Medication 500 MILLIGRAM(S): at 12:54

## 2023-12-22 RX ADMIN — Medication 1 APPLICATION(S): at 15:40

## 2023-12-22 RX ADMIN — LACTULOSE 10 GRAM(S): 10 SOLUTION ORAL at 12:55

## 2023-12-22 RX ADMIN — Medication 8: at 18:10

## 2023-12-22 RX ADMIN — INSULIN GLARGINE 9 UNIT(S): 100 INJECTION, SOLUTION SUBCUTANEOUS at 22:00

## 2023-12-22 RX ADMIN — POLYETHYLENE GLYCOL 3350 17 GRAM(S): 17 POWDER, FOR SOLUTION ORAL at 18:11

## 2023-12-22 NOTE — PROGRESS NOTE ADULT - ATTENDING COMMENTS
68yM with PMH of TBI, seizure disorder, sacral ulcer, from [facility], who was sent to the ED for concerns for sepsis, admitted for sepsis 2/2 infected sacral decubitus ulcer. Continue vancomycin, cefepime, Flagyl; blood cultures negative. Continue dexamethasone and remdesvir, can consider shortened course of steroids pending improvement in AHRF. Patient will need PICC line for long-term abx treatment, discussed with Dr. Elkins. LEATHA and hyperkalemia resolved, likely in setting of poor PO intake and acute illness. Wound care following for sacral ulcer. Surgery consulted for debridement, discussed with Dr. Grewal.  Patient had debridment of sacral decubitus ulcer on Wednesday  · Operative Findings	wound class 4  7cm x 11cm open wound with 4cm undermining caudally, 3cm tunneling into right buttock, and 2cm tunneling into left buttock     After the dressing was changed, there has been no more evidence of bleeding.     Alert man in NAD  Vital Signs Last 24 Hrs  T(C): 37.2 (22 Dec 2023 13:40), Max: 37.2 (22 Dec 2023 13:40)  T(F): 99 (22 Dec 2023 13:40), Max: 99 (22 Dec 2023 13:40)  HR: 84 (22 Dec 2023 13:40) (80 - 84)  BP: 125/74 (22 Dec 2023 13:40) (124/65 - 125/74)  BP(mean): --  RR: 18 (22 Dec 2023 13:40) (18 - 18)  SpO2: 100% (22 Dec 2023 13:40) (99% - 100%)    Parameters below as of 22 Dec 2023 13:40  Patient On (Oxygen Delivery Method): room air    Lungs, clear  Cor, RRR  ABdomen, soft  Neurological, alert, verbal, hemiparesis with increased tone.                                  7.8    14.20 )-----------( 490      ( 22 Dec 2023 05:15 )             24.7   12-22    138  |  105  |  19<H>  ----------------------------<  186<H>  4.2   |  27  |  0.82    Ca    8.4      22 Dec 2023 05:15  Phos  1.5     12-22  Mg     1.9     12-22        < from: 12 Lead ECG (12.14.23 @ 00:08) >  Diagnosis Line Sinus tachycardia  Cannot rule out Septal infarct , age undetermined  Abnormal ECG    < end of copied text >    < from: CT Abdomen and Pelvis No Cont (12.14.23 @ 18:33) >    Complex extensive sacral decubitus ulcer with fragmentation/osteomyelitis   of the the coccyx. Locules of subcutaneous gas extending from the   decubitus ulcer superiorly to the soft tissues of the lower back at the   level of the lower lumbosacral spine.    < end of copied text >      #Sepsis 2/2 sacral ulcer vs COVID pneumonia, clinically improved after antibiotics and debridement  #AHRF 2/2 COVID pneumonia, resolved  #Elevated lactate, resolved  #LEATHA, resolved  #Hyperkalemia, resolved  #Seizure disorder  #TBI  #Type 2 DM  #HTN  PICC line has been placed.  Patient can complete his course of IV antibiotics (six weeks, started December 14) in subacute medicine.  Plan for discharge tomorrow. 68yM with PMH of TBI, seizure disorder, sacral ulcer, from [facility], who was sent to the ED for concerns for sepsis, admitted for sepsis 2/2 infected sacral decubitus ulcer. Continue vancomycin, cefepime, Flagyl; blood cultures negative. Continue dexamethasone and remdesvir, can consider shortened course of steroids pending improvement in AHRF. Patient will need PICC line for long-term abx treatment, discussed with Dr. Elkins. LEATHA and hyperkalemia resolved, likely in setting of poor PO intake and acute illness. Wound care following for sacral ulcer. Surgery consulted for debridement, discussed with Dr. Grewal.  Patient had debridment of sacral decubitus ulcer on Wednesday  · Operative Findings	wound class 4  7cm x 11cm open wound with 4cm undermining caudally, 3cm tunneling into right buttock, and 2cm tunneling into left buttock     After the dressing was changed, there has been no more evidence of bleeding.     Alert man in NAD  Vital Signs Last 24 Hrs  T(C): 37.2 (22 Dec 2023 13:40), Max: 37.2 (22 Dec 2023 13:40)  T(F): 99 (22 Dec 2023 13:40), Max: 99 (22 Dec 2023 13:40)  HR: 84 (22 Dec 2023 13:40) (80 - 84)  BP: 125/74 (22 Dec 2023 13:40) (124/65 - 125/74)  BP(mean): --  RR: 18 (22 Dec 2023 13:40) (18 - 18)  SpO2: 100% (22 Dec 2023 13:40) (99% - 100%)    Parameters below as of 22 Dec 2023 13:40  Patient On (Oxygen Delivery Method): room air    Lungs, clear  Cor, RRR  ABdomen, soft  Neurological, alert, verbal, hemiparesis with increased tone.                                  7.8    14.20 )-----------( 490      ( 22 Dec 2023 05:15 )             24.7   12-22    138  |  105  |  19<H>  ----------------------------<  186<H>  4.2   |  27  |  0.82    Ca    8.4      22 Dec 2023 05:15  Phos  1.5     12-22  Mg     1.9     12-22        < from: 12 Lead ECG (12.14.23 @ 00:08) >  Diagnosis Line Sinus tachycardia  Cannot rule out Septal infarct , age undetermined  Abnormal ECG    < end of copied text >    < from: CT Abdomen and Pelvis No Cont (12.14.23 @ 18:33) >    Complex extensive sacral decubitus ulcer with fragmentation/osteomyelitis   of the the coccyx. Locules of subcutaneous gas extending from the   decubitus ulcer superiorly to the soft tissues of the lower back at the   level of the lower lumbosacral spine.    < end of copied text >      #Sepsis 2/2 sacral ulcer vs COVID pneumonia, clinically improved after antibiotics and debridement  #AHRF 2/2 COVID pneumonia, resolved  #Elevated lactate, resolved  #LEATHA, resolved  #Hyperkalemia, resolved  #Seizure disorder  #TBI  #Type 2 DM  #HTN  PICC line has been placed.  Patient can complete his course of IV antibiotics (six weeks, started December 14) in subacute medicine.  Plan for discharge tomorrow.

## 2023-12-22 NOTE — PROGRESS NOTE ADULT - PROBLEM SELECTOR PLAN 6
- t2DM on metformin   - blood sugars 200s  - s/p lantus 9u, 3 TID   - ISS fs qhs and before meals  12/18 Fasting gluc 58, lantus decreased to 6U, admelog dc  - d/c NS+D5 for maintenance, reinstated lantus 6U  patient eating better 12/21 lantus increased to 9U

## 2023-12-22 NOTE — DISCHARGE NOTE NURSING/CASE MANAGEMENT/SOCIAL WORK - NSDCPEFALRISK_GEN_ALL_CORE
For information on Fall & Injury Prevention, visit: [email or web address] OR  [email or web address] OR  https://www.cdc.gov/steadi/patient.html For information on Fall & Injury Prevention, visit: [email or web address] OR  [email or web address] OR  https://www.cdc.gov/steadi/patient.html

## 2023-12-22 NOTE — PROGRESS NOTE ADULT - MUSCULOSKELETAL
no joint swelling/no joint erythema/no joint warmth

## 2023-12-22 NOTE — PROGRESS NOTE ADULT - REASON FOR ADMISSION
Sepsis
Sepsis 2/2 cellulitis and COVID
Sepsis 2/2 sacral ulcer vs. pneumonia
Sepsis 2/2 sacral ulcer
Sepsis 2/2 sacral ulcer

## 2023-12-22 NOTE — DISCHARGE NOTE NURSING/CASE MANAGEMENT/SOCIAL WORK - NSDCPELOVENOX_GEN_ALL_CORE
No
Enoxaparin/Lovenox - Compliance/Enoxaparin/Lovenox - Dietary Advice/Enoxaparin/Lovenox - Follow up monitoring/Enoxaparin/Lovenox - Potential for adverse drug reactions and interactions

## 2023-12-22 NOTE — DISCHARGE NOTE NURSING/CASE MANAGEMENT/SOCIAL WORK - PATIENT PORTAL LINK FT
You can access the FollowMyHealth Patient Portal offered by [facility] by registering at the following website: [email or web address]. By joining [employer]’s FollowMyHealth portal, you will also be able to view your health information using other applications (apps) compatible with our system. You can access the FollowMyHealth Patient Portal offered by [facility] by registering at the following website: [email or web address]. By joining [employer]’s FollowMyHealth portal, you will also be able to view your health information using other applications (apps) compatible with our system.

## 2023-12-22 NOTE — PROGRESS NOTE ADULT - ASSESSMENT
68-year-old male, PMH of traumatic brain injury w/ seizure disorder, DM, sacral decubitus ulcer, sent from [facility] for reported fever and oxygen saturation in low 90s. Pt reporting lethargy/weakness, A&Ox0. Vital signs sig for temp 101 F (resolved, 98.6F),  > 101, 97% on RA. EKG sinus tachycardia 106 HR, Labs sig for WC 14, Cr 1.5, lac 2.8, K 5.9. CXR shows questionable RML infiltrate. s/p cefepime 2g and 2.9 L in ED. COVID +. Admitted for Sepsis likely 2/2 sacral ulcer and COVID pneumonia. Patient s/p sacral decubitus ulcer debridement and PICC placement for 5 week IV antibiotics.                68-year-old male, PMH of traumatic brain injury w/ seizure disorder, DM, sacral decubitus ulcer, sent from [facility] for reported fever and oxygen saturation in low 90s. Pt reporting lethargy/weakness, A&Ox0. Vital signs sig for temp 101 F (resolved, 98.6F),  > 101, 97% on RA. EKG sinus tachycardia 106 HR, Labs sig for WC 14, Cr 1.5, lac 2.8, K 5.9. CXR shows questionable RML infiltrate. s/p cefepime 2g and 2.9 L in ED. COVID +. Admitted for Sepsis likely 2/2 sacral ulcer and COVID pneumonia. Patient s/p sacral decubitus ulcer debridement and PICC placement for 5 week IV antibiotics.

## 2023-12-22 NOTE — PROGRESS NOTE ADULT - PROVIDER SPECIALTY LIST ADULT
Infectious Disease
Surgery
Surgery
Infectious Disease
Internal Medicine

## 2023-12-22 NOTE — PROGRESS NOTE ADULT - PROBLEM SELECTOR PLAN 1
Ulcer gushing grey, malodorous, purulent fluid without erythema  CT abdomen: Complex extensive sacral decubitus ulcer with fragmentation/osteomyelitis of the the coccyx. Locules of subcutaneous gas extending from the decubitus ulcer superiorly to the soft tissues of the lower back at the level of the lower lumbosacral spine.   - wound care consult  c/w cefepime, vanc, flagyl  12/20 s/p ulcer debridement  12/21 PICC line placed

## 2023-12-22 NOTE — PROGRESS NOTE ADULT - SUBJECTIVE AND OBJECTIVE BOX
INTERVAL HPI/OVERNIGHT EVENTS:  Pt resting comfortably. No overnight events.  On Cefepime    MEDICATIONS  (STANDING):  ascorbic acid 500 milliGRAM(s) Oral daily  cefepime   IVPB 2000 milliGRAM(s) IV Intermittent every 12 hours  chlorhexidine 2% Cloths 1 Application(s) Topical <User Schedule>  chlorhexidine 2% Cloths 1 Application(s) Topical daily  collagenase Ointment 1 Application(s) Topical every 8 hours  Dakins Solution - 1/4 Strength 1 Application(s) Topical daily  dexAMETHasone  Injectable 6 milliGRAM(s) IV Push daily  dextrose 50% Injectable 25 Gram(s) IV Push once  insulin glargine Injectable (LANTUS) 9 Unit(s) SubCutaneous at bedtime  insulin lispro (ADMELOG) corrective regimen sliding scale   SubCutaneous three times a day before meals  insulin lispro (ADMELOG) corrective regimen sliding scale   SubCutaneous at bedtime  lactated ringers. 1000 milliLiter(s) (50 mL/Hr) IV Continuous <Continuous>  lactulose Syrup 10 Gram(s) Oral daily  levETIRAcetam 750 milliGRAM(s) Oral two times a day  metroNIDAZOLE  IVPB      metroNIDAZOLE  IVPB 500 milliGRAM(s) IV Intermittent every 8 hours  multivitamin 1 Tablet(s) Oral daily  mupirocin 2% Ointment 1 Application(s) Both Nostrils two times a day  polyethylene glycol 3350 17 Gram(s) Oral two times a day  senna 2 Tablet(s) Oral at bedtime  vancomycin  IVPB 500 milliGRAM(s) IV Intermittent every 12 hours  vancomycin  IVPB        MEDICATIONS  (PRN):  acetaminophen     Tablet .. 650 milliGRAM(s) Oral every 6 hours PRN Mild Pain (1 - 3)  sodium chloride 0.9% lock flush 10 milliLiter(s) IV Push every 1 hour PRN Pre/post blood products, medications, blood draw, and to maintain line patency    Vital Signs Last 24 Hrs  T(C): 36.4 (22 Dec 2023 05:18), Max: 37.1 (21 Dec 2023 13:49)  T(F): 97.5 (22 Dec 2023 05:18), Max: 98.8 (21 Dec 2023 13:49)  HR: 80 (22 Dec 2023 05:18) (80 - 89)  BP: 124/65 (22 Dec 2023 05:18) (116/66 - 124/65)  BP(mean): --  RR: 18 (22 Dec 2023 05:18) (17 - 18)  SpO2: 99% (22 Dec 2023 05:18) (99% - 100%)    Parameters below as of 22 Dec 2023 05:18  Patient On (Oxygen Delivery Method): room air    Physical:  General: Awake, alert. NAD.  Back: 9.5x7cm sacral decubitus wound to level of sacral bone and muscle. Concentric fibrinous slough on right hand side of wound starting from sacrum, extending inferiorly toward gluteal cleft. Left side of wound with fibrinous slough under left wound edge. 6cm tunneling at 12 o'clock aspect of wound, 5.5cm tunneling at 4 o'clock aspect of wound, 3cm undermining from 6 o'clock to 9 o'clock. Wound edges clean. No active drainage noted. No malodor Clean wound base at superior aspect of wound.    LABS:                        7.8    14.20 )-----------( 490      ( 22 Dec 2023 05:15 )             24.7             12-22    138  |  105  |  19<H>  ----------------------------<  186<H>  4.2   |  27  |  0.82    Ca    8.4      22 Dec 2023 05:15  Phos  1.5     12-22  Mg     1.9     12-22

## 2023-12-22 NOTE — PROGRESS NOTE ADULT - PROBLEM SELECTOR PLAN 5
hb 9.3>10.7>7.2>7.9 > 7.4 > 7.1 > 9.2 > 8.2 > 9.2 > 9.5 >7.7 >8.2>7.8  monitor for signs of worsening anemia  transfuse if <7 or precipitous drop  no signs of active bleed at this time  hgb increased from 7.1 to 9.2 s/p 12/19 1pRBC  12/21 debridement site bleeding through dressing and pad - repeat afternoon Hgb stable 8.2  12/22 no bleeding from debridement site

## 2023-12-22 NOTE — PROGRESS NOTE ADULT - ASSESSMENT
68y.o. Male s/p debridement of stage 4 sacral decub ulcer POD#2    -Collagenase to fibrinous slough areas  -Wet to dry on clean wound base  -Please pack tunneled and undermined areas with wet gauze  -Cover with dry dressing  -Continue abx  -Scheduled offloading  -Clean, frequent hygiene  -Poss further debridement next week  -Contents of this note signed out to covering PA [phone]     This note and its recommendations herein are preliminary until such time as cosigned by an attending. 68y.o. Male s/p debridement of stage 4 sacral decub ulcer POD#2    -Collagenase to fibrinous slough areas  -Wet to dry on clean wound base  -Please pack tunneled and undermined areas with wet gauze  -Cover with dry dressing  -Continue abx  -Scheduled offloading  -Clean, frequent hygiene  -Poss further debridement next week  -Contents of this note signed out to covering PA [phone]     This note and its recommendations herein are preliminary until such time as cosigned by an attending.

## 2023-12-22 NOTE — PROGRESS NOTE ADULT - PROBLEM SELECTOR PLAN 2
- sent from [facility] for reported fever and oxygen saturation in low 90s.   - Pt reporting lethargy/weakness, A&Ox0, weeping purulent sacral ulcer noticed on exam   - Vital signs sig for temp 101 F (resolved, 98.6F),  > 101, 97% on RA, intermittently hypoxic to low 90s on RA   - EKG sinus tachycardia 106 HR  - Labs sig for WC 14, Cr 1.5, lac 2.8  - RVP - covid positive , U/A negative   - CXR: Diffuse right lung pneumonia.  - s/p cefepime 2g and 2.9 L in ED  Admitted for Sepsis 2/2 pneumonia vs. infected sacral ulcer vs. COVID   s/p vanc, cefepime, flagyl   bcx NGTD 4d   Vanc trough 15.9  12/22 patient transitioned to meropenem q8h for 5 more weeks - sent from [facility] for reported fever and oxygen saturation in low 90s.   - Pt reporting lethargy/weakness, A&Ox0, weeping purulent sacral ulcer noticed on exam   - Vital signs sig for temp 101 F (resolved, 98.6F),  > 101, 97% on RA, intermittently hypoxic to low 90s on RA   - EKG sinus tachycardia 106 HR  - Labs sig for WC 14, Cr 1.5, lac 2.8  - RVP - covid positive , U/A negative   - CXR: Diffuse right lung pneumonia.  - s/p cefepime 2g and 2.9 L in ED  Admitted for Sepsis 2/2 pneumonia vs. infected sacral ulcer vs. COVID   s/p vanc, cefepime, flagyl   bcx NGTD 4d   Vanc trough 15.9  12/22 patient transitioned to meropenem q8h for 5 more weeks

## 2023-12-22 NOTE — PROGRESS NOTE ADULT - SUBJECTIVE AND OBJECTIVE BOX
68y Male    Meds:  cefepime   IVPB 2000 milliGRAM(s) IV Intermittent every 12 hours  metroNIDAZOLE  IVPB      metroNIDAZOLE  IVPB 500 milliGRAM(s) IV Intermittent every 8 hours  vancomycin  IVPB      vancomycin  IVPB 500 milliGRAM(s) IV Intermittent every 12 hours    Allergies    No Known Allergies    Intolerances        VITALS:  Vital Signs Last 24 Hrs  T(C): 37.2 (22 Dec 2023 13:40), Max: 37.2 (22 Dec 2023 13:40)  T(F): 99 (22 Dec 2023 13:40), Max: 99 (22 Dec 2023 13:40)  HR: 84 (22 Dec 2023 13:40) (80 - 84)  BP: 125/74 (22 Dec 2023 13:40) (116/66 - 125/74)  BP(mean): --  RR: 18 (22 Dec 2023 13:40) (17 - 18)  SpO2: 100% (22 Dec 2023 13:40) (99% - 100%)    Parameters below as of 22 Dec 2023 13:40  Patient On (Oxygen Delivery Method): room air        LABS/DIAGNOSTIC TESTS:                          7.8    14.20 )-----------( 490      ( 22 Dec 2023 05:15 )             24.7         12-22    138  |  105  |  19<H>  ----------------------------<  186<H>  4.2   |  27  |  0.82    Ca    8.4      22 Dec 2023 05:15  Phos  1.5     12-22  Mg     1.9     12-22            CULTURES: Clean Catch Clean Catch (Midstream)  12-14 @ 02:36   No growth  --  --      .Blood Blood-Peripheral  12-13 @ 21:35   No growth at 5 days  --  --      .Blood Blood-Peripheral  12-13 @ 21:25   No growth at 5 days  --  --            RADIOLOGY:      ROS:  [  ] UNABLE TO ELICIT 68y Male who is doing well and hopefully will be going to rehab today, he has no fevers or chills, no diarrhea, no nausea or vomiting, no other complaints. His wbc count is decreasing.    Meds:  cefepime   IVPB 2000 milliGRAM(s) IV Intermittent every 12 hours  metroNIDAZOLE  IVPB      metroNIDAZOLE  IVPB 500 milliGRAM(s) IV Intermittent every 8 hours  vancomycin  IVPB      vancomycin  IVPB 500 milliGRAM(s) IV Intermittent every 12 hours    Allergies    No Known Allergies    Intolerances        VITALS:  Vital Signs Last 24 Hrs  T(C): 37.2 (22 Dec 2023 13:40), Max: 37.2 (22 Dec 2023 13:40)  T(F): 99 (22 Dec 2023 13:40), Max: 99 (22 Dec 2023 13:40)  HR: 84 (22 Dec 2023 13:40) (80 - 84)  BP: 125/74 (22 Dec 2023 13:40) (116/66 - 125/74)  BP(mean): --  RR: 18 (22 Dec 2023 13:40) (17 - 18)  SpO2: 100% (22 Dec 2023 13:40) (99% - 100%)    Parameters below as of 22 Dec 2023 13:40  Patient On (Oxygen Delivery Method): room air        LABS/DIAGNOSTIC TESTS:                          7.8    14.20 )-----------( 490      ( 22 Dec 2023 05:15 )             24.7         12-22    138  |  105  |  19<H>  ----------------------------<  186<H>  4.2   |  27  |  0.82    Ca    8.4      22 Dec 2023 05:15  Phos  1.5     12-22  Mg     1.9     12-22            CULTURES: Clean Catch Clean Catch (Midstream)  12-14 @ 02:36   No growth  --  --      .Blood Blood-Peripheral  12-13 @ 21:35   No growth at 5 days  --  --      .Blood Blood-Peripheral  12-13 @ 21:25   No growth at 5 days  --  --            RADIOLOGY:      ROS:  [  ] UNABLE TO ELICIT

## 2023-12-22 NOTE — PROGRESS NOTE ADULT - SUBJECTIVE AND OBJECTIVE BOX
Chief complaint: Patient is a 68y old  Male who presents with a chief complaint of Sepsis 2/2 sacral ulcer (21 Dec 2023 10:19)      LUISA MÁRQUEZ is a 68y year old Male     INTERVAL HPI/OVERNIGHT EVENTS: No acute events overnight.  Patient examined at bedside this AM.  Patient a little more drowsy today. Answers questions but closes his eyes between questions. Patient denies acute complaints.     REVIEW OF SYSTEMS:  CONSTITUTIONAL: + fatigue; No fever, weight loss  ENMT:  No difficulty hearing, tinnitus, vertigo; No sinus or throat pain  NECK: No pain or stiffness  RESPIRATORY: No cough, wheezing, chills or hemoptysis; No shortness of breath  CARDIOVASCULAR: No chest pain, palpitations, dizziness, or leg swelling  GASTROINTESTINAL: + constipation; No abdominal or epigastric pain. No nausea, vomiting, or hematemesis; No diarrhea. No melena or hematochezia.  GENITOURINARY: No dysuria, frequency, hematuria, or incontinence  NEUROLOGICAL: No headaches, memory loss, loss of strength, numbness, or tremors  SKIN: No itching, burning, rashes, or lesions   MUSCULOSKELETAL: decreased right arm pain; No joint pain or swelling; No muscle, back pain        FAMILY HISTORY:      T(C): 37.2 (12-22-23 @ 13:40), Max: 37.2 (12-22-23 @ 13:40)  HR: 84 (12-22-23 @ 13:40) (80 - 84)  BP: 125/74 (12-22-23 @ 13:40) (116/66 - 125/74)  RR: 18 (12-22-23 @ 13:40) (17 - 18)  SpO2: 100% (12-22-23 @ 13:40) (99% - 100%)  Wt(kg): --Vital Signs Last 24 Hrs  T(C): 37.2 (22 Dec 2023 13:40), Max: 37.2 (22 Dec 2023 13:40)  T(F): 99 (22 Dec 2023 13:40), Max: 99 (22 Dec 2023 13:40)  HR: 84 (22 Dec 2023 13:40) (80 - 84)  BP: 125/74 (22 Dec 2023 13:40) (116/66 - 125/74)  BP(mean): --  RR: 18 (22 Dec 2023 13:40) (17 - 18)  SpO2: 100% (22 Dec 2023 13:40) (99% - 100%)    Parameters below as of 22 Dec 2023 13:40  Patient On (Oxygen Delivery Method): room air        PHYSICAL EXAM:  GENERAL: NAD, laying comfortably in bed   HEAD:  Atraumatic, temporal wasting (chronic)   EYES: EOMI, PERRLA, conjunctiva and sclera clear  ENMT: No tonsillar erythema, exudates, or enlargement; Moist mucous membranes.   NECK: Supple, No JVD  NERVOUS SYSTEM:  Alert & Oriented X1 (self only), Poor concentration; Right sided contraction, moderate muscle strength in left upper and lower extremity  CHEST/LUNG: Clear to percussion bilaterally; No rales, rhonchi, wheezing, or rubs  HEART: Regular rate and rhythm; No murmurs, rubs, or gallops  ABDOMEN: Soft, Nontender, Nondistended; Bowel sounds present  EXTREMITIES: No clubbing, cyanosis, or edema  SKIN: s/p debridement site clean with no purulent discharge     Consultant(s) Notes Reviewed:  [x ] YES  [ ] NO  Care Discussed with Consultants/Other Providers [ x] YES  [ ] NO    LABS:                        7.8    14.20 )-----------( 490      ( 22 Dec 2023 05:15 )             24.7       12-22    138  |  105  |  19<H>  ----------------------------<  186<H>  4.2   |  27  |  0.82    Ca    8.4      22 Dec 2023 05:15  Phos  1.5     12-22  Mg     1.9     12-22          RADIOLOGY & ADDITIONAL TESTS:    Imaging Personally Reviewed:  [ ] YES  [ ] NO  acetaminophen     Tablet .. 650 milliGRAM(s) Oral every 6 hours PRN  ascorbic acid 500 milliGRAM(s) Oral daily  chlorhexidine 2% Cloths 1 Application(s) Topical <User Schedule>  chlorhexidine 2% Cloths 1 Application(s) Topical daily  collagenase Ointment 1 Application(s) Topical every 8 hours  Dakins Solution - 1/4 Strength 1 Application(s) Topical daily  dexAMETHasone  Injectable 6 milliGRAM(s) IV Push daily  dextrose 50% Injectable 25 Gram(s) IV Push once  insulin glargine Injectable (LANTUS) 9 Unit(s) SubCutaneous at bedtime  insulin lispro (ADMELOG) corrective regimen sliding scale   SubCutaneous three times a day before meals  insulin lispro (ADMELOG) corrective regimen sliding scale   SubCutaneous at bedtime  lactated ringers. 1000 milliLiter(s) IV Continuous <Continuous>  lactulose Syrup 10 Gram(s) Oral daily  levETIRAcetam 750 milliGRAM(s) Oral two times a day  meropenem  IVPB 1000 milliGRAM(s) IV Intermittent every 8 hours  multivitamin 1 Tablet(s) Oral daily  mupirocin 2% Ointment 1 Application(s) Both Nostrils two times a day  polyethylene glycol 3350 17 Gram(s) Oral two times a day  senna 2 Tablet(s) Oral at bedtime  sodium chloride 0.9% lock flush 10 milliLiter(s) IV Push every 1 hour PRN      HEALTH ISSUES - PROBLEM Dx:  Preoperative examination    Sacral decubitus ulcer    Sepsis    2019 novel coronavirus disease (COVID-19)    Acute metabolic encephalopathy    DM (diabetes mellitus)    Constipation    LEATHA (acute kidney injury)    Hyperkalemia    TBI (traumatic brain injury)    Seizure disorder    Prophylactic measure    Preoperative clearance    Anemia

## 2023-12-22 NOTE — PROGRESS NOTE ADULT - RESPIRATORY
normal/clear to auscultation bilaterally/no wheezes/no rales/no rhonchi
clear to auscultation bilaterally/no wheezes/no rales/no rhonchi

## 2023-12-22 NOTE — PROGRESS NOTE ADULT - ASSESSMENT
Pneumonia ( ? 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  ·	switch to Meropenem 1 gm iv q8hrs  x 5 weeks more.  ·	DC planning.

## 2023-12-23 VITALS
RESPIRATION RATE: 17 BRPM | SYSTOLIC BLOOD PRESSURE: 125 MMHG | OXYGEN SATURATION: 99 % | HEART RATE: 88 BPM | DIASTOLIC BLOOD PRESSURE: 73 MMHG | TEMPERATURE: 98 F

## 2023-12-23 LAB
ANION GAP SERPL CALC-SCNC: 4 MMOL/L — LOW (ref 5–17)
ANION GAP SERPL CALC-SCNC: 4 MMOL/L — LOW (ref 5–17)
BASOPHILS # BLD AUTO: 0.04 K/UL — SIGNIFICANT CHANGE UP (ref 0–0.2)
BASOPHILS # BLD AUTO: 0.04 K/UL — SIGNIFICANT CHANGE UP (ref 0–0.2)
BASOPHILS NFR BLD AUTO: 0.3 % — SIGNIFICANT CHANGE UP (ref 0–2)
BASOPHILS NFR BLD AUTO: 0.3 % — SIGNIFICANT CHANGE UP (ref 0–2)
BUN SERPL-MCNC: 16 MG/DL — SIGNIFICANT CHANGE UP (ref 7–18)
BUN SERPL-MCNC: 16 MG/DL — SIGNIFICANT CHANGE UP (ref 7–18)
CALCIUM SERPL-MCNC: 8.1 MG/DL — LOW (ref 8.4–10.5)
CALCIUM SERPL-MCNC: 8.1 MG/DL — LOW (ref 8.4–10.5)
CHLORIDE SERPL-SCNC: 103 MMOL/L — SIGNIFICANT CHANGE UP (ref 96–108)
CHLORIDE SERPL-SCNC: 103 MMOL/L — SIGNIFICANT CHANGE UP (ref 96–108)
CO2 SERPL-SCNC: 27 MMOL/L — SIGNIFICANT CHANGE UP (ref 22–31)
CO2 SERPL-SCNC: 27 MMOL/L — SIGNIFICANT CHANGE UP (ref 22–31)
CREAT SERPL-MCNC: 0.7 MG/DL — SIGNIFICANT CHANGE UP (ref 0.5–1.3)
CREAT SERPL-MCNC: 0.7 MG/DL — SIGNIFICANT CHANGE UP (ref 0.5–1.3)
EGFR: 102 ML/MIN/1.73M2 — SIGNIFICANT CHANGE UP
EGFR: 102 ML/MIN/1.73M2 — SIGNIFICANT CHANGE UP
EOSINOPHIL # BLD AUTO: 0.05 K/UL — SIGNIFICANT CHANGE UP (ref 0–0.5)
EOSINOPHIL # BLD AUTO: 0.05 K/UL — SIGNIFICANT CHANGE UP (ref 0–0.5)
EOSINOPHIL NFR BLD AUTO: 0.3 % — SIGNIFICANT CHANGE UP (ref 0–6)
EOSINOPHIL NFR BLD AUTO: 0.3 % — SIGNIFICANT CHANGE UP (ref 0–6)
GLUCOSE BLDC GLUCOMTR-MCNC: 171 MG/DL — HIGH (ref 70–99)
GLUCOSE BLDC GLUCOMTR-MCNC: 171 MG/DL — HIGH (ref 70–99)
GLUCOSE BLDC GLUCOMTR-MCNC: 227 MG/DL — HIGH (ref 70–99)
GLUCOSE BLDC GLUCOMTR-MCNC: 227 MG/DL — HIGH (ref 70–99)
GLUCOSE SERPL-MCNC: 171 MG/DL — HIGH (ref 70–99)
GLUCOSE SERPL-MCNC: 171 MG/DL — HIGH (ref 70–99)
HCT VFR BLD CALC: 24.5 % — LOW (ref 39–50)
HCT VFR BLD CALC: 24.5 % — LOW (ref 39–50)
HGB BLD-MCNC: 7.9 G/DL — LOW (ref 13–17)
HGB BLD-MCNC: 7.9 G/DL — LOW (ref 13–17)
IMM GRANULOCYTES NFR BLD AUTO: 1.6 % — HIGH (ref 0–0.9)
IMM GRANULOCYTES NFR BLD AUTO: 1.6 % — HIGH (ref 0–0.9)
LYMPHOCYTES # BLD AUTO: 1.41 K/UL — SIGNIFICANT CHANGE UP (ref 1–3.3)
LYMPHOCYTES # BLD AUTO: 1.41 K/UL — SIGNIFICANT CHANGE UP (ref 1–3.3)
LYMPHOCYTES # BLD AUTO: 9.2 % — LOW (ref 13–44)
LYMPHOCYTES # BLD AUTO: 9.2 % — LOW (ref 13–44)
MAGNESIUM SERPL-MCNC: 1.9 MG/DL — SIGNIFICANT CHANGE UP (ref 1.6–2.6)
MAGNESIUM SERPL-MCNC: 1.9 MG/DL — SIGNIFICANT CHANGE UP (ref 1.6–2.6)
MCHC RBC-ENTMCNC: 25.2 PG — LOW (ref 27–34)
MCHC RBC-ENTMCNC: 25.2 PG — LOW (ref 27–34)
MCHC RBC-ENTMCNC: 32.2 GM/DL — SIGNIFICANT CHANGE UP (ref 32–36)
MCHC RBC-ENTMCNC: 32.2 GM/DL — SIGNIFICANT CHANGE UP (ref 32–36)
MCV RBC AUTO: 78 FL — LOW (ref 80–100)
MCV RBC AUTO: 78 FL — LOW (ref 80–100)
MONOCYTES # BLD AUTO: 0.76 K/UL — SIGNIFICANT CHANGE UP (ref 0–0.9)
MONOCYTES # BLD AUTO: 0.76 K/UL — SIGNIFICANT CHANGE UP (ref 0–0.9)
MONOCYTES NFR BLD AUTO: 4.9 % — SIGNIFICANT CHANGE UP (ref 2–14)
MONOCYTES NFR BLD AUTO: 4.9 % — SIGNIFICANT CHANGE UP (ref 2–14)
NEUTROPHILS # BLD AUTO: 12.88 K/UL — HIGH (ref 1.8–7.4)
NEUTROPHILS # BLD AUTO: 12.88 K/UL — HIGH (ref 1.8–7.4)
NEUTROPHILS NFR BLD AUTO: 83.7 % — HIGH (ref 43–77)
NEUTROPHILS NFR BLD AUTO: 83.7 % — HIGH (ref 43–77)
NRBC # BLD: 0 /100 WBCS — SIGNIFICANT CHANGE UP (ref 0–0)
NRBC # BLD: 0 /100 WBCS — SIGNIFICANT CHANGE UP (ref 0–0)
PHOSPHATE SERPL-MCNC: 1.9 MG/DL — LOW (ref 2.5–4.5)
PHOSPHATE SERPL-MCNC: 1.9 MG/DL — LOW (ref 2.5–4.5)
PLATELET # BLD AUTO: 652 K/UL — HIGH (ref 150–400)
PLATELET # BLD AUTO: 652 K/UL — HIGH (ref 150–400)
POTASSIUM SERPL-MCNC: 4.1 MMOL/L — SIGNIFICANT CHANGE UP (ref 3.5–5.3)
POTASSIUM SERPL-MCNC: 4.1 MMOL/L — SIGNIFICANT CHANGE UP (ref 3.5–5.3)
POTASSIUM SERPL-SCNC: 4.1 MMOL/L — SIGNIFICANT CHANGE UP (ref 3.5–5.3)
POTASSIUM SERPL-SCNC: 4.1 MMOL/L — SIGNIFICANT CHANGE UP (ref 3.5–5.3)
RBC # BLD: 3.14 M/UL — LOW (ref 4.2–5.8)
RBC # BLD: 3.14 M/UL — LOW (ref 4.2–5.8)
RBC # FLD: 17.2 % — HIGH (ref 10.3–14.5)
RBC # FLD: 17.2 % — HIGH (ref 10.3–14.5)
SODIUM SERPL-SCNC: 134 MMOL/L — LOW (ref 135–145)
SODIUM SERPL-SCNC: 134 MMOL/L — LOW (ref 135–145)
WBC # BLD: 15.38 K/UL — HIGH (ref 3.8–10.5)
WBC # BLD: 15.38 K/UL — HIGH (ref 3.8–10.5)
WBC # FLD AUTO: 15.38 K/UL — HIGH (ref 3.8–10.5)
WBC # FLD AUTO: 15.38 K/UL — HIGH (ref 3.8–10.5)

## 2023-12-23 PROCEDURE — 85730 THROMBOPLASTIN TIME PARTIAL: CPT

## 2023-12-23 PROCEDURE — 85652 RBC SED RATE AUTOMATED: CPT

## 2023-12-23 PROCEDURE — 87640 STAPH A DNA AMP PROBE: CPT

## 2023-12-23 PROCEDURE — 86140 C-REACTIVE PROTEIN: CPT

## 2023-12-23 PROCEDURE — 71045 X-RAY EXAM CHEST 1 VIEW: CPT

## 2023-12-23 PROCEDURE — 82962 GLUCOSE BLOOD TEST: CPT

## 2023-12-23 PROCEDURE — 81001 URINALYSIS AUTO W/SCOPE: CPT

## 2023-12-23 PROCEDURE — 36415 COLL VENOUS BLD VENIPUNCTURE: CPT

## 2023-12-23 PROCEDURE — P9040: CPT

## 2023-12-23 PROCEDURE — 83735 ASSAY OF MAGNESIUM: CPT

## 2023-12-23 PROCEDURE — 87040 BLOOD CULTURE FOR BACTERIA: CPT

## 2023-12-23 PROCEDURE — 94640 AIRWAY INHALATION TREATMENT: CPT

## 2023-12-23 PROCEDURE — 36573 INSJ PICC RS&I 5 YR+: CPT

## 2023-12-23 PROCEDURE — 85027 COMPLETE CBC AUTOMATED: CPT

## 2023-12-23 PROCEDURE — 96375 TX/PRO/DX INJ NEW DRUG ADDON: CPT

## 2023-12-23 PROCEDURE — 80202 ASSAY OF VANCOMYCIN: CPT

## 2023-12-23 PROCEDURE — 86803 HEPATITIS C AB TEST: CPT

## 2023-12-23 PROCEDURE — 83605 ASSAY OF LACTIC ACID: CPT

## 2023-12-23 PROCEDURE — 0225U NFCT DS DNA&RNA 21 SARSCOV2: CPT

## 2023-12-23 PROCEDURE — 96365 THER/PROPH/DIAG IV INF INIT: CPT

## 2023-12-23 PROCEDURE — 87086 URINE CULTURE/COLONY COUNT: CPT

## 2023-12-23 PROCEDURE — 99238 HOSP IP/OBS DSCHRG MGMT 30/<: CPT

## 2023-12-23 PROCEDURE — 87389 HIV-1 AG W/HIV-1&-2 AB AG IA: CPT

## 2023-12-23 PROCEDURE — 74176 CT ABD & PELVIS W/O CONTRAST: CPT

## 2023-12-23 PROCEDURE — 77001 FLUOROGUIDE FOR VEIN DEVICE: CPT

## 2023-12-23 PROCEDURE — 85025 COMPLETE CBC W/AUTO DIFF WBC: CPT

## 2023-12-23 PROCEDURE — 93005 ELECTROCARDIOGRAM TRACING: CPT

## 2023-12-23 PROCEDURE — 84100 ASSAY OF PHOSPHORUS: CPT

## 2023-12-23 PROCEDURE — 86850 RBC ANTIBODY SCREEN: CPT

## 2023-12-23 PROCEDURE — 92610 EVALUATE SWALLOWING FUNCTION: CPT

## 2023-12-23 PROCEDURE — C1751: CPT

## 2023-12-23 PROCEDURE — 80048 BASIC METABOLIC PNL TOTAL CA: CPT

## 2023-12-23 PROCEDURE — 76937 US GUIDE VASCULAR ACCESS: CPT

## 2023-12-23 PROCEDURE — 85610 PROTHROMBIN TIME: CPT

## 2023-12-23 PROCEDURE — 80053 COMPREHEN METABOLIC PANEL: CPT

## 2023-12-23 PROCEDURE — 83036 HEMOGLOBIN GLYCOSYLATED A1C: CPT

## 2023-12-23 PROCEDURE — 86900 BLOOD TYPING SEROLOGIC ABO: CPT

## 2023-12-23 PROCEDURE — 86901 BLOOD TYPING SEROLOGIC RH(D): CPT

## 2023-12-23 PROCEDURE — 99285 EMERGENCY DEPT VISIT HI MDM: CPT

## 2023-12-23 PROCEDURE — 86923 COMPATIBILITY TEST ELECTRIC: CPT

## 2023-12-23 PROCEDURE — 87641 MR-STAPH DNA AMP PROBE: CPT

## 2023-12-23 PROCEDURE — 36430 TRANSFUSION BLD/BLD COMPNT: CPT

## 2023-12-23 RX ADMIN — MUPIROCIN 1 APPLICATION(S): 20 OINTMENT TOPICAL at 06:10

## 2023-12-23 RX ADMIN — LEVETIRACETAM 750 MILLIGRAM(S): 250 TABLET, FILM COATED ORAL at 06:11

## 2023-12-23 RX ADMIN — Medication 6 MILLIGRAM(S): at 06:08

## 2023-12-23 RX ADMIN — Medication 1 TABLET(S): at 11:43

## 2023-12-23 RX ADMIN — Medication 1 APPLICATION(S): at 06:06

## 2023-12-23 RX ADMIN — Medication 500 MILLIGRAM(S): at 11:43

## 2023-12-23 RX ADMIN — LACTULOSE 10 GRAM(S): 10 SOLUTION ORAL at 11:44

## 2023-12-23 RX ADMIN — MEROPENEM 100 MILLIGRAM(S): 1 INJECTION INTRAVENOUS at 06:10

## 2023-12-23 RX ADMIN — CHLORHEXIDINE GLUCONATE 1 APPLICATION(S): 213 SOLUTION TOPICAL at 06:58

## 2023-12-23 RX ADMIN — CHLORHEXIDINE GLUCONATE 1 APPLICATION(S): 213 SOLUTION TOPICAL at 12:17

## 2023-12-23 RX ADMIN — Medication 2: at 08:25

## 2023-12-23 RX ADMIN — Medication 4: at 11:38

## 2023-12-23 RX ADMIN — Medication 1 APPLICATION(S): at 12:18

## 2023-12-25 ENCOUNTER — INPATIENT (INPATIENT)
Facility: HOSPITAL | Age: 68
LOS: 3 days | Discharge: NURSING, REHAB OR LONG-TERM CARE | DRG: 871 | End: 2023-12-29
Attending: STUDENT IN AN ORGANIZED HEALTH CARE EDUCATION/TRAINING PROGRAM | Admitting: STUDENT IN AN ORGANIZED HEALTH CARE EDUCATION/TRAINING PROGRAM
Payer: MEDICARE

## 2023-12-25 VITALS — DIASTOLIC BLOOD PRESSURE: 56 MMHG | SYSTOLIC BLOOD PRESSURE: 84 MMHG | HEIGHT: 72 IN

## 2023-12-25 DIAGNOSIS — Z29.9 ENCOUNTER FOR PROPHYLACTIC MEASURES, UNSPECIFIED: ICD-10-CM

## 2023-12-25 DIAGNOSIS — R09.02 HYPOXEMIA: ICD-10-CM

## 2023-12-25 DIAGNOSIS — M86.9 OSTEOMYELITIS, UNSPECIFIED: ICD-10-CM

## 2023-12-25 DIAGNOSIS — A41.9 SEPSIS, UNSPECIFIED ORGANISM: ICD-10-CM

## 2023-12-25 DIAGNOSIS — N17.9 ACUTE KIDNEY FAILURE, UNSPECIFIED: ICD-10-CM

## 2023-12-25 DIAGNOSIS — D64.9 ANEMIA, UNSPECIFIED: ICD-10-CM

## 2023-12-25 DIAGNOSIS — I95.9 HYPOTENSION, UNSPECIFIED: ICD-10-CM

## 2023-12-25 PROBLEM — G40.909 EPILEPSY, UNSPECIFIED, NOT INTRACTABLE, WITHOUT STATUS EPILEPTICUS: Chronic | Status: ACTIVE | Noted: 2023-12-14

## 2023-12-25 PROBLEM — E11.9 TYPE 2 DIABETES MELLITUS WITHOUT COMPLICATIONS: Chronic | Status: ACTIVE | Noted: 2023-12-14

## 2023-12-25 PROBLEM — S06.9XAA UNSPECIFIED INTRACRANIAL INJURY WITH LOSS OF CONSCIOUSNESS STATUS UNKNOWN, INITIAL ENCOUNTER: Chronic | Status: ACTIVE | Noted: 2023-12-14

## 2023-12-25 LAB
ALBUMIN SERPL ELPH-MCNC: 1.6 G/DL — LOW (ref 3.5–5)
ALBUMIN SERPL ELPH-MCNC: 1.6 G/DL — LOW (ref 3.5–5)
ALP SERPL-CCNC: 46 U/L — SIGNIFICANT CHANGE UP (ref 40–120)
ALP SERPL-CCNC: 46 U/L — SIGNIFICANT CHANGE UP (ref 40–120)
ALT FLD-CCNC: 8 U/L DA — LOW (ref 10–60)
ALT FLD-CCNC: 8 U/L DA — LOW (ref 10–60)
ANION GAP SERPL CALC-SCNC: 8 MMOL/L — SIGNIFICANT CHANGE UP (ref 5–17)
ANION GAP SERPL CALC-SCNC: 8 MMOL/L — SIGNIFICANT CHANGE UP (ref 5–17)
APPEARANCE UR: CLEAR — SIGNIFICANT CHANGE UP
APPEARANCE UR: CLEAR — SIGNIFICANT CHANGE UP
APTT BLD: 38.6 SEC — HIGH (ref 24.5–35.6)
APTT BLD: 38.6 SEC — HIGH (ref 24.5–35.6)
AST SERPL-CCNC: 7 U/L — LOW (ref 10–40)
AST SERPL-CCNC: 7 U/L — LOW (ref 10–40)
BACTERIA # UR AUTO: NEGATIVE /HPF — SIGNIFICANT CHANGE UP
BACTERIA # UR AUTO: NEGATIVE /HPF — SIGNIFICANT CHANGE UP
BASOPHILS # BLD AUTO: 0.07 K/UL — SIGNIFICANT CHANGE UP (ref 0–0.2)
BASOPHILS # BLD AUTO: 0.07 K/UL — SIGNIFICANT CHANGE UP (ref 0–0.2)
BASOPHILS NFR BLD AUTO: 0.3 % — SIGNIFICANT CHANGE UP (ref 0–2)
BASOPHILS NFR BLD AUTO: 0.3 % — SIGNIFICANT CHANGE UP (ref 0–2)
BILIRUB SERPL-MCNC: 0.5 MG/DL — SIGNIFICANT CHANGE UP (ref 0.2–1.2)
BILIRUB SERPL-MCNC: 0.5 MG/DL — SIGNIFICANT CHANGE UP (ref 0.2–1.2)
BILIRUB UR-MCNC: NEGATIVE — SIGNIFICANT CHANGE UP
BILIRUB UR-MCNC: NEGATIVE — SIGNIFICANT CHANGE UP
BLD GP AB SCN SERPL QL: SIGNIFICANT CHANGE UP
BLD GP AB SCN SERPL QL: SIGNIFICANT CHANGE UP
BUN SERPL-MCNC: 21 MG/DL — HIGH (ref 7–18)
BUN SERPL-MCNC: 21 MG/DL — HIGH (ref 7–18)
CALCIUM SERPL-MCNC: 7.9 MG/DL — LOW (ref 8.4–10.5)
CALCIUM SERPL-MCNC: 7.9 MG/DL — LOW (ref 8.4–10.5)
CHLORIDE SERPL-SCNC: 105 MMOL/L — SIGNIFICANT CHANGE UP (ref 96–108)
CHLORIDE SERPL-SCNC: 105 MMOL/L — SIGNIFICANT CHANGE UP (ref 96–108)
CO2 SERPL-SCNC: 22 MMOL/L — SIGNIFICANT CHANGE UP (ref 22–31)
CO2 SERPL-SCNC: 22 MMOL/L — SIGNIFICANT CHANGE UP (ref 22–31)
COLOR SPEC: YELLOW — SIGNIFICANT CHANGE UP
COLOR SPEC: YELLOW — SIGNIFICANT CHANGE UP
CREAT SERPL-MCNC: 1.35 MG/DL — HIGH (ref 0.5–1.3)
CREAT SERPL-MCNC: 1.35 MG/DL — HIGH (ref 0.5–1.3)
DIFF PNL FLD: NEGATIVE — SIGNIFICANT CHANGE UP
DIFF PNL FLD: NEGATIVE — SIGNIFICANT CHANGE UP
EGFR: 58 ML/MIN/1.73M2 — LOW
EGFR: 58 ML/MIN/1.73M2 — LOW
EOSINOPHIL # BLD AUTO: 0.03 K/UL — SIGNIFICANT CHANGE UP (ref 0–0.5)
EOSINOPHIL # BLD AUTO: 0.03 K/UL — SIGNIFICANT CHANGE UP (ref 0–0.5)
EOSINOPHIL NFR BLD AUTO: 0.1 % — SIGNIFICANT CHANGE UP (ref 0–6)
EOSINOPHIL NFR BLD AUTO: 0.1 % — SIGNIFICANT CHANGE UP (ref 0–6)
EPI CELLS # UR: SIGNIFICANT CHANGE UP
EPI CELLS # UR: SIGNIFICANT CHANGE UP
GAS PNL BLDA: SIGNIFICANT CHANGE UP
GAS PNL BLDA: SIGNIFICANT CHANGE UP
GLUCOSE SERPL-MCNC: 315 MG/DL — HIGH (ref 70–99)
GLUCOSE SERPL-MCNC: 315 MG/DL — HIGH (ref 70–99)
GLUCOSE UR QL: NEGATIVE MG/DL — SIGNIFICANT CHANGE UP
GLUCOSE UR QL: NEGATIVE MG/DL — SIGNIFICANT CHANGE UP
HCT VFR BLD CALC: 20.9 % — CRITICAL LOW (ref 39–50)
HCT VFR BLD CALC: 20.9 % — CRITICAL LOW (ref 39–50)
HGB BLD-MCNC: 6.6 G/DL — CRITICAL LOW (ref 13–17)
HGB BLD-MCNC: 6.6 G/DL — CRITICAL LOW (ref 13–17)
IMM GRANULOCYTES NFR BLD AUTO: 2.3 % — HIGH (ref 0–0.9)
IMM GRANULOCYTES NFR BLD AUTO: 2.3 % — HIGH (ref 0–0.9)
INR BLD: 1.21 RATIO — HIGH (ref 0.85–1.18)
INR BLD: 1.21 RATIO — HIGH (ref 0.85–1.18)
KETONES UR-MCNC: NEGATIVE MG/DL — SIGNIFICANT CHANGE UP
KETONES UR-MCNC: NEGATIVE MG/DL — SIGNIFICANT CHANGE UP
LACTATE SERPL-SCNC: 2.8 MMOL/L — HIGH (ref 0.7–2)
LACTATE SERPL-SCNC: 2.8 MMOL/L — HIGH (ref 0.7–2)
LACTATE SERPL-SCNC: 3.9 MMOL/L — HIGH (ref 0.7–2)
LACTATE SERPL-SCNC: 3.9 MMOL/L — HIGH (ref 0.7–2)
LEUKOCYTE ESTERASE UR-ACNC: NEGATIVE — SIGNIFICANT CHANGE UP
LEUKOCYTE ESTERASE UR-ACNC: NEGATIVE — SIGNIFICANT CHANGE UP
LYMPHOCYTES # BLD AUTO: 2.28 K/UL — SIGNIFICANT CHANGE UP (ref 1–3.3)
LYMPHOCYTES # BLD AUTO: 2.28 K/UL — SIGNIFICANT CHANGE UP (ref 1–3.3)
LYMPHOCYTES # BLD AUTO: 8.2 % — LOW (ref 13–44)
LYMPHOCYTES # BLD AUTO: 8.2 % — LOW (ref 13–44)
MCHC RBC-ENTMCNC: 25.4 PG — LOW (ref 27–34)
MCHC RBC-ENTMCNC: 25.4 PG — LOW (ref 27–34)
MCHC RBC-ENTMCNC: 31.6 GM/DL — LOW (ref 32–36)
MCHC RBC-ENTMCNC: 31.6 GM/DL — LOW (ref 32–36)
MCV RBC AUTO: 80.4 FL — SIGNIFICANT CHANGE UP (ref 80–100)
MCV RBC AUTO: 80.4 FL — SIGNIFICANT CHANGE UP (ref 80–100)
MONOCYTES # BLD AUTO: 1.61 K/UL — HIGH (ref 0–0.9)
MONOCYTES # BLD AUTO: 1.61 K/UL — HIGH (ref 0–0.9)
MONOCYTES NFR BLD AUTO: 5.8 % — SIGNIFICANT CHANGE UP (ref 2–14)
MONOCYTES NFR BLD AUTO: 5.8 % — SIGNIFICANT CHANGE UP (ref 2–14)
NEUTROPHILS # BLD AUTO: 23.11 K/UL — HIGH (ref 1.8–7.4)
NEUTROPHILS # BLD AUTO: 23.11 K/UL — HIGH (ref 1.8–7.4)
NEUTROPHILS NFR BLD AUTO: 83.3 % — HIGH (ref 43–77)
NEUTROPHILS NFR BLD AUTO: 83.3 % — HIGH (ref 43–77)
NITRITE UR-MCNC: NEGATIVE — SIGNIFICANT CHANGE UP
NITRITE UR-MCNC: NEGATIVE — SIGNIFICANT CHANGE UP
NRBC # BLD: 0 /100 WBCS — SIGNIFICANT CHANGE UP (ref 0–0)
NRBC # BLD: 0 /100 WBCS — SIGNIFICANT CHANGE UP (ref 0–0)
PH UR: 8 — SIGNIFICANT CHANGE UP (ref 5–8)
PH UR: 8 — SIGNIFICANT CHANGE UP (ref 5–8)
PLATELET # BLD AUTO: 703 K/UL — HIGH (ref 150–400)
PLATELET # BLD AUTO: 703 K/UL — HIGH (ref 150–400)
POTASSIUM SERPL-MCNC: 4.7 MMOL/L — SIGNIFICANT CHANGE UP (ref 3.5–5.3)
POTASSIUM SERPL-MCNC: 4.7 MMOL/L — SIGNIFICANT CHANGE UP (ref 3.5–5.3)
POTASSIUM SERPL-SCNC: 4.7 MMOL/L — SIGNIFICANT CHANGE UP (ref 3.5–5.3)
POTASSIUM SERPL-SCNC: 4.7 MMOL/L — SIGNIFICANT CHANGE UP (ref 3.5–5.3)
PROT SERPL-MCNC: 6 G/DL — SIGNIFICANT CHANGE UP (ref 6–8.3)
PROT SERPL-MCNC: 6 G/DL — SIGNIFICANT CHANGE UP (ref 6–8.3)
PROT UR-MCNC: 30 MG/DL
PROT UR-MCNC: 30 MG/DL
PROTHROM AB SERPL-ACNC: 13.7 SEC — HIGH (ref 9.5–13)
PROTHROM AB SERPL-ACNC: 13.7 SEC — HIGH (ref 9.5–13)
RAPID RVP RESULT: DETECTED
RAPID RVP RESULT: DETECTED
RBC # BLD: 2.6 M/UL — LOW (ref 4.2–5.8)
RBC # BLD: 2.6 M/UL — LOW (ref 4.2–5.8)
RBC # FLD: 18.6 % — HIGH (ref 10.3–14.5)
RBC # FLD: 18.6 % — HIGH (ref 10.3–14.5)
RBC CASTS # UR COMP ASSIST: SIGNIFICANT CHANGE UP /HPF
RBC CASTS # UR COMP ASSIST: SIGNIFICANT CHANGE UP /HPF
SARS-COV-2 RNA SPEC QL NAA+PROBE: DETECTED
SARS-COV-2 RNA SPEC QL NAA+PROBE: DETECTED
SODIUM SERPL-SCNC: 135 MMOL/L — SIGNIFICANT CHANGE UP (ref 135–145)
SODIUM SERPL-SCNC: 135 MMOL/L — SIGNIFICANT CHANGE UP (ref 135–145)
SP GR SPEC: 1 — LOW (ref 1–1.03)
SP GR SPEC: 1 — LOW (ref 1–1.03)
UROBILINOGEN FLD QL: 1 MG/DL — SIGNIFICANT CHANGE UP (ref 0.2–1)
UROBILINOGEN FLD QL: 1 MG/DL — SIGNIFICANT CHANGE UP (ref 0.2–1)
WBC # BLD: 27.74 K/UL — HIGH (ref 3.8–10.5)
WBC # BLD: 27.74 K/UL — HIGH (ref 3.8–10.5)
WBC # FLD AUTO: 27.74 K/UL — HIGH (ref 3.8–10.5)
WBC # FLD AUTO: 27.74 K/UL — HIGH (ref 3.8–10.5)
WBC UR QL: 2 /HPF — SIGNIFICANT CHANGE UP (ref 0–5)
WBC UR QL: 2 /HPF — SIGNIFICANT CHANGE UP (ref 0–5)

## 2023-12-25 PROCEDURE — 71045 X-RAY EXAM CHEST 1 VIEW: CPT | Mod: 26

## 2023-12-25 PROCEDURE — 99291 CRITICAL CARE FIRST HOUR: CPT

## 2023-12-25 PROCEDURE — 99223 1ST HOSP IP/OBS HIGH 75: CPT | Mod: GC

## 2023-12-25 RX ORDER — LEVETIRACETAM 250 MG/1
750 TABLET, FILM COATED ORAL
Refills: 0 | Status: DISCONTINUED | OUTPATIENT
Start: 2023-12-25 | End: 2023-12-29

## 2023-12-25 RX ORDER — LACTULOSE 10 G/15ML
15 SOLUTION ORAL
Refills: 0 | DISCHARGE

## 2023-12-25 RX ORDER — SODIUM CHLORIDE 9 MG/ML
2000 INJECTION INTRAMUSCULAR; INTRAVENOUS; SUBCUTANEOUS ONCE
Refills: 0 | Status: COMPLETED | OUTPATIENT
Start: 2023-12-25 | End: 2023-12-25

## 2023-12-25 RX ORDER — NOREPINEPHRINE BITARTRATE/D5W 8 MG/250ML
0.05 PLASTIC BAG, INJECTION (ML) INTRAVENOUS
Qty: 16 | Refills: 0 | Status: DISCONTINUED | OUTPATIENT
Start: 2023-12-25 | End: 2023-12-25

## 2023-12-25 RX ORDER — SENNA PLUS 8.6 MG/1
2 TABLET ORAL
Refills: 0 | DISCHARGE

## 2023-12-25 RX ORDER — ACETAMINOPHEN 500 MG
650 TABLET ORAL EVERY 6 HOURS
Refills: 0 | Status: DISCONTINUED | OUTPATIENT
Start: 2023-12-25 | End: 2023-12-29

## 2023-12-25 RX ORDER — METFORMIN HYDROCHLORIDE 850 MG/1
1 TABLET ORAL
Refills: 0 | DISCHARGE

## 2023-12-25 RX ORDER — SODIUM CHLORIDE 9 MG/ML
1000 INJECTION, SOLUTION INTRAVENOUS
Refills: 0 | Status: DISCONTINUED | OUTPATIENT
Start: 2023-12-25 | End: 2023-12-26

## 2023-12-25 RX ORDER — SODIUM CHLORIDE 9 MG/ML
1000 INJECTION INTRAMUSCULAR; INTRAVENOUS; SUBCUTANEOUS ONCE
Refills: 0 | Status: COMPLETED | OUTPATIENT
Start: 2023-12-25 | End: 2023-12-25

## 2023-12-25 RX ORDER — ACETAMINOPHEN 500 MG
2 TABLET ORAL
Refills: 0 | DISCHARGE

## 2023-12-25 RX ORDER — POLYETHYLENE GLYCOL 3350 17 G/17G
17 POWDER, FOR SOLUTION ORAL DAILY
Refills: 0 | Status: DISCONTINUED | OUTPATIENT
Start: 2023-12-25 | End: 2023-12-29

## 2023-12-25 RX ORDER — SODIUM CHLORIDE 9 MG/ML
500 INJECTION, SOLUTION INTRAVENOUS ONCE
Refills: 0 | Status: COMPLETED | OUTPATIENT
Start: 2023-12-25 | End: 2023-12-25

## 2023-12-25 RX ORDER — SENNA PLUS 8.6 MG/1
2 TABLET ORAL AT BEDTIME
Refills: 0 | Status: DISCONTINUED | OUTPATIENT
Start: 2023-12-25 | End: 2023-12-29

## 2023-12-25 RX ORDER — MEROPENEM 1 G/30ML
1000 INJECTION INTRAVENOUS
Refills: 0 | DISCHARGE

## 2023-12-25 RX ORDER — LEVETIRACETAM 250 MG/1
1 TABLET, FILM COATED ORAL
Refills: 0 | DISCHARGE

## 2023-12-25 RX ORDER — ENOXAPARIN SODIUM 100 MG/ML
40 INJECTION SUBCUTANEOUS
Refills: 0 | DISCHARGE

## 2023-12-25 RX ORDER — POLYETHYLENE GLYCOL 3350 17 G/17G
17 POWDER, FOR SOLUTION ORAL
Refills: 0 | DISCHARGE

## 2023-12-25 RX ORDER — COLLAGENASE CLOSTRIDIUM HIST. 250 UNIT/G
1 OINTMENT (GRAM) TOPICAL
Refills: 0 | DISCHARGE

## 2023-12-25 RX ORDER — ASCORBIC ACID 60 MG
1 TABLET,CHEWABLE ORAL
Refills: 0 | DISCHARGE

## 2023-12-25 RX ORDER — MEROPENEM 1 G/30ML
1000 INJECTION INTRAVENOUS EVERY 8 HOURS
Refills: 0 | Status: DISCONTINUED | OUTPATIENT
Start: 2023-12-25 | End: 2023-12-29

## 2023-12-25 RX ORDER — MEROPENEM 1 G/30ML
1000 INJECTION INTRAVENOUS ONCE
Refills: 0 | Status: COMPLETED | OUTPATIENT
Start: 2023-12-25 | End: 2023-12-25

## 2023-12-25 RX ADMIN — SENNA PLUS 2 TABLET(S): 8.6 TABLET ORAL at 22:59

## 2023-12-25 RX ADMIN — LEVETIRACETAM 750 MILLIGRAM(S): 250 TABLET, FILM COATED ORAL at 22:57

## 2023-12-25 RX ADMIN — MEROPENEM 100 MILLIGRAM(S): 1 INJECTION INTRAVENOUS at 21:15

## 2023-12-25 RX ADMIN — MEROPENEM 100 MILLIGRAM(S): 1 INJECTION INTRAVENOUS at 11:40

## 2023-12-25 RX ADMIN — SODIUM CHLORIDE 2000 MILLILITER(S): 9 INJECTION INTRAMUSCULAR; INTRAVENOUS; SUBCUTANEOUS at 11:31

## 2023-12-25 RX ADMIN — SODIUM CHLORIDE 1000 MILLILITER(S): 9 INJECTION, SOLUTION INTRAVENOUS at 15:23

## 2023-12-25 RX ADMIN — SODIUM CHLORIDE 1000 MILLILITER(S): 9 INJECTION INTRAMUSCULAR; INTRAVENOUS; SUBCUTANEOUS at 14:15

## 2023-12-25 RX ADMIN — SODIUM CHLORIDE 60 MILLILITER(S): 9 INJECTION, SOLUTION INTRAVENOUS at 15:24

## 2023-12-25 NOTE — ED PROVIDER NOTE - CLINICAL SUMMARY MEDICAL DECISION MAKING FREE TEXT BOX
Patient presenting with shock pathology - could be recurrent sepsis (although currently on meropenem via PICC) - will restart sepsis bundle, continue meropenem, lactate, CXR, fluid boluses Wound Care: Vaseline

## 2023-12-25 NOTE — H&P ADULT - PROBLEM SELECTOR PLAN 1
p/w hypoxia, low BP   in ED: afebrile, , BP 84/56  repeat vitals afebrile , /75, RR 15  leukocytosis of 27.74 with left shift  Lactate 3.9 >2.8  U/A neg   COVID positive [positive on 12/13 previous admission]  CXR neg for consolidation or effusion   likely sepsis 2/2 sacral wound ulcer, osteomyelitis   s/p 3.5 L bolus in ED   c/w LR 60 ml/hr  c/w Meropenem 1g q8   ICU consulted and rejected   f/u urine and blood cultures [last BCx, Ucx neg] p/w hypoxia, low BP   in ED: afebrile, , BP 84/56  repeat vitals afebrile , /75, RR 15  leukocytosis of 27.74 with left shift  Lactate 3.9 >2.8  U/A neg   COVID positive [positive on 12/13 previous admission]  CXR neg for consolidation or effusion   likely sepsis 2/2 sacral wound ulcer, osteomyelitis   s/p 3.5 L bolus in ED   c/w LR 60 ml/hr  c/w Meropenem 1g q8   ICU consulted and rejected   f/u urine and blood cultures [last BCx, Ucx neg]  f/u wound culture   Surgery consulted

## 2023-12-25 NOTE — ED PROVIDER NOTE - PRO INTERPRETER NEED 2
Right TM is erythematous with bone land markings obliterated; slight amount of white discharge in the auditory canal.
English

## 2023-12-25 NOTE — ED PROVIDER NOTE - OBJECTIVE STATEMENT
68-year-old man brought from nursing home for hypotension.  Found this morning to be decreased responsiveness and had low blood pressure in the field.  Was discharged from hospital yesterday after admission for sepsis presumptively secondary to infected sacral pressure ulcer.  Currently is on meropenem via PICC.  On arrival patient is awake and alert but unable to provide history.

## 2023-12-25 NOTE — CONSULT NOTE ADULT - ASSESSMENT
68y.o. Male s/p debridement of stage 4 sacral decub ulcer 12/20 p/w hypotension and hypoxia   H/H 6.9/20.9 (7.9/24.5 2 days ago)   Leukocytosis 27        68y.o. Male s/p debridement of stage 4 sacral decub ulcer 12/20 p/w hypotension and hypoxia   H/H 6.9/20.9 (7.9/24.5 2 days ago)   Leukocytosis 27     PLAN  - Pressure dressing applied to decubitus ulcer   - Sacral ulcer unlikely source of anemia   - Transfuse PRN   - Further anemia workup per medicine  - Continue abx via PICC for osteomyelitis   - Collagenase to fibrinous slough areas; wound consult while admitted   - Wet to dry dressings  - Discussed with attending on call Dr. Aragon

## 2023-12-25 NOTE — H&P ADULT - ATTENDING COMMENTS
69 yo man referred from [facility] because of hypotension. He was found to have a hemoglobin of 6.0 and oozing from the recently-debrided sacral decubitus ulcer.  He was discharged yesterday after an admission for encephalopathy, found to have pneumonia, sepsis, and sacral decubitus ulcers.  Cultures were negative and he was treated empirically with meropenem.  He was discharged to complete six weeks of antibiotics at [facility].    PMI: TBI with subdural, right hemiparesis with contracture.  During the last admission encephalopathy improved.  Before discharge he felt much better, but still had severe protein-calorie malnutrition.     Alert, cooperative man  Vital Signs Last 24 Hrs  T(C): 36.4 (25 Dec 2023 23:00), Max: 36.9 (25 Dec 2023 10:54)  T(F): 97.6 (25 Dec 2023 23:00), Max: 98.5 (25 Dec 2023 10:54)  HR: 87 (25 Dec 2023 23:00) (87 - 142)  BP: 101/66 (25 Dec 2023 23:00) (69/53 - 118/76)  BP(mean): 75 (25 Dec 2023 21:47) (75 - 75)  RR: 18 (25 Dec 2023 23:00) (15 - 20)  SpO2: 100% (25 Dec 2023 23:00) (98% - 100%)    Parameters below as of 25 Dec 2023 23:00  Patient On (Oxygen Delivery Method): nasal cannula  O2 Flow (L/min): 4  Lungs, clear  Cor, RRR  Abdomen, soft  Stage IV decubitus ulcer with oozing, minimal exudate and minimal necrotic eschar  Neurological, alert, right hemiparesis with contracture                          6.6    27.74 )-----------( 703      ( 25 Dec 2023 11:00 )             20.9   12-25    135  |  105  |  21<H>  ----------------------------<  315<H>  4.7   |  22  |  1.35<H>    Ca    7.9<L>      25 Dec 2023 11:00    TPro  6.0  /  Alb  1.6<L>  /  TBili  0.5  /  DBili  x   /  AST  7<L>  /  ALT  8<L>  /  AlkPhos  46  12-25    iINR: 1.21: Recommended targets/ranges for therapeutic INR:     cultures have yielded no growth  Surgical path is not available from recent debridement    CXR  IMPRESSION: No gross consolidation is seen. Left-sided PICC line catheter noted with the distal tip overlying the superior vena cava.    IMP:  Hypotension is likely secondary to blood loss from the sacral ulcer.               No obvious evidence of bleeding diathesis             Leukocytosis and thrombocytosis are most likely reactive             Severe protein-calorie malnutrition because of chronic illness              TBI with hemiplegia is stable  Plan: Tx one unit PRBC             Surgical consultation for control of bleeding in the sacral ulcer             Cultures, resume empirical antibiotics             Nutrition consultation for support  Discussed with Dr. Banks and with surgical PA. 69 yo man referred from [facility] because of hypotension. He was found to have a hemoglobin of 6.0 and oozing from the recently-debrided sacral decubitus ulcer.  He was discharged yesterday after an admission for encephalopathy, found to have pneumonia, sepsis, and sacral decubitus ulcers.  Cultures were negative and he was treated empirically with meropenem.  He was discharged to complete six weeks of antibiotics at [facility].    PMI: TBI with subdural, right hemiparesis with contracture.  During the last admission encephalopathy improved.  Before discharge he felt much better, but still had severe protein-calorie malnutrition.     Alert, cooperative man  Vital Signs Last 24 Hrs  T(C): 36.4 (25 Dec 2023 23:00), Max: 36.9 (25 Dec 2023 10:54)  T(F): 97.6 (25 Dec 2023 23:00), Max: 98.5 (25 Dec 2023 10:54)  HR: 87 (25 Dec 2023 23:00) (87 - 142)  BP: 101/66 (25 Dec 2023 23:00) (69/53 - 118/76)  BP(mean): 75 (25 Dec 2023 21:47) (75 - 75)  RR: 18 (25 Dec 2023 23:00) (15 - 20)  SpO2: 100% (25 Dec 2023 23:00) (98% - 100%)    Parameters below as of 25 Dec 2023 23:00  Patient On (Oxygen Delivery Method): nasal cannula  O2 Flow (L/min): 4  Lungs, clear  Cor, RRR  Abdomen, soft  Stage IV decubitus ulcer with oozing, minimal exudate and minimal necrotic eschar  Neurological, alert, right hemiparesis with contracture                          6.6    27.74 )-----------( 703      ( 25 Dec 2023 11:00 )             20.9   12-25    135  |  105  |  21<H>  ----------------------------<  315<H>  4.7   |  22  |  1.35<H>    Ca    7.9<L>      25 Dec 2023 11:00    TPro  6.0  /  Alb  1.6<L>  /  TBili  0.5  /  DBili  x   /  AST  7<L>  /  ALT  8<L>  /  AlkPhos  46  12-25    iINR: 1.21: Recommended targets/ranges for therapeutic INR:     cultures have yielded no growth  Surgical path is not available from recent debridement    CXR  IMPRESSION: No gross consolidation is seen. Left-sided PICC line catheter noted with the distal tip overlying the superior vena cava.    IMP:  Hypotension is likely secondary to blood loss from the sacral ulcer.               No obvious evidence of bleeding diathesis             Leukocytosis and thrombocytosis are most likely reactive             Severe protein-calorie malnutrition because of chronic illness              TBI with hemiplegia is stable  Plan: Tx one unit PRBC             Surgical consultation for control of bleeding in the sacral ulcer             Cultures, resume empirical antibiotics             Nutrition consultation for support  Discussed with Dr. Banks and with surgical PA.

## 2023-12-25 NOTE — CONSULT NOTE ADULT - SUBJECTIVE AND OBJECTIVE BOX
HPI:  68 yrs old M from [facility] with pmhx of TBI  w/ seizure disorder, DM, sacral decubitus ulcer, recently discharged from [facility] on  with main dx of  Sepsis likely 2/2 sacral ulcer, found to have extensive sacral decubitus ulcer with fragmentation/osteomyelitis of the the coccyx, underwent debridement of sacral ulcer on  with Dr. Grewal and was d/c with PICC line to complete abx course for osteomyelitis. Pt is presented to the ED from his facility with low BP and hypoxia. Found to be anemic Hgb 6.6(7.9 on dc two days ago). Surgery consulted to evaluate wound as source of bleeding. At time of visit, pt denies pain, lightheadedness/dizziness, fevers, chills, hematemsis, hematochezia.     PAST MEDICAL & SURGICAL HISTORY:  TBI (traumatic brain injury)      Seizure disorder      DM (diabetes mellitus)          MEDICATIONS  (STANDING):  lactated ringers. 1000 milliLiter(s) (60 mL/Hr) IV Continuous <Continuous>  meropenem  IVPB 1000 milliGRAM(s) IV Intermittent every 8 hours    MEDICATIONS  (PRN):      Allergies    No Known Allergies    Intolerances        ROS: Negative except per HPI.     SOCIAL HISTORY:  Smoking history   ETOH history    OBJECTIVE:    Vital Signs Last 24 Hrs  T(C): 36.9 (25 Dec 2023 11:43), Max: 36.9 (25 Dec 2023 10:54)  T(F): 98.5 (25 Dec 2023 11:43), Max: 98.5 (25 Dec 2023 10:54)  HR: 100 (25 Dec 2023 17:22) (100 - 142)  BP: 114/79 (25 Dec 2023 17:22) (69/53 - 118/76)  BP(mean): --  RR: 15 (25 Dec 2023 17:22) (15 - 20)  SpO2: 100% (25 Dec 2023 17:22) (100% - 100%)    Parameters below as of 25 Dec 2023 17:22  Patient On (Oxygen Delivery Method): nasal cannula  O2 Flow (L/min): 4      I&O's Summary      LABS:                        6.6    27.74 )-----------( 703      ( 25 Dec 2023 11:00 )             20.9         135  |  105  |  21<H>  ----------------------------<  315<H>  4.7   |  22  |  1.35<H>    Ca    7.9<L>      25 Dec 2023 11:00    TPro  6.0  /  Alb  1.6<L>  /  TBili  0.5  /  DBili  x   /  AST  7<L>  /  ALT  8<L>  /  AlkPhos  46  12-25    PT/INR - ( 25 Dec 2023 11:00 )   PT: 13.7 sec;   INR: 1.21 ratio         PTT - ( 25 Dec 2023 11:00 )  PTT:38.6 sec  Urinalysis Basic - ( 25 Dec 2023 11:00 )    Color: Yellow / Appearance: Clear / S.001 / pH: x  Gluc: 315 mg/dL / Ketone: Negative mg/dL  / Bili: Negative / Urobili: 1.0 mg/dL   Blood: x / Protein: 30 mg/dL / Nitrite: Negative   Leuk Esterase: Negative / RBC: None Seen /HPF / WBC 2 /HPF   Sq Epi: x / Non Sq Epi: x / Bacteria: Negative /HPF      CAPILLARY BLOOD GLUCOSE      POCT Blood Glucose.: 235 mg/dL (25 Dec 2023 10:47)    LIVER FUNCTIONS - ( 25 Dec 2023 11:00 )  Alb: 1.6 g/dL / Pro: 6.0 g/dL / ALK PHOS: 46 U/L / ALT: 8 U/L DA / AST: 7 U/L / GGT: x             Cultures:      PHYSICAL EXAM:   General: AOx3, NAD.   Cardio: RR  Pulm: Normal chest rise and expansion. Non-labored breathing.  GI: abd soft, nondistended, nontender  : Julien w/ clear yellow urine  Extremities: Warm, dry with 3 sec cap refill. No edema or swelling noted b/l.  No overlying skin changes noted. No calf tenderness b/l.   Wound: Large sacral decubitus ulcer, approximately 10cm x 7 cm with undermining caudally, coccyx visible, no active bleeding or purulent drainage noted. No tenderness. No crepitus or fluctuance. Wound base pink with serosanguinous drainage also with wound to lateral buttock, some seropurulent drainage.     RADIOLOGY & ADDITIONAL STUDIES:   HPI:  68 yrs old M from [facility] with pmhx of TBI  w/ seizure disorder, DM, sacral decubitus ulcer, [facility]  with main dx of  Sepsis likely 2/2 sacral ulcer, found to have extensive sacral decubitus ulcer with fragmentation/osteomyelitis of the the coccyx, underwent debridement of sacral ulcer on  with Dr. Grewal and was d/c with PICC line to complete abx course for osteomyelitis. Pt is presented to the ED from his facility with low BP and hypoxia. Found to be anemic Hgb 6.6(7.9 on dc two days ago). Surgery consulted to evaluate wound as source of bleeding. At time of visit, pt denies pain, lightheadedness/dizziness, fevers, chills, hematemsis, hematochezia.     PAST MEDICAL & SURGICAL HISTORY:  TBI (traumatic brain injury)      Seizure disorder      DM (diabetes mellitus)          MEDICATIONS  (STANDING):  lactated ringers. 1000 milliLiter(s) (60 mL/Hr) IV Continuous <Continuous>  meropenem  IVPB 1000 milliGRAM(s) IV Intermittent every 8 hours    MEDICATIONS  (PRN):      Allergies    No Known Allergies    Intolerances        ROS: Negative except per HPI.     SOCIAL HISTORY:  Smoking history   ETOH history    OBJECTIVE:    Vital Signs Last 24 Hrs  T(C): 36.9 (25 Dec 2023 11:43), Max: 36.9 (25 Dec 2023 10:54)  T(F): 98.5 (25 Dec 2023 11:43), Max: 98.5 (25 Dec 2023 10:54)  HR: 100 (25 Dec 2023 17:22) (100 - 142)  BP: 114/79 (25 Dec 2023 17:22) (69/53 - 118/76)  BP(mean): --  RR: 15 (25 Dec 2023 17:22) (15 - 20)  SpO2: 100% (25 Dec 2023 17:22) (100% - 100%)    Parameters below as of 25 Dec 2023 17:22  Patient On (Oxygen Delivery Method): nasal cannula  O2 Flow (L/min): 4      I&O's Summary      LABS:                        6.6    27.74 )-----------( 703      ( 25 Dec 2023 11:00 )             20.9         135  |  105  |  21<H>  ----------------------------<  315<H>  4.7   |  22  |  1.35<H>    Ca    7.9<L>      25 Dec 2023 11:00    TPro  6.0  /  Alb  1.6<L>  /  TBili  0.5  /  DBili  x   /  AST  7<L>  /  ALT  8<L>  /  AlkPhos  46  12-25    PT/INR - ( 25 Dec 2023 11:00 )   PT: 13.7 sec;   INR: 1.21 ratio         PTT - ( 25 Dec 2023 11:00 )  PTT:38.6 sec  Urinalysis Basic - ( 25 Dec 2023 11:00 )    Color: Yellow / Appearance: Clear / S.001 / pH: x  Gluc: 315 mg/dL / Ketone: Negative mg/dL  / Bili: Negative / Urobili: 1.0 mg/dL   Blood: x / Protein: 30 mg/dL / Nitrite: Negative   Leuk Esterase: Negative / RBC: None Seen /HPF / WBC 2 /HPF   Sq Epi: x / Non Sq Epi: x / Bacteria: Negative /HPF      CAPILLARY BLOOD GLUCOSE      POCT Blood Glucose.: 235 mg/dL (25 Dec 2023 10:47)    LIVER FUNCTIONS - ( 25 Dec 2023 11:00 )  Alb: 1.6 g/dL / Pro: 6.0 g/dL / ALK PHOS: 46 U/L / ALT: 8 U/L DA / AST: 7 U/L / GGT: x             Cultures:      PHYSICAL EXAM:   General: AOx3, NAD.   Cardio: RR  Pulm: Normal chest rise and expansion. Non-labored breathing.  GI: abd soft, nondistended, nontender  : Julien w/ clear yellow urine  Extremities: Warm, dry with 3 sec cap refill. No edema or swelling noted b/l.  No overlying skin changes noted. No calf tenderness b/l.   Wound: Large sacral decubitus ulcer, approximately 10cm x 7 cm with undermining caudally, coccyx visible, no active bleeding or purulent drainage noted. No tenderness. No crepitus or fluctuance. Wound base pink with serosanguinous drainage also with wound to lateral buttock, some seropurulent drainage.     RADIOLOGY & ADDITIONAL STUDIES:

## 2023-12-25 NOTE — CONSULT NOTE ADULT - SUBJECTIVE AND OBJECTIVE BOX
Patient is a 68y old  Male who presents with a chief complaint of     HPI:      PAST MEDICAL & SURGICAL HISTORY:  TBI (traumatic brain injury)      Seizure disorder      DM (diabetes mellitus)          SOCIAL HX:   Smoking                         ETOH                            Other    FAMILY HISTORY:  :  No known cardiovacular family hisotry     ROS:  See HPI     Allergies    No Known Allergies    Intolerances          PHYSICAL EXAM    ICU Vital Signs Last 24 Hrs  T(C): 36.9 (25 Dec 2023 11:43), Max: 36.9 (25 Dec 2023 10:54)  T(F): 98.5 (25 Dec 2023 11:43), Max: 98.5 (25 Dec 2023 10:54)  HR: 111 (25 Dec 2023 14:02) (107 - 142)  BP: 109/70 (25 Dec 2023 14:02) (69/53 - 118/76)  BP(mean): --  ABP: --  ABP(mean): --  RR: 16 (25 Dec 2023 14:02) (16 - 20)  SpO2: --        General: Not in distress  HEENT:  JASON              Lymphatic system: No LN  Lungs: Bilateral BS  Cardiovascular: Regular  Gastrointestinal: Soft, Positive BS  Musculoskeletal: No clubbing.  Moves all extremities.    Skin: Warm.  Intact  Neurological: No motor or sensory deficit         LABS:                          6.6    27.74 )-----------( 703      ( 25 Dec 2023 11:00 )             20.9                                               12-25    135  |  105  |  21<H>  ----------------------------<  315<H>  4.7   |  22  |  1.35<H>    Ca    7.9<L>      25 Dec 2023 11:00    TPro  6.0  /  Alb  1.6<L>  /  TBili  0.5  /  DBili  x   /  AST  7<L>  /  ALT  8<L>  /  AlkPhos  46  12-25      PT/INR - ( 25 Dec 2023 11:00 )   PT: 13.7 sec;   INR: 1.21 ratio         PTT - ( 25 Dec 2023 11:00 )  PTT:38.6 sec                                       Urinalysis Basic - ( 25 Dec 2023 11:00 )    Color: Yellow / Appearance: Clear / S.001 / pH: x  Gluc: 315 mg/dL / Ketone: Negative mg/dL  / Bili: Negative / Urobili: 1.0 mg/dL   Blood: x / Protein: 30 mg/dL / Nitrite: Negative   Leuk Esterase: Negative / RBC: None Seen /HPF / WBC 2 /HPF   Sq Epi: x / Non Sq Epi: x / Bacteria: Negative /HPF                                                  LIVER FUNCTIONS - ( 25 Dec 2023 11:00 )  Alb: 1.6 g/dL / Pro: 6.0 g/dL / ALK PHOS: 46 U/L / ALT: 8 U/L DA / AST: 7 U/L / GGT: x                                                                                                                                   ABG - ( 25 Dec 2023 11:15 )  pH, Arterial: 7.51  pH, Blood: x     /  pCO2: 24    /  pO2: 216   / HCO3: 19    / Base Excess: -3.3  /  SaO2: 100                 CXR:    ECHO:    MEDICATIONS  (STANDING):  norepinephrine Infusion 0.05 MICROgram(s)/kG/Min (2.74 mL/Hr) IV Continuous <Continuous>    MEDICATIONS  (PRN):         HPI: 68-year-old male, PMH of traumatic brain injury w/ seizure disorder, DM, sacral decubitus ulcer, sent from [facility], d/alexis on  from [facility] with main dx of  Sepsis likely 2/2 sacral ulcer, found to have extensive sacral decubitus ulcer with fragmentation/osteomyelitis of the the coccyx, underwent debridement of sacral ulcer on  , s/p  PICC line placment on . Pt was d/alexis on Meropenem to complete a 6 week course for osteomyelitis. EMS today from [facility] due to low BP and hypoxia.  As per ED attending note  he  was  found this morning to have decreased responsiveness and had low blood pressure in the field.  At the time of assessmentl patient is awake and alert but unable to provide any history.      PAST MEDICAL & SURGICAL HISTORY:  TBI (traumatic brain injury)      Seizure disorder      DM (diabetes mellitus)          SOCIAL HX:   Smoking                         ETOH                            Other    FAMILY HISTORY:  :  No known cardiovacular family hisotry     ROS:  See HPI     Allergies    No Known Allergies    Intolerances          PHYSICAL EXAM    ICU Vital Signs Last 24 Hrs  T(C): 36.9 (25 Dec 2023 11:43), Max: 36.9 (25 Dec 2023 10:54)  T(F): 98.5 (25 Dec 2023 11:43), Max: 98.5 (25 Dec 2023 10:54)  HR: 111 (25 Dec 2023 14:02) (107 - 142)  BP: 109/70 (25 Dec 2023 14:02) (69/53 - 118/76)  BP(mean): --  ABP: --  ABP(mean): --  RR: 16 (25 Dec 2023 14:02) (16 - 20)  SpO2: --        General: Not in distress  HEENT:  JASON. moist mucus membrane              Lungs: Bilateral BS, no rales, wheezing or ronchus  Cardiovascular: Regular rate and rhythm, tachycardic no murmurs   Gastrointestinal: Soft,  non-tender, not distended, Positive BS.   Musculoskeletal: No clubbing. Contracted  Skin: unstageable sacral pressure ulcer with small amt of oozing of blood  Neurological: AAOX1 (oriented to self), following simple commands, no focal deficits         LABS:                          6.6    27.74 )-----------( 703      ( 25 Dec 2023 11:00 )             20.9                                                   135  |  105  |  21<H>  ----------------------------<  315<H>  4.7   |  22  |  1.35<H>    Ca    7.9<L>      25 Dec 2023 11:00    TPro  6.0  /  Alb  1.6<L>  /  TBili  0.5  /  DBili  x   /  AST  7<L>  /  ALT  8<L>  /  AlkPhos  46        PT/INR - ( 25 Dec 2023 11:00 )   PT: 13.7 sec;   INR: 1.21 ratio         PTT - ( 25 Dec 2023 11:00 )  PTT:38.6 sec                                       Urinalysis Basic - ( 25 Dec 2023 11:00 )    Color: Yellow / Appearance: Clear / S.001 / pH: x  Gluc: 315 mg/dL / Ketone: Negative mg/dL  / Bili: Negative / Urobili: 1.0 mg/dL   Blood: x / Protein: 30 mg/dL / Nitrite: Negative   Leuk Esterase: Negative / RBC: None Seen /HPF / WBC 2 /HPF   Sq Epi: x / Non Sq Epi: x / Bacteria: Negative /HPF                                                  LIVER FUNCTIONS - ( 25 Dec 2023 11:00 )  Alb: 1.6 g/dL / Pro: 6.0 g/dL / ALK PHOS: 46 U/L / ALT: 8 U/L DA / AST: 7 U/L / GGT: x                                                                                                                                   ABG - ( 25 Dec 2023 11:15 )  pH, Arterial: 7.51  pH, Blood: x     /  pCO2: 24    /  pO2: 216   / HCO3: 19    / Base Excess: -3.3  /  SaO2: 100                 CXR:    ECHO:    MEDICATIONS  (STANDING):  norepinephrine Infusion 0.05 MICROgram(s)/kG/Min (2.74 mL/Hr) IV Continuous <Continuous>    MEDICATIONS  (PRN):         HPI: 68-year-old male, PMH of traumatic brain injury w/ seizure disorder, DM, sacral decubitus ulcer, sent from [facility], d/alexis on  from [facility] with main dx of  Sepsis likely 2/2 sacral ulcer, found to have extensive sacral decubitus ulcer with fragmentation/osteomyelitis of the the coccyx, underwent debridement of sacral ulcer on  , s/p  PICC line placment on . Pt was d/alexis on Meropenem to complete a 6 week course for osteomyelitis. EMS today from [facility] due to low BP and hypoxia.  As per ED attending note  he  was  found this morning to have decreased responsiveness and had low blood pressure in the field.  At the time of assessmentl patient is awake and alert but unable to provide any history.      PAST MEDICAL & SURGICAL HISTORY:  TBI (traumatic brain injury)      Seizure disorder      DM (diabetes mellitus)          SOCIAL HX:   Smoking                         ETOH                            Other    FAMILY HISTORY:  :  No known cardiovacular family hisotry     ROS:  See HPI     Allergies    No Known Allergies    Intolerances          PHYSICAL EXAM    ICU Vital Signs Last 24 Hrs  T(C): 36.9 (25 Dec 2023 11:43), Max: 36.9 (25 Dec 2023 10:54)  T(F): 98.5 (25 Dec 2023 11:43), Max: 98.5 (25 Dec 2023 10:54)  HR: 111 (25 Dec 2023 14:02) (107 - 142)  BP: 109/70 (25 Dec 2023 14:02) (69/53 - 118/76)  BP(mean): --  ABP: --  ABP(mean): --  RR: 16 (25 Dec 2023 14:02) (16 - 20)  SpO2: --        General: Not in distress  HEENT:  JASON. moist mucus membrane              Lungs: Bilateral BS, no rales, wheezing or ronchus  Cardiovascular: Regular rate and rhythm, tachycardic no murmurs   Gastrointestinal: Soft,  non-tender, not distended, Positive BS.   Musculoskeletal: No clubbing. Contracted  Skin: unstageable sacral pressure ulcer with small amt of oozing of blood  Neurological: AAOX1 (oriented to self), following simple commands, no focal deficits         LABS:                          6.6    27.74 )-----------( 703      ( 25 Dec 2023 11:00 )             20.9                                                   135  |  105  |  21<H>  ----------------------------<  315<H>  4.7   |  22  |  1.35<H>    Ca    7.9<L>      25 Dec 2023 11:00    TPro  6.0  /  Alb  1.6<L>  /  TBili  0.5  /  DBili  x   /  AST  7<L>  /  ALT  8<L>  /  AlkPhos  46        PT/INR - ( 25 Dec 2023 11:00 )   PT: 13.7 sec;   INR: 1.21 ratio         PTT - ( 25 Dec 2023 11:00 )  PTT:38.6 sec                                       Urinalysis Basic - ( 25 Dec 2023 11:00 )    Color: Yellow / Appearance: Clear / S.001 / pH: x  Gluc: 315 mg/dL / Ketone: Negative mg/dL  / Bili: Negative / Urobili: 1.0 mg/dL   Blood: x / Protein: 30 mg/dL / Nitrite: Negative   Leuk Esterase: Negative / RBC: None Seen /HPF / WBC 2 /HPF   Sq Epi: x / Non Sq Epi: x / Bacteria: Negative /HPF                                                  LIVER FUNCTIONS - ( 25 Dec 2023 11:00 )  Alb: 1.6 g/dL / Pro: 6.0 g/dL / ALK PHOS: 46 U/L / ALT: 8 U/L DA / AST: 7 U/L / GGT: x                                                                                                                                   ABG - ( 25 Dec 2023 11:15 )  pH, Arterial: 7.51  pH, Blood: x     /  pCO2: 24    /  pO2: 216   / HCO3: 19    / Base Excess: -3.3  /  SaO2: 100                 CXR:    ECHO:    MEDICATIONS  (STANDING):  norepinephrine Infusion 0.05 MICROgram(s)/kG/Min (2.74 mL/Hr) IV Continuous <Continuous>    MEDICATIONS  (PRN):         HPI: 68-year-old male, PMH of traumatic brain injury w/ seizure disorder, DM, sacral decubitus ulcer, sent from [facility], d/alexis on  from [facility]placed by [facility] with main dx of  Sepsis likely 2/2 sacral ulcer, found to have extensive sacral decubitus ulcer with fragmentation/osteomyelitis of the the coccyx, underwent debridement of sacral ulcer on  , s/p  PICC line placment on . Pt was d/alexis on Meropenem to complete a 6 week course for osteomyelitis. EMS today from [facility] due to low BP and hypoxia.  As per ED attending note  he  was  found this morning to have decreased responsiveness and had low blood pressure in the field.  At the time of assessmentl patient is awake and alert but unable to provide any history.      PAST MEDICAL & SURGICAL HISTORY:  TBI (traumatic brain injury)      Seizure disorder      DM (diabetes mellitus)          SOCIAL HX:   From facility    FAMILY HISTORY:  :  No known cardiovacular family hisotry     ROS:  See HPI     Allergies    No Known Allergies    Intolerances          PHYSICAL EXAM    ICU Vital Signs Last 24 Hrs  T(C): 36.9 (25 Dec 2023 11:43), Max: 36.9 (25 Dec 2023 10:54)  T(F): 98.5 (25 Dec 2023 11:43), Max: 98.5 (25 Dec 2023 10:54)  HR: 111 (25 Dec 2023 14:02) (107 - 142)  BP: 109/70 (25 Dec 2023 14:02) (69/53 - 118/76)  BP(mean): --  ABP: --  ABP(mean): --  RR: 16 (25 Dec 2023 14:02) (16 - 20)  SpO2: --        General: Not in distress  HEENT:  JASON. moist mucus membrane              Lungs: Bilateral BS, no rales, wheezing or ronchus  Cardiovascular: Regular rate and rhythm, tachycardic no murmurs   Gastrointestinal: Soft,  non-tender, not distended, Positive BS.   Musculoskeletal: No clubbing. Contracted  Skin: unstageable sacral pressure ulcer with small amt of oozing of blood  Neurological: AAOX1 (oriented to self), following simple commands, no focal deficits         LABS:                          6.6    27.74 )-----------( 703      ( 25 Dec 2023 11:00 )             20.9                                               12-    135  |  105  |  21<H>  ----------------------------<  315<H>  4.7   |  22  |  1.35<H>    Ca    7.9<L>      25 Dec 2023 11:00    TPro  6.0  /  Alb  1.6<L>  /  TBili  0.5  /  DBili  x   /  AST  7<L>  /  ALT  8<L>  /  AlkPhos  46        PT/INR - ( 25 Dec 2023 11:00 )   PT: 13.7 sec;   INR: 1.21 ratio         PTT - ( 25 Dec 2023 11:00 )  PTT:38.6 sec                                       Urinalysis Basic - ( 25 Dec 2023 11:00 )    Color: Yellow / Appearance: Clear / S.001 / pH: x  Gluc: 315 mg/dL / Ketone: Negative mg/dL  / Bili: Negative / Urobili: 1.0 mg/dL   Blood: x / Protein: 30 mg/dL / Nitrite: Negative   Leuk Esterase: Negative / RBC: None Seen /HPF / WBC 2 /HPF   Sq Epi: x / Non Sq Epi: x / Bacteria: Negative /HPF                                                  LIVER FUNCTIONS - ( 25 Dec 2023 11:00 )  Alb: 1.6 g/dL / Pro: 6.0 g/dL / ALK PHOS: 46 U/L / ALT: 8 U/L DA / AST: 7 U/L / GGT: x                                                                                                                                   ABG - ( 25 Dec 2023 11:15 )  pH, Arterial: 7.51  pH, Blood: x     /  pCO2: 24    /  pO2: 216   / HCO3: 19    / Base Excess: -3.3  /  SaO2: 100                 CXR:    ECHO:    MEDICATIONS  (STANDING):  norepinephrine Infusion 0.05 MICROgram(s)/kG/Min (2.74 mL/Hr) IV Continuous <Continuous>    MEDICATIONS  (PRN):         HPI: 68-year-old male, PMH of traumatic brain injury w/ seizure disorder, DM, sacral decubitus ulcer, sent from [facility], d/alexis on  from [facility] with main dx of  Sepsis likely 2/2 sacral ulcer, found to have extensive sacral decubitus ulcer with fragmentation/osteomyelitis of the the coccyx, underwent debridement of sacral ulcer on  , s/p  PICC line placment on . Pt was d/alexis on Meropenem to complete a 6 week course for osteomyelitis. EMS today from [facility] due to low BP and hypoxia.  As per ED attending note  he  was  found this morning to have decreased responsiveness and had low blood pressure in the field.  At the time of assessmentl patient is awake and alert but unable to provide any history.      PAST MEDICAL & SURGICAL HISTORY:  TBI (traumatic brain injury)      Seizure disorder      DM (diabetes mellitus)          SOCIAL HX:   From facility    FAMILY HISTORY:  :  No known cardiovacular family hisotry     ROS:  See HPI     Allergies    No Known Allergies    Intolerances          PHYSICAL EXAM    ICU Vital Signs Last 24 Hrs  T(C): 36.9 (25 Dec 2023 11:43), Max: 36.9 (25 Dec 2023 10:54)  T(F): 98.5 (25 Dec 2023 11:43), Max: 98.5 (25 Dec 2023 10:54)  HR: 111 (25 Dec 2023 14:02) (107 - 142)  BP: 109/70 (25 Dec 2023 14:02) (69/53 - 118/76)  BP(mean): --  ABP: --  ABP(mean): --  RR: 16 (25 Dec 2023 14:02) (16 - 20)  SpO2: --        General: Not in distress  HEENT:  JASON. moist mucus membrane              Lungs: Bilateral BS, no rales, wheezing or ronchus  Cardiovascular: Regular rate and rhythm, tachycardic no murmurs   Gastrointestinal: Soft,  non-tender, not distended, Positive BS.   Musculoskeletal: No clubbing. Contracted  Skin: unstageable sacral pressure ulcer with small amt of oozing of blood  Neurological: AAOX1 (oriented to self), following simple commands, no focal deficits         LABS:                          6.6    27.74 )-----------( 703      ( 25 Dec 2023 11:00 )             20.9                                               12-    135  |  105  |  21<H>  ----------------------------<  315<H>  4.7   |  22  |  1.35<H>    Ca    7.9<L>      25 Dec 2023 11:00    TPro  6.0  /  Alb  1.6<L>  /  TBili  0.5  /  DBili  x   /  AST  7<L>  /  ALT  8<L>  /  AlkPhos  46        PT/INR - ( 25 Dec 2023 11:00 )   PT: 13.7 sec;   INR: 1.21 ratio         PTT - ( 25 Dec 2023 11:00 )  PTT:38.6 sec                                       Urinalysis Basic - ( 25 Dec 2023 11:00 )    Color: Yellow / Appearance: Clear / S.001 / pH: x  Gluc: 315 mg/dL / Ketone: Negative mg/dL  / Bili: Negative / Urobili: 1.0 mg/dL   Blood: x / Protein: 30 mg/dL / Nitrite: Negative   Leuk Esterase: Negative / RBC: None Seen /HPF / WBC 2 /HPF   Sq Epi: x / Non Sq Epi: x / Bacteria: Negative /HPF                                                  LIVER FUNCTIONS - ( 25 Dec 2023 11:00 )  Alb: 1.6 g/dL / Pro: 6.0 g/dL / ALK PHOS: 46 U/L / ALT: 8 U/L DA / AST: 7 U/L / GGT: x                                                                                                                                   ABG - ( 25 Dec 2023 11:15 )  pH, Arterial: 7.51  pH, Blood: x     /  pCO2: 24    /  pO2: 216   / HCO3: 19    / Base Excess: -3.3  /  SaO2: 100                 CXR:    ECHO:    MEDICATIONS  (STANDING):  norepinephrine Infusion 0.05 MICROgram(s)/kG/Min (2.74 mL/Hr) IV Continuous <Continuous>    MEDICATIONS  (PRN):

## 2023-12-25 NOTE — H&P ADULT - NSHPPHYSICALEXAM_GEN_ALL_CORE
GENERAL: NAD, lying in bed comfortably, cachectic   HEAD:  Atraumatic, Normocephalic  EYES: EOMI, PERRLA, conjunctiva and sclera clear  ENT: Moist mucous membranes  NECK: Supple, No JVD  CHEST/LUNG: Clear to auscultation bilaterally; No rales, rhonchi, wheezing, or rubs. Unlabored respirations, on 3L NC   HEART: Regular rate and rhythm; No murmurs, rubs, or gallops  ABDOMEN: Bowel sounds present; Soft, Nontender, Nondistended.   EXTREMITIES:  2+ Peripheral Pulses, No clubbing, cyanosis, or edema  NERVOUS SYSTEM:  Alert & Oriented X0, speech clear. able to move left hand against gravity however does not move lower extremity, noted to be in flexed position   SKIN: No rashes , + sacral wound ulcer 10x8 cm at least with purulent and bloody discharge.

## 2023-12-25 NOTE — ED ADULT NURSE NOTE - CHIEF COMPLAINT QUOTE
BIBA, sending from [facility], shortness of breath, hypotension, hypoxia, PICC to left arm, h/x brain injury, bedbound, BIBA, sending from [facility], shortness of breath, hypotension, hypoxia, PICC to left arm, h/x brain injury, bedbound,

## 2023-12-25 NOTE — H&P ADULT - PROBLEM SELECTOR PLAN 4
p/w hypoxia as per EMS   CXR neg for consolidation or effusion   NAD on physical exam, on NC for comfort purposes  [id number]

## 2023-12-25 NOTE — H&P ADULT - PROBLEM SELECTOR PLAN 3
p/w Hb 6.6 baseline 7.9 on 12/23  source unknown, likely slow bleeding from the sacral wound site  will give 1 unit pRBC and f/u post transfusion CBC   monitor CBC q 12
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
## 2023-12-25 NOTE — ED ADULT TRIAGE NOTE - CHIEF COMPLAINT QUOTE
BIBA, sending from [facility], shortness of breath, hypotension, hypoxia, h/x brain injury, bedbound, BIBA, sending from [facility], shortness of breath, hypotension, hypoxia, h/x brain injury, bedbound, BIBA, sending from [facility], shortness of breath, hypotension, hypoxia, PICC to left arm, h/x brain injury, bedbound, BIBA, sending from [facility], shortness of breath, hypotension, hypoxia, PICC to left arm, h/x brain injury, bedbound,

## 2023-12-25 NOTE — ED PROVIDER NOTE - PHYSICAL EXAMINATION
Exam:  General: Patient ill appearing, tachycardic, hypotensive vital signs within normal limits  HEENT: airway patent with dry mucous membranes  Cardiac: regular tachycardia S1/S2 with strong peripheral pulses  Respiratory: lungs clear without respiratory distress  GI: abdomen soft, non tender, non distended  Skin: sacral decubitus ulcer

## 2023-12-25 NOTE — ED ADULT NURSE NOTE - OBJECTIVE STATEMENT
Patient present to ED from [facility] with hypotensive , on non rebreather awake and alert responsive to all verbal and tactile stimuli, recently discharged from hospital yetserday

## 2023-12-25 NOTE — CONSULT NOTE ADULT - ASSESSMENT
COMPLETE NOTE TO FOLLOW  68-year-old male, PMH of traumatic brain injury w/ seizure disorder, DM, sacral decubitus ulcer, sent from [facility], d/alexis on 12/23 due to sepsis 2/2 to OM on meropenem through PICC line (placed on 12/21), as per ID pt had to complete 6 wks of ABX. Sent from [facility] due to  hypotension and hypoxia.  In the ED pt initially hypotensive to to 69/53 which after 3L IVF improved to 109/70 (83). ICU consulted due to concern for severe sepsis    ===========Neuro========  Currently at baseline  continue anti-seizures meds    ==========CV===========  #Severe sepsis  pt meets sepsis criteria (tachy, leukocytosis)  2/2 known hx of acute OM  continue with standing IVF   continue Steff  consider Vanc for MRSA coverage given pt recent hx stay  f/u BCX    ===========Pulm==========  #AHRF  on prior admission was treated for covid PNA  CXR no evidence of consolidation  titrate off oxygen as tolerated    ===========Renal============  #LEATHA  likely pre-renal vs ATN in the s/o hypotension  repeat BMP   obtain urine lytes   monitor OU    #Lactic acidosis  type A lactic acidosis in the s/o hypotension  improving after fluids  f/u repeat lactate    ==========GI===============  No active issues    ==========Heme============  #Anemia  hgb 6.6 on admission  baseline ~ 8-9  2/2 from oozing from pressure ulcer  transfuse as needed     ===========ID========  #severe sepsis  2/2 known hx of acute OM  responsive to IVF  continue Steff  consider Vanc for MRSA coverage given pt recent hx stay  consider obtaining culture from PICC line   f/u BCX    ============Lines=======  L-PICC     ==========Dispo========  medicine        68-year-old male, PMH of traumatic brain injury w/ seizure disorder, DM, sacral decubitus ulcer, sent from [facility], d/alexis on 12/23 due to sepsis 2/2 to OM on meropenem through PICC line (placed on 12/21), as per ID pt had to complete 6 wks of ABX. Sent from [facility] due to  hypotension and hypoxia.  In the ED pt initially hypotensive to to 69/53 which after 3L IVF improved to 109/70 (83). ICU consulted due to concern for severe sepsis    ===========Neuro========  Currently at baseline  continue anti-seizures meds    ==========CV===========  #Severe sepsis  pt meets sepsis criteria (tachy, leukocytosis)  2/2 known hx of acute OM  continue with standing IVF   continue Steff  consider Vanc for MRSA coverage given pt recent hx stay  f/u BCX    ===========Pulm==========  #AHRF  on prior admission was treated for covid PNA  CXR no evidence of consolidation  titrate off oxygen as tolerated    ===========Renal============  #LEATHA  likely pre-renal vs ATN in the s/o hypotension  repeat BMP   obtain urine lytes   monitor OU    #Lactic acidosis  type A lactic acidosis in the s/o hypotension  improving after fluids  f/u repeat lactate    ==========GI===============  No active issues    ==========Heme============  #Anemia  hgb 6.6 on admission  baseline ~ 8-9  2/2 from oozing from pressure ulcer  transfuse as needed     ===========ID========  #severe sepsis  2/2 known hx of acute OM  responsive to IVF  continue Steff  consider Vanc for MRSA coverage given pt recent hx stay  consider obtaining culture from PICC line   f/u BCX    ============Lines=======  L-PICC     ==========Dispo========  medicine

## 2023-12-25 NOTE — H&P ADULT - HISTORY OF PRESENT ILLNESS
68 yrs old M from [facility] with pmhx of TBI  w/ seizure disorder, DM, sacral decubitus ulcer, recently discharged from [facility] on 12/23 with main dx of  Sepsis likely 2/2 sacral ulcer, found to have extensive sacral decubitus ulcer with fragmentation/osteomyelitis of the the coccyx, underwent debridement of sacral ulcer on 12/20 , s/p  PICC line placment on 12/21 to complete 6 weeks course of Meropenem for osteomyelitis. Pt is presented with low BP and hypoxia.  As per ED attending note  he  was  found this morning to have decreased responsiveness and had low blood pressure in the field. Pt is awake however not oriented, denied any pain.      68 yrs old M from [facility] with pmhx of TBI  w/ seizure disorder, DM, sacral decubitus ulcer, recently discharged from [facility] on 12/23 with main dx of  Sepsis likely 2/2 sacral ulcer, found to have extensive sacral decubitus ulcer with fragmentation/osteomyelitis of the the coccyx, underwent debridement of sacral ulcer on 12/20 , s/p  PICC line placment on 12/21 to complete 6 weeks course of Meropenem for osteomyelitis. Pt is presented with low BP and hypoxia.  As per ED attending note  he  was  found this morning to have decreased responsiveness and had low blood pressure in the field. Pt is awake however not oriented, denied any pain.

## 2023-12-25 NOTE — ED ADULT NURSE NOTE - NSFALLHARMRISKINTERV_ED_ALL_ED
Assistance OOB with selected safe patient handling equipment if applicable/Assistance with ambulation/Communicate risk of Fall with Harm to all staff, patient, and family/Monitor gait and stability/Provide visual cue: red socks, yellow wristband, yellow gown, etc/Reinforce activity limits and safety measures with patient and family/Bed in lowest position, wheels locked, appropriate side rails in place/Call bell, personal items and telephone in reach/Instruct patient to call for assistance before getting out of bed/chair/stretcher/Non-slip footwear applied when patient is off stretcher/[place] to call system/Physically safe environment - no spills, clutter or unnecessary equipment/Purposeful Proactive Rounding/Room/bathroom lighting operational, light cord in reach Assistance OOB with selected safe patient handling equipment if applicable/Assistance with ambulation/Communicate risk of Fall with Harm to all staff, patient, and family/Monitor gait and stability/Provide visual cue: red socks, yellow wristband, yellow gown, etc/Reinforce activity limits and safety measures with patient and family/Bed in lowest position, wheels locked, appropriate side rails in place/Call bell, personal items and telephone in reach/Instruct patient to call for assistance before getting out of bed/chair/stretcher/Non-slip footwear applied when patient is off stretcher/[place] to call system/Physically safe environment - no spills, clutter or unnecessary equipment/Purposeful Proactive Rounding/Room/bathroom lighting operational, light cord in reach

## 2023-12-25 NOTE — CONSULT NOTE ADULT - ATTENDING COMMENTS
68-year-old male, PMH of traumatic brain injury w/ seizure disorder, DM, sacral decubitus ulcer, sent from [facility], d/alexis on 12/23 due to sepsis 2/2 to OM on meropenem through PICC line (placed on 12/21), as per ID pt had to complete 6 wks of ABX. Sent from [facility] due to  hypotension and hypoxia.  In the ED pt initially hypotensive to to 69/53 which after 3L IVF improved to 109/70 (83). ICU consulted due to concern for severe sepsis    Assessment:  1. Severe sepsis  2. Hypotension  3. Acute blood loss anemia   4. Stage 4 sacral wound  5. Osteomyelitis of sacrum   6. Acute renal failure   7. Lactic acidosis    Plan:  - At time of evaluation hypotension is improved post IV fluid hydration  - Noted with bleeding at sacral wound site, possible has been slowly oozing at wound site  - Consider surgical evaluation if continues to bleeding  - Cont. broad spectrum antibiotics  - Sepsis work up  - Lactate is downtrending  - IV fluid hydration  - Hemodynamic monitoring  - Monitor urine output and kidney function  - At this time will defer ICU admission  - Discussed with Dr. Sandoval 68-year-old male, PMH of traumatic brain injury w/ seizure disorder, DM, sacral decubitus ulcer, sent from [facility], d/alexis on 12/23 due to sepsis 2/2 to OM on meropenem through PICC line (placed on 12/21), as per ID pt had to complete 6 wks of ABX. Sent from [facility] due to  hypotension and hypoxia.  In the ED pt initially hypotensive to to 69/53 which after 3L IVF improved to 109/70 (83). ICU consulted due to concern for severe sepsis    Assessment:  1. Severe sepsis  2. Hypotension  3. Acute blood loss anemia   4. Stage 4 sacral wound  5. Osteomyelitis of sacrum   6. Acute renal failure   7. Lactic acidosis    Plan:  - At time of evaluation hypotension is improved post IV fluid hydration  - Noted with bleeding at sacral wound site, possible has been slowly oozing at wound site  - Consider surgical evaluation if continues to bleeding  - Cont. broad spectrum antibiotics  - Sepsis work up  - Lactate is downtrending  - IV fluid hydration  - Hemodynamic monitoring  - Monitor urine output and kidney function  - At this time will defer ICU admission  - Discussed with Dr. Sandoval

## 2023-12-25 NOTE — H&P ADULT - ASSESSMENT
68 yrs old M from [facility] with pmhx of TBI  w/ seizure disorder, DM, sacral decubitus ulcer, presented with low BP and hypoxia. Pt is admitted for sepsis likely 2/2 sacral wound and anemia of bleeding likely at the sacral wound.  68 yrs old M from [facility] with pmhx of TBI  w/ seizure disorder, DM, sacral decubitus ulcer, presented with low BP and hypoxia. Pt is admitted for sepsis likely 2/2 sacral wound and anemia of bleeding likely at the sacral wound.

## 2023-12-25 NOTE — H&P ADULT - PROBLEM SELECTOR PLAN 5
BUN and SCr of 21 and 1.35 on admission   Baseline SCr of 0.7  s/p 3.5L bolus  ?LEATHA 2/2 sepsis and dehydration [although BUN: SCr <20]  monitor BMP

## 2023-12-26 DIAGNOSIS — R78.81 BACTEREMIA: ICD-10-CM

## 2023-12-26 DIAGNOSIS — E43 UNSPECIFIED SEVERE PROTEIN-CALORIE MALNUTRITION: ICD-10-CM

## 2023-12-26 DIAGNOSIS — L89.159 PRESSURE ULCER OF SACRAL REGION, UNSPECIFIED STAGE: ICD-10-CM

## 2023-12-26 DIAGNOSIS — G93.9 DISORDER OF BRAIN, UNSPECIFIED: ICD-10-CM

## 2023-12-26 DIAGNOSIS — J96.01 ACUTE RESPIRATORY FAILURE WITH HYPOXIA: ICD-10-CM

## 2023-12-26 DIAGNOSIS — Z86.69 PERSONAL HISTORY OF OTHER DISEASES OF THE NERVOUS SYSTEM AND SENSE ORGANS: ICD-10-CM

## 2023-12-26 LAB
BASOPHILS # BLD AUTO: 0.02 K/UL — SIGNIFICANT CHANGE UP (ref 0–0.2)
BASOPHILS # BLD AUTO: 0.02 K/UL — SIGNIFICANT CHANGE UP (ref 0–0.2)
BASOPHILS NFR BLD AUTO: 0.1 % — SIGNIFICANT CHANGE UP (ref 0–2)
BASOPHILS NFR BLD AUTO: 0.1 % — SIGNIFICANT CHANGE UP (ref 0–2)
CULTURE RESULTS: SIGNIFICANT CHANGE UP
CULTURE RESULTS: SIGNIFICANT CHANGE UP
EOSINOPHIL # BLD AUTO: 0.03 K/UL — SIGNIFICANT CHANGE UP (ref 0–0.5)
EOSINOPHIL # BLD AUTO: 0.03 K/UL — SIGNIFICANT CHANGE UP (ref 0–0.5)
EOSINOPHIL NFR BLD AUTO: 0.2 % — SIGNIFICANT CHANGE UP (ref 0–6)
EOSINOPHIL NFR BLD AUTO: 0.2 % — SIGNIFICANT CHANGE UP (ref 0–6)
GLUCOSE BLDC GLUCOMTR-MCNC: 105 MG/DL — HIGH (ref 70–99)
GLUCOSE BLDC GLUCOMTR-MCNC: 105 MG/DL — HIGH (ref 70–99)
GLUCOSE BLDC GLUCOMTR-MCNC: 110 MG/DL — HIGH (ref 70–99)
GLUCOSE BLDC GLUCOMTR-MCNC: 110 MG/DL — HIGH (ref 70–99)
HCT VFR BLD CALC: 18.5 % — CRITICAL LOW (ref 39–50)
HCT VFR BLD CALC: 18.5 % — CRITICAL LOW (ref 39–50)
HGB BLD-MCNC: 5.9 G/DL — CRITICAL LOW (ref 13–17)
HGB BLD-MCNC: 5.9 G/DL — CRITICAL LOW (ref 13–17)
IMM GRANULOCYTES NFR BLD AUTO: 0.8 % — SIGNIFICANT CHANGE UP (ref 0–0.9)
IMM GRANULOCYTES NFR BLD AUTO: 0.8 % — SIGNIFICANT CHANGE UP (ref 0–0.9)
LACTATE SERPL-SCNC: 1.6 MMOL/L — SIGNIFICANT CHANGE UP (ref 0.7–2)
LACTATE SERPL-SCNC: 1.6 MMOL/L — SIGNIFICANT CHANGE UP (ref 0.7–2)
LYMPHOCYTES # BLD AUTO: 17.3 % — SIGNIFICANT CHANGE UP (ref 13–44)
LYMPHOCYTES # BLD AUTO: 17.3 % — SIGNIFICANT CHANGE UP (ref 13–44)
LYMPHOCYTES # BLD AUTO: 2.44 K/UL — SIGNIFICANT CHANGE UP (ref 1–3.3)
LYMPHOCYTES # BLD AUTO: 2.44 K/UL — SIGNIFICANT CHANGE UP (ref 1–3.3)
MCHC RBC-ENTMCNC: 26.1 PG — LOW (ref 27–34)
MCHC RBC-ENTMCNC: 26.1 PG — LOW (ref 27–34)
MCHC RBC-ENTMCNC: 31.9 GM/DL — LOW (ref 32–36)
MCHC RBC-ENTMCNC: 31.9 GM/DL — LOW (ref 32–36)
MCV RBC AUTO: 81.9 FL — SIGNIFICANT CHANGE UP (ref 80–100)
MCV RBC AUTO: 81.9 FL — SIGNIFICANT CHANGE UP (ref 80–100)
MONOCYTES # BLD AUTO: 1.05 K/UL — HIGH (ref 0–0.9)
MONOCYTES # BLD AUTO: 1.05 K/UL — HIGH (ref 0–0.9)
MONOCYTES NFR BLD AUTO: 7.4 % — SIGNIFICANT CHANGE UP (ref 2–14)
MONOCYTES NFR BLD AUTO: 7.4 % — SIGNIFICANT CHANGE UP (ref 2–14)
MRSA PCR RESULT.: SIGNIFICANT CHANGE UP
MRSA PCR RESULT.: SIGNIFICANT CHANGE UP
NEUTROPHILS # BLD AUTO: 10.46 K/UL — HIGH (ref 1.8–7.4)
NEUTROPHILS # BLD AUTO: 10.46 K/UL — HIGH (ref 1.8–7.4)
NEUTROPHILS NFR BLD AUTO: 74.2 % — SIGNIFICANT CHANGE UP (ref 43–77)
NEUTROPHILS NFR BLD AUTO: 74.2 % — SIGNIFICANT CHANGE UP (ref 43–77)
NRBC # BLD: 0 /100 WBCS — SIGNIFICANT CHANGE UP (ref 0–0)
NRBC # BLD: 0 /100 WBCS — SIGNIFICANT CHANGE UP (ref 0–0)
PLATELET # BLD AUTO: 415 K/UL — HIGH (ref 150–400)
PLATELET # BLD AUTO: 415 K/UL — HIGH (ref 150–400)
RBC # BLD: 2.26 M/UL — LOW (ref 4.2–5.8)
RBC # BLD: 2.26 M/UL — LOW (ref 4.2–5.8)
RBC # FLD: 18.6 % — HIGH (ref 10.3–14.5)
RBC # FLD: 18.6 % — HIGH (ref 10.3–14.5)
S AUREUS DNA NOSE QL NAA+PROBE: DETECTED
S AUREUS DNA NOSE QL NAA+PROBE: DETECTED
SPECIMEN SOURCE: SIGNIFICANT CHANGE UP
SPECIMEN SOURCE: SIGNIFICANT CHANGE UP
WBC # BLD: 14.12 K/UL — HIGH (ref 3.8–10.5)
WBC # BLD: 14.12 K/UL — HIGH (ref 3.8–10.5)
WBC # FLD AUTO: 14.12 K/UL — HIGH (ref 3.8–10.5)
WBC # FLD AUTO: 14.12 K/UL — HIGH (ref 3.8–10.5)

## 2023-12-26 PROCEDURE — 99233 SBSQ HOSP IP/OBS HIGH 50: CPT

## 2023-12-26 RX ORDER — GLUCAGON INJECTION, SOLUTION 0.5 MG/.1ML
1 INJECTION, SOLUTION SUBCUTANEOUS ONCE
Refills: 0 | Status: DISCONTINUED | OUTPATIENT
Start: 2023-12-26 | End: 2023-12-29

## 2023-12-26 RX ORDER — ALTEPLASE 100 MG
2 KIT INTRAVENOUS ONCE
Refills: 0 | Status: DISCONTINUED | OUTPATIENT
Start: 2023-12-26 | End: 2023-12-29

## 2023-12-26 RX ORDER — INSULIN LISPRO 100/ML
VIAL (ML) SUBCUTANEOUS
Refills: 0 | Status: DISCONTINUED | OUTPATIENT
Start: 2023-12-26 | End: 2023-12-29

## 2023-12-26 RX ORDER — CHLORHEXIDINE GLUCONATE 213 G/1000ML
1 SOLUTION TOPICAL
Refills: 0 | Status: DISCONTINUED | OUTPATIENT
Start: 2023-12-26 | End: 2023-12-29

## 2023-12-26 RX ORDER — DEXTROSE 50 % IN WATER 50 %
25 SYRINGE (ML) INTRAVENOUS ONCE
Refills: 0 | Status: DISCONTINUED | OUTPATIENT
Start: 2023-12-26 | End: 2023-12-29

## 2023-12-26 RX ORDER — SODIUM CHLORIDE 9 MG/ML
1000 INJECTION, SOLUTION INTRAVENOUS
Refills: 0 | Status: DISCONTINUED | OUTPATIENT
Start: 2023-12-26 | End: 2023-12-26

## 2023-12-26 RX ORDER — DEXTROSE 50 % IN WATER 50 %
12.5 SYRINGE (ML) INTRAVENOUS ONCE
Refills: 0 | Status: DISCONTINUED | OUTPATIENT
Start: 2023-12-26 | End: 2023-12-29

## 2023-12-26 RX ORDER — DEXTROSE 50 % IN WATER 50 %
15 SYRINGE (ML) INTRAVENOUS ONCE
Refills: 0 | Status: DISCONTINUED | OUTPATIENT
Start: 2023-12-26 | End: 2023-12-29

## 2023-12-26 RX ORDER — PANTOPRAZOLE SODIUM 20 MG/1
40 TABLET, DELAYED RELEASE ORAL
Refills: 0 | Status: ACTIVE | OUTPATIENT
Start: 2023-12-26 | End: 2024-11-23

## 2023-12-26 RX ADMIN — Medication 650 MILLIGRAM(S): at 01:00

## 2023-12-26 RX ADMIN — Medication 650 MILLIGRAM(S): at 12:46

## 2023-12-26 RX ADMIN — Medication 650 MILLIGRAM(S): at 13:16

## 2023-12-26 RX ADMIN — MEROPENEM 100 MILLIGRAM(S): 1 INJECTION INTRAVENOUS at 22:29

## 2023-12-26 RX ADMIN — Medication 650 MILLIGRAM(S): at 05:43

## 2023-12-26 RX ADMIN — SENNA PLUS 2 TABLET(S): 8.6 TABLET ORAL at 22:29

## 2023-12-26 RX ADMIN — Medication 650 MILLIGRAM(S): at 19:03

## 2023-12-26 RX ADMIN — MEROPENEM 100 MILLIGRAM(S): 1 INJECTION INTRAVENOUS at 18:33

## 2023-12-26 RX ADMIN — Medication 650 MILLIGRAM(S): at 00:00

## 2023-12-26 RX ADMIN — LEVETIRACETAM 750 MILLIGRAM(S): 250 TABLET, FILM COATED ORAL at 05:43

## 2023-12-26 RX ADMIN — MEROPENEM 100 MILLIGRAM(S): 1 INJECTION INTRAVENOUS at 08:37

## 2023-12-26 RX ADMIN — LEVETIRACETAM 750 MILLIGRAM(S): 250 TABLET, FILM COATED ORAL at 18:34

## 2023-12-26 RX ADMIN — POLYETHYLENE GLYCOL 3350 17 GRAM(S): 17 POWDER, FOR SOLUTION ORAL at 12:46

## 2023-12-26 RX ADMIN — Medication 650 MILLIGRAM(S): at 06:55

## 2023-12-26 RX ADMIN — PANTOPRAZOLE SODIUM 40 MILLIGRAM(S): 20 TABLET, DELAYED RELEASE ORAL at 18:47

## 2023-12-26 RX ADMIN — Medication 650 MILLIGRAM(S): at 18:33

## 2023-12-26 NOTE — PROGRESS NOTE ADULT - PROBLEM SELECTOR PLAN 4
gram neg +  - f/u Repeat  blood Cx in am   - cont ceftriaxone 2gm IV p/w Hb 6.6 baseline 7.9 on 12/23  - source unknown, likely slow bleeding from the sacral wound site  - give 2 untis PRBcs   - f/u post trasfusion CBC  - monitor cbc

## 2023-12-26 NOTE — PATIENT PROFILE ADULT - FUNCTIONAL ASSESSMENT - BASIC MOBILITY 6.
1-calculated by average/Not able to assess (calculate score using [facility] averaging method)  1-calculated by average/Not able to assess (calculate score using [facility] averaging method)

## 2023-12-26 NOTE — PROGRESS NOTE ADULT - SUBJECTIVE AND OBJECTIVE BOX
Patient is a 68y old  Male who presents with a chief complaint of     INTERVAL HPI/OVERNIGHT EVENTS: Hg 5 overnight, PRBCs in process, will f/u CBC    REVIEW OF SYSTEMS:  CONSTITUTIONAL: No fever, chills  ENMT:  No difficulty hearing, no change in vision  NECK: No pain or stiffness  RESPIRATORY: No cough, SOB  CARDIOVASCULAR: No chest pain, palpitations  GASTROINTESTINAL: No abdominal pain. No nausea, vomiting, or diarrhea  GENITOURINARY: No dysuria  NEUROLOGICAL: No HA  SKIN: No itching, burning, rashes, or lesions   LYMPH NODES: No enlarged glands  ENDOCRINE: No heat or cold intolerance; No hair loss  MUSCULOSKELETAL: No joint pain or swelling; No muscle, back, or extremity pain  PSYCHIATRIC: No depression, anxiety  HEME/LYMPH: No easy bruising, or bleeding gums    T(C): 36.3 (23 @ 11:57), Max: 36.4 (23 @ 23:00)  HR: 73 (23 @ 11:57) (73 - 116)  BP: 137/80 (23 @ 11:57) (69/53 - 137/80)  RR: 18 (23 @ 11:57) (15 - 20)  SpO2: 100% (23 @ 11:57) (98% - 100%)  Wt(kg): --Vital Signs Last 24 Hrs  T(C): 36.3 (26 Dec 2023 11:57), Max: 36.4 (25 Dec 2023 23:00)  T(F): 97.3 (26 Dec 2023 11:57), Max: 97.6 (25 Dec 2023 23:00)  HR: 73 (26 Dec 2023 11:57) (73 - 116)  BP: 137/80 (26 Dec 2023 11:57) (69/53 - 137/80)  BP(mean): 75 (25 Dec 2023 21:47) (75 - 75)  RR: 18 (26 Dec 2023 11:57) (15 - 20)  SpO2: 100% (26 Dec 2023 11:57) (98% - 100%)    Parameters below as of 26 Dec 2023 11:57  Patient On (Oxygen Delivery Method): nasal cannula  O2 Flow (L/min): 3    MEDICATIONS  (STANDING):  acetaminophen     Tablet .. 650 milliGRAM(s) Oral every 6 hours  chlorhexidine 2% Cloths 1 Application(s) Topical <User Schedule>  levETIRAcetam 750 milliGRAM(s) Oral two times a day  meropenem  IVPB 1000 milliGRAM(s) IV Intermittent every 8 hours  polyethylene glycol 3350 17 Gram(s) Oral daily  senna 2 Tablet(s) Oral at bedtime    MEDICATIONS  (PRN):    PHYSICAL EXAM:  GENERAL: NAD  EYES: clear conjunctiva; EOMI  ENMT: Moist mucous membranes  NECK: Supple, No JVD, Normal thyroid  CHEST/LUNG: Clear to auscultation bilaterally; No rales, rhonchi, wheezing, or rubs  HEART: S1, S2, Regular rate and rhythm  ABDOMEN: Soft, Nontender, Nondistended; Bowel sounds present  NEURO: Alert & Oriented X3  EXTREMITIES: No LE edema, no calf tenderness, L upper arm PICC with dressing CDi   LYMPH: No lymphadenopathy noted  SKIN: sacral ulcer     Consultant(s) Notes Reviewed:  [x ] YES  [ ] NO  Care Discussed with Consultants/Other Providers [ x] YES  [ ] NO    LABS:                        5.9    14.12 )-----------( 415      ( 26 Dec 2023 03:00 )             18.5     12-25    135  |  105  |  21<H>  ----------------------------<  315<H>  4.7   |  22  |  1.35<H>    Ca    7.9<L>      25 Dec 2023 11:00    TPro  6.0  /  Alb  1.6<L>  /  TBili  0.5  /  DBili  x   /  AST  7<L>  /  ALT  8<L>  /  AlkPhos  46  12-25    PT/INR - ( 25 Dec 2023 11:00 )   PT: 13.7 sec;   INR: 1.21 ratio         PTT - ( 25 Dec 2023 11:00 )  PTT:38.6 sec  CAPILLARY BLOOD GLUCOSE          ABG - ( 25 Dec 2023 11:15 )  pH, Arterial: 7.51  pH, Blood: x     /  pCO2: 24    /  pO2: 216   / HCO3: 19    / Base Excess: -3.3  /  SaO2: 100       Urinalysis Basic - ( 25 Dec 2023 11:00 )    Color: Yellow / Appearance: Clear / S.001 / pH: x  Gluc: 315 mg/dL / Ketone: Negative mg/dL  / Bili: Negative / Urobili: 1.0 mg/dL   Blood: x / Protein: 30 mg/dL / Nitrite: Negative   Leuk Esterase: Negative / RBC: None Seen /HPF / WBC 2 /HPF   Sq Epi: x / Non Sq Epi: x / Bacteria: Negative /HPF        RADIOLOGY & ADDITIONAL TESTS:    Imaging Personally Reviewed:  [x ] YES  [ ] NO  < from: Xray Chest 1 View- PORTABLE-Urgent (23 @ 12:00) >    ACC: [id number] EXAM:  XR CHEST PORTABLE URGENT 1V   ORDERED BY: ADRIAN SANCHEZ     PROCEDURE DATE:  2023          INTERPRETATION:  Chest radiograph (one view)     CPT 44028    CLINICAL INFORMATION:  Sepsis.  Short of breath.    TECHNIQUE:  Single frontal view of the chest was obtained.    FINDINGS:  Prior study dated 2023 was available for review.    The lungs are clear.  No pleural abnormality is seen.    The heart and mediastinum appear intact.  A left-sided PICC line catheter   is noted with the distal tip overlying the superior vena cava.      IMPRESSION: No gross consolidation is seen.   Left-sided PICC line   catheter noted with the distal tip overlying the superior vena cava.    --- End of Report ---            HANSEL MARTINEZ MD; Attending Radiologist  This document has been electronically signed. Dec 25 2023  1:43PM    < end of copied text >       Patient is a 68y old  Male who presents with a chief complaint of     INTERVAL HPI/OVERNIGHT EVENTS: Hg 5 overnight, PRBCs in process, will f/u CBC    REVIEW OF SYSTEMS:  CONSTITUTIONAL: No fever, chills  ENMT:  No difficulty hearing, no change in vision  NECK: No pain or stiffness  RESPIRATORY: No cough, SOB  CARDIOVASCULAR: No chest pain, palpitations  GASTROINTESTINAL: No abdominal pain. No nausea, vomiting, or diarrhea  GENITOURINARY: No dysuria  NEUROLOGICAL: No HA  SKIN: No itching, burning, rashes, or lesions   LYMPH NODES: No enlarged glands  ENDOCRINE: No heat or cold intolerance; No hair loss  MUSCULOSKELETAL: No joint pain or swelling; No muscle, back, or extremity pain  PSYCHIATRIC: No depression, anxiety  HEME/LYMPH: No easy bruising, or bleeding gums    T(C): 36.3 (23 @ 11:57), Max: 36.4 (23 @ 23:00)  HR: 73 (23 @ 11:57) (73 - 116)  BP: 137/80 (23 @ 11:57) (69/53 - 137/80)  RR: 18 (23 @ 11:57) (15 - 20)  SpO2: 100% (23 @ 11:57) (98% - 100%)  Wt(kg): --Vital Signs Last 24 Hrs  T(C): 36.3 (26 Dec 2023 11:57), Max: 36.4 (25 Dec 2023 23:00)  T(F): 97.3 (26 Dec 2023 11:57), Max: 97.6 (25 Dec 2023 23:00)  HR: 73 (26 Dec 2023 11:57) (73 - 116)  BP: 137/80 (26 Dec 2023 11:57) (69/53 - 137/80)  BP(mean): 75 (25 Dec 2023 21:47) (75 - 75)  RR: 18 (26 Dec 2023 11:57) (15 - 20)  SpO2: 100% (26 Dec 2023 11:57) (98% - 100%)    Parameters below as of 26 Dec 2023 11:57  Patient On (Oxygen Delivery Method): nasal cannula  O2 Flow (L/min): 3    MEDICATIONS  (STANDING):  acetaminophen     Tablet .. 650 milliGRAM(s) Oral every 6 hours  chlorhexidine 2% Cloths 1 Application(s) Topical <User Schedule>  levETIRAcetam 750 milliGRAM(s) Oral two times a day  meropenem  IVPB 1000 milliGRAM(s) IV Intermittent every 8 hours  polyethylene glycol 3350 17 Gram(s) Oral daily  senna 2 Tablet(s) Oral at bedtime    MEDICATIONS  (PRN):    PHYSICAL EXAM:  GENERAL: NAD  EYES: clear conjunctiva; EOMI  ENMT: Moist mucous membranes  NECK: Supple, No JVD, Normal thyroid  CHEST/LUNG: Clear to auscultation bilaterally; No rales, rhonchi, wheezing, or rubs  HEART: S1, S2, Regular rate and rhythm  ABDOMEN: Soft, Nontender, Nondistended; Bowel sounds present  NEURO: Alert & Oriented X3  EXTREMITIES: No LE edema, no calf tenderness, L upper arm PICC with dressing CDi   LYMPH: No lymphadenopathy noted  SKIN: sacral ulcer     Consultant(s) Notes Reviewed:  [x ] YES  [ ] NO  Care Discussed with Consultants/Other Providers [ x] YES  [ ] NO    LABS:                        5.9    14.12 )-----------( 415      ( 26 Dec 2023 03:00 )             18.5     12-25    135  |  105  |  21<H>  ----------------------------<  315<H>  4.7   |  22  |  1.35<H>    Ca    7.9<L>      25 Dec 2023 11:00    TPro  6.0  /  Alb  1.6<L>  /  TBili  0.5  /  DBili  x   /  AST  7<L>  /  ALT  8<L>  /  AlkPhos  46  12-25    PT/INR - ( 25 Dec 2023 11:00 )   PT: 13.7 sec;   INR: 1.21 ratio         PTT - ( 25 Dec 2023 11:00 )  PTT:38.6 sec  CAPILLARY BLOOD GLUCOSE          ABG - ( 25 Dec 2023 11:15 )  pH, Arterial: 7.51  pH, Blood: x     /  pCO2: 24    /  pO2: 216   / HCO3: 19    / Base Excess: -3.3  /  SaO2: 100       Urinalysis Basic - ( 25 Dec 2023 11:00 )    Color: Yellow / Appearance: Clear / S.001 / pH: x  Gluc: 315 mg/dL / Ketone: Negative mg/dL  / Bili: Negative / Urobili: 1.0 mg/dL   Blood: x / Protein: 30 mg/dL / Nitrite: Negative   Leuk Esterase: Negative / RBC: None Seen /HPF / WBC 2 /HPF   Sq Epi: x / Non Sq Epi: x / Bacteria: Negative /HPF        RADIOLOGY & ADDITIONAL TESTS:    Imaging Personally Reviewed:  [x ] YES  [ ] NO  < from: Xray Chest 1 View- PORTABLE-Urgent (23 @ 12:00) >    ACC: [id number] EXAM:  XR CHEST PORTABLE URGENT 1V   ORDERED BY: ADRIAN SANCHEZ     PROCEDURE DATE:  2023          INTERPRETATION:  Chest radiograph (one view)     CPT 33011    CLINICAL INFORMATION:  Sepsis.  Short of breath.    TECHNIQUE:  Single frontal view of the chest was obtained.    FINDINGS:  Prior study dated 2023 was available for review.    The lungs are clear.  No pleural abnormality is seen.    The heart and mediastinum appear intact.  A left-sided PICC line catheter   is noted with the distal tip overlying the superior vena cava.      IMPRESSION: No gross consolidation is seen.   Left-sided PICC line   catheter noted with the distal tip overlying the superior vena cava.    --- End of Report ---            HANSEL MARTINEZ MD; Attending Radiologist  This document has been electronically signed. Dec 25 2023  1:43PM    < end of copied text >

## 2023-12-26 NOTE — PROGRESS NOTE ADULT - PROBLEM SELECTOR PLAN 5
p/w Hb 6.6 baseline 7.9 on 12/23  - source unknown, likely slow bleeding from the sacral wound site  - give 2 untis PRBcs   - f/u post trasfusion CBC  - monitor cbc 2/2 sepsis and dehydration [although BUN: SCr <20]  - s/p bolus  - trend BMP

## 2023-12-26 NOTE — PATIENT PROFILE ADULT - FALL HARM RISK - HARM RISK INTERVENTIONS
Assistance OOB with selected safe patient handling equipment/Communicate Risk of Fall with Harm to all staff/Reinforce activity limits and safety measures with patient and family/Tailored Fall Risk Interventions/Visual Cue: Yellow wristband and red socks/Bed in lowest position, wheels locked, appropriate side rails in place/Call bell, personal items and telephone in reach/Instruct patient to call for assistance before getting out of bed or chair/Non-slip footwear when patient is out of bed/Shock to call system/Physically safe environment - no spills, clutter or unnecessary equipment/Purposeful Proactive Rounding/Room/bathroom lighting operational, light cord in reach Assistance OOB with selected safe patient handling equipment/Communicate Risk of Fall with Harm to all staff/Reinforce activity limits and safety measures with patient and family/Tailored Fall Risk Interventions/Visual Cue: Yellow wristband and red socks/Bed in lowest position, wheels locked, appropriate side rails in place/Call bell, personal items and telephone in reach/Instruct patient to call for assistance before getting out of bed or chair/Non-slip footwear when patient is out of bed/[place] to call system/Physically safe environment - no spills, clutter or unnecessary equipment/Purposeful Proactive Rounding/Room/bathroom lighting operational, light cord in reach

## 2023-12-26 NOTE — CHART NOTE - NSCHARTNOTEFT_GEN_A_CORE
Attempted several times to obtain post-transfusion CBC. Patient with difficult access and with left arm precautions. Denies any SOB, chest pain dizziness or lightheadedness. Vital signs stable   Chest pain.    HPI: 68 yrs old M from [facility] with pmhx of TBI  w/ seizure disorder, DM, sacral decubitus ulcer, presented with low BP and hypoxia. Pt is admitted for sepsis likely 2/2 sacral wound and anemia of bleeding likely at the sacral wound.         Vital Signs Last 24 Hrs  T(C): 36.4 (25 Dec 2023 23:00), Max: 36.9 (25 Dec 2023 10:54)  T(F): 97.6 (25 Dec 2023 23:00), Max: 98.5 (25 Dec 2023 10:54)  HR: 87 (25 Dec 2023 23:00) (87 - 142)  BP: 101/66 (25 Dec 2023 23:00) (69/53 - 118/76)  BP(mean): 75 (25 Dec 2023 21:47) (75 - 75)  RR: 18 (25 Dec 2023 23:00) (15 - 20)  SpO2: 100% (25 Dec 2023 23:00) (98% - 100%)    Parameters below as of 25 Dec 2023 23:00  Patient On (Oxygen Delivery Method): nasal cannula  O2 Flow (L/min): 4    Anemia source unknown, likely slow bleeding from the sacral wound site  S/p 1 unit pRBC   Will attempt to obtain CBC  via ultrasound   f/u post transfusion CBC in am   monitor CBC q 12. Attempted several times to obtain post-transfusion CBC. Patient with difficult access and with left arm precautions. Denies any SOB, chest pain dizziness or lightheadedness. Vital signs stable   Chest pain.    HPI: 68 yrs old M from [facility] with pmhx of TBI  w/ seizure disorder, DM, sacral decubitus ulcer, presented with low BP and hypoxia. Pt is admitted for sepsis likely 2/2 sacral wound and anemia of bleeding likely at the sacral wound.         Vital Signs Last 24 Hrs  T(C): 36.4 (25 Dec 2023 23:00), Max: 36.9 (25 Dec 2023 10:54)  T(F): 97.6 (25 Dec 2023 23:00), Max: 98.5 (25 Dec 2023 10:54)  HR: 87 (25 Dec 2023 23:00) (87 - 142)  BP: 101/66 (25 Dec 2023 23:00) (69/53 - 118/76)  BP(mean): 75 (25 Dec 2023 21:47) (75 - 75)  RR: 18 (25 Dec 2023 23:00) (15 - 20)  SpO2: 100% (25 Dec 2023 23:00) (98% - 100%)    Parameters below as of 25 Dec 2023 23:00  Patient On (Oxygen Delivery Method): nasal cannula  O2 Flow (L/min): 4    Anemia source unknown, likely slow bleeding from the sacral wound site  S/p 1 unit pRBC   Will attempt to obtain CBC  via ultrasound   f/u post transfusion CBC in am   monitor CBC q 12.

## 2023-12-26 NOTE — PROGRESS NOTE ADULT - PROBLEM SELECTOR PLAN 2
likely 2/2 sacral wound ulcer, osteomyelitis   - pt has PICC from prior admission   -c/w Meropenem 1g q8   - blood CX +  - F/u Urine Cx  - f/u lactate   - f/u wound culture   - Surgery following likely 2/2 sacral wound ulcer, osteomyelitis   - pt has PICC from prior admission   -c/w Meropenem 1g q8   - f/u blood CX  - F/u Urine Cx  - f/u lactate   - f/u wound culture   - Surgery following

## 2023-12-26 NOTE — PROGRESS NOTE ADULT - PROBLEM SELECTOR PLAN 6
2/2 sepsis and dehydration [although BUN: SCr <20]  - s/p bolus  - trend BMP DVT - SCD [in the setting of anemia requiring blood transfusion]

## 2023-12-26 NOTE — PROGRESS NOTE ADULT - NS ATTEND AMEND GEN_ALL_CORE FT
Patient seen at bedside, states he feels good, no acute symptoms noted. denies n/v, abdominal pain.  On exam, patient is AOx2, NAD, abdomen soft, NT/ND, extremities without edema.   Labs reviewed, CBC with WBC 27->14, hgb 5.9, pending post-transfusion CBC.    Assessment and plan:  67 yo man with pmhx of TBI  w/ seizure disorder, DM, sacral decubitus ulcer and OM recently discharged from [facility] on 12/23 s/p debridement  12/20 and PICC line placement on 12/21 to complete 6 weeks course of Meropenem, referred from [facility] for low BP and hypoxia. Found to have anemia requiring pRBC transfusion.    # Acute on chronic anemia, s/p 2pRBC, source unclear  # Sacral ulcer with OM on meropenem 6-week course through PICC   - surgery consulted for the sacral ulcer, no active bleed noted, does not suspect the decubitus is the primary cause of blood loss, agree with the assessment  - no BM documented, continue monitoring BM  - diet with CLD, PPI BID for potential acute GIB  - send haptoglobin, LDH, retic count for anemia workup  - follow CBC q6h until stablizied  - f/u post-transfusion CBC  - continue meropenem for OM    # DM A1c 7.0% 12/2023  - FS/SSI    # TBI with seizure  - continue Keppra    # DVT ppx  - SCDs iso likely acute bleed Patient seen at bedside, states he feels good, no acute symptoms noted. denies n/v, abdominal pain.  On exam, patient is AOx2, NAD, abdomen soft, NT/ND, extremities without edema.   Labs reviewed, CBC with WBC 27->14, hgb 5.9, pending post-transfusion CBC.    Assessment and plan:  69 yo man with pmhx of TBI  w/ seizure disorder, DM, sacral decubitus ulcer and OM recently [facility] on 12/23 s/p debridement  12/20 and PICC line placement on 12/21 to complete 6 weeks course of Meropenem, referred from [facility] for low BP and hypoxia. Found to have anemia requiring pRBC transfusion.    # Acute on chronic anemia, s/p 2pRBC, source unclear  # Sacral ulcer with OM on meropenem 6-week course through PICC   - surgery consulted for the sacral ulcer, no active bleed noted, does not suspect the decubitus is the primary cause of blood loss, agree with the assessment  - no BM documented, continue monitoring BM  - diet with CLD, PPI BID for potential acute GIB  - send haptoglobin, LDH, retic count for anemia workup  - follow CBC q6h until stablizied  - f/u post-transfusion CBC  - continue meropenem for OM    # DM A1c 7.0% 12/2023  - FS/SSI    # TBI with seizure  - continue Keppra    # DVT ppx  - SCDs iso likely acute bleed

## 2023-12-26 NOTE — PROGRESS NOTE ADULT - PROBLEM SELECTOR PLAN 1
- setting of Sepsis/ symptomatic anemia/ bacteremia   - saturating well in 2L now   - chest x-ray unremarkable  - cont supportive measure   - cont to monitor oxygen saturation

## 2023-12-26 NOTE — PROGRESS NOTE ADULT - ASSESSMENT
68 yrs old M from [facility] with pmhx of TBI  w/ seizure disorder, DM, sacral decubitus ulcer, presented with low BP and hypoxia. Pt is admitted for sepsis likely 2/2 sacral wound and anemia of bleeding likely at the sacral wound. surgery following.  68 yrs old M from [facility] with pmhx of TBI  w/ seizure disorder, DM, sacral decubitus ulcer, presented with low BP and hypoxia. Pt is admitted for sepsis likely 2/2 sacral wound and anemia of bleeding likely at the sacral wound. surgery following.

## 2023-12-27 ENCOUNTER — TRANSCRIPTION ENCOUNTER (OUTPATIENT)
Age: 68
End: 2023-12-27

## 2023-12-27 LAB
ANION GAP SERPL CALC-SCNC: 5 MMOL/L — SIGNIFICANT CHANGE UP (ref 5–17)
ANION GAP SERPL CALC-SCNC: 5 MMOL/L — SIGNIFICANT CHANGE UP (ref 5–17)
BUN SERPL-MCNC: 13 MG/DL — SIGNIFICANT CHANGE UP (ref 7–18)
BUN SERPL-MCNC: 13 MG/DL — SIGNIFICANT CHANGE UP (ref 7–18)
CALCIUM SERPL-MCNC: 8.8 MG/DL — SIGNIFICANT CHANGE UP (ref 8.4–10.5)
CALCIUM SERPL-MCNC: 8.8 MG/DL — SIGNIFICANT CHANGE UP (ref 8.4–10.5)
CHLORIDE SERPL-SCNC: 110 MMOL/L — HIGH (ref 96–108)
CHLORIDE SERPL-SCNC: 110 MMOL/L — HIGH (ref 96–108)
CO2 SERPL-SCNC: 25 MMOL/L — SIGNIFICANT CHANGE UP (ref 22–31)
CO2 SERPL-SCNC: 25 MMOL/L — SIGNIFICANT CHANGE UP (ref 22–31)
CREAT SERPL-MCNC: 0.67 MG/DL — SIGNIFICANT CHANGE UP (ref 0.5–1.3)
CREAT SERPL-MCNC: 0.67 MG/DL — SIGNIFICANT CHANGE UP (ref 0.5–1.3)
EGFR: 103 ML/MIN/1.73M2 — SIGNIFICANT CHANGE UP
EGFR: 103 ML/MIN/1.73M2 — SIGNIFICANT CHANGE UP
GLUCOSE BLDC GLUCOMTR-MCNC: 147 MG/DL — HIGH (ref 70–99)
GLUCOSE BLDC GLUCOMTR-MCNC: 147 MG/DL — HIGH (ref 70–99)
GLUCOSE BLDC GLUCOMTR-MCNC: 86 MG/DL — SIGNIFICANT CHANGE UP (ref 70–99)
GLUCOSE BLDC GLUCOMTR-MCNC: 86 MG/DL — SIGNIFICANT CHANGE UP (ref 70–99)
GLUCOSE BLDC GLUCOMTR-MCNC: 91 MG/DL — SIGNIFICANT CHANGE UP (ref 70–99)
GLUCOSE SERPL-MCNC: 93 MG/DL — SIGNIFICANT CHANGE UP (ref 70–99)
GLUCOSE SERPL-MCNC: 93 MG/DL — SIGNIFICANT CHANGE UP (ref 70–99)
HAPTOGLOB SERPL-MCNC: 122 MG/DL — SIGNIFICANT CHANGE UP (ref 34–200)
HAPTOGLOB SERPL-MCNC: 122 MG/DL — SIGNIFICANT CHANGE UP (ref 34–200)
HCT VFR BLD CALC: 27.5 % — LOW (ref 39–50)
HCT VFR BLD CALC: 27.5 % — LOW (ref 39–50)
HCT VFR BLD CALC: 28.3 % — LOW (ref 39–50)
HCT VFR BLD CALC: 28.3 % — LOW (ref 39–50)
HGB BLD-MCNC: 9.2 G/DL — LOW (ref 13–17)
HGB BLD-MCNC: 9.2 G/DL — LOW (ref 13–17)
HGB BLD-MCNC: 9.4 G/DL — LOW (ref 13–17)
HGB BLD-MCNC: 9.4 G/DL — LOW (ref 13–17)
LDH SERPL L TO P-CCNC: 152 U/L — SIGNIFICANT CHANGE UP (ref 120–225)
LDH SERPL L TO P-CCNC: 152 U/L — SIGNIFICANT CHANGE UP (ref 120–225)
MCHC RBC-ENTMCNC: 26.6 PG — LOW (ref 27–34)
MCHC RBC-ENTMCNC: 26.6 PG — LOW (ref 27–34)
MCHC RBC-ENTMCNC: 27.2 PG — SIGNIFICANT CHANGE UP (ref 27–34)
MCHC RBC-ENTMCNC: 27.2 PG — SIGNIFICANT CHANGE UP (ref 27–34)
MCHC RBC-ENTMCNC: 33.2 GM/DL — SIGNIFICANT CHANGE UP (ref 32–36)
MCHC RBC-ENTMCNC: 33.2 GM/DL — SIGNIFICANT CHANGE UP (ref 32–36)
MCHC RBC-ENTMCNC: 33.5 GM/DL — SIGNIFICANT CHANGE UP (ref 32–36)
MCHC RBC-ENTMCNC: 33.5 GM/DL — SIGNIFICANT CHANGE UP (ref 32–36)
MCV RBC AUTO: 80.2 FL — SIGNIFICANT CHANGE UP (ref 80–100)
MCV RBC AUTO: 80.2 FL — SIGNIFICANT CHANGE UP (ref 80–100)
MCV RBC AUTO: 81.4 FL — SIGNIFICANT CHANGE UP (ref 80–100)
MCV RBC AUTO: 81.4 FL — SIGNIFICANT CHANGE UP (ref 80–100)
NRBC # BLD: 0 /100 WBCS — SIGNIFICANT CHANGE UP (ref 0–0)
PLATELET # BLD AUTO: 377 K/UL — SIGNIFICANT CHANGE UP (ref 150–400)
PLATELET # BLD AUTO: 377 K/UL — SIGNIFICANT CHANGE UP (ref 150–400)
PLATELET # BLD AUTO: 395 K/UL — SIGNIFICANT CHANGE UP (ref 150–400)
PLATELET # BLD AUTO: 395 K/UL — SIGNIFICANT CHANGE UP (ref 150–400)
POTASSIUM SERPL-MCNC: 4.2 MMOL/L — SIGNIFICANT CHANGE UP (ref 3.5–5.3)
POTASSIUM SERPL-MCNC: 4.2 MMOL/L — SIGNIFICANT CHANGE UP (ref 3.5–5.3)
POTASSIUM SERPL-SCNC: 4.2 MMOL/L — SIGNIFICANT CHANGE UP (ref 3.5–5.3)
POTASSIUM SERPL-SCNC: 4.2 MMOL/L — SIGNIFICANT CHANGE UP (ref 3.5–5.3)
RBC # BLD: 3.38 M/UL — LOW (ref 4.2–5.8)
RBC # BLD: 3.38 M/UL — LOW (ref 4.2–5.8)
RBC # BLD: 3.53 M/UL — LOW (ref 4.2–5.8)
RBC # FLD: 17.2 % — HIGH (ref 10.3–14.5)
RETICS #: 116.8 K/UL — SIGNIFICANT CHANGE UP (ref 25–125)
RETICS #: 116.8 K/UL — SIGNIFICANT CHANGE UP (ref 25–125)
RETICS/RBC NFR: 3.3 % — HIGH (ref 0.5–2.5)
RETICS/RBC NFR: 3.3 % — HIGH (ref 0.5–2.5)
SODIUM SERPL-SCNC: 140 MMOL/L — SIGNIFICANT CHANGE UP (ref 135–145)
SODIUM SERPL-SCNC: 140 MMOL/L — SIGNIFICANT CHANGE UP (ref 135–145)
WBC # BLD: 10.23 K/UL — SIGNIFICANT CHANGE UP (ref 3.8–10.5)
WBC # BLD: 10.23 K/UL — SIGNIFICANT CHANGE UP (ref 3.8–10.5)
WBC # BLD: 7.48 K/UL — SIGNIFICANT CHANGE UP (ref 3.8–10.5)
WBC # BLD: 7.48 K/UL — SIGNIFICANT CHANGE UP (ref 3.8–10.5)
WBC # FLD AUTO: 10.23 K/UL — SIGNIFICANT CHANGE UP (ref 3.8–10.5)
WBC # FLD AUTO: 10.23 K/UL — SIGNIFICANT CHANGE UP (ref 3.8–10.5)
WBC # FLD AUTO: 7.48 K/UL — SIGNIFICANT CHANGE UP (ref 3.8–10.5)
WBC # FLD AUTO: 7.48 K/UL — SIGNIFICANT CHANGE UP (ref 3.8–10.5)

## 2023-12-27 PROCEDURE — 99233 SBSQ HOSP IP/OBS HIGH 50: CPT

## 2023-12-27 RX ORDER — MUPIROCIN 20 MG/G
1 OINTMENT TOPICAL
Refills: 0 | Status: DISCONTINUED | OUTPATIENT
Start: 2023-12-27 | End: 2023-12-29

## 2023-12-27 RX ORDER — SODIUM HYPOCHLORITE 0.125 %
1 SOLUTION, NON-ORAL MISCELLANEOUS DAILY
Refills: 0 | Status: DISCONTINUED | OUTPATIENT
Start: 2023-12-27 | End: 2023-12-29

## 2023-12-27 RX ORDER — COLLAGENASE CLOSTRIDIUM HIST. 250 UNIT/G
1 OINTMENT (GRAM) TOPICAL DAILY
Refills: 0 | Status: DISCONTINUED | OUTPATIENT
Start: 2023-12-27 | End: 2023-12-29

## 2023-12-27 RX ADMIN — Medication 650 MILLIGRAM(S): at 00:44

## 2023-12-27 RX ADMIN — SENNA PLUS 2 TABLET(S): 8.6 TABLET ORAL at 22:11

## 2023-12-27 RX ADMIN — CHLORHEXIDINE GLUCONATE 1 APPLICATION(S): 213 SOLUTION TOPICAL at 06:49

## 2023-12-27 RX ADMIN — MEROPENEM 100 MILLIGRAM(S): 1 INJECTION INTRAVENOUS at 22:10

## 2023-12-27 RX ADMIN — LEVETIRACETAM 750 MILLIGRAM(S): 250 TABLET, FILM COATED ORAL at 17:43

## 2023-12-27 RX ADMIN — Medication 1 APPLICATION(S): at 12:50

## 2023-12-27 RX ADMIN — MEROPENEM 100 MILLIGRAM(S): 1 INJECTION INTRAVENOUS at 14:13

## 2023-12-27 RX ADMIN — PANTOPRAZOLE SODIUM 40 MILLIGRAM(S): 20 TABLET, DELAYED RELEASE ORAL at 17:43

## 2023-12-27 RX ADMIN — Medication 650 MILLIGRAM(S): at 15:06

## 2023-12-27 RX ADMIN — MUPIROCIN 1 APPLICATION(S): 20 OINTMENT TOPICAL at 17:44

## 2023-12-27 RX ADMIN — PANTOPRAZOLE SODIUM 40 MILLIGRAM(S): 20 TABLET, DELAYED RELEASE ORAL at 06:48

## 2023-12-27 RX ADMIN — Medication 650 MILLIGRAM(S): at 17:43

## 2023-12-27 RX ADMIN — Medication 650 MILLIGRAM(S): at 12:49

## 2023-12-27 RX ADMIN — MEROPENEM 100 MILLIGRAM(S): 1 INJECTION INTRAVENOUS at 06:47

## 2023-12-27 RX ADMIN — Medication 650 MILLIGRAM(S): at 06:48

## 2023-12-27 RX ADMIN — Medication 650 MILLIGRAM(S): at 07:18

## 2023-12-27 RX ADMIN — LEVETIRACETAM 750 MILLIGRAM(S): 250 TABLET, FILM COATED ORAL at 06:48

## 2023-12-27 RX ADMIN — Medication 650 MILLIGRAM(S): at 01:14

## 2023-12-27 RX ADMIN — POLYETHYLENE GLYCOL 3350 17 GRAM(S): 17 POWDER, FOR SOLUTION ORAL at 12:49

## 2023-12-27 RX ADMIN — Medication 650 MILLIGRAM(S): at 18:00

## 2023-12-27 NOTE — PROGRESS NOTE ADULT - PROBLEM SELECTOR PLAN 4
p/w Hb 6.6 baseline 7.9 on 12/23  - source unknown, likely slow bleeding from the sacral wound site  - give 2 untis PRBcs   - f/u post trasfusion CBC  - monitor cbc

## 2023-12-27 NOTE — DISCHARGE NOTE PROVIDER - ATTENDING DISCHARGE PHYSICAL EXAMINATION:
PHYSICAL EXAM:  GENERAL: NAD  HEAD:  Atraumatic, Normocephalic  EYES: conjunctiva and sclera clear  NECK: Supple, No JVD  CHEST/LUNG: Clear to auscultation bilaterally; No wheeze  HEART: Regular rate and rhythm; No murmurs, rubs, or gallops  ABDOMEN: Soft, Nontender, Nondistended; Bowel sounds present  EXTREMITIES:  No clubbing, cyanosis, or edema  PSYCH: AAOx2  NEUROLOGY: non-focal  SKIN: No rashes or lesions

## 2023-12-27 NOTE — DISCHARGE NOTE PROVIDER - CARE PROVIDER_API CALL
Steffanie Sutherland  Internal Medicine  [address] [place] [address]  Phone: [phone]  Fax: [phone]  Follow Up Time: 2 weeks   Steffanie Sutherland  Internal Medicine  [address] [place] [address]  Phone: [phone]  Fax: [phone]  Follow Up Time: 2 weeks   Steffanie Sutherland  Internal Medicine  [address] [place] [address]  Phone: [phone]  Fax: [phone]  Follow Up Time: 2 weeks    Andrae Aragon  Surgery  [address] [place] [address], [address]  [place] [address]  Phone: [phone]  Fax: [phone]  Follow Up Time: 2 weeks   Steffanie Sutherland  Internal Medicine  [address] [place] [address]  Phone: [phone]  Fax: [phone]  Follow Up Time: 2 weeks    Andrae Aragon  Surgery  [address] [place] [address], [address]  [place] [address]  Phone: [phone]  Fax: [phone]  Follow Up Time: 2 weeks

## 2023-12-27 NOTE — PROGRESS NOTE ADULT - PROBLEM SELECTOR PLAN 2
likely 2/2 sacral wound ulcer, osteomyelitis   - pt has PICC from prior admission   -c/w Meropenem 1g q8   - blood CX  negative   - Urine Cx negative    - Surgery following

## 2023-12-27 NOTE — DISCHARGE NOTE PROVIDER - PROVIDER TOKENS
PROVIDER:[TOKEN:[[id number]],FOLLOWUP:[2 weeks]] PROVIDER:[TOKEN:[[id number]],FOLLOWUP:[2 weeks]],PROVIDER:[TOKEN:[[id number]],FOLLOWUP:[2 weeks]]

## 2023-12-27 NOTE — DISCHARGE NOTE PROVIDER - NSDCMRMEDTOKEN_GEN_ALL_CORE_FT
acetaminophen 325 mg oral tablet: 2 tab(s) orally every 6 hours as needed for  mild pain  enoxaparin 40 mg/0.4 mL injectable solution: 40 milligram(s) subcutaneously once a day  lactulose 10 g/15 mL oral syrup: 15 milliliter(s) orally once a day  levETIRAcetam 750 mg oral tablet: 1 tab(s) orally 2 times a day  meropenem 1000 mg intravenous injection: 1,000 milligram(s) intravenously every 8 hours Until 1/25/24  metFORMIN 500 mg oral tablet: 1 tab(s) orally 2 times a day  Multiple Vitamins oral tablet: 1 tab(s) orally once a day  Normal Saline Flush 0.9% injectable solution: 1 application intravenous prn Every 1 hour, PRN Pre/Post blood products, medications, blood draws, and to maintain line  polyethylene glycol 3350 oral powder for reconstitution: 17 gram(s) orally 2 times a day  Santyl 250 units/g topical ointment: Apply topically to affected area every 8 hours  senna (sennosides) 8.6 mg oral tablet: 2 tab(s) orally once a day  sodium hypochlorite 0.125% topical solution: 1 Apply topically to affected area once a day  Vitamin C 500 mg oral capsule: 1 cap(s) orally once a day   acetaminophen 325 mg oral tablet: 2 tab(s) orally every 6 hours as needed for  mild pain  enoxaparin 40 mg/0.4 mL injectable solution: 40 milligram(s) subcutaneously once a day  lactulose 10 g/15 mL oral syrup: 15 milliliter(s) orally once a day  levETIRAcetam 750 mg oral tablet: 1 tab(s) orally 2 times a day  meropenem 1000 mg intravenous injection: 1,000 milligram(s) intravenously every 8 hours Until 1/25/24  metFORMIN 500 mg oral tablet: 1 tab(s) orally 2 times a day  Multiple Vitamins oral tablet: 1 tab(s) orally once a day  mupirocin 2% topical ointment: 1 application in each nostril 2 times a day until 12/31  Normal Saline Flush 0.9% injectable solution: 1 application intravenous prn Every 1 hour, PRN Pre/Post blood products, medications, and to maintain line  polyethylene glycol 3350 oral powder for reconstitution: 17 gram(s) orally 2 times a day  Santyl 250 units/g topical ointment: Apply topically to affected area every 8 hours  senna (sennosides) 8.6 mg oral tablet: 2 tab(s) orally once a day  sodium hypochlorite 0.125% topical solution: 1 Apply topically to affected area once a day  Vitamin C 500 mg oral capsule: 1 cap(s) orally once a day

## 2023-12-27 NOTE — DISCHARGE NOTE PROVIDER - DETAILS OF MALNUTRITION DIAGNOSIS/DIAGNOSES
This patient has been assessed with a concern for Malnutrition and was treated during this hospitalization for the following Nutrition diagnosis/diagnoses:     -  12/28/2023: Severe protein-calorie malnutrition   -  12/28/2023: Underweight (BMI < 19)

## 2023-12-27 NOTE — DISCHARGE NOTE PROVIDER - CARE PROVIDERS DIRECT ADDRESSES
,DirectAddress_Unknown ,DirectAddress_Unknown,[email or web address] ,DirectAddress_Unknown,[email or web address]

## 2023-12-27 NOTE — ADVANCED PRACTICE NURSE CONSULT - ASSESSMENT
This is a 68yr old male patient admitted for Hypotension, presenting with the following:  -There is an Unstageable Pressure injury to the L. 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

## 2023-12-27 NOTE — PROGRESS NOTE ADULT - SUBJECTIVE AND OBJECTIVE BOX
INTERVAL HPI/OVERNIGHT EVENTS:  seen and examined   Pt resting comfortably.     MEDICATIONS  (STANDING):  acetaminophen     Tablet .. 650 milliGRAM(s) Oral every 6 hours  alteplase for catheter clearance 2 milliGRAM(s) Catheter once  chlorhexidine 2% Cloths 1 Application(s) Topical <User Schedule>  collagenase Ointment 1 Application(s) Topical daily  Dakins Solution - 1/2 Strength 1 Application(s) Topical daily  dextrose 50% Injectable 25 Gram(s) IV Push once  dextrose 50% Injectable 25 Gram(s) IV Push once  dextrose 50% Injectable 12.5 Gram(s) IV Push once  glucagon  Injectable 1 milliGRAM(s) IntraMuscular once  insulin lispro (ADMELOG) corrective regimen sliding scale   SubCutaneous three times a day before meals  levETIRAcetam 750 milliGRAM(s) Oral two times a day  meropenem  IVPB 1000 milliGRAM(s) IV Intermittent every 8 hours  mupirocin 2% Ointment 1 Application(s) Both Nostrils two times a day  pantoprazole    Tablet 40 milliGRAM(s) Oral two times a day  polyethylene glycol 3350 17 Gram(s) Oral daily  senna 2 Tablet(s) Oral at bedtime    MEDICATIONS  (PRN):  dextrose Oral Gel 15 Gram(s) Oral once PRN Blood Glucose LESS THAN 70 milliGRAM(s)/deciliter      Vital Signs Last 24 Hrs  T(C): 36.4 (27 Dec 2023 05:44), Max: 36.8 (26 Dec 2023 14:56)  T(F): 97.6 (27 Dec 2023 05:44), Max: 98.3 (26 Dec 2023 14:56)  HR: 61 (27 Dec 2023 05:44) (61 - 85)  BP: 126/73 (27 Dec 2023 05:44) (117/74 - 137/80)  BP(mean): 91 (27 Dec 2023 05:44) (88 - 91)  RR: 18 (27 Dec 2023 05:44) (17 - 18)  SpO2: 100% (27 Dec 2023 05:44) (100% - 100%)    Parameters below as of 27 Dec 2023 05:44  Patient On (Oxygen Delivery Method): nasal cannula  O2 Flow (L/min): 3      Physical:  General: . NAD.  Respirations: Unlabored  back: Sacral decubitus ulcer pressure dressing cdi, SS kerlix, no active bleeding noted   I&O's Detail    26 Dec 2023 07:01  -  27 Dec 2023 07:00  --------------------------------------------------------  IN:  Total IN: 0 mL    OUT:    Voided (mL): 1800 mL  Total OUT: 1800 mL    Total NET: -1800 mL          LABS:                        9.4    7.48  )-----------( 377      ( 27 Dec 2023 05:40 )             28.3             12-27    140  |  110<H>  |  13  ----------------------------<  93  4.2   |  25  |  0.67    Ca    8.8      27 Dec 2023 05:40    TPro  6.0  /  Alb  1.6<L>  /  TBili  0.5  /  DBili  x   /  AST  7<L>  /  ALT  8<L>  /  AlkPhos  46  12-25

## 2023-12-27 NOTE — PROGRESS NOTE ADULT - ASSESSMENT
68 yrs old M from [facility] with pmhx of TBI  w/ seizure disorder, DM, sacral decubitus ulcer, presented with low BP and hypoxia. Pt is admitted for sepsis likely 2/2 sacral wound and anemia of bleeding likely at the sacral wound. surgery following.  68 yrs old M from [facility] with pmhx of TBI  w/ seizure disorder, DM, sacral decubitus ulcer, presented with low BP and hypoxia. Pt is admitted for sepsis likely 2/2 sacral wound and anemia of bleeding likely at the sacral wound. surgery following.

## 2023-12-27 NOTE — PROGRESS NOTE ADULT - NS ATTEND AMEND GEN_ALL_CORE FT
Patient seen at bedside, states he feels good, no acute symptoms noted. denies n/v, abdominal pain. RN reports that he had a BM this morning, soft and no bleed noted.  On exam, patient is AOx2, NAD, abdomen soft, NT/ND, extremities without edema.   Labs reviewed, CBC with WBC normalized, hgb 9.4 after 2pRBC, good response. BMP unremarkable. Retic high, LDH normal, haptoglobin pending.    Assessment and plan:  69 yo man with pmhx of TBI  w/ seizure disorder, DM, sacral decubitus ulcer and OM recently discharged from [facility] on 12/23 s/p debridement  12/20 and PICC line placement on 12/21 to complete 6 weeks course of Meropenem, referred from [facility] for low BP and hypoxia. Found to have anemia requiring pRBC transfusion.    # Acute on chronic anemia, s/p 2pRBC, source unclear  # Sacral ulcer with OM on meropenem 6-week course through PICC   - surgery consulted for the sacral ulcer, no active bleed noted, does not suspect the decubitus is the primary cause of blood loss, agree with the assessment  - no overt GIB noted, having normal BMs  - advance diet as tolerated  - f/u haptoglobin  - repeat CBC this PM  - continue PPI BID for now, will decrease to QD if hgb stable until tomorrow  - continue meropenem for OM    # DM A1c 7.0% 12/2023  - FS/SSI    # TBI with seizure  - continue Keppra    # DVT ppx  - SCDs iso likely acute bleed Patient seen at bedside, states he feels good, no acute symptoms noted. denies n/v, abdominal pain. RN reports that he had a BM this morning, soft and no bleed noted.  On exam, patient is AOx2, NAD, abdomen soft, NT/ND, extremities without edema.   Labs reviewed, CBC with WBC normalized, hgb 9.4 after 2pRBC, good response. BMP unremarkable. Retic high, LDH normal, haptoglobin pending.    Assessment and plan:  67 yo man with pmhx of TBI  w/ seizure disorder, DM, sacral decubitus ulcer and OM recently discharged from [facility] on 12/23 s/p debridement  12/20 and PICC line placement on 12/21 to complete 6 weeks course of Meropenem, referred from [facility] for low BP and hypoxia. Found to have anemia requiring pRBC transfusion.    # Acute on chronic anemia, s/p 2pRBC, source unclear  # Sacral ulcer with OM on meropenem 6-week course through PICC   - surgery consulted for the sacral ulcer, no active bleed noted, does not suspect the decubitus is the primary cause of blood loss, agree with the assessment  - no overt GIB noted, having normal BMs  - advance diet as tolerated  - f/u haptoglobin  - repeat CBC this PM  - continue PPI BID for now, will decrease to QD if hgb stable until tomorrow  - continue meropenem for OM    # DM A1c 7.0% 12/2023  - FS/SSI    # TBI with seizure  - continue Keppra    # DVT ppx  - SCDs iso likely acute bleed

## 2023-12-27 NOTE — PROGRESS NOTE ADULT - ASSESSMENT
68y.o. Male s/p debridement of stage 4 sacral decub ulcer 12/20 p/w hypotension and hypoxia, anemia, s/p 3uprbc, wbc downtrending    -daily dressing changes, wound care orders are in, pressure dressing   -monitor H/H transfuse PRN   -IV abx  -collagenase to fibrinous slough areas; wound consult while admitted

## 2023-12-27 NOTE — DISCHARGE NOTE PROVIDER - HOSPITAL COURSE
68 yrs old M from [facility] with pmhx of TBI  w/ seizure disorder, DM, sacral decubitus ulcer, presented with low BP and hypoxia. Pt is admitted for sepsis likely 2/2 sacral wound and anemia. bleeding likely at the sacral wound. s/p 3 units of PRBCs. surgery following. also was found to have kirill, improved with IV fluids. Pt will d/c with meropenem from prior      68 yrs old M from [facility] with pmhx of TBI  w/ seizure disorder, DM, sacral decubitus ulcer, recently discharged from [facility] on 12/23 with main dx of  Sepsis likely 2/2 sacral ulcer, found to have extensive sacral decubitus ulcer with fragmentation/osteomyelitis of the the coccyx, underwent debridement of sacral ulcer on 12/20 , s/p  PICC line placment on 12/21 to complete 6 weeks course of Meropenem for osteomyelitis. Pt is presented with low BP and hypoxia.  As per ED attending note  he  was  found this morning to have decreased responsiveness and had low blood pressure in the field. Pt is awake however not oriented, denied any pain.             Problem/Plan - 1:  ·  Problem: Acute respiratory failure with hypoxia.   ·  Plan: - setting of Sepsis/ symptomatic anemia/ bacteremia   - saturating well in 2L now   - chest x-ray unremarkable  - cont supportive measure   - cont to monitor oxygen saturation.    Problem/Plan - 2:  ·  Problem: Sepsis.   ·  Plan: likely 2/2 sacral wound ulcer, osteomyelitis   - pt has PICC from prior admission   -c/w Meropenem 1g q8   - blood CX  negative   - Urine Cx negative    - Surgery following.    Problem/Plan - 3:  ·  Problem: Osteomyelitis.   ·  Plan: plan as above.   68 yrs old M from [facility] with pmhx of TBI  w/ seizure disorder, DM, sacral decubitus ulcer, presented with low BP and hypoxia. Pt is admitted for sepsis likely 2/2 sacral wound and anemia. bleeding likely at the sacral wound. s/p 3 units of PRBCs. surgery following. also was found to have kirill, improved with IV fluids. Pt will d/c with meropenem from prior      68 yrs old M from [facility] with pmhx of TBI  w/ seizure disorder, DM, sacral decubitus ulcer, recently discharged from [facility] on 12/23 with main dx of  Sepsis likely 2/2 sacral ulcer, found to have extensive sacral decubitus ulcer with fragmentation/osteomyelitis of the the coccyx, underwent debridement of sacral ulcer on 12/20 , s/p  PICC line placment on 12/21 to complete 6 weeks course of Meropenem for osteomyelitis. Pt is presented with low BP and hypoxia.  As per ED attending note  he  was  found this morning to have decreased responsiveness and had low blood pressure in the field. Pt is awake however not oriented, denied any pain.             Problem/Plan - 1:  ·  Problem: Acute respiratory failure with hypoxia.   ·  Plan: - setting of Sepsis/ symptomatic anemia/ bacteremia   - saturating well in 2L now   - chest x-ray unremarkable  - cont supportive measure   - cont to monitor oxygen saturation.    Problem/Plan - 2:  ·  Problem: Sepsis.   ·  Plan: likely 2/2 sacral wound ulcer, osteomyelitis   - pt has PICC from prior admission   -c/w Meropenem 1g q8   - blood CX  negative   - Urine Cx negative    - Surgery following.    Problem/Plan - 3:  ·  Problem: Osteomyelitis.   ·  Plan: plan as above.   68 yrs old M from [facility] with pmhx of68 yo man with pmhx of TBI  w/ seizure disorder, DM, sacral decubitus ulcer and OM recently discharged from [facility] on 12/23 s/p debridement  12/20 and PICC line placement on 12/21 to complete 6 weeks course of Meropenem, referred from [facility] for low BP and hypoxia. Found to have anemia requiring 3 units of pRBC. Surgery consulted for possible bleed from the sacral ulcer leading to the anemia, but there was no active bleed noted, no intervention warranted. No melena/hematochezia was also noted, his BMs were confirmed soft and normal in color. His hemoglobin was stable after the transfusion. BP was also stable during the course.  Given the stable hospital course, the decision was made to discharge him back to [facility]. 68 yrs old M from [facility] with pmhx of68 yo man with pmhx of TBI  w/ seizure disorder, DM, sacral decubitus ulcer and OM recently discharged from [facility] on 12/23 s/p debridement  12/20 and PICC line placement on 12/21 to complete 6 weeks course of Meropenem, referred from [facility] for low BP and hypoxia. Found to have anemia requiring 3 units of pRBC. Surgery consulted for possible bleed from the sacral ulcer leading to the anemia, but there was no active bleed noted, no intervention warranted. No melena/hematochezia was also noted, his BMs were confirmed soft and normal in color. His hemoglobin was stable after the transfusion. BP was also stable during the course.  Given the stable hospital course, the decision was made to discharge him back to [facility]. 68 yrs old M from [facility] with pmhx TBI  w/ seizure disorder, DM, sacral decubitus ulcer and OM recently discharged from [facility] on 12/23 s/p debridement  12/20 and PICC line placement on 12/21 to complete 6 weeks course of Meropenem, referred from [facility] for low BP and hypoxia. Found to have anemia requiring 3 units of pRBC. Surgery consulted for possible bleed from the sacral ulcer leading to the anemia, but there was no active bleed noted, no intervention warranted. No melena/hematochezia was also noted, his BMs were confirmed soft and normal in color. His hemoglobin was stable after the transfusion. BP was also stable during the course.  Given the stable hospital course, the decision was made to discharge him back to [facility]. pt will continue meropenem 1g q8h until 1/25/24. 68 yrs old M from [facility] with pmhx TBI  w/ seizure disorder, DM, sacral decubitus ulcer and OM recently discharged from [facility] on 12/23 s/p debridement  12/20 and PICC line placement on 12/21 to complete 6 weeks course of Meropenem, referred from [facility] for low BP and hypoxia. Found to have anemia requiring 3 units of pRBC. Surgery consulted for possible bleed from the sacral ulcer leading to the anemia, but there was no active bleed noted, no intervention warranted. No melena/hematochezia was also noted, his BMs were confirmed soft and normal in color. His hemoglobin was stable after the transfusion. BP was also stable during the course.  Given the stable hospital course, the decision was made to discharge him back to [facility]. pt will continue meropenem 1g q8h until 1/25/24. 68 yrs old M from [facility] with pmhx TBI  w/ seizure disorder, DM, sacral decubitus ulcer and OM recently discharged from [facility] on 12/23 s/p debridement  12/20 and PICC line placement on 12/21 to complete 6 weeks course of Meropenem, referred from [facility] for low BP and hypoxia. Found to have anemia requiring 3 units of pRBC. Surgery consulted for possible bleed from the sacral ulcer leading to the anemia, but there was no active bleed noted, no intervention warranted. No melena/hematochezia was noted during the course, his BMs were confirmed soft and normal in color. His hemoglobin was stable after the transfusion. BP was also stable during the course.  Given the stable hospital course, the decision was made to discharge him back to [facility]. pt will continue meropenem 1g q8h until 1/25/24. 68 yrs old M from [facility] with pmhx TBI  w/ seizure disorder, DM, sacral decubitus ulcer and OM recently discharged from [facility] on 12/23 s/p debridement  12/20 and PICC line placement on 12/21 to complete 6 weeks course of Meropenem, referred from [facility] for low BP and hypoxia. Found to have anemia requiring 3 units of pRBC. Surgery consulted for possible bleed from the sacral ulcer leading to the anemia, but there was no active bleed noted, no intervention warranted. No melena/hematochezia was noted during the course, his BMs were confirmed soft and normal in color. His hemoglobin was stable after the transfusion. BP was also stable during the course.  Given the stable hospital course, the decision was made to discharge him back to [facility]. pt will continue meropenem 1g q8h until 1/25/24.

## 2023-12-27 NOTE — ADVANCED PRACTICE NURSE CONSULT - RECOMMEDATIONS
-Clean all wounds with normal saline and apply skin prep to the surrounding skin  -Apply Collagenase ointment to the slough and necrotic areas of the L. Ischial and Sacrococcyx area wounds, pack using 0.125% Dakins moistened gauze, and cover with a Foam dressing Q 72hrs PRN  -Elevate/float the patients heels using heel protectors and reposition the patient Q 2hrs using wedges or pillows

## 2023-12-27 NOTE — PROGRESS NOTE ADULT - SUBJECTIVE AND OBJECTIVE BOX
Patient is a 68y old  Male who presents with a chief complaint of     INTERVAL HPI/OVERNIGHT EVENTS: no acute events overnight       REVIEW OF SYSTEMS:  CONSTITUTIONAL: No fever, chills  ENMT:  No difficulty hearing, no change in vision  NECK: No pain or stiffness  RESPIRATORY: No cough, SOB  CARDIOVASCULAR: No chest pain, palpitations  GASTROINTESTINAL: No abdominal pain. No nausea, vomiting, or diarrhea  GENITOURINARY: No dysuria  NEUROLOGICAL: No HA  SKIN: No itching, burning, rashes, or lesions   LYMPH NODES: No enlarged glands  ENDOCRINE: No heat or cold intolerance; No hair loss  MUSCULOSKELETAL: No joint pain or swelling; No muscle, back, or extremity pain  PSYCHIATRIC: No depression, anxiety  HEME/LYMPH: No easy bruising, or bleeding gums    T(C): 36.4 (12-27-23 @ 05:44), Max: 36.8 (12-26-23 @ 14:56)  HR: 61 (12-27-23 @ 05:44) (61 - 74)  BP: 126/73 (12-27-23 @ 05:44) (117/74 - 137/80)  RR: 18 (12-27-23 @ 05:44) (17 - 18)  SpO2: 100% (12-27-23 @ 05:44) (100% - 100%)  Wt(kg): --Vital Signs Last 24 Hrs  T(C): 36.4 (27 Dec 2023 05:44), Max: 36.8 (26 Dec 2023 14:56)  T(F): 97.6 (27 Dec 2023 05:44), Max: 98.3 (26 Dec 2023 14:56)  HR: 61 (27 Dec 2023 05:44) (61 - 74)  BP: 126/73 (27 Dec 2023 05:44) (117/74 - 137/80)  BP(mean): 91 (27 Dec 2023 05:44) (88 - 91)  RR: 18 (27 Dec 2023 05:44) (17 - 18)  SpO2: 100% (27 Dec 2023 05:44) (100% - 100%)    Parameters below as of 27 Dec 2023 05:44  Patient On (Oxygen Delivery Method): nasal cannula  O2 Flow (L/min): 3    MEDICATIONS  (STANDING):  acetaminophen     Tablet .. 650 milliGRAM(s) Oral every 6 hours  alteplase for catheter clearance 2 milliGRAM(s) Catheter once  chlorhexidine 2% Cloths 1 Application(s) Topical <User Schedule>  collagenase Ointment 1 Application(s) Topical daily  Dakins Solution - 1/2 Strength 1 Application(s) Topical daily  dextrose 50% Injectable 12.5 Gram(s) IV Push once  dextrose 50% Injectable 25 Gram(s) IV Push once  dextrose 50% Injectable 25 Gram(s) IV Push once  glucagon  Injectable 1 milliGRAM(s) IntraMuscular once  insulin lispro (ADMELOG) corrective regimen sliding scale   SubCutaneous three times a day before meals  levETIRAcetam 750 milliGRAM(s) Oral two times a day  meropenem  IVPB 1000 milliGRAM(s) IV Intermittent every 8 hours  mupirocin 2% Ointment 1 Application(s) Both Nostrils two times a day  pantoprazole    Tablet 40 milliGRAM(s) Oral two times a day  polyethylene glycol 3350 17 Gram(s) Oral daily  senna 2 Tablet(s) Oral at bedtime    MEDICATIONS  (PRN):  dextrose Oral Gel 15 Gram(s) Oral once PRN Blood Glucose LESS THAN 70 milliGRAM(s)/deciliter    PHYSICAL EXAM:  GENERAL: NAD  EYES: clear conjunctiva; EOMI  ENMT: Moist mucous membranes  NECK: Supple, No JVD, Normal thyroid  CHEST/LUNG: Clear to auscultation bilaterally; No rales, rhonchi, wheezing, or rubs  HEART: S1, S2, Regular rate and rhythm  ABDOMEN: Soft, Nontender, Nondistended; Bowel sounds present  NEURO: Alert & Oriented X3  EXTREMITIES: No LE edema, no calf tenderness  LYMPH: No lymphadenopathy noted  SKIN: sacral ulcer     Consultant(s) Notes Reviewed:  [x ] YES  [ ] NO  Care Discussed with Consultants/Other Providers [ x] YES  [ ] NO    LABS:                        9.4    7.48  )-----------( 377      ( 27 Dec 2023 05:40 )             28.3     12-27    140  |  110<H>  |  13  ----------------------------<  93  4.2   |  25  |  0.67    Ca    8.8      27 Dec 2023 05:40    TPro  6.0  /  Alb  1.6<L>  /  TBili  0.5  /  DBili  x   /  AST  7<L>  /  ALT  8<L>  /  AlkPhos  46  12-25    PT/INR - ( 25 Dec 2023 11:00 )   PT: 13.7 sec;   INR: 1.21 ratio         PTT - ( 25 Dec 2023 11:00 )  PTT:38.6 sec  CAPILLARY BLOOD GLUCOSE      POCT Blood Glucose.: 147 mg/dL (27 Dec 2023 08:36)  POCT Blood Glucose.: 110 mg/dL (26 Dec 2023 21:56)  POCT Blood Glucose.: 105 mg/dL (26 Dec 2023 16:58)      ABG - ( 25 Dec 2023 11:15 )  pH, Arterial: 7.51  pH, Blood: x     /  pCO2: 24    /  pO2: 216   / HCO3: 19    / Base Excess: -3.3  /  SaO2: 100         Urinalysis Basic - ( 27 Dec 2023 05:40 )    Color: x / Appearance: x / SG: x / pH: x  Gluc: 93 mg/dL / Ketone: x  / Bili: x / Urobili: x   Blood: x / Protein: x / Nitrite: x   Leuk Esterase: x / RBC: x / WBC x   Sq Epi: x / Non Sq Epi: x / Bacteria: x        RADIOLOGY & ADDITIONAL TESTS:    Imaging Personally Reviewed:  [x ] YES  [ ] NO      < from: Xray Chest 1 View- PORTABLE-Urgent (12.25.23 @ 12:00) >    ACC: [id number] EXAM:  XR CHEST PORTABLE URGENT 1V   ORDERED BY: ADRIAN SANCHEZ     PROCEDURE DATE:  12/25/2023          INTERPRETATION:  Chest radiograph (one view)     CPT 14107    CLINICAL INFORMATION:  Sepsis.  Short of breath.    TECHNIQUE:  Single frontal view of the chest was obtained.    FINDINGS:  Prior study dated 12/13/2023 was available for review.    The lungs are clear.  No pleural abnormality is seen.    The heart and mediastinum appear intact.  A left-sided PICC line catheter   is noted with the distal tip overlying the superior vena cava.      IMPRESSION: No gross consolidation is seen.   Left-sided PICC line   catheter noted with the distal tip overlying the superior vena cava.    --- End of Report ---            HANSEL MARTINEZ MD; Attending Radiologist  This document has been electronically signed. Dec 25 2023  1:43PM    < end of copied text >       Patient is a 68y old  Male who presents with a chief complaint of     INTERVAL HPI/OVERNIGHT EVENTS: no acute events overnight       REVIEW OF SYSTEMS:  CONSTITUTIONAL: No fever, chills  ENMT:  No difficulty hearing, no change in vision  NECK: No pain or stiffness  RESPIRATORY: No cough, SOB  CARDIOVASCULAR: No chest pain, palpitations  GASTROINTESTINAL: No abdominal pain. No nausea, vomiting, or diarrhea  GENITOURINARY: No dysuria  NEUROLOGICAL: No HA  SKIN: No itching, burning, rashes, or lesions   LYMPH NODES: No enlarged glands  ENDOCRINE: No heat or cold intolerance; No hair loss  MUSCULOSKELETAL: No joint pain or swelling; No muscle, back, or extremity pain  PSYCHIATRIC: No depression, anxiety  HEME/LYMPH: No easy bruising, or bleeding gums    T(C): 36.4 (12-27-23 @ 05:44), Max: 36.8 (12-26-23 @ 14:56)  HR: 61 (12-27-23 @ 05:44) (61 - 74)  BP: 126/73 (12-27-23 @ 05:44) (117/74 - 137/80)  RR: 18 (12-27-23 @ 05:44) (17 - 18)  SpO2: 100% (12-27-23 @ 05:44) (100% - 100%)  Wt(kg): --Vital Signs Last 24 Hrs  T(C): 36.4 (27 Dec 2023 05:44), Max: 36.8 (26 Dec 2023 14:56)  T(F): 97.6 (27 Dec 2023 05:44), Max: 98.3 (26 Dec 2023 14:56)  HR: 61 (27 Dec 2023 05:44) (61 - 74)  BP: 126/73 (27 Dec 2023 05:44) (117/74 - 137/80)  BP(mean): 91 (27 Dec 2023 05:44) (88 - 91)  RR: 18 (27 Dec 2023 05:44) (17 - 18)  SpO2: 100% (27 Dec 2023 05:44) (100% - 100%)    Parameters below as of 27 Dec 2023 05:44  Patient On (Oxygen Delivery Method): nasal cannula  O2 Flow (L/min): 3    MEDICATIONS  (STANDING):  acetaminophen     Tablet .. 650 milliGRAM(s) Oral every 6 hours  alteplase for catheter clearance 2 milliGRAM(s) Catheter once  chlorhexidine 2% Cloths 1 Application(s) Topical <User Schedule>  collagenase Ointment 1 Application(s) Topical daily  Dakins Solution - 1/2 Strength 1 Application(s) Topical daily  dextrose 50% Injectable 12.5 Gram(s) IV Push once  dextrose 50% Injectable 25 Gram(s) IV Push once  dextrose 50% Injectable 25 Gram(s) IV Push once  glucagon  Injectable 1 milliGRAM(s) IntraMuscular once  insulin lispro (ADMELOG) corrective regimen sliding scale   SubCutaneous three times a day before meals  levETIRAcetam 750 milliGRAM(s) Oral two times a day  meropenem  IVPB 1000 milliGRAM(s) IV Intermittent every 8 hours  mupirocin 2% Ointment 1 Application(s) Both Nostrils two times a day  pantoprazole    Tablet 40 milliGRAM(s) Oral two times a day  polyethylene glycol 3350 17 Gram(s) Oral daily  senna 2 Tablet(s) Oral at bedtime    MEDICATIONS  (PRN):  dextrose Oral Gel 15 Gram(s) Oral once PRN Blood Glucose LESS THAN 70 milliGRAM(s)/deciliter    PHYSICAL EXAM:  GENERAL: NAD  EYES: clear conjunctiva; EOMI  ENMT: Moist mucous membranes  NECK: Supple, No JVD, Normal thyroid  CHEST/LUNG: Clear to auscultation bilaterally; No rales, rhonchi, wheezing, or rubs  HEART: S1, S2, Regular rate and rhythm  ABDOMEN: Soft, Nontender, Nondistended; Bowel sounds present  NEURO: Alert & Oriented X3  EXTREMITIES: No LE edema, no calf tenderness  LYMPH: No lymphadenopathy noted  SKIN: sacral ulcer     Consultant(s) Notes Reviewed:  [x ] YES  [ ] NO  Care Discussed with Consultants/Other Providers [ x] YES  [ ] NO    LABS:                        9.4    7.48  )-----------( 377      ( 27 Dec 2023 05:40 )             28.3     12-27    140  |  110<H>  |  13  ----------------------------<  93  4.2   |  25  |  0.67    Ca    8.8      27 Dec 2023 05:40    TPro  6.0  /  Alb  1.6<L>  /  TBili  0.5  /  DBili  x   /  AST  7<L>  /  ALT  8<L>  /  AlkPhos  46  12-25    PT/INR - ( 25 Dec 2023 11:00 )   PT: 13.7 sec;   INR: 1.21 ratio         PTT - ( 25 Dec 2023 11:00 )  PTT:38.6 sec  CAPILLARY BLOOD GLUCOSE      POCT Blood Glucose.: 147 mg/dL (27 Dec 2023 08:36)  POCT Blood Glucose.: 110 mg/dL (26 Dec 2023 21:56)  POCT Blood Glucose.: 105 mg/dL (26 Dec 2023 16:58)      ABG - ( 25 Dec 2023 11:15 )  pH, Arterial: 7.51  pH, Blood: x     /  pCO2: 24    /  pO2: 216   / HCO3: 19    / Base Excess: -3.3  /  SaO2: 100         Urinalysis Basic - ( 27 Dec 2023 05:40 )    Color: x / Appearance: x / SG: x / pH: x  Gluc: 93 mg/dL / Ketone: x  / Bili: x / Urobili: x   Blood: x / Protein: x / Nitrite: x   Leuk Esterase: x / RBC: x / WBC x   Sq Epi: x / Non Sq Epi: x / Bacteria: x        RADIOLOGY & ADDITIONAL TESTS:    Imaging Personally Reviewed:  [x ] YES  [ ] NO      < from: Xray Chest 1 View- PORTABLE-Urgent (12.25.23 @ 12:00) >    ACC: [id number] EXAM:  XR CHEST PORTABLE URGENT 1V   ORDERED BY: ADRIAN SANCHEZ     PROCEDURE DATE:  12/25/2023          INTERPRETATION:  Chest radiograph (one view)     CPT 30755    CLINICAL INFORMATION:  Sepsis.  Short of breath.    TECHNIQUE:  Single frontal view of the chest was obtained.    FINDINGS:  Prior study dated 12/13/2023 was available for review.    The lungs are clear.  No pleural abnormality is seen.    The heart and mediastinum appear intact.  A left-sided PICC line catheter   is noted with the distal tip overlying the superior vena cava.      IMPRESSION: No gross consolidation is seen.   Left-sided PICC line   catheter noted with the distal tip overlying the superior vena cava.    --- End of Report ---            HANSEL MARTINEZ MD; Attending Radiologist  This document has been electronically signed. Dec 25 2023  1:43PM    < end of copied text >       Patient is a 68y old  Male who presents with a chief complaint of     INTERVAL HPI/OVERNIGHT EVENTS: no acute events overnight, hg improved 9.4 from 5.9. s/p 2 units of  PRbCs yesterday, kirill resolved       REVIEW OF SYSTEMS:  CONSTITUTIONAL: No fever, chills  ENMT:  No difficulty hearing, no change in vision  NECK: No pain or stiffness  RESPIRATORY: No cough, SOB  CARDIOVASCULAR: No chest pain, palpitations  GASTROINTESTINAL: No abdominal pain. No nausea, vomiting, or diarrhea  GENITOURINARY: No dysuria  NEUROLOGICAL: No HA  SKIN: No itching, burning, rashes, or lesions   LYMPH NODES: No enlarged glands  ENDOCRINE: No heat or cold intolerance; No hair loss  MUSCULOSKELETAL: No joint pain or swelling; No muscle, back, or extremity pain  PSYCHIATRIC: No depression, anxiety  HEME/LYMPH: No easy bruising, or bleeding gums    T(C): 36.4 (12-27-23 @ 05:44), Max: 36.8 (12-26-23 @ 14:56)  HR: 61 (12-27-23 @ 05:44) (61 - 74)  BP: 126/73 (12-27-23 @ 05:44) (117/74 - 137/80)  RR: 18 (12-27-23 @ 05:44) (17 - 18)  SpO2: 100% (12-27-23 @ 05:44) (100% - 100%)  Wt(kg): --Vital Signs Last 24 Hrs  T(C): 36.4 (27 Dec 2023 05:44), Max: 36.8 (26 Dec 2023 14:56)  T(F): 97.6 (27 Dec 2023 05:44), Max: 98.3 (26 Dec 2023 14:56)  HR: 61 (27 Dec 2023 05:44) (61 - 74)  BP: 126/73 (27 Dec 2023 05:44) (117/74 - 137/80)  BP(mean): 91 (27 Dec 2023 05:44) (88 - 91)  RR: 18 (27 Dec 2023 05:44) (17 - 18)  SpO2: 100% (27 Dec 2023 05:44) (100% - 100%)    Parameters below as of 27 Dec 2023 05:44  Patient On (Oxygen Delivery Method): nasal cannula  O2 Flow (L/min): 3    MEDICATIONS  (STANDING):  acetaminophen     Tablet .. 650 milliGRAM(s) Oral every 6 hours  alteplase for catheter clearance 2 milliGRAM(s) Catheter once  chlorhexidine 2% Cloths 1 Application(s) Topical <User Schedule>  collagenase Ointment 1 Application(s) Topical daily  Dakins Solution - 1/2 Strength 1 Application(s) Topical daily  dextrose 50% Injectable 12.5 Gram(s) IV Push once  dextrose 50% Injectable 25 Gram(s) IV Push once  dextrose 50% Injectable 25 Gram(s) IV Push once  glucagon  Injectable 1 milliGRAM(s) IntraMuscular once  insulin lispro (ADMELOG) corrective regimen sliding scale   SubCutaneous three times a day before meals  levETIRAcetam 750 milliGRAM(s) Oral two times a day  meropenem  IVPB 1000 milliGRAM(s) IV Intermittent every 8 hours  mupirocin 2% Ointment 1 Application(s) Both Nostrils two times a day  pantoprazole    Tablet 40 milliGRAM(s) Oral two times a day  polyethylene glycol 3350 17 Gram(s) Oral daily  senna 2 Tablet(s) Oral at bedtime    MEDICATIONS  (PRN):  dextrose Oral Gel 15 Gram(s) Oral once PRN Blood Glucose LESS THAN 70 milliGRAM(s)/deciliter    PHYSICAL EXAM:  GENERAL: NAD  EYES: clear conjunctiva; EOMI  ENMT: Moist mucous membranes  NECK: Supple, No JVD, Normal thyroid  CHEST/LUNG: Clear to auscultation bilaterally; No rales, rhonchi, wheezing, or rubs  HEART: S1, S2, Regular rate and rhythm  ABDOMEN: Soft, Nontender, Nondistended; Bowel sounds present  NEURO: Alert & Oriented X3  EXTREMITIES: No LE edema, no calf tenderness  LYMPH: No lymphadenopathy noted  SKIN: sacral ulcer     Consultant(s) Notes Reviewed:  [x ] YES  [ ] NO  Care Discussed with Consultants/Other Providers [ x] YES  [ ] NO    LABS:                        9.4    7.48  )-----------( 377      ( 27 Dec 2023 05:40 )             28.3     12-27    140  |  110<H>  |  13  ----------------------------<  93  4.2   |  25  |  0.67    Ca    8.8      27 Dec 2023 05:40    TPro  6.0  /  Alb  1.6<L>  /  TBili  0.5  /  DBili  x   /  AST  7<L>  /  ALT  8<L>  /  AlkPhos  46  12-25    PT/INR - ( 25 Dec 2023 11:00 )   PT: 13.7 sec;   INR: 1.21 ratio         PTT - ( 25 Dec 2023 11:00 )  PTT:38.6 sec  CAPILLARY BLOOD GLUCOSE      POCT Blood Glucose.: 147 mg/dL (27 Dec 2023 08:36)  POCT Blood Glucose.: 110 mg/dL (26 Dec 2023 21:56)  POCT Blood Glucose.: 105 mg/dL (26 Dec 2023 16:58)      ABG - ( 25 Dec 2023 11:15 )  pH, Arterial: 7.51  pH, Blood: x     /  pCO2: 24    /  pO2: 216   / HCO3: 19    / Base Excess: -3.3  /  SaO2: 100         Urinalysis Basic - ( 27 Dec 2023 05:40 )    Color: x / Appearance: x / SG: x / pH: x  Gluc: 93 mg/dL / Ketone: x  / Bili: x / Urobili: x   Blood: x / Protein: x / Nitrite: x   Leuk Esterase: x / RBC: x / WBC x   Sq Epi: x / Non Sq Epi: x / Bacteria: x        RADIOLOGY & ADDITIONAL TESTS:    Imaging Personally Reviewed:  [x ] YES  [ ] NO      < from: Xray Chest 1 View- PORTABLE-Urgent (12.25.23 @ 12:00) >    ACC: [id number] EXAM:  XR CHEST PORTABLE URGENT 1V   ORDERED BY: ADRIAN SANCHEZ     PROCEDURE DATE:  12/25/2023          INTERPRETATION:  Chest radiograph (one view)     CPT 10213    CLINICAL INFORMATION:  Sepsis.  Short of breath.    TECHNIQUE:  Single frontal view of the chest was obtained.    FINDINGS:  Prior study dated 12/13/2023 was available for review.    The lungs are clear.  No pleural abnormality is seen.    The heart and mediastinum appear intact.  A left-sided PICC line catheter   is noted with the distal tip overlying the superior vena cava.      IMPRESSION: No gross consolidation is seen.   Left-sided PICC line   catheter noted with the distal tip overlying the superior vena cava.    --- End of Report ---            HANSEL MARTINEZ MD; Attending Radiologist  This document has been electronically signed. Dec 25 2023  1:43PM    < end of copied text >       Patient is a 68y old  Male who presents with a chief complaint of     INTERVAL HPI/OVERNIGHT EVENTS: no acute events overnight, hg improved 9.4 from 5.9. s/p 2 units of  PRbCs yesterday, kirill resolved       REVIEW OF SYSTEMS:  CONSTITUTIONAL: No fever, chills  ENMT:  No difficulty hearing, no change in vision  NECK: No pain or stiffness  RESPIRATORY: No cough, SOB  CARDIOVASCULAR: No chest pain, palpitations  GASTROINTESTINAL: No abdominal pain. No nausea, vomiting, or diarrhea  GENITOURINARY: No dysuria  NEUROLOGICAL: No HA  SKIN: No itching, burning, rashes, or lesions   LYMPH NODES: No enlarged glands  ENDOCRINE: No heat or cold intolerance; No hair loss  MUSCULOSKELETAL: No joint pain or swelling; No muscle, back, or extremity pain  PSYCHIATRIC: No depression, anxiety  HEME/LYMPH: No easy bruising, or bleeding gums    T(C): 36.4 (12-27-23 @ 05:44), Max: 36.8 (12-26-23 @ 14:56)  HR: 61 (12-27-23 @ 05:44) (61 - 74)  BP: 126/73 (12-27-23 @ 05:44) (117/74 - 137/80)  RR: 18 (12-27-23 @ 05:44) (17 - 18)  SpO2: 100% (12-27-23 @ 05:44) (100% - 100%)  Wt(kg): --Vital Signs Last 24 Hrs  T(C): 36.4 (27 Dec 2023 05:44), Max: 36.8 (26 Dec 2023 14:56)  T(F): 97.6 (27 Dec 2023 05:44), Max: 98.3 (26 Dec 2023 14:56)  HR: 61 (27 Dec 2023 05:44) (61 - 74)  BP: 126/73 (27 Dec 2023 05:44) (117/74 - 137/80)  BP(mean): 91 (27 Dec 2023 05:44) (88 - 91)  RR: 18 (27 Dec 2023 05:44) (17 - 18)  SpO2: 100% (27 Dec 2023 05:44) (100% - 100%)    Parameters below as of 27 Dec 2023 05:44  Patient On (Oxygen Delivery Method): nasal cannula  O2 Flow (L/min): 3    MEDICATIONS  (STANDING):  acetaminophen     Tablet .. 650 milliGRAM(s) Oral every 6 hours  alteplase for catheter clearance 2 milliGRAM(s) Catheter once  chlorhexidine 2% Cloths 1 Application(s) Topical <User Schedule>  collagenase Ointment 1 Application(s) Topical daily  Dakins Solution - 1/2 Strength 1 Application(s) Topical daily  dextrose 50% Injectable 12.5 Gram(s) IV Push once  dextrose 50% Injectable 25 Gram(s) IV Push once  dextrose 50% Injectable 25 Gram(s) IV Push once  glucagon  Injectable 1 milliGRAM(s) IntraMuscular once  insulin lispro (ADMELOG) corrective regimen sliding scale   SubCutaneous three times a day before meals  levETIRAcetam 750 milliGRAM(s) Oral two times a day  meropenem  IVPB 1000 milliGRAM(s) IV Intermittent every 8 hours  mupirocin 2% Ointment 1 Application(s) Both Nostrils two times a day  pantoprazole    Tablet 40 milliGRAM(s) Oral two times a day  polyethylene glycol 3350 17 Gram(s) Oral daily  senna 2 Tablet(s) Oral at bedtime    MEDICATIONS  (PRN):  dextrose Oral Gel 15 Gram(s) Oral once PRN Blood Glucose LESS THAN 70 milliGRAM(s)/deciliter    PHYSICAL EXAM:  GENERAL: NAD  EYES: clear conjunctiva; EOMI  ENMT: Moist mucous membranes  NECK: Supple, No JVD, Normal thyroid  CHEST/LUNG: Clear to auscultation bilaterally; No rales, rhonchi, wheezing, or rubs  HEART: S1, S2, Regular rate and rhythm  ABDOMEN: Soft, Nontender, Nondistended; Bowel sounds present  NEURO: Alert & Oriented X3  EXTREMITIES: No LE edema, no calf tenderness  LYMPH: No lymphadenopathy noted  SKIN: sacral ulcer     Consultant(s) Notes Reviewed:  [x ] YES  [ ] NO  Care Discussed with Consultants/Other Providers [ x] YES  [ ] NO    LABS:                        9.4    7.48  )-----------( 377      ( 27 Dec 2023 05:40 )             28.3     12-27    140  |  110<H>  |  13  ----------------------------<  93  4.2   |  25  |  0.67    Ca    8.8      27 Dec 2023 05:40    TPro  6.0  /  Alb  1.6<L>  /  TBili  0.5  /  DBili  x   /  AST  7<L>  /  ALT  8<L>  /  AlkPhos  46  12-25    PT/INR - ( 25 Dec 2023 11:00 )   PT: 13.7 sec;   INR: 1.21 ratio         PTT - ( 25 Dec 2023 11:00 )  PTT:38.6 sec  CAPILLARY BLOOD GLUCOSE      POCT Blood Glucose.: 147 mg/dL (27 Dec 2023 08:36)  POCT Blood Glucose.: 110 mg/dL (26 Dec 2023 21:56)  POCT Blood Glucose.: 105 mg/dL (26 Dec 2023 16:58)      ABG - ( 25 Dec 2023 11:15 )  pH, Arterial: 7.51  pH, Blood: x     /  pCO2: 24    /  pO2: 216   / HCO3: 19    / Base Excess: -3.3  /  SaO2: 100         Urinalysis Basic - ( 27 Dec 2023 05:40 )    Color: x / Appearance: x / SG: x / pH: x  Gluc: 93 mg/dL / Ketone: x  / Bili: x / Urobili: x   Blood: x / Protein: x / Nitrite: x   Leuk Esterase: x / RBC: x / WBC x   Sq Epi: x / Non Sq Epi: x / Bacteria: x        RADIOLOGY & ADDITIONAL TESTS:    Imaging Personally Reviewed:  [x ] YES  [ ] NO      < from: Xray Chest 1 View- PORTABLE-Urgent (12.25.23 @ 12:00) >    ACC: [id number] EXAM:  XR CHEST PORTABLE URGENT 1V   ORDERED BY: ADRIAN SANCHEZ     PROCEDURE DATE:  12/25/2023          INTERPRETATION:  Chest radiograph (one view)     CPT 33671    CLINICAL INFORMATION:  Sepsis.  Short of breath.    TECHNIQUE:  Single frontal view of the chest was obtained.    FINDINGS:  Prior study dated 12/13/2023 was available for review.    The lungs are clear.  No pleural abnormality is seen.    The heart and mediastinum appear intact.  A left-sided PICC line catheter   is noted with the distal tip overlying the superior vena cava.      IMPRESSION: No gross consolidation is seen.   Left-sided PICC line   catheter noted with the distal tip overlying the superior vena cava.    --- End of Report ---            HANSEL MARTINEZ MD; Attending Radiologist  This document has been electronically signed. Dec 25 2023  1:43PM    < end of copied text >       Patient is a 68y old  Male who presents with a chief complaint of     INTERVAL HPI/OVERNIGHT EVENTS: no acute events overnight, hg improved 9.4 from 5.9. s/p 2 units of  PRbCs yesterday, kirill resolved       REVIEW OF SYSTEMS:  CONSTITUTIONAL: No fever, chills  ENMT:  No difficulty hearing, no change in vision  NECK: No pain or stiffness  RESPIRATORY: No cough, SOB  CARDIOVASCULAR: No chest pain, palpitations  GASTROINTESTINAL: No abdominal pain. No nausea, vomiting, or diarrhea  GENITOURINARY: No dysuria  NEUROLOGICAL: No HA  SKIN: No itching, burning, rashes, or lesions   LYMPH NODES: No enlarged glands  ENDOCRINE: No heat or cold intolerance; No hair loss  MUSCULOSKELETAL: No joint pain or swelling; No muscle, back, or extremity pain  PSYCHIATRIC: No depression, anxiety  HEME/LYMPH: No easy bruising, or bleeding gums    T(C): 36.4 (12-27-23 @ 05:44), Max: 36.8 (12-26-23 @ 14:56)  HR: 61 (12-27-23 @ 05:44) (61 - 74)  BP: 126/73 (12-27-23 @ 05:44) (117/74 - 137/80)  RR: 18 (12-27-23 @ 05:44) (17 - 18)  SpO2: 100% (12-27-23 @ 05:44) (100% - 100%)  Wt(kg): --Vital Signs Last 24 Hrs  T(C): 36.4 (27 Dec 2023 05:44), Max: 36.8 (26 Dec 2023 14:56)  T(F): 97.6 (27 Dec 2023 05:44), Max: 98.3 (26 Dec 2023 14:56)  HR: 61 (27 Dec 2023 05:44) (61 - 74)  BP: 126/73 (27 Dec 2023 05:44) (117/74 - 137/80)  BP(mean): 91 (27 Dec 2023 05:44) (88 - 91)  RR: 18 (27 Dec 2023 05:44) (17 - 18)  SpO2: 100% (27 Dec 2023 05:44) (100% - 100%)    Parameters below as of 27 Dec 2023 05:44  Patient On (Oxygen Delivery Method): nasal cannula  O2 Flow (L/min): 3    MEDICATIONS  (STANDING):  acetaminophen     Tablet .. 650 milliGRAM(s) Oral every 6 hours  alteplase for catheter clearance 2 milliGRAM(s) Catheter once  chlorhexidine 2% Cloths 1 Application(s) Topical <User Schedule>  collagenase Ointment 1 Application(s) Topical daily  Dakins Solution - 1/2 Strength 1 Application(s) Topical daily  dextrose 50% Injectable 12.5 Gram(s) IV Push once  dextrose 50% Injectable 25 Gram(s) IV Push once  dextrose 50% Injectable 25 Gram(s) IV Push once  glucagon  Injectable 1 milliGRAM(s) IntraMuscular once  insulin lispro (ADMELOG) corrective regimen sliding scale   SubCutaneous three times a day before meals  levETIRAcetam 750 milliGRAM(s) Oral two times a day  meropenem  IVPB 1000 milliGRAM(s) IV Intermittent every 8 hours  mupirocin 2% Ointment 1 Application(s) Both Nostrils two times a day  pantoprazole    Tablet 40 milliGRAM(s) Oral two times a day  polyethylene glycol 3350 17 Gram(s) Oral daily  senna 2 Tablet(s) Oral at bedtime    MEDICATIONS  (PRN):  dextrose Oral Gel 15 Gram(s) Oral once PRN Blood Glucose LESS THAN 70 milliGRAM(s)/deciliter    PHYSICAL EXAM:  GENERAL: NAD  EYES: clear conjunctiva; EOMI  ENMT: Moist mucous membranes  NECK: Supple, No JVD, Normal thyroid  CHEST/LUNG: Clear to auscultation bilaterally; No rales, rhonchi, wheezing, or rubs  HEART: S1, S2, Regular rate and rhythm  ABDOMEN: Soft, Nontender, Nondistended; Bowel sounds present  NEURO: Alert & Oriented X3  EXTREMITIES: No LE edema, no calf tenderness. L upper arm PICC with dressing CDI   LYMPH: No lymphadenopathy noted  SKIN: sacral ulcer     Consultant(s) Notes Reviewed:  [x ] YES  [ ] NO  Care Discussed with Consultants/Other Providers [ x] YES  [ ] NO    LABS:                        9.4    7.48  )-----------( 377      ( 27 Dec 2023 05:40 )             28.3     12-27    140  |  110<H>  |  13  ----------------------------<  93  4.2   |  25  |  0.67    Ca    8.8      27 Dec 2023 05:40    TPro  6.0  /  Alb  1.6<L>  /  TBili  0.5  /  DBili  x   /  AST  7<L>  /  ALT  8<L>  /  AlkPhos  46  12-25    PT/INR - ( 25 Dec 2023 11:00 )   PT: 13.7 sec;   INR: 1.21 ratio         PTT - ( 25 Dec 2023 11:00 )  PTT:38.6 sec  CAPILLARY BLOOD GLUCOSE      POCT Blood Glucose.: 147 mg/dL (27 Dec 2023 08:36)  POCT Blood Glucose.: 110 mg/dL (26 Dec 2023 21:56)  POCT Blood Glucose.: 105 mg/dL (26 Dec 2023 16:58)      ABG - ( 25 Dec 2023 11:15 )  pH, Arterial: 7.51  pH, Blood: x     /  pCO2: 24    /  pO2: 216   / HCO3: 19    / Base Excess: -3.3  /  SaO2: 100         Urinalysis Basic - ( 27 Dec 2023 05:40 )    Color: x / Appearance: x / SG: x / pH: x  Gluc: 93 mg/dL / Ketone: x  / Bili: x / Urobili: x   Blood: x / Protein: x / Nitrite: x   Leuk Esterase: x / RBC: x / WBC x   Sq Epi: x / Non Sq Epi: x / Bacteria: x        RADIOLOGY & ADDITIONAL TESTS:    Imaging Personally Reviewed:  [x ] YES  [ ] NO      < from: Xray Chest 1 View- PORTABLE-Urgent (12.25.23 @ 12:00) >    ACC: [id number] EXAM:  XR CHEST PORTABLE URGENT 1V   ORDERED BY: ADRIAN SANCHEZ     PROCEDURE DATE:  12/25/2023          INTERPRETATION:  Chest radiograph (one view)     CPT 38260    CLINICAL INFORMATION:  Sepsis.  Short of breath.    TECHNIQUE:  Single frontal view of the chest was obtained.    FINDINGS:  Prior study dated 12/13/2023 was available for review.    The lungs are clear.  No pleural abnormality is seen.    The heart and mediastinum appear intact.  A left-sided PICC line catheter   is noted with the distal tip overlying the superior vena cava.      IMPRESSION: No gross consolidation is seen.   Left-sided PICC line   catheter noted with the distal tip overlying the superior vena cava.    --- End of Report ---            HANSEL MARTINEZ MD; Attending Radiologist  This document has been electronically signed. Dec 25 2023  1:43PM    < end of copied text >       Patient is a 68y old  Male who presents with a chief complaint of     INTERVAL HPI/OVERNIGHT EVENTS: no acute events overnight, hg improved 9.4 from 5.9. s/p 2 units of  PRbCs yesterday, kirill resolved       REVIEW OF SYSTEMS:  CONSTITUTIONAL: No fever, chills  ENMT:  No difficulty hearing, no change in vision  NECK: No pain or stiffness  RESPIRATORY: No cough, SOB  CARDIOVASCULAR: No chest pain, palpitations  GASTROINTESTINAL: No abdominal pain. No nausea, vomiting, or diarrhea  GENITOURINARY: No dysuria  NEUROLOGICAL: No HA  SKIN: No itching, burning, rashes, or lesions   LYMPH NODES: No enlarged glands  ENDOCRINE: No heat or cold intolerance; No hair loss  MUSCULOSKELETAL: No joint pain or swelling; No muscle, back, or extremity pain  PSYCHIATRIC: No depression, anxiety  HEME/LYMPH: No easy bruising, or bleeding gums    T(C): 36.4 (12-27-23 @ 05:44), Max: 36.8 (12-26-23 @ 14:56)  HR: 61 (12-27-23 @ 05:44) (61 - 74)  BP: 126/73 (12-27-23 @ 05:44) (117/74 - 137/80)  RR: 18 (12-27-23 @ 05:44) (17 - 18)  SpO2: 100% (12-27-23 @ 05:44) (100% - 100%)  Wt(kg): --Vital Signs Last 24 Hrs  T(C): 36.4 (27 Dec 2023 05:44), Max: 36.8 (26 Dec 2023 14:56)  T(F): 97.6 (27 Dec 2023 05:44), Max: 98.3 (26 Dec 2023 14:56)  HR: 61 (27 Dec 2023 05:44) (61 - 74)  BP: 126/73 (27 Dec 2023 05:44) (117/74 - 137/80)  BP(mean): 91 (27 Dec 2023 05:44) (88 - 91)  RR: 18 (27 Dec 2023 05:44) (17 - 18)  SpO2: 100% (27 Dec 2023 05:44) (100% - 100%)    Parameters below as of 27 Dec 2023 05:44  Patient On (Oxygen Delivery Method): nasal cannula  O2 Flow (L/min): 3    MEDICATIONS  (STANDING):  acetaminophen     Tablet .. 650 milliGRAM(s) Oral every 6 hours  alteplase for catheter clearance 2 milliGRAM(s) Catheter once  chlorhexidine 2% Cloths 1 Application(s) Topical <User Schedule>  collagenase Ointment 1 Application(s) Topical daily  Dakins Solution - 1/2 Strength 1 Application(s) Topical daily  dextrose 50% Injectable 12.5 Gram(s) IV Push once  dextrose 50% Injectable 25 Gram(s) IV Push once  dextrose 50% Injectable 25 Gram(s) IV Push once  glucagon  Injectable 1 milliGRAM(s) IntraMuscular once  insulin lispro (ADMELOG) corrective regimen sliding scale   SubCutaneous three times a day before meals  levETIRAcetam 750 milliGRAM(s) Oral two times a day  meropenem  IVPB 1000 milliGRAM(s) IV Intermittent every 8 hours  mupirocin 2% Ointment 1 Application(s) Both Nostrils two times a day  pantoprazole    Tablet 40 milliGRAM(s) Oral two times a day  polyethylene glycol 3350 17 Gram(s) Oral daily  senna 2 Tablet(s) Oral at bedtime    MEDICATIONS  (PRN):  dextrose Oral Gel 15 Gram(s) Oral once PRN Blood Glucose LESS THAN 70 milliGRAM(s)/deciliter    PHYSICAL EXAM:  GENERAL: NAD  EYES: clear conjunctiva; EOMI  ENMT: Moist mucous membranes  NECK: Supple, No JVD, Normal thyroid  CHEST/LUNG: Clear to auscultation bilaterally; No rales, rhonchi, wheezing, or rubs  HEART: S1, S2, Regular rate and rhythm  ABDOMEN: Soft, Nontender, Nondistended; Bowel sounds present  NEURO: Alert & Oriented X3  EXTREMITIES: No LE edema, no calf tenderness. L upper arm PICC with dressing CDI   LYMPH: No lymphadenopathy noted  SKIN: sacral ulcer     Consultant(s) Notes Reviewed:  [x ] YES  [ ] NO  Care Discussed with Consultants/Other Providers [ x] YES  [ ] NO    LABS:                        9.4    7.48  )-----------( 377      ( 27 Dec 2023 05:40 )             28.3     12-27    140  |  110<H>  |  13  ----------------------------<  93  4.2   |  25  |  0.67    Ca    8.8      27 Dec 2023 05:40    TPro  6.0  /  Alb  1.6<L>  /  TBili  0.5  /  DBili  x   /  AST  7<L>  /  ALT  8<L>  /  AlkPhos  46  12-25    PT/INR - ( 25 Dec 2023 11:00 )   PT: 13.7 sec;   INR: 1.21 ratio         PTT - ( 25 Dec 2023 11:00 )  PTT:38.6 sec  CAPILLARY BLOOD GLUCOSE      POCT Blood Glucose.: 147 mg/dL (27 Dec 2023 08:36)  POCT Blood Glucose.: 110 mg/dL (26 Dec 2023 21:56)  POCT Blood Glucose.: 105 mg/dL (26 Dec 2023 16:58)      ABG - ( 25 Dec 2023 11:15 )  pH, Arterial: 7.51  pH, Blood: x     /  pCO2: 24    /  pO2: 216   / HCO3: 19    / Base Excess: -3.3  /  SaO2: 100         Urinalysis Basic - ( 27 Dec 2023 05:40 )    Color: x / Appearance: x / SG: x / pH: x  Gluc: 93 mg/dL / Ketone: x  / Bili: x / Urobili: x   Blood: x / Protein: x / Nitrite: x   Leuk Esterase: x / RBC: x / WBC x   Sq Epi: x / Non Sq Epi: x / Bacteria: x        RADIOLOGY & ADDITIONAL TESTS:    Imaging Personally Reviewed:  [x ] YES  [ ] NO      < from: Xray Chest 1 View- PORTABLE-Urgent (12.25.23 @ 12:00) >    ACC: [id number] EXAM:  XR CHEST PORTABLE URGENT 1V   ORDERED BY: ADRIAN SANCHEZ     PROCEDURE DATE:  12/25/2023          INTERPRETATION:  Chest radiograph (one view)     CPT 36425    CLINICAL INFORMATION:  Sepsis.  Short of breath.    TECHNIQUE:  Single frontal view of the chest was obtained.    FINDINGS:  Prior study dated 12/13/2023 was available for review.    The lungs are clear.  No pleural abnormality is seen.    The heart and mediastinum appear intact.  A left-sided PICC line catheter   is noted with the distal tip overlying the superior vena cava.      IMPRESSION: No gross consolidation is seen.   Left-sided PICC line   catheter noted with the distal tip overlying the superior vena cava.    --- End of Report ---            HANSEL MARTINEZ MD; Attending Radiologist  This document has been electronically signed. Dec 25 2023  1:43PM    < end of copied text >

## 2023-12-27 NOTE — DISCHARGE NOTE PROVIDER - NSDCCPCAREPLAN_GEN_ALL_CORE_FT
PRINCIPAL DISCHARGE DIAGNOSIS  Diagnosis: Acute respiratory failure with hypoxia  Assessment and Plan of Treatment:       SECONDARY DISCHARGE DIAGNOSES  Diagnosis: Anemia  Assessment and Plan of Treatment: Your hemoglobin was low when you arrvied here . You received 3 untis of PRBCs , Your Hg improved   You had no signs of acute bleeding - no loss with bowel movements, coughing or acute trauma. The loss of blood was likely coming from your ulcerated skin infection. You remained asymptomatic throughout the event and your hemoglobin remained stable after surgery.    Diagnosis: Osteomyelitis  Assessment and Plan of Treatment:   Assessment and Plan of Treatment: You were diagnosed with Sepsis which is a very serious condition resulting from your body's reaction to an infection. You had Sepsis secondary to your sacral ulcer infection and COVID Pneumonia. You were found to have elevated white blood cell count, fever and a fast heart rate. You tested positive for COVID and your chest xray showed signs of pneumonia, for which you were treated with remdesivir and steroid. CT scan of your abdomen showed extensive sacaral ulcer with soft tissue infection and osteomyelitis. You were seen by an infectious disease doctor and their recommendations were followed. You were treated with IV antibiotics. Your ulcerated skin tissue was surgical removed. A PICC line was placed so you will take Meropenem 1g every 8 hours until 1/25/23.      Diagnosis: DM (diabetes mellitus)  Assessment and Plan of Treatment: Diagnosis: DM (diabetes mellitus)  Assessment and Plan of Treatment: You have history of diabetes. Your blood sugar was manged with insulin in the hospital. Your HbA1c was XXXX during this admission.  You need to continue monitoring your blood sugar levels closely and maintain healthy lifestyle by eating healthy diabetic regimen.  Please continue to take your medications as prescribed. Please follow up with your primary care provider upon discharge for further recommendations.     PRINCIPAL DISCHARGE DIAGNOSIS  Diagnosis: Acute respiratory failure with hypoxia  Assessment and Plan of Treatment: You initially came into the hospital for worsening shortness of breath, this was likely due to worsening anemia.    you had a chest xray that did not show any signs of Pneumonia.   you are now tolerating room air after being completing your blood transfusions.   follow up with your primary care doctor in 1 week.      SECONDARY DISCHARGE DIAGNOSES  Diagnosis: Acute on chronic anemia  Assessment and Plan of Treatment: Your hemoglobin was low when you arrvied here . You received 3 untis of packed red blood cells, Your hemoglobin improved to 10.2.   You had no signs of acute bleeding - no loss with bowel movements, coughing or acute trauma. The loss of blood was likely coming from your ulcerated skin infection. You remained asymptomatic throughout the event and your hemoglobin remained stable after surgery.    Diagnosis: Osteomyelitis of vertebra, sacral and sacrococcygeal region  Assessment and Plan of Treatment: you have a history of a sacral ulcer with osteomyelitis and had a debridement on previous admission on 12/20  continue meropenem 1 g every 8 hours until 1/25/24 via left arm PICC line.   continue apply santyl ointment to help with wound healing to the sacral ulcer  continue using vitamin C daily to aide in healing  You were seen by the surgery team and you did not need any surgical intervention while in the hospital.  -Clean all wounds with normal saline and apply skin prep to the surrounding skin  -Apply Collagenase ointment to the slough and necrotic areas of the L. Ischial and Sacrococcyx area wounds, pack using 0.125% Dakins moistened gauze, and cover with a Foam dressing Q 72hrs as needed  -Elevate/float the patients heels using heel protectors and reposition the patient every 2hrs using wedges or pillows  follow up with Surgery Dr. Aragon in 1-2 weeks    Diagnosis: Sepsis  Assessment and Plan of Treatment: you initially came into the hospital with low blood pressure due to infection from your sacral ulcer  Sepsis happens when an infection spreads and causes your body to react strongly to germs. Your body's defense system normally releases chemicals to fight off infection at the infected area. When infection spreads, chemicals are released throughout your body. The chemicals cause inflammation and clotting in small blood vessels that is difficult to control. Inflammation and clotting decrease blood flow and oxygen to your organs. This may cause your organs to stop working correctly. Sepsis is a life-threatening emergency.  if you have any pain or low grade temperature of 100.4F, you can take tylenol 650 mg every 6 hours as needed  Take all antibiotics as ordered.  Call you Health care provider upon arrival home to make a one week follow up appointment.  If you develop fever, chills, malaise, or change in mental status call your Health Care Provider or go to the Emergency Department.  Nutrition is important, eat small frequent meals to help ensure you get adequate calories.  Do not stay in bed all day!  Increase your activity daily as tolerated.      Diagnosis: 2019 novel coronavirus disease (COVID-19)  Assessment and Plan of Treatment: covid is spread from person to person after close contact with the infected by droplets that become airborne after a cough or sneeze. Transmission can also occur by touching contaminated surfaces. Wash your hands, cover your cough, disinfect, avoid close contact. wear a mask until your symptoms resolve.  you were found covid positive on date: 12/13, you were still testing positive on 12/25.   you do not need to quarantine at this time.    Diagnosis: DM (diabetes mellitus)  Assessment and Plan of Treatment: HgA1C 7.0 this admission.  Make sure you get your HgA1c checked every three months.  If you take oral diabetes medications, check your blood glucose two times a day.  continue taking metformin 500 mg twice a day    Diagnosis: LEATHA (acute kidney injury)  Assessment and Plan of Treatment: You creatinine was elevated due to dehydration and sepsis from the sacral infection  you returned to your baseline creatinine of 0.70  Creatinine improved with IV fluids   Avoid taking (NSAIDs) - (ex: Ibuprofen, Advil, Celebrex, Naprosyn)  Avoid taking any nephrotoxic agents (can harm kidneys) - Intravenous contrast for diagnostic testing, combination cold medications.  Have all medications adjusted for your renal function by your Health Care Provider.  Blood pressure control is important.  Take all medication as prescribed.      Diagnosis: TBI (traumatic brain injury)  Assessment and Plan of Treatment: you have a history of a traumatic brain injury, you are to continue taking keppra 750 mg twice a day to prevent seizures.    Diagnosis: MSSA (methicillin-susceptible Staphylococcus aureus) colonization  Assessment and Plan of Treatment: you were found to have MSSA in your nares on 12/26, continue using mupirocin ointment to both your nostrils twice a day until 12/31

## 2023-12-28 DIAGNOSIS — Z02.9 ENCOUNTER FOR ADMINISTRATIVE EXAMINATIONS, UNSPECIFIED: ICD-10-CM

## 2023-12-28 LAB
ANION GAP SERPL CALC-SCNC: 3 MMOL/L — LOW (ref 5–17)
ANION GAP SERPL CALC-SCNC: 3 MMOL/L — LOW (ref 5–17)
BUN SERPL-MCNC: 15 MG/DL — SIGNIFICANT CHANGE UP (ref 7–18)
BUN SERPL-MCNC: 15 MG/DL — SIGNIFICANT CHANGE UP (ref 7–18)
CALCIUM SERPL-MCNC: 8 MG/DL — LOW (ref 8.4–10.5)
CALCIUM SERPL-MCNC: 8 MG/DL — LOW (ref 8.4–10.5)
CHLORIDE SERPL-SCNC: 108 MMOL/L — SIGNIFICANT CHANGE UP (ref 96–108)
CHLORIDE SERPL-SCNC: 108 MMOL/L — SIGNIFICANT CHANGE UP (ref 96–108)
CO2 SERPL-SCNC: 26 MMOL/L — SIGNIFICANT CHANGE UP (ref 22–31)
CO2 SERPL-SCNC: 26 MMOL/L — SIGNIFICANT CHANGE UP (ref 22–31)
CREAT SERPL-MCNC: 0.8 MG/DL — SIGNIFICANT CHANGE UP (ref 0.5–1.3)
CREAT SERPL-MCNC: 0.8 MG/DL — SIGNIFICANT CHANGE UP (ref 0.5–1.3)
EGFR: 98 ML/MIN/1.73M2 — SIGNIFICANT CHANGE UP
EGFR: 98 ML/MIN/1.73M2 — SIGNIFICANT CHANGE UP
GLUCOSE BLDC GLUCOMTR-MCNC: 104 MG/DL — HIGH (ref 70–99)
GLUCOSE BLDC GLUCOMTR-MCNC: 104 MG/DL — HIGH (ref 70–99)
GLUCOSE BLDC GLUCOMTR-MCNC: 110 MG/DL — HIGH (ref 70–99)
GLUCOSE BLDC GLUCOMTR-MCNC: 110 MG/DL — HIGH (ref 70–99)
GLUCOSE BLDC GLUCOMTR-MCNC: 124 MG/DL — HIGH (ref 70–99)
GLUCOSE BLDC GLUCOMTR-MCNC: 124 MG/DL — HIGH (ref 70–99)
GLUCOSE BLDC GLUCOMTR-MCNC: 137 MG/DL — HIGH (ref 70–99)
GLUCOSE BLDC GLUCOMTR-MCNC: 137 MG/DL — HIGH (ref 70–99)
GLUCOSE SERPL-MCNC: 130 MG/DL — HIGH (ref 70–99)
GLUCOSE SERPL-MCNC: 130 MG/DL — HIGH (ref 70–99)
HCT VFR BLD CALC: 29.7 % — LOW (ref 39–50)
HCT VFR BLD CALC: 29.7 % — LOW (ref 39–50)
HGB BLD-MCNC: 9.7 G/DL — LOW (ref 13–17)
HGB BLD-MCNC: 9.7 G/DL — LOW (ref 13–17)
MCHC RBC-ENTMCNC: 26.7 PG — LOW (ref 27–34)
MCHC RBC-ENTMCNC: 26.7 PG — LOW (ref 27–34)
MCHC RBC-ENTMCNC: 32.7 GM/DL — SIGNIFICANT CHANGE UP (ref 32–36)
MCHC RBC-ENTMCNC: 32.7 GM/DL — SIGNIFICANT CHANGE UP (ref 32–36)
MCV RBC AUTO: 81.8 FL — SIGNIFICANT CHANGE UP (ref 80–100)
MCV RBC AUTO: 81.8 FL — SIGNIFICANT CHANGE UP (ref 80–100)
NRBC # BLD: 0 /100 WBCS — SIGNIFICANT CHANGE UP (ref 0–0)
NRBC # BLD: 0 /100 WBCS — SIGNIFICANT CHANGE UP (ref 0–0)
PLATELET # BLD AUTO: 415 K/UL — HIGH (ref 150–400)
PLATELET # BLD AUTO: 415 K/UL — HIGH (ref 150–400)
POTASSIUM SERPL-MCNC: 4.5 MMOL/L — SIGNIFICANT CHANGE UP (ref 3.5–5.3)
POTASSIUM SERPL-MCNC: 4.5 MMOL/L — SIGNIFICANT CHANGE UP (ref 3.5–5.3)
POTASSIUM SERPL-SCNC: 4.5 MMOL/L — SIGNIFICANT CHANGE UP (ref 3.5–5.3)
POTASSIUM SERPL-SCNC: 4.5 MMOL/L — SIGNIFICANT CHANGE UP (ref 3.5–5.3)
RBC # BLD: 3.63 M/UL — LOW (ref 4.2–5.8)
RBC # BLD: 3.63 M/UL — LOW (ref 4.2–5.8)
RBC # FLD: 17.3 % — HIGH (ref 10.3–14.5)
RBC # FLD: 17.3 % — HIGH (ref 10.3–14.5)
SODIUM SERPL-SCNC: 137 MMOL/L — SIGNIFICANT CHANGE UP (ref 135–145)
SODIUM SERPL-SCNC: 137 MMOL/L — SIGNIFICANT CHANGE UP (ref 135–145)
WBC # BLD: 9.87 K/UL — SIGNIFICANT CHANGE UP (ref 3.8–10.5)
WBC # BLD: 9.87 K/UL — SIGNIFICANT CHANGE UP (ref 3.8–10.5)
WBC # FLD AUTO: 9.87 K/UL — SIGNIFICANT CHANGE UP (ref 3.8–10.5)
WBC # FLD AUTO: 9.87 K/UL — SIGNIFICANT CHANGE UP (ref 3.8–10.5)

## 2023-12-28 PROCEDURE — 99232 SBSQ HOSP IP/OBS MODERATE 35: CPT

## 2023-12-28 RX ORDER — PANTOPRAZOLE SODIUM 20 MG/1
40 TABLET, DELAYED RELEASE ORAL
Refills: 0 | Status: DISCONTINUED | OUTPATIENT
Start: 2023-12-29 | End: 2023-12-29

## 2023-12-28 RX ADMIN — MUPIROCIN 1 APPLICATION(S): 20 OINTMENT TOPICAL at 17:56

## 2023-12-28 RX ADMIN — Medication 650 MILLIGRAM(S): at 05:56

## 2023-12-28 RX ADMIN — MEROPENEM 100 MILLIGRAM(S): 1 INJECTION INTRAVENOUS at 22:40

## 2023-12-28 RX ADMIN — Medication 650 MILLIGRAM(S): at 17:56

## 2023-12-28 RX ADMIN — PANTOPRAZOLE SODIUM 40 MILLIGRAM(S): 20 TABLET, DELAYED RELEASE ORAL at 05:27

## 2023-12-28 RX ADMIN — Medication 650 MILLIGRAM(S): at 00:24

## 2023-12-28 RX ADMIN — LEVETIRACETAM 750 MILLIGRAM(S): 250 TABLET, FILM COATED ORAL at 05:26

## 2023-12-28 RX ADMIN — Medication 650 MILLIGRAM(S): at 05:26

## 2023-12-28 RX ADMIN — Medication 650 MILLIGRAM(S): at 00:54

## 2023-12-28 RX ADMIN — SENNA PLUS 2 TABLET(S): 8.6 TABLET ORAL at 22:39

## 2023-12-28 RX ADMIN — LEVETIRACETAM 750 MILLIGRAM(S): 250 TABLET, FILM COATED ORAL at 17:55

## 2023-12-28 RX ADMIN — MUPIROCIN 1 APPLICATION(S): 20 OINTMENT TOPICAL at 05:27

## 2023-12-28 RX ADMIN — CHLORHEXIDINE GLUCONATE 1 APPLICATION(S): 213 SOLUTION TOPICAL at 05:27

## 2023-12-28 RX ADMIN — Medication 650 MILLIGRAM(S): at 14:15

## 2023-12-28 RX ADMIN — Medication 650 MILLIGRAM(S): at 23:03

## 2023-12-28 RX ADMIN — MEROPENEM 100 MILLIGRAM(S): 1 INJECTION INTRAVENOUS at 14:16

## 2023-12-28 RX ADMIN — MEROPENEM 100 MILLIGRAM(S): 1 INJECTION INTRAVENOUS at 06:01

## 2023-12-28 NOTE — DIETITIAN INITIAL EVALUATION ADULT - OTHER INFO
Pt visited. Pt is on Airborne isolation. Pt with COVID +. Pt is alert but confused. Pt with Poor Dentition. Observed Post Lunch meal. Pt ate ~ 25 % of meal. Pt not able to Provide any Relevant Information. Bed scale 122 Lb. Pt appears thin and Loss of Muscle Mass noted.  Pt with Pressure ulcer Unstageable  @ sacrum . Pt with Osteomyelitis. S/P  # units of Blood transfusion. Pt admitted with hgb 6.6. Pt with Recent Admission on Dec 13 th- wt 133 Lb. wt Loss Noted. D/W RN Pt ate Good for B fast. Meds Noted. Will suggest MVI w Min, Vit c, Zinc.  Pt W H/O DM A1c 7.0 Will suggest Diabetic diet  Pureed w Glucerna shacelso BID

## 2023-12-28 NOTE — DIETITIAN INITIAL EVALUATION ADULT - PERTINENT LABORATORY DATA
12-28    137  |  108  |  15  ----------------------------<  130<H>  4.5   |  26  |  0.80    Ca    8.0<L>      28 Dec 2023 06:25    POCT Blood Glucose.: 124 mg/dL (12-28-23 @ 11:52)  A1C with Estimated Average Glucose Result: 7.0 % (12-22-23 @ 05:15)

## 2023-12-28 NOTE — PROGRESS NOTE ADULT - PROBLEM SELECTOR PLAN 1
- setting of Sepsis/ symptomatic anemia/ bacteremia   - saturating well in 2L now   - chest x-ray unremarkable  - cont supportive measure   - cont to monitor oxygen saturation - setting of Sepsis/ symptomatic anemia  - saturating well in 2L now   - chest x-ray unremarkable  - cont supportive measure   - cont to monitor oxygen saturation

## 2023-12-28 NOTE — DIETITIAN INITIAL EVALUATION ADULT - PERTINENT MEDS FT
MEDICATIONS  (STANDING):  acetaminophen     Tablet .. 650 milliGRAM(s) Oral every 6 hours  alteplase for catheter clearance 2 milliGRAM(s) Catheter once  chlorhexidine 2% Cloths 1 Application(s) Topical <User Schedule>  collagenase Ointment 1 Application(s) Topical daily  Dakins Solution - 1/2 Strength 1 Application(s) Topical daily  dextrose 50% Injectable 12.5 Gram(s) IV Push once  dextrose 50% Injectable 25 Gram(s) IV Push once  dextrose 50% Injectable 25 Gram(s) IV Push once  glucagon  Injectable 1 milliGRAM(s) IntraMuscular once  insulin lispro (ADMELOG) corrective regimen sliding scale   SubCutaneous three times a day before meals  levETIRAcetam 750 milliGRAM(s) Oral two times a day  meropenem  IVPB 1000 milliGRAM(s) IV Intermittent every 8 hours  mupirocin 2% Ointment 1 Application(s) Both Nostrils two times a day  polyethylene glycol 3350 17 Gram(s) Oral daily  senna 2 Tablet(s) Oral at bedtime    MEDICATIONS  (PRN):  dextrose Oral Gel 15 Gram(s) Oral once PRN Blood Glucose LESS THAN 70 milliGRAM(s)/deciliter

## 2023-12-28 NOTE — PROGRESS NOTE ADULT - PROBLEM SELECTOR PLAN 6
d/c planning back to [facility] if no further recs from surgery, since Hg stable in 9 d/c planning back to [facility] if no further recs from surgery, since Hg stable in 9

## 2023-12-28 NOTE — DIETITIAN INITIAL EVALUATION ADULT - NSFNSPHYEXAMSKINFT_GEN_A_CORE
Pressure Injury 1: coccyx, Stage IV  Pressure Injury 2: Left:, ischium, Unstageable  Pressure Injury 3: none, none  Pressure Injury 4: none, none  Pressure Injury 5: none, none  Pressure Injury 6: none, none  Pressure Injury 7: none, none  Pressure Injury 8: none, none  Pressure Injury 9: none, none  Pressure Injury 10: none, none  Pressure Injury 11: none, none, Pressure Injury 1: coccyx, Stage IV  Pressure Injury 2: Left:, ischium, Unstageable  Pressure Injury 3: none, none  Pressure Injury 4: none, none  Pressure Injury 5: none, none  Pressure Injury 6: none, none  Pressure Injury 7: none, none  Pressure Injury 8: none, none  Pressure Injury 9: none, none  Pressure Injury 10: none, none  Pressure Injury 11: none, none

## 2023-12-28 NOTE — PROGRESS NOTE ADULT - PROBLEM SELECTOR PLAN 3
plan as above
plan as above
p/w Hb 6.6 baseline 7.9 on 12/23  - source unknown, likely slow bleeding from the sacral wound site  - s/p 3 untis PRBcs   - hg stable in 9  - tend CBC while in patient

## 2023-12-28 NOTE — PROGRESS NOTE ADULT - ASSESSMENT
68 yrs old M from [facility] with pmhx of TBI  w/ seizure disorder, DM, sacral decubitus ulcer, presented with low BP and hypoxia. Pt is admitted for sepsis likely 2/2 sacral wound and anemia of bleeding likely at the sacral wound. Surgery following.  68 yrs old M from [facility] with pmhx of TBI  w/ seizure disorder, DM, sacral decubitus ulcer, presented with low BP and hypoxia. Pt is admitted for sepsis likely 2/2 sacral wound and anemia of bleeding likely at the sacral wound. Surgery following.  68 yrs old M from [facility] with pmhx of TBI  w/ seizure disorder, DM, sacral decubitus ulcer, presented with low BP and hypoxia. Pt is admitted for sepsis likely 2/2 sacral wound and anemia of bleeding likely at the sacral wound. Surgery following.  Pt was covid + since last admission. off iso per Infection control   68 yrs old M from [facility] with pmhx of TBI  w/ seizure disorder, DM, sacral decubitus ulcer, presented with low BP and hypoxia. Pt is admitted for sepsis likely 2/2 sacral wound and anemia of bleeding likely at the sacral wound. Surgery following.  Pt was covid + since last admission. off iso per Infection control

## 2023-12-28 NOTE — PROGRESS NOTE ADULT - NS ATTEND AMEND GEN_ALL_CORE FT
Additional comments: Patient seen at bedside, no complaints reported, states he feels good, no SOB.    On exam, patient is AOx2, NAD, abdomen soft, NT/ND, extremities without edema.  SpO2 100% on RA on my exam.  Labs reviewed, CBC with WBC hgb 9.2->9.7, BMP stable.     Assessment and plan:  69 yo man with pmhx of TBI  w/ seizure disorder, DM, sacral decubitus ulcer and OM recently discharged from [facility] on 12/23 s/p debridement  12/20 and PICC line placement on 12/21 to complete 6 weeks course of Meropenem, referred from [facility] for low BP and hypoxia. Found to have anemia requiring pRBC transfusion.    # Acute on chronic anemia, s/p pRBC transfusion, source unclear but likely from the sacral ulcer  # Sacral ulcer with OM on meropenem 6-week course through PICC   - surgery consulted for the sacral ulcer, no active bleed noted, does not suspect the decubitus is the primary cause of blood loss, agree with the assessment  - no overt GIB noted, having normal BMs  - continue regualr diet  - f/u haptoglobin  - repeat CBC this PM  - decrease PPI dose to QD given low concern for acute GIB  - continue meropenem for OM    # DM A1c 7.0% 12/2023  - FS/SSI    # TBI with seizure  - continue Keppra    # DVT ppx  - SCDs iso likely acute bleed. Additional comments: Patient seen at bedside, no complaints reported, states he feels good, no SOB.    On exam, patient is AOx2, NAD, abdomen soft, NT/ND, extremities without edema.  SpO2 100% on RA on my exam.  Labs reviewed, CBC with WBC hgb 9.2->9.7, BMP stable.     Assessment and plan:  67 yo man with pmhx of TBI  w/ seizure disorder, DM, sacral decubitus ulcer and OM recently discharged from [facility] on 12/23 s/p debridement  12/20 and PICC line placement on 12/21 to complete 6 weeks course of Meropenem, referred from [facility] for low BP and hypoxia. Found to have anemia requiring pRBC transfusion.    # Acute on chronic anemia, s/p pRBC transfusion, source unclear but likely from the sacral ulcer  # Sacral ulcer with OM on meropenem 6-week course through PICC   - surgery consulted for the sacral ulcer, no active bleed noted, does not suspect the decubitus is the primary cause of blood loss, agree with the assessment  - no overt GIB noted, having normal BMs  - continue regualr diet  - f/u haptoglobin  - repeat CBC this PM  - decrease PPI dose to QD given low concern for acute GIB  - continue meropenem for OM    # DM A1c 7.0% 12/2023  - FS/SSI    # TBI with seizure  - continue Keppra    # DVT ppx  - SCDs iso likely acute bleed. Additional comments: Patient seen at bedside, no complaints reported, states he feels good, no SOB.    On exam, patient is AOx2, NAD, abdomen soft, NT/ND, extremities without edema.  SpO2 100% on RA on my exam.  Labs reviewed, CBC with WBC hgb 9.2->9.7, BMP stable.     Assessment and plan:  67 yo man with pmhx of TBI  w/ seizure disorder, DM, sacral decubitus ulcer and OM recently discharged from [facility] on 12/23 s/p debridement  12/20 and PICC line placement on 12/21 to complete 6 weeks course of Meropenem, referred from [facility] for low BP and hypoxia. Found to have anemia requiring pRBC transfusion.    # Acute on chronic anemia, s/p pRBC transfusion, source unclear but likely from the sacral ulcer  # Sacral ulcer with OM on meropenem 6-week course through PICC   - surgery consulted for the sacral ulcer, no active bleed noted, does not suspect the decubitus is the primary cause of blood loss, agree with the assessment  - no overt GIB noted, having normal BMs  - continue regualr diet  - f/u haptoglobin  - repeat CBC this PM  - decrease PPI dose to QD given low concern for acute GIB  - continue meropenem for OM    # Positive COVID-19 test  Found on admission but was noted on the last admission, likely persistent positive result without acute infection. Spo2 normal on RA, no signs of inflammation. No treatment warranted.  - monitor SpO2 on RA    # DM A1c 7.0% 12/2023  - FS/SSI    # TBI with seizure  - continue Keppra    # DVT ppx  - SCDs iso likely acute bleed. Additional comments: Patient seen at bedside, no complaints reported, states he feels good, no SOB.    On exam, patient is AOx2, NAD, abdomen soft, NT/ND, extremities without edema.  SpO2 100% on RA on my exam.  Labs reviewed, CBC with WBC hgb 9.2->9.7, BMP stable.     Assessment and plan:  67 yo man with pmhx of TBI  w/ seizure disorder, DM, sacral decubitus ulcer and OM recently discharged from [facility] on 12/23 s/p debridement  12/20 and PICC line placement on 12/21 to complete 6 weeks course of Meropenem, referred from [facility] for low BP and hypoxia. Found to have anemia requiring pRBC transfusion.    # Acute on chronic anemia, s/p pRBC transfusion, source unclear but likely from the sacral ulcer  # Sacral ulcer with OM on meropenem 6-week course through PICC   - surgery consulted for the sacral ulcer, no active bleed noted, does not suspect the decubitus is the primary cause of blood loss, agree with the assessment  - no overt GIB noted, having normal BMs  - continue regualr diet  - f/u haptoglobin  - repeat CBC this PM  - decrease PPI dose to QD given low concern for acute GIB  - continue meropenem for OM    # Positive COVID-19 test  Found on admission but was noted on the last admission, likely persistent positive result without acute infection. Spo2 normal on RA, no signs of inflammation. No treatment warranted.  - monitor SpO2 on RA    # DM A1c 7.0% 12/2023  - FS/SSI    # TBI with seizure  - continue Keppra    # DVT ppx  - SCDs iso likely acute bleed.

## 2023-12-28 NOTE — PROGRESS NOTE ADULT - SUBJECTIVE AND OBJECTIVE BOX
Patient is a 68y old  Male who presents with a chief complaint of     INTERVAL HPI/OVERNIGHT EVENTS: no acute events ovenight         REVIEW OF SYSTEMS:  CONSTITUTIONAL: No fever, chills  ENMT:  No difficulty hearing, no change in vision  NECK: No pain or stiffness  RESPIRATORY: No cough, SOB  CARDIOVASCULAR: No chest pain, palpitations  GASTROINTESTINAL: No abdominal pain. No nausea, vomiting, or diarrhea  GENITOURINARY: No dysuria  NEUROLOGICAL: No HA  SKIN: No itching, burning, rashes, or lesions   LYMPH NODES: No enlarged glands  ENDOCRINE: No heat or cold intolerance; No hair loss  MUSCULOSKELETAL: No joint pain or swelling; No muscle, back, or extremity pain  PSYCHIATRIC: No depression, anxiety  HEME/LYMPH: No easy bruising, or bleeding gums    T(C): 36.6 (12-28-23 @ 06:14), Max: 36.6 (12-27-23 @ 14:05)  HR: 68 (12-28-23 @ 06:14) (61 - 71)  BP: 114/70 (12-28-23 @ 06:14) (114/70 - 127/74)  RR: 18 (12-28-23 @ 06:14) (18 - 18)  SpO2: 97% (12-28-23 @ 06:14) (97% - 100%)  Wt(kg): --Vital Signs Last 24 Hrs  T(C): 36.6 (28 Dec 2023 06:14), Max: 36.6 (27 Dec 2023 14:05)  T(F): 97.8 (28 Dec 2023 06:14), Max: 97.9 (27 Dec 2023 14:05)  HR: 68 (28 Dec 2023 06:14) (61 - 71)  BP: 114/70 (28 Dec 2023 06:14) (114/70 - 127/74)  BP(mean): --  RR: 18 (28 Dec 2023 06:14) (18 - 18)  SpO2: 97% (28 Dec 2023 06:14) (97% - 100%)    Parameters below as of 28 Dec 2023 06:14  Patient On (Oxygen Delivery Method): nasal cannula  O2 Flow (L/min): 3    MEDICATIONS  (STANDING):  acetaminophen     Tablet .. 650 milliGRAM(s) Oral every 6 hours  alteplase for catheter clearance 2 milliGRAM(s) Catheter once  chlorhexidine 2% Cloths 1 Application(s) Topical <User Schedule>  collagenase Ointment 1 Application(s) Topical daily  Dakins Solution - 1/2 Strength 1 Application(s) Topical daily  dextrose 50% Injectable 25 Gram(s) IV Push once  dextrose 50% Injectable 25 Gram(s) IV Push once  dextrose 50% Injectable 12.5 Gram(s) IV Push once  glucagon  Injectable 1 milliGRAM(s) IntraMuscular once  insulin lispro (ADMELOG) corrective regimen sliding scale   SubCutaneous three times a day before meals  levETIRAcetam 750 milliGRAM(s) Oral two times a day  meropenem  IVPB 1000 milliGRAM(s) IV Intermittent every 8 hours  mupirocin 2% Ointment 1 Application(s) Both Nostrils two times a day  pantoprazole    Tablet 40 milliGRAM(s) Oral two times a day  polyethylene glycol 3350 17 Gram(s) Oral daily  senna 2 Tablet(s) Oral at bedtime    MEDICATIONS  (PRN):  dextrose Oral Gel 15 Gram(s) Oral once PRN Blood Glucose LESS THAN 70 milliGRAM(s)/deciliter    PHYSICAL EXAM:  GENERAL: NAD  EYES: clear conjunctiva; EOMI  ENMT: Moist mucous membranes  NECK: Supple, No JVD, Normal thyroid  CHEST/LUNG: Clear to auscultation bilaterally; No rales, rhonchi, wheezing, or rubs  HEART: S1, S2, Regular rate and rhythm  ABDOMEN: Soft, Nontender, Nondistended; Bowel sounds present  NEURO: Alert & Oriented X3  EXTREMITIES: No LE edema, no calf tenderness  LYMPH: No lymphadenopathy noted  SKIN: No rashes or lesions    Consultant(s) Notes Reviewed:  [x ] YES  [ ] NO  Care Discussed with Consultants/Other Providers [ x] YES  [ ] NO    LABS:                        9.7    9.87  )-----------( 415      ( 28 Dec 2023 06:25 )             29.7     12-28    137  |  108  |  15  ----------------------------<  130<H>  4.5   |  26  |  0.80    Ca    8.0<L>      28 Dec 2023 06:25        CAPILLARY BLOOD GLUCOSE      POCT Blood Glucose.: 104 mg/dL (28 Dec 2023 08:40)  POCT Blood Glucose.: 91 mg/dL (27 Dec 2023 21:09)  POCT Blood Glucose.: 86 mg/dL (27 Dec 2023 16:17)  POCT Blood Glucose.: 91 mg/dL (27 Dec 2023 11:31)        Urinalysis Basic - ( 28 Dec 2023 06:25 )    Color: x / Appearance: x / SG: x / pH: x  Gluc: 130 mg/dL / Ketone: x  / Bili: x / Urobili: x   Blood: x / Protein: x / Nitrite: x   Leuk Esterase: x / RBC: x / WBC x   Sq Epi: x / Non Sq Epi: x / Bacteria: x        RADIOLOGY & ADDITIONAL TESTS:    Imaging Personally Reviewed:  [ x] YES  [ ] NO  < from: Xray Chest 1 View- PORTABLE-Urgent (12.25.23 @ 12:00) >    ACC: [id number] EXAM:  XR CHEST PORTABLE URGENT 1V   ORDERED BY: ADRIAN MARTINEZ DATE:  12/25/2023          INTERPRETATION:  Chest radiograph (one view)     CPT 27729    CLINICAL INFORMATION:  Sepsis.  Short of breath.    TECHNIQUE:  Single frontal view of the chest was obtained.    FINDINGS:  Prior study dated 12/13/2023 was available for review.    The lungs are clear.  No pleural abnormality is seen.    The heart and mediastinum appear intact.  A left-sided PICC line catheter   is noted with the distal tip overlying the superior vena cava.      IMPRESSION: No gross consolidation is seen.   Left-sided PICC line   catheter noted with the distal tip overlying the superior vena cava.    --- End of Report ---            HANSEL MARTINEZ MD; Attending Radiologist  This document has been electronically signed. Dec 25 2023  1:43PM    < end of copied text >       Patient is a 68y old  Male who presents with a chief complaint of     INTERVAL HPI/OVERNIGHT EVENTS: no acute events ovenight         REVIEW OF SYSTEMS:  CONSTITUTIONAL: No fever, chills  ENMT:  No difficulty hearing, no change in vision  NECK: No pain or stiffness  RESPIRATORY: No cough, SOB  CARDIOVASCULAR: No chest pain, palpitations  GASTROINTESTINAL: No abdominal pain. No nausea, vomiting, or diarrhea  GENITOURINARY: No dysuria  NEUROLOGICAL: No HA  SKIN: No itching, burning, rashes, or lesions   LYMPH NODES: No enlarged glands  ENDOCRINE: No heat or cold intolerance; No hair loss  MUSCULOSKELETAL: No joint pain or swelling; No muscle, back, or extremity pain  PSYCHIATRIC: No depression, anxiety  HEME/LYMPH: No easy bruising, or bleeding gums    T(C): 36.6 (12-28-23 @ 06:14), Max: 36.6 (12-27-23 @ 14:05)  HR: 68 (12-28-23 @ 06:14) (61 - 71)  BP: 114/70 (12-28-23 @ 06:14) (114/70 - 127/74)  RR: 18 (12-28-23 @ 06:14) (18 - 18)  SpO2: 97% (12-28-23 @ 06:14) (97% - 100%)  Wt(kg): --Vital Signs Last 24 Hrs  T(C): 36.6 (28 Dec 2023 06:14), Max: 36.6 (27 Dec 2023 14:05)  T(F): 97.8 (28 Dec 2023 06:14), Max: 97.9 (27 Dec 2023 14:05)  HR: 68 (28 Dec 2023 06:14) (61 - 71)  BP: 114/70 (28 Dec 2023 06:14) (114/70 - 127/74)  BP(mean): --  RR: 18 (28 Dec 2023 06:14) (18 - 18)  SpO2: 97% (28 Dec 2023 06:14) (97% - 100%)    Parameters below as of 28 Dec 2023 06:14  Patient On (Oxygen Delivery Method): nasal cannula  O2 Flow (L/min): 3    MEDICATIONS  (STANDING):  acetaminophen     Tablet .. 650 milliGRAM(s) Oral every 6 hours  alteplase for catheter clearance 2 milliGRAM(s) Catheter once  chlorhexidine 2% Cloths 1 Application(s) Topical <User Schedule>  collagenase Ointment 1 Application(s) Topical daily  Dakins Solution - 1/2 Strength 1 Application(s) Topical daily  dextrose 50% Injectable 25 Gram(s) IV Push once  dextrose 50% Injectable 25 Gram(s) IV Push once  dextrose 50% Injectable 12.5 Gram(s) IV Push once  glucagon  Injectable 1 milliGRAM(s) IntraMuscular once  insulin lispro (ADMELOG) corrective regimen sliding scale   SubCutaneous three times a day before meals  levETIRAcetam 750 milliGRAM(s) Oral two times a day  meropenem  IVPB 1000 milliGRAM(s) IV Intermittent every 8 hours  mupirocin 2% Ointment 1 Application(s) Both Nostrils two times a day  pantoprazole    Tablet 40 milliGRAM(s) Oral two times a day  polyethylene glycol 3350 17 Gram(s) Oral daily  senna 2 Tablet(s) Oral at bedtime    MEDICATIONS  (PRN):  dextrose Oral Gel 15 Gram(s) Oral once PRN Blood Glucose LESS THAN 70 milliGRAM(s)/deciliter    PHYSICAL EXAM:  GENERAL: NAD  EYES: clear conjunctiva; EOMI  ENMT: Moist mucous membranes  NECK: Supple, No JVD, Normal thyroid  CHEST/LUNG: Clear to auscultation bilaterally; No rales, rhonchi, wheezing, or rubs  HEART: S1, S2, Regular rate and rhythm  ABDOMEN: Soft, Nontender, Nondistended; Bowel sounds present  NEURO: Alert & Oriented X3  EXTREMITIES: No LE edema, no calf tenderness  LYMPH: No lymphadenopathy noted  SKIN: No rashes or lesions    Consultant(s) Notes Reviewed:  [x ] YES  [ ] NO  Care Discussed with Consultants/Other Providers [ x] YES  [ ] NO    LABS:                        9.7    9.87  )-----------( 415      ( 28 Dec 2023 06:25 )             29.7     12-28    137  |  108  |  15  ----------------------------<  130<H>  4.5   |  26  |  0.80    Ca    8.0<L>      28 Dec 2023 06:25        CAPILLARY BLOOD GLUCOSE      POCT Blood Glucose.: 104 mg/dL (28 Dec 2023 08:40)  POCT Blood Glucose.: 91 mg/dL (27 Dec 2023 21:09)  POCT Blood Glucose.: 86 mg/dL (27 Dec 2023 16:17)  POCT Blood Glucose.: 91 mg/dL (27 Dec 2023 11:31)        Urinalysis Basic - ( 28 Dec 2023 06:25 )    Color: x / Appearance: x / SG: x / pH: x  Gluc: 130 mg/dL / Ketone: x  / Bili: x / Urobili: x   Blood: x / Protein: x / Nitrite: x   Leuk Esterase: x / RBC: x / WBC x   Sq Epi: x / Non Sq Epi: x / Bacteria: x        RADIOLOGY & ADDITIONAL TESTS:    Imaging Personally Reviewed:  [ x] YES  [ ] NO  < from: Xray Chest 1 View- PORTABLE-Urgent (12.25.23 @ 12:00) >    ACC: [id number] EXAM:  XR CHEST PORTABLE URGENT 1V   ORDERED BY: ADRIAN MARTINEZ DATE:  12/25/2023          INTERPRETATION:  Chest radiograph (one view)     CPT 35192    CLINICAL INFORMATION:  Sepsis.  Short of breath.    TECHNIQUE:  Single frontal view of the chest was obtained.    FINDINGS:  Prior study dated 12/13/2023 was available for review.    The lungs are clear.  No pleural abnormality is seen.    The heart and mediastinum appear intact.  A left-sided PICC line catheter   is noted with the distal tip overlying the superior vena cava.      IMPRESSION: No gross consolidation is seen.   Left-sided PICC line   catheter noted with the distal tip overlying the superior vena cava.    --- End of Report ---            HANSEL MARTINEZ MD; Attending Radiologist  This document has been electronically signed. Dec 25 2023  1:43PM    < end of copied text >

## 2023-12-28 NOTE — DIETITIAN INITIAL EVALUATION ADULT - SIGNS/SYMPTOMS
BMI 16.2, Severe  Loss of Muscle mass, Pressure ulcers stage 4 @ sacrococcyx, Po intake < 50 % x > 1

## 2023-12-28 NOTE — DIETITIAN NUTRITION RISK NOTIFICATION - ADDITIONAL COMMENTS/DIETITIAN RECOMMENDATIONS
Consistent carbohydrate evening snack  Pureed   [employer] Glucose Support BID.  MVIM , VIT C, ZNSO4.

## 2023-12-28 NOTE — PROGRESS NOTE ADULT - NS ATTEND OPT1 GEN_ALL_CORE
[facility]    Medicine Progress Note - Hospitalist Service    Date of Admission:  8/23/2022    Assessment & Plan          Cm Lundberg is a 56 year old male admitted on 8/23/2022.     Assessment:  #Infected right BKA stump.  CT right leg showed evolving abscess.  Patient not septic.  Blood cultures obtained.  Started vancomycin and Zosyn.  S/p I&D 8/24, culture sent.  Charcot's ankle, s/p recent right BKA on 8/5/2022 by Dr. Valderrama.      #MRSA bacteremia, likely due to right BKA stump abscess.  Positive BC on 8/23, surveillance BC obtained on 8/24 and 8/25.  TTE 8/25.    #Acute renal failure, creat peaked at 1.47 on 8/24, was 1.04 on admission.  Likely due to sepsis and dehydration, given rising BUN.  With IVF renal function normalizing.    #Chronic normocytic anemia, hemoglobin baseline 8-9.  History of iron deficiency.    #Acute anemia, hemoglobin 6.4 on 8/25.  Reported intraoperative EBL 10 mL.  No postop bleeding.  S/p 1 unit PRBCs 8/25.    #CVD, history of stroke.  On Plavix, statin.    #Atrial fibrillation.  Was on Xarelto, held preoperatively.  Resume when okay per surgery.    #Coronary artery disease, status post CABG and stents.  On Plavix, beta-blockers, fibrate.    #Non-insulin-dependent diabetes mellitus.  Well-controlled with Glucophage and glipizide.  Initially held due to RANDAL.  Resumed now.    #Mood disorder:general anxiety and depression, stable on Abilify, Cymbalta, Ativan, Remeron, trazodone.    Essential hypertension.  BP controlled on losartan and metoprolol.  Peripheral neuropathy, due to diabetes.  Resident of [facility] prior to recent past hospitalization TCU.    Plan:  8/25 transfuse 1 unit PRBC for hemoglobin of 6.4.  Work-up for chronic anemia, check iron, B12, folate.  Follow-up BC obtain surveillance BCs.  Obtain TTE, ID consult, given MRSA bacteremia.  Monitor for signs of septic shock.  Continue vancomycin/Zosyn, continue, add antibiotics 
per ID.  Continue IVF for another day.  Pain management with p.o. oxycodone, scheduled Tylenol, as needed IV Dilaudid  Resume oral antidiabetic meds, since patient is n.p.o.  SSI NovoLog.  Wound nurse following, recommendations reviewed..  Continue PTA scheduled Tylenol, Abilify, Lipitor, Questran, Plavix, Cymbalta, fenofibrate, Neurontin, Protonix, mental health medications.  Resume Xarelto when okay per surgery.  PT/OT        Diet: Room Service  Moderate Consistent Carb (60 g CHO per Meal) Diet    DVT Prophylaxis: VTE Prophylaxis contraindicated due to surgery today  Murray Catheter: Not present  Central Lines: None  Cardiac Monitoring: None  Code Status: Full Code      Disposition Plan     Expected Discharge Date: 08/26/2022        Discharge Comments: pending cultures, surgical clearance.        The patient's care was discussed with the Bedside Nurse, Patient and Patient's Family.    Gisel Santoyo MD  Hospitalist Service  [facility]  Securely message with the Vocera Web Console (learn more here)  Text page via [employer] Paging/Directory     Clinically Significant Risk Factors Present on Admission                    ______________________________________________________________________    Interval History   Admitted on 8/20 history for BKA infected wound with davey pus.  S/p I&D on 8/24.  BC from 8/23 growing MRSA.  Patient remains hemodynamically stable, afebrile.  Hemoglobin 6.4 today.  Reported intraoperative EBL 10 cc.  Surveillance BC on 8/24 and 8/25, awaiting results.  Renal function improved, creatinine 1.09 from 1.5 yesterday.  Incisional pain controlled.  No abdominal pain, nausea, vomiting, cough.    Data reviewed today: I reviewed all medications, new labs and imaging results over the last 24 hours. I personally reviewed    Physical Exam   Vital Signs: Temp: 98.6  F (37  C) Temp src: Oral BP: 112/56 Pulse: 76   Resp: 18 SpO2: 95 % O2 Device: None (Room air) Oxygen Delivery: 2 
LPM  Weight: 151 lbs 3.2 oz  General: Alert and oriented x 3. Not in obvious distress.  HEENT: NC, AT. Neck- supple, No JVP elevation, lymphadenopathy or thyromegaly. Trachea-central.  Chest: Clear to auscultation bilaterally.  Heart: S1S2 regular. No M/R/G.  Abdomen: Soft. NT, ND. No organomegaly. Bowel sounds- active.  Back: No spine tenderness. No CVA tenderness.  Extremities: Right BKA, stump with staples, wound dehiscence with purulent drainage.    Neuro: Cranial nerves 1-12 grossly normal. No focal neurological deficit    Data   Recent Labs   Lab 08/25/22  0652 08/25/22  0605 08/24/22  2129 08/24/22  1913 08/24/22  1010 08/24/22  0833 08/24/22  0825 08/24/22  0611 08/23/22  1625 08/23/22  1300   WBC  --  6.0  --   --   --  6.6  --  6.5  --  8.9   HGB  --  6.4*  --   --   --  8.7*  --  8.9*  --  9.9*   MCV  --  84  --   --   --  83  --  83  --  83   PLT  --  249  --  255  --  315  --  323  --  433   INR  --  1.22*  --   --   --   --   --  1.25*  --   --    NA  --  137  --   --   --   --   --  136  --  134*   POTASSIUM  --  4.3  --   --   --  4.6  --  4.7  --  4.3   CHLORIDE  --  108*  --   --   --   --   --  103  --  95*   CO2  --  25  --   --   --   --   --  25  --  25   BUN  --  20  --   --   --   --   --  26*  --  15   CR  --  1.09  --   --   --  1.50*  --  1.47*  --  1.04   ANIONGAP  --  4*  --   --   --   --   --  8  --  14   TO  --  8.6  --   --   --   --   --  10.0  --  10.2   * 155* 248*  --    < >  --    < > 142*   < > 142*   ALBUMIN  --   --   --   --   --   --   --  2.8*  --   --    PROTTOTAL  --   --   --   --   --   --   --  6.8  --   --    BILITOTAL  --   --   --   --   --   --   --  0.3  --   --    ALKPHOS  --   --   --   --   --   --   --  61  --   --    ALT  --   --   --   --   --   --   --  11  --   --    AST  --   --   --   --   --   --   --  13  --   --     < > = values in this interval not displayed.     Recent Results (from the past 24 hour(s))   POC US Guidance Needle Placement    
"Narrative    Ultrasound was performed as guidance to an anesthesia procedure.  Click   \"PACS images\" hyperlink below to view any stored images.  For specific   procedure details, view procedure note authored by anesthesia.   POC US Guidance Needle Placement    Narrative    Ultrasound was performed as guidance to an anesthesia procedure.  Click   \"PACS images\" hyperlink below to view any stored images.  For specific   procedure details, view procedure note authored by anesthesia.     Medications     sodium chloride 100 mL/hr at 08/24/22 1614       acetaminophen  1,000 mg Oral TID     ARIPiprazole  2 mg Oral Daily     atorvastatin  80 mg Oral Daily     cholestyramine  1 packet Oral Every Other Day     clopidogrel  75 mg Oral Daily     DULoxetine  120 mg Oral Daily     fenofibrate  160 mg Oral Daily     gabapentin  100 mg Oral BID AC     gabapentin  600 mg Oral At Bedtime     heparin ANTICOAGULANT  5,000 Units Subcutaneous Q8H     insulin aspart  1-3 Units Subcutaneous TID AC     insulin aspart  1-3 Units Subcutaneous At Bedtime     losartan  25 mg Oral At Bedtime     magnesium oxide  800 mg Oral BID     metoprolol succinate ER  75 mg Oral Daily     mirtazapine  7.5 mg Oral At Bedtime     pantoprazole  40 mg Oral Daily     piperacillin-tazobactam  3.375 g Intravenous Q8H     polyethylene glycol  17 g Oral Daily     senna-docusate  1 tablet Oral BID     senna-docusate  1 tablet Oral BID     sodium chloride (PF)  3 mL Intracatheter Q8H     sodium chloride (PF)  3 mL Intracatheter Q8H     traZODone  50 mg Oral At Bedtime     vancomycin  750 mg Intravenous Q12H     vitamin C  500 mg Oral Daily       "
I attest my time as attending is greater than 50% of the total combined time spent on qualifying patient care activities by the PA/NP and attending.
I independently performed the documented:
I independently performed the documented:

## 2023-12-28 NOTE — DIETITIAN INITIAL EVALUATION ADULT - PROBLEM SELECTOR PLAN 1
p/w hypoxia, low BP   in ED: afebrile, , BP 84/56  repeat vitals afebrile , /75, RR 15  leukocytosis of 27.74 with left shift  Lactate 3.9 >2.8  U/A neg   COVID positive [positive on 12/13 previous admission]  CXR neg for consolidation or effusion   likely sepsis 2/2 sacral wound ulcer, osteomyelitis   s/p 3.5 L bolus in ED   c/w LR 60 ml/hr  c/w Meropenem 1g q8   ICU consulted and rejected   f/u urine and blood cultures [last BCx, Ucx neg]  f/u wound culture   Surgery consulted

## 2023-12-28 NOTE — DIETITIAN INITIAL EVALUATION ADULT - ORAL INTAKE PTA/DIET HISTORY
Pt is From [facility] Thin liquids  Pt is From [facility] Thin liquids

## 2023-12-29 ENCOUNTER — TRANSCRIPTION ENCOUNTER (OUTPATIENT)
Age: 68
End: 2023-12-29

## 2023-12-29 VITALS
OXYGEN SATURATION: 98 % | TEMPERATURE: 98 F | HEART RATE: 72 BPM | DIASTOLIC BLOOD PRESSURE: 75 MMHG | SYSTOLIC BLOOD PRESSURE: 121 MMHG | RESPIRATION RATE: 18 BRPM

## 2023-12-29 LAB
ANION GAP SERPL CALC-SCNC: 3 MMOL/L — LOW (ref 5–17)
ANION GAP SERPL CALC-SCNC: 3 MMOL/L — LOW (ref 5–17)
BUN SERPL-MCNC: 13 MG/DL — SIGNIFICANT CHANGE UP (ref 7–18)
BUN SERPL-MCNC: 13 MG/DL — SIGNIFICANT CHANGE UP (ref 7–18)
CALCIUM SERPL-MCNC: 8.4 MG/DL — SIGNIFICANT CHANGE UP (ref 8.4–10.5)
CALCIUM SERPL-MCNC: 8.4 MG/DL — SIGNIFICANT CHANGE UP (ref 8.4–10.5)
CHLORIDE SERPL-SCNC: 108 MMOL/L — SIGNIFICANT CHANGE UP (ref 96–108)
CHLORIDE SERPL-SCNC: 108 MMOL/L — SIGNIFICANT CHANGE UP (ref 96–108)
CO2 SERPL-SCNC: 27 MMOL/L — SIGNIFICANT CHANGE UP (ref 22–31)
CO2 SERPL-SCNC: 27 MMOL/L — SIGNIFICANT CHANGE UP (ref 22–31)
CREAT SERPL-MCNC: 0.7 MG/DL — SIGNIFICANT CHANGE UP (ref 0.5–1.3)
CREAT SERPL-MCNC: 0.7 MG/DL — SIGNIFICANT CHANGE UP (ref 0.5–1.3)
EGFR: 102 ML/MIN/1.73M2 — SIGNIFICANT CHANGE UP
EGFR: 102 ML/MIN/1.73M2 — SIGNIFICANT CHANGE UP
GLUCOSE BLDC GLUCOMTR-MCNC: 100 MG/DL — HIGH (ref 70–99)
GLUCOSE BLDC GLUCOMTR-MCNC: 100 MG/DL — HIGH (ref 70–99)
GLUCOSE BLDC GLUCOMTR-MCNC: 146 MG/DL — HIGH (ref 70–99)
GLUCOSE BLDC GLUCOMTR-MCNC: 146 MG/DL — HIGH (ref 70–99)
GLUCOSE SERPL-MCNC: 102 MG/DL — HIGH (ref 70–99)
GLUCOSE SERPL-MCNC: 102 MG/DL — HIGH (ref 70–99)
HCT VFR BLD CALC: 31.5 % — LOW (ref 39–50)
HCT VFR BLD CALC: 31.5 % — LOW (ref 39–50)
HGB BLD-MCNC: 10.2 G/DL — LOW (ref 13–17)
HGB BLD-MCNC: 10.2 G/DL — LOW (ref 13–17)
MCHC RBC-ENTMCNC: 26.9 PG — LOW (ref 27–34)
MCHC RBC-ENTMCNC: 26.9 PG — LOW (ref 27–34)
MCHC RBC-ENTMCNC: 32.4 GM/DL — SIGNIFICANT CHANGE UP (ref 32–36)
MCHC RBC-ENTMCNC: 32.4 GM/DL — SIGNIFICANT CHANGE UP (ref 32–36)
MCV RBC AUTO: 83.1 FL — SIGNIFICANT CHANGE UP (ref 80–100)
MCV RBC AUTO: 83.1 FL — SIGNIFICANT CHANGE UP (ref 80–100)
NRBC # BLD: 0 /100 WBCS — SIGNIFICANT CHANGE UP (ref 0–0)
NRBC # BLD: 0 /100 WBCS — SIGNIFICANT CHANGE UP (ref 0–0)
PLATELET # BLD AUTO: 391 K/UL — SIGNIFICANT CHANGE UP (ref 150–400)
PLATELET # BLD AUTO: 391 K/UL — SIGNIFICANT CHANGE UP (ref 150–400)
POTASSIUM SERPL-MCNC: 4.3 MMOL/L — SIGNIFICANT CHANGE UP (ref 3.5–5.3)
POTASSIUM SERPL-MCNC: 4.3 MMOL/L — SIGNIFICANT CHANGE UP (ref 3.5–5.3)
POTASSIUM SERPL-SCNC: 4.3 MMOL/L — SIGNIFICANT CHANGE UP (ref 3.5–5.3)
POTASSIUM SERPL-SCNC: 4.3 MMOL/L — SIGNIFICANT CHANGE UP (ref 3.5–5.3)
RBC # BLD: 3.79 M/UL — LOW (ref 4.2–5.8)
RBC # BLD: 3.79 M/UL — LOW (ref 4.2–5.8)
RBC # FLD: 17.6 % — HIGH (ref 10.3–14.5)
RBC # FLD: 17.6 % — HIGH (ref 10.3–14.5)
SODIUM SERPL-SCNC: 138 MMOL/L — SIGNIFICANT CHANGE UP (ref 135–145)
SODIUM SERPL-SCNC: 138 MMOL/L — SIGNIFICANT CHANGE UP (ref 135–145)
WBC # BLD: 8.21 K/UL — SIGNIFICANT CHANGE UP (ref 3.8–10.5)
WBC # BLD: 8.21 K/UL — SIGNIFICANT CHANGE UP (ref 3.8–10.5)
WBC # FLD AUTO: 8.21 K/UL — SIGNIFICANT CHANGE UP (ref 3.8–10.5)
WBC # FLD AUTO: 8.21 K/UL — SIGNIFICANT CHANGE UP (ref 3.8–10.5)

## 2023-12-29 PROCEDURE — 82330 ASSAY OF CALCIUM: CPT

## 2023-12-29 PROCEDURE — 99291 CRITICAL CARE FIRST HOUR: CPT

## 2023-12-29 PROCEDURE — 84295 ASSAY OF SERUM SODIUM: CPT

## 2023-12-29 PROCEDURE — 85730 THROMBOPLASTIN TIME PARTIAL: CPT

## 2023-12-29 PROCEDURE — 83615 LACTATE (LD) (LDH) ENZYME: CPT

## 2023-12-29 PROCEDURE — 80048 BASIC METABOLIC PNL TOTAL CA: CPT

## 2023-12-29 PROCEDURE — 83605 ASSAY OF LACTIC ACID: CPT

## 2023-12-29 PROCEDURE — 71045 X-RAY EXAM CHEST 1 VIEW: CPT

## 2023-12-29 PROCEDURE — P9040: CPT

## 2023-12-29 PROCEDURE — 99239 HOSP IP/OBS DSCHRG MGMT >30: CPT

## 2023-12-29 PROCEDURE — 80053 COMPREHEN METABOLIC PANEL: CPT

## 2023-12-29 PROCEDURE — 85045 AUTOMATED RETICULOCYTE COUNT: CPT

## 2023-12-29 PROCEDURE — 86901 BLOOD TYPING SEROLOGIC RH(D): CPT

## 2023-12-29 PROCEDURE — 82803 BLOOD GASES ANY COMBINATION: CPT

## 2023-12-29 PROCEDURE — 87641 MR-STAPH DNA AMP PROBE: CPT

## 2023-12-29 PROCEDURE — 85025 COMPLETE CBC W/AUTO DIFF WBC: CPT

## 2023-12-29 PROCEDURE — 96376 TX/PRO/DX INJ SAME DRUG ADON: CPT

## 2023-12-29 PROCEDURE — 93005 ELECTROCARDIOGRAM TRACING: CPT

## 2023-12-29 PROCEDURE — 86850 RBC ANTIBODY SCREEN: CPT

## 2023-12-29 PROCEDURE — 87640 STAPH A DNA AMP PROBE: CPT

## 2023-12-29 PROCEDURE — 82962 GLUCOSE BLOOD TEST: CPT

## 2023-12-29 PROCEDURE — 83010 ASSAY OF HAPTOGLOBIN QUANT: CPT

## 2023-12-29 PROCEDURE — 96374 THER/PROPH/DIAG INJ IV PUSH: CPT

## 2023-12-29 PROCEDURE — 0225U NFCT DS DNA&RNA 21 SARSCOV2: CPT

## 2023-12-29 PROCEDURE — 81001 URINALYSIS AUTO W/SCOPE: CPT

## 2023-12-29 PROCEDURE — 87040 BLOOD CULTURE FOR BACTERIA: CPT

## 2023-12-29 PROCEDURE — 87086 URINE CULTURE/COLONY COUNT: CPT

## 2023-12-29 PROCEDURE — 84132 ASSAY OF SERUM POTASSIUM: CPT

## 2023-12-29 PROCEDURE — 86923 COMPATIBILITY TEST ELECTRIC: CPT

## 2023-12-29 PROCEDURE — 36430 TRANSFUSION BLD/BLD COMPNT: CPT

## 2023-12-29 PROCEDURE — 85027 COMPLETE CBC AUTOMATED: CPT

## 2023-12-29 PROCEDURE — 85610 PROTHROMBIN TIME: CPT

## 2023-12-29 PROCEDURE — 36415 COLL VENOUS BLD VENIPUNCTURE: CPT

## 2023-12-29 PROCEDURE — 96375 TX/PRO/DX INJ NEW DRUG ADDON: CPT

## 2023-12-29 PROCEDURE — 86900 BLOOD TYPING SEROLOGIC ABO: CPT

## 2023-12-29 RX ORDER — MUPIROCIN 20 MG/G
1 OINTMENT TOPICAL
Qty: 0 | Refills: 0 | DISCHARGE
Start: 2023-12-29

## 2023-12-29 RX ADMIN — MEROPENEM 100 MILLIGRAM(S): 1 INJECTION INTRAVENOUS at 06:01

## 2023-12-29 RX ADMIN — MUPIROCIN 1 APPLICATION(S): 20 OINTMENT TOPICAL at 06:08

## 2023-12-29 RX ADMIN — LEVETIRACETAM 750 MILLIGRAM(S): 250 TABLET, FILM COATED ORAL at 06:00

## 2023-12-29 RX ADMIN — Medication 650 MILLIGRAM(S): at 07:12

## 2023-12-29 RX ADMIN — CHLORHEXIDINE GLUCONATE 1 APPLICATION(S): 213 SOLUTION TOPICAL at 06:02

## 2023-12-29 RX ADMIN — Medication 650 MILLIGRAM(S): at 00:03

## 2023-12-29 RX ADMIN — Medication 650 MILLIGRAM(S): at 06:01

## 2023-12-29 NOTE — DISCHARGE NOTE NURSING/CASE MANAGEMENT/SOCIAL WORK - NSTRANSFERBELONGINGSRESP_GEN_A_NUR
What Type Of Note Output Would You Prefer (Optional)?: Standard Output Hpi Title: Evaluation of Skin Lesions How Severe Are Your Spot(S)?: moderate Have Your Spot(S) Been Treated In The Past?: has not been treated yes

## 2023-12-29 NOTE — DISCHARGE NOTE NURSING/CASE MANAGEMENT/SOCIAL WORK - NSDCPEFALRISK_GEN_ALL_CORE
For information on Fall & Injury Prevention, visit: [email or web address]-[email or web address]-[email or web address]-mobility OR  https://www.[facility]/[email or web address]-prevention-tips-[email or web address]-avoid-injury OR  https://www.cdc.gov/steadi/patient.html For information on Fall & Injury Prevention, visit: [email or web address]://www.[email or web address]/[email or web address]-[email or web address]-[email or web address]-[email or web address]-mobility OR  https://www.[facility]/news/fall-prevention-tips-to-avoid-injury OR  https://www.cdc.gov/steadi/patient.html

## 2023-12-29 NOTE — DISCHARGE NOTE NURSING/CASE MANAGEMENT/SOCIAL WORK - PATIENT PORTAL LINK FT
You can access the FollowMyHealth Patient Portal offered by [facility] by registering at the following website: [email or web address]. By joining [employer]’s FollowMyHealth portal, you will also be able to view your health information using other applications (apps) compatible with our system. You can access the FollowMyHealth Patient Portal offered by [facility] by registering at the following website: [email or web address]. By joining [employer]’s FollowMyHealth portal, you will also be able to view your health information using other applications (apps) compatible with our system.

## 2023-12-30 LAB
CULTURE RESULTS: SIGNIFICANT CHANGE UP
SPECIMEN SOURCE: SIGNIFICANT CHANGE UP

## 2024-01-03 ENCOUNTER — TRANSCRIPTION ENCOUNTER (OUTPATIENT)
Age: 69
End: 2024-01-03

## 2024-01-08 NOTE — DISCHARGE NOTE PROVIDER - ATTENDING DISCHARGE PHYSICAL EXAM:
AUDIOLOGY REPORT    SUBJECTIVE:Geovanni Jasso is a 67 year old male who was seen in the Audiology Clinic at the [facility] on 1/8/2024  for a follow-up check regarding the fitting of new hearing aids. Previous results have revealed an asymmetrical sensorineural hearing loss with worse hearing in the right.  The patient has been seen previously in this clinic and was fit with a Phonak L70-R BiCROS on 12/20/2023.  Geovanni reports good sound quality with the hearing aid(s). He reports some road annoying road noise when driving however.    OBJECTIVE:   The International Outcome Inventory-Hearing Aids (IOI-HA) was administered today.The patient s responses to the 7 questions can be compared to normative data relative to how others are performing with their hearing aids, as well as focusing audiologic care and counseling.This patient s Quality of Life score (Question 7) was 5, which is above normative average.     Based on patient report, the following changes were made;increased noise reduction and impulse noise controls in all conditions.    Reviewed 45 day trial period, care, cleaning (no water, dry brush), batteries (rechargeable) insertion/removal, toxicity, low-battery signal), aid insertion/removal, volume adjustment (if applicable), user booklet, warranty information, storage cases, and other hearing aid details.     No charge visit today (in warranty hearing aid check).     ASSESSMENT: A follow-up appointment for hearing aid fitting was completed today. IOI-HA administered today. Changes to hearing aid was completed as outlined above.     PLAN:Geovanni will return for follow-up as needed, or at least every 4-6 months for cleaning and assessment of hearing aid.  . Please call this clinic with any questions regarding today s appointment.    Michelle Interiano.  Doctor of Audiology  [place] License # [id number]      Attending Discharge Physical Examination:

## 2024-01-09 ENCOUNTER — TRANSCRIPTION ENCOUNTER (OUTPATIENT)
Age: 69
End: 2024-01-09

## 2024-01-19 ENCOUNTER — TRANSCRIPTION ENCOUNTER (OUTPATIENT)
Age: 69
End: 2024-01-19

## 2024-04-01 ENCOUNTER — APPOINTMENT (OUTPATIENT)
Dept: SURGERY | Facility: CLINIC | Age: 69
End: 2024-04-01

## 2024-04-01 VITALS
OXYGEN SATURATION: 99 % | WEIGHT: 128 LBS | SYSTOLIC BLOOD PRESSURE: 115 MMHG | HEIGHT: 65.75 IN | DIASTOLIC BLOOD PRESSURE: 72 MMHG | HEART RATE: 75 BPM | BODY MASS INDEX: 20.82 KG/M2

## 2024-04-01 DIAGNOSIS — Z80.9 FAMILY HISTORY OF MALIGNANT NEOPLASM, UNSPECIFIED: ICD-10-CM

## 2024-04-01 DIAGNOSIS — Z83.3 FAMILY HISTORY OF DIABETES MELLITUS: ICD-10-CM

## 2024-04-01 DIAGNOSIS — Z78.9 OTHER SPECIFIED HEALTH STATUS: ICD-10-CM

## 2024-04-01 DIAGNOSIS — Z82.49 FAMILY HISTORY OF ISCHEMIC HEART DISEASE AND OTHER DISEASES OF THE CIRCULATORY SYSTEM: ICD-10-CM

## 2024-04-01 PROBLEM — Z00.00 ENCOUNTER FOR PREVENTIVE HEALTH EXAMINATION: Status: ACTIVE | Noted: 2024-04-01

## 2024-04-01 RX ORDER — LACTULOSE 10 G/15ML
10 SOLUTION ORAL
Refills: 0 | Status: ACTIVE | COMMUNITY

## 2024-04-01 RX ORDER — ENOXAPARIN SODIUM 100 MG/ML
40 INJECTION SUBCUTANEOUS
Refills: 0 | Status: ACTIVE | COMMUNITY

## 2024-04-01 RX ORDER — COLLAGENASE SANTYL 250 [ARB'U]/G
250 OINTMENT TOPICAL
Refills: 0 | Status: ACTIVE | COMMUNITY

## 2024-04-01 RX ORDER — SENNOSIDES 8.6 MG TABLETS 8.6 MG/1
8.6 TABLET ORAL
Refills: 0 | Status: ACTIVE | COMMUNITY

## 2024-04-01 RX ORDER — LORATADINE 5 MG
17 TABLET,CHEWABLE ORAL
Refills: 0 | Status: ACTIVE | COMMUNITY

## 2024-04-01 RX ORDER — ACETAMINOPHEN 325 MG/1
325 TABLET ORAL
Refills: 0 | Status: ACTIVE | COMMUNITY

## 2024-04-01 RX ORDER — NUTRITIONAL SUPPLEMENT
POWDER (GRAM) ORAL
Refills: 0 | Status: ACTIVE | COMMUNITY

## 2024-04-01 RX ORDER — LEVETIRACETAM 750 MG/1
750 TABLET, FILM COATED ORAL
Refills: 0 | Status: ACTIVE | COMMUNITY

## 2024-04-01 RX ORDER — AMINO ACIDS/PROTEIN HYDROLYS 15 G-60/30
LIQUID (ML) ORAL
Refills: 0 | Status: ACTIVE | COMMUNITY

## 2024-04-01 RX ORDER — METFORMIN HYDROCHLORIDE 500 MG/1
500 TABLET, COATED ORAL
Refills: 0 | Status: ACTIVE | COMMUNITY

## 2024-07-05 NOTE — DIETITIAN NUTRITION RISK NOTIFICATION - INADEQUATE ENERGY INTAKE
----- Message from Jorge Hanley MD sent at 7/5/2024 11:06 AM CDT -----  Cholelithiasis with chronic calcified uterine fibroids, patient can be referred to surgery if interested in cholecystectomy.   </= 50% for >/= 5 days

## 2024-11-03 NOTE — ED ADULT TRIAGE NOTE - INTERNATIONAL TRAVEL
PT observed sleeping during safety checks.  No distress or labor breathing observed.   No changes in medical condition. No behavior noted.  Will continue to monitor.     No

## 2024-12-03 ENCOUNTER — APPOINTMENT (OUTPATIENT)
Dept: GASTROENTEROLOGY | Facility: CLINIC | Age: 69
End: 2024-12-03
Payer: MEDICARE

## 2024-12-03 VITALS
HEIGHT: 67 IN | SYSTOLIC BLOOD PRESSURE: 127 MMHG | HEART RATE: 79 BPM | TEMPERATURE: 98.1 F | OXYGEN SATURATION: 100 % | BODY MASS INDEX: 24.33 KG/M2 | DIASTOLIC BLOOD PRESSURE: 78 MMHG | WEIGHT: 155 LBS

## 2024-12-03 DIAGNOSIS — I10 ESSENTIAL (PRIMARY) HYPERTENSION: ICD-10-CM

## 2024-12-03 DIAGNOSIS — Z80.9 FAMILY HISTORY OF MALIGNANT NEOPLASM, UNSPECIFIED: ICD-10-CM

## 2024-12-03 DIAGNOSIS — Z12.11 ENCOUNTER FOR SCREENING FOR MALIGNANT NEOPLASM OF COLON: ICD-10-CM

## 2024-12-03 DIAGNOSIS — Z83.3 FAMILY HISTORY OF DIABETES MELLITUS: ICD-10-CM

## 2024-12-03 DIAGNOSIS — E11.9 TYPE 2 DIABETES MELLITUS W/OUT COMPLICATIONS: ICD-10-CM

## 2024-12-03 DIAGNOSIS — Z78.9 OTHER SPECIFIED HEALTH STATUS: ICD-10-CM

## 2024-12-03 DIAGNOSIS — Z82.49 FAMILY HISTORY OF ISCHEMIC HEART DISEASE AND OTHER DISEASES OF THE CIRCULATORY SYSTEM: ICD-10-CM

## 2024-12-03 DIAGNOSIS — S06.9XAA UNSPECIFIED INTRACRANIAL INJURY WITH LOSS OF CONSCIOUSNESS STATUS UNKNOWN, INITIAL ENCOUNTER: ICD-10-CM

## 2024-12-03 DIAGNOSIS — E78.5 HYPERLIPIDEMIA, UNSPECIFIED: ICD-10-CM

## 2024-12-03 PROCEDURE — 99204 OFFICE O/P NEW MOD 45 MIN: CPT

## 2025-01-10 NOTE — ASU PATIENT PROFILE, ADULT - FALL HARM RISK - UNIVERSAL INTERVENTIONS
Bed in lowest position, wheels locked, appropriate side rails in place/Call bell, personal items and telephone in reach/Instruct patient to call for assistance before getting out of bed or chair/Non-slip footwear when patient is out of bed/[place] to call system/Physically safe environment - no spills, clutter or unnecessary equipment/Purposeful Proactive Rounding/Room/bathroom lighting operational, light cord in reach

## 2025-01-13 ENCOUNTER — TRANSCRIPTION ENCOUNTER (OUTPATIENT)
Age: 70
End: 2025-01-13

## 2025-01-13 ENCOUNTER — OUTPATIENT (OUTPATIENT)
Dept: OUTPATIENT SERVICES | Facility: HOSPITAL | Age: 70
LOS: 1 days | Discharge: HOME | End: 2025-01-13
Payer: MEDICARE

## 2025-01-13 ENCOUNTER — APPOINTMENT (OUTPATIENT)
Dept: GASTROENTEROLOGY | Facility: HOSPITAL | Age: 70
End: 2025-01-13

## 2025-01-13 VITALS
DIASTOLIC BLOOD PRESSURE: 77 MMHG | RESPIRATION RATE: 14 BRPM | OXYGEN SATURATION: 100 % | SYSTOLIC BLOOD PRESSURE: 129 MMHG | HEART RATE: 87 BPM

## 2025-01-13 VITALS
HEART RATE: 98 BPM | OXYGEN SATURATION: 96 % | DIASTOLIC BLOOD PRESSURE: 75 MMHG | RESPIRATION RATE: 14 BRPM | HEIGHT: 67 IN | SYSTOLIC BLOOD PRESSURE: 117 MMHG | TEMPERATURE: 98 F | WEIGHT: 154.98 LBS

## 2025-01-13 DIAGNOSIS — Z12.11 ENCOUNTER FOR SCREENING FOR MALIGNANT NEOPLASM OF COLON: ICD-10-CM

## 2025-01-13 LAB — GLUCOSE BLDC GLUCOMTR-MCNC: 101 MG/DL — HIGH (ref 70–99)

## 2025-01-13 PROCEDURE — 45330 DIAGNOSTIC SIGMOIDOSCOPY: CPT | Mod: 53

## 2025-01-13 PROCEDURE — 82962 GLUCOSE BLOOD TEST: CPT

## 2025-01-13 PROCEDURE — G0121: CPT

## 2025-01-13 NOTE — ASU PREOP CHECKLIST - HEIGHT IN CM
[address] Jorge Patrick), Pediatrics  [address] [place] [address]  Phone: [phone]  Fax: [phone]

## 2025-01-13 NOTE — ASU DISCHARGE PLAN (ADULT/PEDIATRIC) - NS MD DC FALL RISK RISK
For information on Fall & Injury Prevention, visit: [email or web address]://www.[email or web address]/[email or web address]-[email or web address]-and-maintains-health-and-mobility OR  https://www.[facility]/news/fall-prevention-tips-to-avoid-injury OR  https://www.cdc.gov/steadi/patient.html

## 2025-01-13 NOTE — ASU DISCHARGE PLAN (ADULT/PEDIATRIC) - FINANCIAL ASSISTANCE
[facility] provides services at a reduced cost to those who are determined to be eligible through [facility]’s financial assistance program. Information regarding [facility]’s financial assistance program can be found by going to [email or web address] or by calling [phone].

## 2025-01-15 RX ORDER — POLYETHYLENE GLYCOL 3350 17 G/17G
17 POWDER, FOR SOLUTION ORAL
Qty: 2 | Refills: 0 | Status: ACTIVE | COMMUNITY
Start: 2025-01-15 | End: 1900-01-01

## 2025-01-29 ENCOUNTER — APPOINTMENT (OUTPATIENT)
Dept: GASTROENTEROLOGY | Facility: HOSPITAL | Age: 70
End: 2025-01-29

## 2025-03-12 ENCOUNTER — APPOINTMENT (OUTPATIENT)
Dept: GASTROENTEROLOGY | Facility: HOSPITAL | Age: 70
End: 2025-03-12

## 2025-03-12 ENCOUNTER — RESULT REVIEW (OUTPATIENT)
Age: 70
End: 2025-03-12

## 2025-03-12 ENCOUNTER — TRANSCRIPTION ENCOUNTER (OUTPATIENT)
Age: 70
End: 2025-03-12

## 2025-03-12 ENCOUNTER — OUTPATIENT (OUTPATIENT)
Dept: OUTPATIENT SERVICES | Facility: HOSPITAL | Age: 70
LOS: 1 days | End: 2025-03-12
Payer: MEDICARE

## 2025-03-12 VITALS
DIASTOLIC BLOOD PRESSURE: 84 MMHG | OXYGEN SATURATION: 100 % | TEMPERATURE: 98 F | RESPIRATION RATE: 15 BRPM | HEART RATE: 87 BPM | SYSTOLIC BLOOD PRESSURE: 120 MMHG | WEIGHT: 154.54 LBS

## 2025-03-12 VITALS
OXYGEN SATURATION: 99 % | HEART RATE: 72 BPM | RESPIRATION RATE: 18 BRPM | DIASTOLIC BLOOD PRESSURE: 72 MMHG | SYSTOLIC BLOOD PRESSURE: 135 MMHG

## 2025-03-12 DIAGNOSIS — Z98.890 OTHER SPECIFIED POSTPROCEDURAL STATES: Chronic | ICD-10-CM

## 2025-03-12 DIAGNOSIS — Z12.11 ENCOUNTER FOR SCREENING FOR MALIGNANT NEOPLASM OF COLON: ICD-10-CM

## 2025-03-12 LAB — GLUCOSE BLDC GLUCOMTR-MCNC: 125 MG/DL — HIGH (ref 70–99)

## 2025-03-12 PROCEDURE — 45385 COLONOSCOPY W/LESION REMOVAL: CPT | Mod: PT

## 2025-03-12 PROCEDURE — 88305 TISSUE EXAM BY PATHOLOGIST: CPT

## 2025-03-12 PROCEDURE — 82962 GLUCOSE BLOOD TEST: CPT

## 2025-03-12 PROCEDURE — 45380 COLONOSCOPY AND BIOPSY: CPT

## 2025-03-12 PROCEDURE — 88305 TISSUE EXAM BY PATHOLOGIST: CPT | Mod: 26

## 2025-03-12 NOTE — ASU PATIENT PROFILE, ADULT - NSICDXPASTMEDICALHX_GEN_ALL_CORE_FT
PAST MEDICAL HISTORY:  DM (diabetes mellitus)     Hypertension     Osteomyelitis of vertebra, multiple sites in spine     Pressure ulcer     Seizure disorder     TBI (traumatic brain injury)

## 2025-03-12 NOTE — ASU PATIENT PROFILE, ADULT - FALL HARM RISK - HARM RISK INTERVENTIONS

## 2025-03-12 NOTE — PACU DISCHARGE NOTE - NSPTMEETSDISCHCRITERIADT_GEN_A_CORE
[address]  [facility] EMERGENCY DEPT  [address] [address]  [phone]    Work/School Note    Date: 1/30/2022    To Whom It May concern:    Jesus Green was seen and treated today in the emergency room by the following provider(s):  Attending Provider: Lazaro Silver MD.      Jesus Green is excused from work/school on 01/30/22 and 01/31/22. She is medically clear to return to work/school on 2/1/2022.        Sincerely,          Jim Colvin MD
12-Mar-2025 11:07

## 2025-03-12 NOTE — ASU DISCHARGE PLAN (ADULT/PEDIATRIC) - FINANCIAL ASSISTANCE
[facility] provides services at a reduced cost to those who are determined to be eligible through [facility]’s financial assistance program. Information regarding [facility]’s financial assistance program can be found by going to [email or web address] or by calling [phone].

## 2025-03-14 LAB — SURGICAL PATHOLOGY STUDY: SIGNIFICANT CHANGE UP

## 2025-03-19 ENCOUNTER — NON-APPOINTMENT (OUTPATIENT)
Age: 70
End: 2025-03-19

## 2025-05-16 ENCOUNTER — INPATIENT (INPATIENT)
Facility: HOSPITAL | Age: 70
LOS: 6 days | Discharge: NURSING, REHAB OR LONG-TERM CARE | DRG: 594 | End: 2025-05-23
Attending: GENERAL PRACTICE | Admitting: GENERAL PRACTICE
Payer: MEDICARE

## 2025-05-16 VITALS
DIASTOLIC BLOOD PRESSURE: 82 MMHG | OXYGEN SATURATION: 100 % | RESPIRATION RATE: 18 BRPM | HEART RATE: 98 BPM | WEIGHT: 153 LBS | TEMPERATURE: 98 F | SYSTOLIC BLOOD PRESSURE: 131 MMHG

## 2025-05-16 DIAGNOSIS — E11.9 TYPE 2 DIABETES MELLITUS WITHOUT COMPLICATIONS: ICD-10-CM

## 2025-05-16 DIAGNOSIS — Z98.890 OTHER SPECIFIED POSTPROCEDURAL STATES: Chronic | ICD-10-CM

## 2025-05-16 DIAGNOSIS — S06.9XAA UNSPECIFIED INTRACRANIAL INJURY WITH LOSS OF CONSCIOUSNESS STATUS UNKNOWN, INITIAL ENCOUNTER: ICD-10-CM

## 2025-05-16 DIAGNOSIS — A41.9 SEPSIS, UNSPECIFIED ORGANISM: ICD-10-CM

## 2025-05-16 DIAGNOSIS — I10 ESSENTIAL (PRIMARY) HYPERTENSION: ICD-10-CM

## 2025-05-16 DIAGNOSIS — S31.000A UNSPECIFIED OPEN WOUND OF LOWER BACK AND PELVIS WITHOUT PENETRATION INTO RETROPERITONEUM, INITIAL ENCOUNTER: ICD-10-CM

## 2025-05-16 DIAGNOSIS — E78.5 HYPERLIPIDEMIA, UNSPECIFIED: ICD-10-CM

## 2025-05-16 DIAGNOSIS — Z29.9 ENCOUNTER FOR PROPHYLACTIC MEASURES, UNSPECIFIED: ICD-10-CM

## 2025-05-16 DIAGNOSIS — L89.154 PRESSURE ULCER OF SACRAL REGION, STAGE 4: ICD-10-CM

## 2025-05-16 PROBLEM — M46.20 OSTEOMYELITIS OF VERTEBRA, SITE UNSPECIFIED: Chronic | Status: ACTIVE | Noted: 2025-03-12

## 2025-05-16 PROBLEM — L89.90 PRESSURE ULCER OF UNSPECIFIED SITE, UNSPECIFIED STAGE: Chronic | Status: ACTIVE | Noted: 2025-03-12

## 2025-05-16 LAB
ALBUMIN SERPL ELPH-MCNC: 1.9 G/DL — LOW (ref 3.5–5)
ALP SERPL-CCNC: 64 U/L — SIGNIFICANT CHANGE UP (ref 40–120)
ALT FLD-CCNC: 9 U/L DA — LOW (ref 10–60)
ANION GAP SERPL CALC-SCNC: 4 MMOL/L — LOW (ref 5–17)
APPEARANCE UR: CLEAR — SIGNIFICANT CHANGE UP
APTT BLD: 28 SEC — SIGNIFICANT CHANGE UP (ref 26.1–36.8)
AST SERPL-CCNC: 7 U/L — LOW (ref 10–40)
BASOPHILS # BLD AUTO: 0.05 K/UL — SIGNIFICANT CHANGE UP (ref 0–0.2)
BASOPHILS NFR BLD AUTO: 0.5 % — SIGNIFICANT CHANGE UP (ref 0–2)
BILIRUB SERPL-MCNC: 0.3 MG/DL — SIGNIFICANT CHANGE UP (ref 0.2–1.2)
BILIRUB UR-MCNC: NEGATIVE — SIGNIFICANT CHANGE UP
BUN SERPL-MCNC: 16 MG/DL — SIGNIFICANT CHANGE UP (ref 7–18)
CALCIUM SERPL-MCNC: 9.2 MG/DL — SIGNIFICANT CHANGE UP (ref 8.4–10.5)
CHLORIDE SERPL-SCNC: 108 MMOL/L — SIGNIFICANT CHANGE UP (ref 96–108)
CO2 SERPL-SCNC: 23 MMOL/L — SIGNIFICANT CHANGE UP (ref 22–31)
COLOR SPEC: YELLOW — SIGNIFICANT CHANGE UP
CREAT SERPL-MCNC: 0.79 MG/DL — SIGNIFICANT CHANGE UP (ref 0.5–1.3)
DIFF PNL FLD: NEGATIVE — SIGNIFICANT CHANGE UP
EGFR: 97 ML/MIN/1.73M2 — SIGNIFICANT CHANGE UP
EGFR: 97 ML/MIN/1.73M2 — SIGNIFICANT CHANGE UP
EOSINOPHIL # BLD AUTO: 0.15 K/UL — SIGNIFICANT CHANGE UP (ref 0–0.5)
EOSINOPHIL NFR BLD AUTO: 1.6 % — SIGNIFICANT CHANGE UP (ref 0–6)
FLUAV AG NPH QL: SIGNIFICANT CHANGE UP
FLUBV AG NPH QL: SIGNIFICANT CHANGE UP
GLUCOSE BLDC GLUCOMTR-MCNC: 101 MG/DL — HIGH (ref 70–99)
GLUCOSE SERPL-MCNC: 92 MG/DL — SIGNIFICANT CHANGE UP (ref 70–99)
GLUCOSE UR QL: NEGATIVE MG/DL — SIGNIFICANT CHANGE UP
HCT VFR BLD CALC: 26.7 % — LOW (ref 39–50)
HGB BLD-MCNC: 8.3 G/DL — LOW (ref 13–17)
IMM GRANULOCYTES NFR BLD AUTO: 0.4 % — SIGNIFICANT CHANGE UP (ref 0–0.9)
INR BLD: 1.13 RATIO — SIGNIFICANT CHANGE UP (ref 0.85–1.16)
KETONES UR QL: ABNORMAL MG/DL
LACTATE SERPL-SCNC: 1.9 MMOL/L — SIGNIFICANT CHANGE UP (ref 0.7–2)
LEUKOCYTE ESTERASE UR-ACNC: NEGATIVE — SIGNIFICANT CHANGE UP
LYMPHOCYTES # BLD AUTO: 2.12 K/UL — SIGNIFICANT CHANGE UP (ref 1–3.3)
LYMPHOCYTES # BLD AUTO: 22.4 % — SIGNIFICANT CHANGE UP (ref 13–44)
MCHC RBC-ENTMCNC: 23.9 PG — LOW (ref 27–34)
MCHC RBC-ENTMCNC: 31.1 G/DL — LOW (ref 32–36)
MCV RBC AUTO: 76.9 FL — LOW (ref 80–100)
MONOCYTES # BLD AUTO: 0.97 K/UL — HIGH (ref 0–0.9)
MONOCYTES NFR BLD AUTO: 10.3 % — SIGNIFICANT CHANGE UP (ref 2–14)
NEUTROPHILS # BLD AUTO: 6.13 K/UL — SIGNIFICANT CHANGE UP (ref 1.8–7.4)
NEUTROPHILS NFR BLD AUTO: 64.8 % — SIGNIFICANT CHANGE UP (ref 43–77)
NITRITE UR-MCNC: NEGATIVE — SIGNIFICANT CHANGE UP
NRBC BLD AUTO-RTO: 0 /100 WBCS — SIGNIFICANT CHANGE UP (ref 0–0)
PH UR: 5 — SIGNIFICANT CHANGE UP (ref 5–8)
PLATELET # BLD AUTO: 552 K/UL — HIGH (ref 150–400)
POTASSIUM SERPL-MCNC: 4.3 MMOL/L — SIGNIFICANT CHANGE UP (ref 3.5–5.3)
POTASSIUM SERPL-SCNC: 4.3 MMOL/L — SIGNIFICANT CHANGE UP (ref 3.5–5.3)
PROT SERPL-MCNC: 7.7 G/DL — SIGNIFICANT CHANGE UP (ref 6–8.3)
PROT UR-MCNC: ABNORMAL MG/DL
PROTHROM AB SERPL-ACNC: 13.2 SEC — SIGNIFICANT CHANGE UP (ref 9.9–13.4)
RBC # BLD: 3.47 M/UL — LOW (ref 4.2–5.8)
RBC # FLD: 15.9 % — HIGH (ref 10.3–14.5)
RSV RNA NPH QL NAA+NON-PROBE: SIGNIFICANT CHANGE UP
SARS-COV-2 RNA SPEC QL NAA+PROBE: SIGNIFICANT CHANGE UP
SODIUM SERPL-SCNC: 135 MMOL/L — SIGNIFICANT CHANGE UP (ref 135–145)
SOURCE RESPIRATORY: SIGNIFICANT CHANGE UP
SP GR SPEC: 1.02 — SIGNIFICANT CHANGE UP (ref 1–1.03)
TROPONIN I, HIGH SENSITIVITY RESULT: 10.2 NG/L — SIGNIFICANT CHANGE UP
UROBILINOGEN FLD QL: 1 MG/DL — SIGNIFICANT CHANGE UP (ref 0.2–1)
WBC # BLD: 9.46 K/UL — SIGNIFICANT CHANGE UP (ref 3.8–10.5)
WBC # FLD AUTO: 9.46 K/UL — SIGNIFICANT CHANGE UP (ref 3.8–10.5)

## 2025-05-16 PROCEDURE — 72131 CT LUMBAR SPINE W/O DYE: CPT | Mod: 26

## 2025-05-16 PROCEDURE — 71045 X-RAY EXAM CHEST 1 VIEW: CPT | Mod: 26

## 2025-05-16 PROCEDURE — 99285 EMERGENCY DEPT VISIT HI MDM: CPT

## 2025-05-16 RX ORDER — B1/B2/B3/B5/B6/B12/VIT C/FOLIC 500-0.5 MG
1 TABLET ORAL DAILY
Refills: 0 | Status: DISCONTINUED | OUTPATIENT
Start: 2025-05-16 | End: 2025-05-23

## 2025-05-16 RX ORDER — INSULIN LISPRO 100 U/ML
INJECTION, SOLUTION INTRAVENOUS; SUBCUTANEOUS
Refills: 0 | Status: DISCONTINUED | OUTPATIENT
Start: 2025-05-16 | End: 2025-05-23

## 2025-05-16 RX ORDER — LEVETIRACETAM 10 MG/ML
750 INJECTION, SOLUTION INTRAVENOUS
Refills: 0 | Status: DISCONTINUED | OUTPATIENT
Start: 2025-05-16 | End: 2025-05-23

## 2025-05-16 RX ORDER — LACTULOSE 10 G/15ML
10 SOLUTION ORAL DAILY
Refills: 0 | Status: DISCONTINUED | OUTPATIENT
Start: 2025-05-16 | End: 2025-05-23

## 2025-05-16 RX ORDER — ONDANSETRON HCL/PF 4 MG/2 ML
4 VIAL (ML) INJECTION EVERY 8 HOURS
Refills: 0 | Status: DISCONTINUED | OUTPATIENT
Start: 2025-05-16 | End: 2025-05-23

## 2025-05-16 RX ORDER — MAGNESIUM, ALUMINUM HYDROXIDE 200-200 MG
30 TABLET,CHEWABLE ORAL EVERY 4 HOURS
Refills: 0 | Status: DISCONTINUED | OUTPATIENT
Start: 2025-05-16 | End: 2025-05-23

## 2025-05-16 RX ORDER — COLLAGENASE CLOSTRIDIUM HIST. 250 UNIT/G
1 OINTMENT (GRAM) TOPICAL EVERY 8 HOURS
Refills: 0 | Status: DISCONTINUED | OUTPATIENT
Start: 2025-05-16 | End: 2025-05-23

## 2025-05-16 RX ORDER — SENNA 187 MG
2 TABLET ORAL AT BEDTIME
Refills: 0 | Status: DISCONTINUED | OUTPATIENT
Start: 2025-05-16 | End: 2025-05-23

## 2025-05-16 RX ORDER — ACETAMINOPHEN 500 MG/5ML
1000 LIQUID (ML) ORAL ONCE
Refills: 0 | Status: DISCONTINUED | OUTPATIENT
Start: 2025-05-16 | End: 2025-05-23

## 2025-05-16 RX ORDER — MELATONIN 5 MG
3 TABLET ORAL AT BEDTIME
Refills: 0 | Status: DISCONTINUED | OUTPATIENT
Start: 2025-05-16 | End: 2025-05-23

## 2025-05-16 RX ORDER — INSULIN LISPRO 100 U/ML
INJECTION, SOLUTION INTRAVENOUS; SUBCUTANEOUS AT BEDTIME
Refills: 0 | Status: DISCONTINUED | OUTPATIENT
Start: 2025-05-16 | End: 2025-05-23

## 2025-05-16 RX ORDER — POLYETHYLENE GLYCOL 3350 17 G/17G
17 POWDER, FOR SOLUTION ORAL
Refills: 0 | Status: DISCONTINUED | OUTPATIENT
Start: 2025-05-16 | End: 2025-05-23

## 2025-05-16 RX ORDER — MEROPENEM 1 G/30ML
1000 INJECTION INTRAVENOUS EVERY 8 HOURS
Refills: 0 | Status: COMPLETED | OUTPATIENT
Start: 2025-05-16 | End: 2025-05-23

## 2025-05-16 RX ORDER — ACETAMINOPHEN 500 MG/5ML
650 LIQUID (ML) ORAL EVERY 6 HOURS
Refills: 0 | Status: DISCONTINUED | OUTPATIENT
Start: 2025-05-16 | End: 2025-05-23

## 2025-05-16 RX ORDER — MEROPENEM 1 G/30ML
1000 INJECTION INTRAVENOUS ONCE
Refills: 0 | Status: COMPLETED | OUTPATIENT
Start: 2025-05-16 | End: 2025-05-16

## 2025-05-16 RX ADMIN — POLYETHYLENE GLYCOL 3350 17 GRAM(S): 17 POWDER, FOR SOLUTION ORAL at 17:25

## 2025-05-16 RX ADMIN — LEVETIRACETAM 750 MILLIGRAM(S): 10 INJECTION, SOLUTION INTRAVENOUS at 18:32

## 2025-05-16 RX ADMIN — Medication 2 TABLET(S): at 22:33

## 2025-05-16 RX ADMIN — Medication 2200 MILLILITER(S): at 14:04

## 2025-05-16 RX ADMIN — MEROPENEM 100 MILLIGRAM(S): 1 INJECTION INTRAVENOUS at 17:25

## 2025-05-16 RX ADMIN — INSULIN LISPRO 0: 100 INJECTION, SOLUTION INTRAVENOUS; SUBCUTANEOUS at 22:33

## 2025-05-16 RX ADMIN — MEROPENEM 100 MILLIGRAM(S): 1 INJECTION INTRAVENOUS at 22:32

## 2025-05-16 RX ADMIN — Medication 1 APPLICATION(S): at 22:57

## 2025-05-16 NOTE — H&P ADULT - PROBLEM SELECTOR PLAN 3
Has prior hx of Osteomyelitis of vertebra, sacral and sacrococcygeal region  Assessment and Plan of Treatment: you have a history of a sacral ulcer with osteomyelitis and had a debridement on previous admission on 12/20  continue meropenem 1 g every 8 hours until 1/25/24 via left arm PICC line    Folow up CT lumbar spine

## 2025-05-16 NOTE — H&P ADULT - HISTORY OF PRESENT ILLNESS
70M DM, HTN, HLD, TBI. seizure disorder, metabolic encephalopathy, stage IV sacral ulcer sent from [facility] for fevers for 3 days.   Also as per transfer papers patiently currently receiving meropenem twice daily. Patient sent to ED for further evaluation Patient has no acute complaints, but unreliable historian.    In the ED, RVP negative, Hgb 8 slightly lower than it was 2 years ago, surgery evaluated the patient and recommended wound care and further workup but no need for urgent debridement at this time.

## 2025-05-16 NOTE — ED ADULT NURSE NOTE - NSFALLHARMRISKINTERV_ED_ALL_ED
Assistance OOB with selected safe patient handling equipment if applicable/Assistance with ambulation/Communicate risk of Fall with Harm to all staff, patient, and family/Monitor gait and stability/Provide visual cue: red socks, yellow wristband, yellow gown, etc/Reinforce activity limits and safety measures with patient and family/Bed in lowest position, wheels locked, appropriate side rails in place/Call bell, personal items and telephone in reach/Instruct patient to call for assistance before getting out of bed/chair/stretcher/Non-slip footwear applied when patient is off stretcher/[place] to call system/Physically safe environment - no spills, clutter or unnecessary equipment/Purposeful Proactive Rounding/Room/bathroom lighting operational, light cord in reach

## 2025-05-16 NOTE — CONSULT NOTE ADULT - SUBJECTIVE AND OBJECTIVE BOX
HPI:  70M DM, HTN, HLD, TBI. seizure disorder, metabolic encephalopathy, stage IV sacral ulcer sent from [facility] for fevers for 3 days.   Also as per transfer papers patient currently receiving meropenem twice daily. Patient sent to ED for further evaluation Patient has no acute complaints, but unreliable historian.    In the ED, RVP negative, Hgb 8 slightly lower than it was 2 years ago, surgery evaluated the patient and recommended wound care and further workup but no need for urgent debridement at this time.   (16 May 2025 16:06)                PAST MEDICAL & SURGICAL HISTORY:  TBI (traumatic brain injury)      Seizure disorder      DM (diabetes mellitus)      Pressure ulcer      Osteomyelitis of vertebra, multiple sites in spine      Hypertension      H/O colonoscopy          No Known Allergies      Meds:  acetaminophen     Tablet .. 650 milliGRAM(s) Oral every 6 hours PRN  acetaminophen   IVPB .. 1000 milliGRAM(s) IV Intermittent once  aluminum hydroxide/magnesium hydroxide/simethicone Suspension 30 milliLiter(s) Oral every 4 hours PRN  collagenase Ointment 1 Application(s) Topical every 8 hours  insulin lispro (ADMELOG) corrective regimen sliding scale   SubCutaneous three times a day before meals  insulin lispro (ADMELOG) corrective regimen sliding scale   SubCutaneous at bedtime  lactulose Syrup 10 Gram(s) Oral daily  levETIRAcetam 750 milliGRAM(s) Oral two times a day  melatonin 3 milliGRAM(s) Oral at bedtime PRN  meropenem  IVPB 1000 milliGRAM(s) IV Intermittent every 8 hours  multivitamin 1 Tablet(s) Oral daily  ondansetron Injectable 4 milliGRAM(s) IV Push every 8 hours PRN  polyethylene glycol 3350 17 Gram(s) Oral two times a day  senna 2 Tablet(s) Oral at bedtime      SOCIAL HISTORY:  Smoker:  YES / NO        PACK YEARS:                         WHEN QUIT?  ETOH use:  YES / NO               FREQUENCY / QUANTITY:  Ilicit Drug use:  YES / NO  Occupation:  Assisted device use (Cane / Walker):  Live with:    FAMILY HISTORY:      VITALS:  Vital Signs Last 24 Hrs  T(C): 36.8 (16 May 2025 15:42), Max: 37.1 (16 May 2025 14:34)  T(F): 98.3 (16 May 2025 15:42), Max: 98.8 (16 May 2025 14:34)  HR: 89 (16 May 2025 15:42) (89 - 98)  BP: 136/80 (16 May 2025 15:42) (131/82 - 136/80)  BP(mean): --  RR: 18 (16 May 2025 15:42) (18 - 18)  SpO2: 96% (16 May 2025 15:42) (96% - 100%)    Parameters below as of 16 May 2025 15:42  Patient On (Oxygen Delivery Method): room air        LABS/DIAGNOSTIC TESTS:                          8.3    9.46  )-----------( 552      ( 16 May 2025 13:55 )             26.7     WBC Count: 9.46 K/uL (05-16 @ 13:55)      05-16    135  |  108  |  16  ----------------------------<  92  4.3   |  23  |  0.79    Ca    9.2      16 May 2025 13:55    TPro  7.7  /  Alb  1.9[L]  /  TBili  0.3  /  DBili  x   /  AST  7[L]  /  ALT  9[L]  /  AlkPhos  64  05-16      Urinalysis with Rflx Culture (05.16.25 @ 18:20)   Urine Appearance: Clear  Color: Yellow  Specific Gravity: 1.018  pH Urine: 5.0  Protein, Urine: Trace mg/dL  Glucose Qualitative, Urine: Negative mg/dL  Ketone , Urine: Trace mg/dL  Blood, Urine: Negative  Bilirubin: Negative  Urobilinogen: 1.0 mg/dL  Leukocyte Esterase Concentration: Negative  Nitrite: Negative      LIVER FUNCTIONS - ( 16 May 2025 13:55 )  Alb: 1.9 g/dL / Pro: 7.7 g/dL / ALK PHOS: 64 U/L / ALT: 9 U/L DA / AST: 7 U/L / GGT: x             PT/INR - ( 16 May 2025 13:55 )   PT: 13.2 sec;   INR: 1.13 ratio         PTT - ( 16 May 2025 13:55 )  PTT:28.0 sec    LACTATE:Lactate, Blood: 1.9 mmol/L (05-16 @ 13:55)      ABG -     CULTURES:       RADIOLOGY:< from: Xray Chest 1 View- PORTABLE-Urgent (05.16.25 @ 14:08) >  ACC: [id number] EXAM:  XR CHEST PORTABLE URGENT 1V   ORDERED BY: JAMAR REYES     PROCEDURE DATE:  05/16/2025          INTERPRETATION:  CLINICAL INDICATION: 70 years  Male with Sepsis.    COMPARISON: Chest x-ray 12/25/2023    AP view of the chest demonstrates the lungs to be clear. There is no   pleural effusion. The pulmonary vasculature is normal. There is no   pneumothorax.    The heart is normal in size. There is no mediastinal or hilar mass.    Mild thoracic degenerative changes are present.    IMPRESSION:    No acute infiltrate.    --- End of Report ---            SABA NIEVES MD; Attending Radiologist  This document has been electronically signed. May 16 2025  4:48PM    < end of copied text >        ROS  [  ] UNABLE TO ELICIT               HPI:  70M DM, HTN, HLD, TBI. seizure disorder, metabolic encephalopathy, stage IV sacral ulcer sent from [facility] for fevers for 3 days.   Also as per transfer papers patient currently receiving meropenem twice daily. Patient sent to ED for further evaluation Patient has no acute complaints, but unreliable historian.    In the ED, RVP negative, Hgb 8 slightly lower than it was 2 years ago, surgery evaluated the patient and recommended wound care and further workup but no need for urgent debridement at this time.   (16 May 2025 16:06)          History as above, asked to see this patient who is a NH and has a stage 4 sacral decubitus and is a TBI patient and was sent in with fevers at the NH for the last 3 days and was started on Meropenem 1gm iv q12hrs there but was sent to the hospital for some reason. He is very confused and talks but makes no sense , he denies anything I ask him. He has no fevers here and he has no Leukocytosis. He was seen by surgery and he is not going to get any emergency debridement at this time. He was placed on Meropenem at 1gm iv q8hrs here.          PAST MEDICAL & SURGICAL HISTORY:  TBI (traumatic brain injury)      Seizure disorder      DM (diabetes mellitus)      Pressure ulcer      Osteomyelitis of vertebra, multiple sites in spine      Hypertension      H/O colonoscopy          No Known Allergies      Meds:  acetaminophen     Tablet .. 650 milliGRAM(s) Oral every 6 hours PRN  acetaminophen   IVPB .. 1000 milliGRAM(s) IV Intermittent once  aluminum hydroxide/magnesium hydroxide/simethicone Suspension 30 milliLiter(s) Oral every 4 hours PRN  collagenase Ointment 1 Application(s) Topical every 8 hours  insulin lispro (ADMELOG) corrective regimen sliding scale   SubCutaneous three times a day before meals  insulin lispro (ADMELOG) corrective regimen sliding scale   SubCutaneous at bedtime  lactulose Syrup 10 Gram(s) Oral daily  levETIRAcetam 750 milliGRAM(s) Oral two times a day  melatonin 3 milliGRAM(s) Oral at bedtime PRN  meropenem  IVPB 1000 milliGRAM(s) IV Intermittent every 8 hours  multivitamin 1 Tablet(s) Oral daily  ondansetron Injectable 4 milliGRAM(s) IV Push every 8 hours PRN  polyethylene glycol 3350 17 Gram(s) Oral two times a day  senna 2 Tablet(s) Oral at bedtime      SOCIAL HISTORY: unknown    FAMILY HISTORY: unknown      VITALS:  Vital Signs Last 24 Hrs  T(C): 36.8 (16 May 2025 15:42), Max: 37.1 (16 May 2025 14:34)  T(F): 98.3 (16 May 2025 15:42), Max: 98.8 (16 May 2025 14:34)  HR: 89 (16 May 2025 15:42) (89 - 98)  BP: 136/80 (16 May 2025 15:42) (131/82 - 136/80)  BP(mean): --  RR: 18 (16 May 2025 15:42) (18 - 18)  SpO2: 96% (16 May 2025 15:42) (96% - 100%)    Parameters below as of 16 May 2025 15:42  Patient On (Oxygen Delivery Method): room air        LABS/DIAGNOSTIC TESTS:                          8.3    9.46  )-----------( 552      ( 16 May 2025 13:55 )             26.7     WBC Count: 9.46 K/uL (05-16 @ 13:55)      05-16    135  |  108  |  16  ----------------------------<  92  4.3   |  23  |  0.79    Ca    9.2      16 May 2025 13:55    TPro  7.7  /  Alb  1.9[L]  /  TBili  0.3  /  DBili  x   /  AST  7[L]  /  ALT  9[L]  /  AlkPhos  64  05-16      Urinalysis with Rflx Culture (05.16.25 @ 18:20)   Urine Appearance: Clear  Color: Yellow  Specific Gravity: 1.018  pH Urine: 5.0  Protein, Urine: Trace mg/dL  Glucose Qualitative, Urine: Negative mg/dL  Ketone , Urine: Trace mg/dL  Blood, Urine: Negative  Bilirubin: Negative  Urobilinogen: 1.0 mg/dL  Leukocyte Esterase Concentration: Negative  Nitrite: Negative      LIVER FUNCTIONS - ( 16 May 2025 13:55 )  Alb: 1.9 g/dL / Pro: 7.7 g/dL / ALK PHOS: 64 U/L / ALT: 9 U/L DA / AST: 7 U/L / GGT: x             PT/INR - ( 16 May 2025 13:55 )   PT: 13.2 sec;   INR: 1.13 ratio         PTT - ( 16 May 2025 13:55 )  PTT:28.0 sec    LACTATE:Lactate, Blood: 1.9 mmol/L (05-16 @ 13:55)      ABG -     CULTURES:       RADIOLOGY:< from: Xray Chest 1 View- PORTABLE-Urgent (05.16.25 @ 14:08) >  ACC: [id number] EXAM:  XR CHEST PORTABLE URGENT 1V   ORDERED BY: JAMAR REYES     PROCEDURE DATE:  05/16/2025          INTERPRETATION:  CLINICAL INDICATION: 70 years  Male with Sepsis.    COMPARISON: Chest x-ray 12/25/2023    AP view of the chest demonstrates the lungs to be clear. There is no   pleural effusion. The pulmonary vasculature is normal. There is no   pneumothorax.    The heart is normal in size. There is no mediastinal or hilar mass.    Mild thoracic degenerative changes are present.    IMPRESSION:    No acute infiltrate.    --- End of Report ---            SABA NIEVES MD; Attending Radiologist  This document has been electronically signed. May 16 2025  4:48PM    < end of copied text >        ROS  [ x ] UNABLE TO ELICIT

## 2025-05-16 NOTE — ED ADULT NURSE NOTE - OBJECTIVE STATEMENT
Patient came in ED sent from [facility] for evaluation of Fever for 3 days. Patient is AOX3, Denies pain.

## 2025-05-16 NOTE — CONSULT NOTE ADULT - SUBJECTIVE AND OBJECTIVE BOX
Patient is a 70y old  Male who presents with a chief complaint of     HPI: 70M with PMHx TBI, DM, sacral wound, HTN, OM, presents from NH with fevers. Pt denies any pain anywhere, states he is the primary medical decision maker.       PAST MEDICAL & SURGICAL HISTORY:  TBI (traumatic brain injury)  Seizure disorder  DM (diabetes mellitus)  Pressure ulcer  Osteomyelitis of vertebra, multiple sites in spine  Hypertension  H/O colonoscopy    MEDICATIONS  (STANDING):  collagenase Ointment 1 Application(s) Topical every 8 hours  lactulose Syrup 10 Gram(s) Oral daily  levETIRAcetam 750 milliGRAM(s) Oral two times a day  meropenem  IVPB 1000 milliGRAM(s) IV Intermittent Once  multivitamin 1 Tablet(s) Oral daily  polyethylene glycol 3350 17 Gram(s) Oral two times a day  senna 2 Tablet(s) Oral at bedtime    MEDICATIONS  (PRN):  acetaminophen     Tablet .. 650 milliGRAM(s) Oral every 6 hours PRN Temp greater or equal to 38C (100.4F), Mild Pain (1 - 3)  aluminum hydroxide/magnesium hydroxide/simethicone Suspension 30 milliLiter(s) Oral every 4 hours PRN Dyspepsia  melatonin 3 milliGRAM(s) Oral at bedtime PRN Insomnia  ondansetron Injectable 4 milliGRAM(s) IV Push every 8 hours PRN Nausea and/or Vomiting    Allergies:  No Known Allergies    Vital Signs Last 24 Hrs  T(C): 36.8 (16 May 2025 15:42), Max: 37.1 (16 May 2025 14:34)  T(F): 98.3 (16 May 2025 15:42), Max: 98.8 (16 May 2025 14:34)  HR: 89 (16 May 2025 15:42) (89 - 98)  BP: 136/80 (16 May 2025 15:42) (131/82 - 136/80)  BP(mean): --  RR: 18 (16 May 2025 15:42) (18 - 18)  SpO2: 96% (16 May 2025 15:42) (96% - 100%)    Parameters below as of 16 May 2025 15:42  Patient On (Oxygen Delivery Method): room air    PHYSICAL EXAM:  Constitutional: NAD, well-developed  HEENT: EOMI  Neck: no pain  Respiratory: No accessory respiratory muscle use  Buttock: sacrum with 8cm x 9cm open wound with some slough inside, wound bed appears mostly pink  Extremities: no edema  Skin: No rashes    LABS:                      8.3    9.46  )-----------( 552      ( 16 May 2025 13:55 )             26.7     05-16    135  |  108  |  16  ----------------------------<  92  4.3   |  23  |  0.79    Ca    9.2      16 May 2025 13:55    TPro  7.7  /  Alb  1.9[L]  /  TBili  0.3  /  DBili  x   /  AST  7[L]  /  ALT  9[L]  /  AlkPhos  64  05-16    PT/INR - ( 16 May 2025 13:55 )   PT: 13.2 sec;   INR: 1.13 ratio    PTT - ( 16 May 2025 13:55 )  PTT:28.0 sec    Urinalysis Basic - ( 16 May 2025 13:55 )  Color: x / Appearance: x / SG: x / pH: x  Gluc: 92 mg/dL / Ketone: x  / Bili: x / Urobili: x   Blood: x / Protein: x / Nitrite: x   Leuk Esterase: x / RBC: x / WBC x   Sq Epi: x / Non Sq Epi: x / Bacteria: x

## 2025-05-16 NOTE — PROGRESS NOTE ADULT - ASSESSMENT
seen and examined d/w er md  and  resident   HPI:· Chief Complaint: The patient is a 70y Male complaining of fever.  · HPI Objective Statement: 70-year-old male history of DM, HTN, HLD, TBI. seizure disorder, metabolic encephalopathy, stage IV sacral ulcer sent from [facility] for possible sepsis.  As per transfer papers patient has had fever for the past 3 days.  Also as per transfer papers patiently currently receiving meropenem twice daily. Patient sent to ED for further evaluation  was on iv antibxs in NH but spiking temp    no cough  no abd pain   large DUs on sacrum with discharge    pt has rt sided hemiplegia  ( TBI)    wbc 9.6  hgb 8.3  plat 552    cxr  ua pending    surgical consult for dus   ct lumber r/o OM  anemia profile watch hgb   freq change in position

## 2025-05-16 NOTE — H&P ADULT - ASSESSMENT
70M DM,, TBI. seizure disorder, metabolic encephalopathy, stage IV sacral ulcer sent from [facility] for fevers for 3 days being admitted for sepsis; etiology wound vs blood vs urine

## 2025-05-16 NOTE — H&P ADULT - NSHPPHYSICALEXAM_GEN_ALL_CORE
GENERAL: NAD, elderly, frail, contracted  HEAD:  Atraumatic, Normocephalic  EYES: EOMI, PERRLA, conjunctiva and sclera clear  NERVOUS SYSTEM:  Alert & Oriented X1-2,  Moves all extremities spontaneously however nonpurposeful movements   CHEST/LUNG: Lungs clear to auscultation bilaterally, No rales, rhonchi, wheezing   HEART: Elevated Regular rate and rhythm; No murmurs, rubs, or gallops  ABDOMEN: Soft, Nontender, Nondistended; Bowel sounds present  SKIN: sacrum with 10cm x 10cm open wound some odor, pink

## 2025-05-16 NOTE — PROGRESS NOTE ADULT - SUBJECTIVE AND OBJECTIVE BOX
HPI:· Chief Complaint: The patient is a 70y Male complaining of fever.  · HPI Objective Statement: 70-year-old male history of DM, HTN, HLD, TBI. seizure disorder, metabolic encephalopathy, stage IV sacral ulcer sent from [facility] for possible sepsis.  As per transfer papers patient has had fever for the past 3 days.  Also as per transfer papers patiently currently receiving meropenem twice daily. Patient sent to ED for further evaluation        Patient is a 70y old  Male who presents with a chief complaint of     INTERVAL HPI/OVERNIGHT EVENTS:  T(C): 37.1 (05-16-25 @ 14:34), Max: 37.1 (05-16-25 @ 14:34)  HR: 98 (05-16-25 @ 12:27) (98 - 98)  BP: 131/82 (05-16-25 @ 12:27) (131/82 - 131/82)  RR: 18 (05-16-25 @ 12:27) (18 - 18)  SpO2: 100% (05-16-25 @ 12:27) (100% - 100%)  Wt(kg): --  I&O's Summary      REVIEW OF SYSTEMS: denies fever, chills, SOB, palpitations, chest pain, abdominal pain, nausea, vomitting, diarrhea, constipation, dizziness    MEDICATIONS  (STANDING):  meropenem  IVPB 1000 milliGRAM(s) IV Intermittent Once    MEDICATIONS  (PRN):      PHYSICAL EXAM:  GENERAL: NAD, well-groomed, well-developed  HEAD:  Atraumatic, Normocephalic  EYES: EOMI, PERRLA, conjunctiva and sclera clear  ENMT: No tonsillar erythema, exudates, or enlargement; Moist mucous membranes, Good dentition, No lesions  NECK: Supple, No JVD, Normal thyroid  NERVOUS SYSTEM:  Alert & Oriented X3, Good concentration; Motor Strength 5/5 B/L upper and lower extremities; DTRs 2+ intact and symmetric  CHEST/LUNG: Clear to percussion bilaterally; No rales, rhonchi, wheezing, or rubs  HEART: Regular rate and rhythm; No murmurs, rubs, or gallops  ABDOMEN: Soft, Nontender, Nondistended; Bowel sounds present  EXTREMITIES:  2+ Peripheral Pulses, No clubbing, cyanosis, or edema  LYMPH: No lymphadenopathy noted  SKIN: No rashes or lesions  LABS:                        8.3    9.46  )-----------( 552      ( 16 May 2025 13:55 )             26.7     05-16    135  |  108  |  16  ----------------------------<  92  4.3   |  23  |  0.79    Ca    9.2      16 May 2025 13:55    TPro  7.7  /  Alb  1.9[L]  /  TBili  0.3  /  DBili  x   /  AST  7[L]  /  ALT  9[L]  /  AlkPhos  64  05-16    PT/INR - ( 16 May 2025 13:55 )   PT: 13.2 sec;   INR: 1.13 ratio         PTT - ( 16 May 2025 13:55 )  PTT:28.0 sec  Urinalysis Basic - ( 16 May 2025 13:55 )    Color: x / Appearance: x / SG: x / pH: x  Gluc: 92 mg/dL / Ketone: x  / Bili: x / Urobili: x   Blood: x / Protein: x / Nitrite: x   Leuk Esterase: x / RBC: x / WBC x   Sq Epi: x / Non Sq Epi: x / Bacteria: x      CAPILLARY BLOOD GLUCOSE      POCT Blood Glucose.: 91 mg/dL (16 May 2025 14:13)        Urinalysis Basic - ( 16 May 2025 13:55 )    Color: x / Appearance: x / SG: x / pH: x  Gluc: 92 mg/dL / Ketone: x  / Bili: x / Urobili: x   Blood: x / Protein: x / Nitrite: x   Leuk Esterase: x / RBC: x / WBC x   Sq Epi: x / Non Sq Epi: x / Bacteria: x

## 2025-05-16 NOTE — CONSULT NOTE ADULT - ASSESSMENT
Infected Sacral Decubitus Ulcer  Fevers - at the NH      Plan - Cont Meropenem 1gm iv q8hrs for now.

## 2025-05-16 NOTE — CONSULT NOTE ADULT - ASSESSMENT
sacral ulcer    No need for surgical debridement  Diligent wound care with WTD gauze, gauze, medipore dressing  Fever work up per primary team  Discussed with Dr. Reddy     sacral ulcer    No need for surgical debridement at this time  Diligent wound care with Santyl, WTD gauze, dry gauze, medipore dressing  Fever work up per primary team  Discussed with Dr. Reddy

## 2025-05-16 NOTE — ED PROVIDER NOTE - OBJECTIVE STATEMENT
70-year-old male history of DM, HTN, HLD, TBI. seizure disorder, metabolic encephalopathy, stage IV sacral ulcer sent from [facility] for possible sepsis.  As per transfer papers patient has had fever for the past 3 days.  Also as per transfer papers patiently currently receiving meropenem twice daily. Patient sent to ED for further evaluation

## 2025-05-16 NOTE — H&P ADULT - PROBLEM SELECTOR PLAN 1
p/w with fevers from NH  HR elevated initally   RVP neg  CXR shows no infilitrate  Started on meropenem at nursing home  wound is Stage IV malordorous however mostly pink and nonpurulent  surgery recomends conservative mgt  Meropenem for now  ID Severo following  Follow up blood cx  Urine Culture

## 2025-05-16 NOTE — ED PROVIDER NOTE - CLINICAL SUMMARY MEDICAL DECISION MAKING FREE TEXT BOX
70-year-old male sent from living facility for possible sepsis.  Transfer papers reporting fever for the past 3 days patient currently receiving meropenem.  Patient afebrile here in ED without any complaints.  Exam unremarkable.  Check labs, chest x-ray, UA, reassess

## 2025-05-17 LAB
A1C WITH ESTIMATED AVERAGE GLUCOSE RESULT: 6.4 % — HIGH (ref 4–5.6)
ANION GAP SERPL CALC-SCNC: 8 MMOL/L — SIGNIFICANT CHANGE UP (ref 5–17)
BUN SERPL-MCNC: 13 MG/DL — SIGNIFICANT CHANGE UP (ref 7–18)
CALCIUM SERPL-MCNC: 8.6 MG/DL — SIGNIFICANT CHANGE UP (ref 8.4–10.5)
CHLORIDE SERPL-SCNC: 105 MMOL/L — SIGNIFICANT CHANGE UP (ref 96–108)
CO2 SERPL-SCNC: 23 MMOL/L — SIGNIFICANT CHANGE UP (ref 22–31)
CREAT SERPL-MCNC: 0.73 MG/DL — SIGNIFICANT CHANGE UP (ref 0.5–1.3)
EGFR: 99 ML/MIN/1.73M2 — SIGNIFICANT CHANGE UP
EGFR: 99 ML/MIN/1.73M2 — SIGNIFICANT CHANGE UP
ESTIMATED AVERAGE GLUCOSE: 137 MG/DL — HIGH (ref 68–114)
GLUCOSE BLDC GLUCOMTR-MCNC: 117 MG/DL — HIGH (ref 70–99)
GLUCOSE BLDC GLUCOMTR-MCNC: 138 MG/DL — HIGH (ref 70–99)
GLUCOSE BLDC GLUCOMTR-MCNC: 235 MG/DL — HIGH (ref 70–99)
GLUCOSE BLDC GLUCOMTR-MCNC: 93 MG/DL — SIGNIFICANT CHANGE UP (ref 70–99)
GLUCOSE SERPL-MCNC: 141 MG/DL — HIGH (ref 70–99)
HCT VFR BLD CALC: 22.8 % — LOW (ref 39–50)
HGB BLD-MCNC: 7.4 G/DL — LOW (ref 13–17)
MCHC RBC-ENTMCNC: 24 PG — LOW (ref 27–34)
MCHC RBC-ENTMCNC: 32.5 G/DL — SIGNIFICANT CHANGE UP (ref 32–36)
MCV RBC AUTO: 74 FL — LOW (ref 80–100)
NRBC BLD AUTO-RTO: 0 /100 WBCS — SIGNIFICANT CHANGE UP (ref 0–0)
PLATELET # BLD AUTO: 609 K/UL — HIGH (ref 150–400)
POTASSIUM SERPL-MCNC: 3.9 MMOL/L — SIGNIFICANT CHANGE UP (ref 3.5–5.3)
POTASSIUM SERPL-SCNC: 3.9 MMOL/L — SIGNIFICANT CHANGE UP (ref 3.5–5.3)
RBC # BLD: 3.08 M/UL — LOW (ref 4.2–5.8)
RBC # FLD: 15.9 % — HIGH (ref 10.3–14.5)
SODIUM SERPL-SCNC: 136 MMOL/L — SIGNIFICANT CHANGE UP (ref 135–145)
WBC # BLD: 8.24 K/UL — SIGNIFICANT CHANGE UP (ref 3.8–10.5)
WBC # FLD AUTO: 8.24 K/UL — SIGNIFICANT CHANGE UP (ref 3.8–10.5)

## 2025-05-17 RX ADMIN — LEVETIRACETAM 750 MILLIGRAM(S): 10 INJECTION, SOLUTION INTRAVENOUS at 05:38

## 2025-05-17 RX ADMIN — LACTULOSE 10 GRAM(S): 10 SOLUTION ORAL at 12:04

## 2025-05-17 RX ADMIN — Medication 1 TABLET(S): at 12:04

## 2025-05-17 RX ADMIN — Medication 1 APPLICATION(S): at 05:38

## 2025-05-17 RX ADMIN — Medication 2 TABLET(S): at 22:19

## 2025-05-17 RX ADMIN — POLYETHYLENE GLYCOL 3350 17 GRAM(S): 17 POWDER, FOR SOLUTION ORAL at 05:38

## 2025-05-17 RX ADMIN — MEROPENEM 100 MILLIGRAM(S): 1 INJECTION INTRAVENOUS at 05:38

## 2025-05-17 RX ADMIN — Medication 1 APPLICATION(S): at 22:46

## 2025-05-17 RX ADMIN — POLYETHYLENE GLYCOL 3350 17 GRAM(S): 17 POWDER, FOR SOLUTION ORAL at 17:22

## 2025-05-17 RX ADMIN — Medication 1 APPLICATION(S): at 14:13

## 2025-05-17 RX ADMIN — LEVETIRACETAM 750 MILLIGRAM(S): 10 INJECTION, SOLUTION INTRAVENOUS at 17:22

## 2025-05-17 RX ADMIN — MEROPENEM 100 MILLIGRAM(S): 1 INJECTION INTRAVENOUS at 14:02

## 2025-05-17 RX ADMIN — MEROPENEM 100 MILLIGRAM(S): 1 INJECTION INTRAVENOUS at 22:18

## 2025-05-17 NOTE — PROGRESS NOTE ADULT - SUBJECTIVE AND OBJECTIVE BOX
SUBJECTIVE / OVERNIGHT EVENTS:pt seen and examined  05-17-25    MEDICATIONS  (STANDING):  acetaminophen   IVPB .. 1000 milliGRAM(s) IV Intermittent once  collagenase Ointment 1 Application(s) Topical every 8 hours  insulin lispro (ADMELOG) corrective regimen sliding scale   SubCutaneous three times a day before meals  insulin lispro (ADMELOG) corrective regimen sliding scale   SubCutaneous at bedtime  lactulose Syrup 10 Gram(s) Oral daily  levETIRAcetam 750 milliGRAM(s) Oral two times a day  meropenem  IVPB 1000 milliGRAM(s) IV Intermittent every 8 hours  multivitamin 1 Tablet(s) Oral daily  polyethylene glycol 3350 17 Gram(s) Oral two times a day  senna 2 Tablet(s) Oral at bedtime    MEDICATIONS  (PRN):  acetaminophen     Tablet .. 650 milliGRAM(s) Oral every 6 hours PRN Temp greater or equal to 38C (100.4F), Mild Pain (1 - 3)  aluminum hydroxide/magnesium hydroxide/simethicone Suspension 30 milliLiter(s) Oral every 4 hours PRN Dyspepsia  melatonin 3 milliGRAM(s) Oral at bedtime PRN Insomnia  ondansetron Injectable 4 milliGRAM(s) IV Push every 8 hours PRN Nausea and/or Vomiting    T(C): 36.4 (05-17-25 @ 20:04), Max: 36.8 (05-17-25 @ 05:28)  HR: 99 (05-17-25 @ 20:04) (92 - 105)  BP: 127/73 (05-17-25 @ 20:04) (114/76 - 133/92)  RR: 18 (05-17-25 @ 20:04) (18 - 19)  SpO2: 97% (05-17-25 @ 20:04) (97% - 99%)    CAPILLARY BLOOD GLUCOSE      POCT Blood Glucose.: 235 mg/dL (17 May 2025 21:21)  POCT Blood Glucose.: 117 mg/dL (17 May 2025 17:08)  POCT Blood Glucose.: 138 mg/dL (17 May 2025 11:13)  POCT Blood Glucose.: 93 mg/dL (17 May 2025 07:39)    I&O's Summary      Constitutional: No fever, fatigue  Skin: No rash.  Eyes: No recent vision problems or eye pain.  ENT: No congestion, ear pain, or sore throat.  Cardiovascular: No chest pain or palpation.  Respiratory: No cough, shortness of breath, congestion, or wheezing.  Gastrointestinal: No abdominal pain, nausea, vomiting, or diarrhea.  Genitourinary: No dysuria.  Musculoskeletal: No joint swelling.  Neurologic: No headache.    PHYSICAL EXAM:  GENERAL: NAD  EYES: EOMI, PERRLA  NECK: Supple, No JVD  CHEST/LUNG: dec breath sounds at bases  HEART:  S1 , S2 +  ABDOMEN: soft., bs+  EXTREMITIES:  edema  NEUROLOGY:alert awake      LABS:                        7.4    8.24  )-----------( 609      ( 17 May 2025 10:38 )             22.8     05-17    136  |  105  |  13  ----------------------------<  141[H]  3.9   |  23  |  0.73    Ca    8.6      17 May 2025 10:38    TPro  7.7  /  Alb  1.9[L]  /  TBili  0.3  /  DBili  x   /  AST  7[L]  /  ALT  9[L]  /  AlkPhos  64  05-16    PT/INR - ( 16 May 2025 13:55 )   PT: 13.2 sec;   INR: 1.13 ratio         PTT - ( 16 May 2025 13:55 )  PTT:28.0 sec      Urinalysis Basic - ( 17 May 2025 10:38 )    Color: x / Appearance: x / SG: x / pH: x  Gluc: 141 mg/dL / Ketone: x  / Bili: x / Urobili: x   Blood: x / Protein: x / Nitrite: x   Leuk Esterase: x / RBC: x / WBC x   Sq Epi: x / Non Sq Epi: x / Bacteria: x        RADIOLOGY & ADDITIONAL TESTS:    Imaging Personally Reviewed:    Consultant(s) Notes Reviewed:      Care Discussed with Consultants/Other Providers:

## 2025-05-17 NOTE — PROGRESS NOTE ADULT - PROBLEM SELECTOR PLAN 1
p/w with fevers from NH  HR elevated initially   RVP neg  CXR shows no infiltrate  abx  ID eval  f/u cx

## 2025-05-17 NOTE — PATIENT PROFILE ADULT - NSPROPOAPRESSUREINJURYCT_GEN_A_NUR
Patient given Ativan 2mg for anxiety per request. Patient given Tylenol 650mg for toe/foot pain 10/10 per request. Patient walking around unit, doing karate like moves. 2

## 2025-05-17 NOTE — PROGRESS NOTE ADULT - ASSESSMENT
70M DM,, TBI. seizure disorder, metabolic encephalopathy, stage IV sacral ulcer sent from [facility] for fevers for 3 days being admitted for sepsis; etiology wound vs blood vs urine

## 2025-05-17 NOTE — PATIENT PROFILE ADULT - FALL HARM RISK - HARM RISK INTERVENTIONS

## 2025-05-18 LAB
ALBUMIN SERPL ELPH-MCNC: 1.8 G/DL — LOW (ref 3.5–5)
ALP SERPL-CCNC: 54 U/L — SIGNIFICANT CHANGE UP (ref 40–120)
ALT FLD-CCNC: 7 U/L DA — LOW (ref 10–60)
ANION GAP SERPL CALC-SCNC: 7 MMOL/L — SIGNIFICANT CHANGE UP (ref 5–17)
AST SERPL-CCNC: 6 U/L — LOW (ref 10–40)
BILIRUB DIRECT SERPL-MCNC: <0.1 MG/DL — SIGNIFICANT CHANGE UP (ref 0–0.3)
BILIRUB INDIRECT FLD-MCNC: >0.1 MG/DL — LOW (ref 0.2–1)
BILIRUB SERPL-MCNC: 0.2 MG/DL — SIGNIFICANT CHANGE UP (ref 0.2–1.2)
BUN SERPL-MCNC: 13 MG/DL — SIGNIFICANT CHANGE UP (ref 7–18)
CALCIUM SERPL-MCNC: 8.5 MG/DL — SIGNIFICANT CHANGE UP (ref 8.4–10.5)
CHLORIDE SERPL-SCNC: 108 MMOL/L — SIGNIFICANT CHANGE UP (ref 96–108)
CO2 SERPL-SCNC: 22 MMOL/L — SIGNIFICANT CHANGE UP (ref 22–31)
CREAT SERPL-MCNC: 0.82 MG/DL — SIGNIFICANT CHANGE UP (ref 0.5–1.3)
EGFR: 96 ML/MIN/1.73M2 — SIGNIFICANT CHANGE UP
EGFR: 96 ML/MIN/1.73M2 — SIGNIFICANT CHANGE UP
GLUCOSE BLDC GLUCOMTR-MCNC: 105 MG/DL — HIGH (ref 70–99)
GLUCOSE BLDC GLUCOMTR-MCNC: 114 MG/DL — HIGH (ref 70–99)
GLUCOSE BLDC GLUCOMTR-MCNC: 170 MG/DL — HIGH (ref 70–99)
GLUCOSE BLDC GLUCOMTR-MCNC: 291 MG/DL — HIGH (ref 70–99)
GLUCOSE SERPL-MCNC: 132 MG/DL — HIGH (ref 70–99)
HCT VFR BLD CALC: 23.4 % — LOW (ref 39–50)
HGB BLD-MCNC: 7.4 G/DL — LOW (ref 13–17)
LACTATE SERPL-SCNC: 1.2 MMOL/L — SIGNIFICANT CHANGE UP (ref 0.7–2)
LDH SERPL L TO P-CCNC: 124 U/L — SIGNIFICANT CHANGE UP (ref 120–225)
MAGNESIUM SERPL-MCNC: 2 MG/DL — SIGNIFICANT CHANGE UP (ref 1.6–2.6)
MCHC RBC-ENTMCNC: 23.8 PG — LOW (ref 27–34)
MCHC RBC-ENTMCNC: 31.6 G/DL — LOW (ref 32–36)
MCV RBC AUTO: 75.2 FL — LOW (ref 80–100)
NRBC BLD AUTO-RTO: 0 /100 WBCS — SIGNIFICANT CHANGE UP (ref 0–0)
PHOSPHATE SERPL-MCNC: 2.8 MG/DL — SIGNIFICANT CHANGE UP (ref 2.5–4.5)
PLATELET # BLD AUTO: 591 K/UL — HIGH (ref 150–400)
POTASSIUM SERPL-MCNC: 4.1 MMOL/L — SIGNIFICANT CHANGE UP (ref 3.5–5.3)
POTASSIUM SERPL-SCNC: 4.1 MMOL/L — SIGNIFICANT CHANGE UP (ref 3.5–5.3)
PROT SERPL-MCNC: 6.6 G/DL — SIGNIFICANT CHANGE UP (ref 6–8.3)
RBC # BLD: 3.11 M/UL — LOW (ref 4.2–5.8)
RBC # FLD: 16 % — HIGH (ref 10.3–14.5)
SODIUM SERPL-SCNC: 137 MMOL/L — SIGNIFICANT CHANGE UP (ref 135–145)
WBC # BLD: 8.08 K/UL — SIGNIFICANT CHANGE UP (ref 3.8–10.5)
WBC # FLD AUTO: 8.08 K/UL — SIGNIFICANT CHANGE UP (ref 3.8–10.5)

## 2025-05-18 RX ORDER — ZINC SULFATE 50(220)MG
220 CAPSULE ORAL DAILY
Refills: 0 | Status: DISCONTINUED | OUTPATIENT
Start: 2025-05-18 | End: 2025-05-23

## 2025-05-18 RX ADMIN — MEROPENEM 100 MILLIGRAM(S): 1 INJECTION INTRAVENOUS at 22:36

## 2025-05-18 RX ADMIN — INSULIN LISPRO 3: 100 INJECTION, SOLUTION INTRAVENOUS; SUBCUTANEOUS at 12:10

## 2025-05-18 RX ADMIN — POLYETHYLENE GLYCOL 3350 17 GRAM(S): 17 POWDER, FOR SOLUTION ORAL at 05:23

## 2025-05-18 RX ADMIN — LACTULOSE 10 GRAM(S): 10 SOLUTION ORAL at 11:32

## 2025-05-18 RX ADMIN — LEVETIRACETAM 750 MILLIGRAM(S): 10 INJECTION, SOLUTION INTRAVENOUS at 05:24

## 2025-05-18 RX ADMIN — Medication 1 APPLICATION(S): at 13:35

## 2025-05-18 RX ADMIN — MEROPENEM 100 MILLIGRAM(S): 1 INJECTION INTRAVENOUS at 13:36

## 2025-05-18 RX ADMIN — Medication 1000 MILLIGRAM(S): at 17:35

## 2025-05-18 RX ADMIN — LEVETIRACETAM 750 MILLIGRAM(S): 10 INJECTION, SOLUTION INTRAVENOUS at 17:35

## 2025-05-18 RX ADMIN — Medication 1 TABLET(S): at 11:32

## 2025-05-18 RX ADMIN — Medication 1 APPLICATION(S): at 05:22

## 2025-05-18 RX ADMIN — Medication 2 TABLET(S): at 22:36

## 2025-05-18 RX ADMIN — Medication 1 APPLICATION(S): at 22:36

## 2025-05-18 RX ADMIN — MEROPENEM 100 MILLIGRAM(S): 1 INJECTION INTRAVENOUS at 05:21

## 2025-05-18 RX ADMIN — POLYETHYLENE GLYCOL 3350 17 GRAM(S): 17 POWDER, FOR SOLUTION ORAL at 17:35

## 2025-05-18 NOTE — DIETITIAN INITIAL EVALUATION ADULT - PERTINENT LABORATORY DATA
05-18    137  |  108  |  13  ----------------------------<  132[H]  4.1   |  22  |  0.82    Ca    8.5      18 May 2025 06:20  Phos  2.8     05-18  Mg     2.0     05-18    TPro  6.6  /  Alb  1.8[L]  /  TBili  0.2  /  DBili  <0.1  /  AST  6[L]  /  ALT  7[L]  /  AlkPhos  54  05-18  POCT Blood Glucose.: 291 mg/dL (05-18-25 @ 11:50)  A1C with Estimated Average Glucose Result: 6.4 % (05-17-25 @ 10:35)

## 2025-05-18 NOTE — DIETITIAN INITIAL EVALUATION ADULT - PATIENT MEETS CRITERIA FOR MALNUTRITION
No DVT on LE dopplers  Rash does not look like cellulitis, more consistent with a  vasculitis. No leukocytosis.  Observe off ABx  Hepatitis serologies negative.  appreciate PT eval, no needs  Derm eval no

## 2025-05-18 NOTE — PROGRESS NOTE ADULT - ASSESSMENT
_________________________________________________________________________________________  ========>>  M E D I C A L   A T T E N D I N G    F O L L O W  U P  N O T E  <<=========  -----------------------------------------------------------------------------------------------------    - Patient seen and examined by me earlier today. (covering for Dr Sma)   - In summary,  LUISA MÁRQUEZ is a 70y year old man admitted with infected decub ulcer   - Patient today overall doing ok, comfortable, eating OK.     ==================>> REVIEW OF SYSTEM <<=================    GEN: no fever, no chills, no pain  RESP: no SOB, no cough  CVS: no chest pain, no palpitations  GI: no abdominal pain, no nausea  : no dysuria, no frequency  Neuro: no headache, no dizziness    ==================>> PHYSICAL EXAM <<=================    GEN: A&O X 3 , NAD , comfortable, pleasant, calm   HEENT: NCAT, MMM, hearing intact  CVS: S1S2 , regular , No M/R/G appreciated  PULM: CTA B/L,  no W/R/R appreciated  ABD.: soft. non tender, non distended  Extrem: intact pulses , no edema      carter in place..      chronic rt weakness post CVA         ( Note written / Date of service 05-18-25 ( This is certified to be the same as "ENTERED" date above ( for billing purposes)))    ==================>> MEDICATIONS <<====================    MEDICATIONS  (STANDING):  acetaminophen   IVPB .. 1000 milliGRAM(s) IV Intermittent once  collagenase Ointment 1 Application(s) Topical every 8 hours  insulin lispro (ADMELOG) corrective regimen sliding scale   SubCutaneous three times a day before meals  insulin lispro (ADMELOG) corrective regimen sliding scale   SubCutaneous at bedtime  lactulose Syrup 10 Gram(s) Oral daily  levETIRAcetam 750 milliGRAM(s) Oral two times a day  meropenem  IVPB 1000 milliGRAM(s) IV Intermittent every 8 hours  multivitamin 1 Tablet(s) Oral daily  polyethylene glycol 3350 17 Gram(s) Oral two times a day  senna 2 Tablet(s) Oral at bedtime    MEDICATIONS  (PRN):  acetaminophen     Tablet .. 650 milliGRAM(s) Oral every 6 hours PRN Temp greater or equal to 38C (100.4F), Mild Pain (1 - 3)  aluminum hydroxide/magnesium hydroxide/simethicone Suspension 30 milliLiter(s) Oral every 4 hours PRN Dyspepsia  melatonin 3 milliGRAM(s) Oral at bedtime PRN Insomnia  ondansetron Injectable 4 milliGRAM(s) IV Push every 8 hours PRN Nausea and/or Vomiting    ___________  Active diet:  Diet, DASH/TLC:   Sodium & Cholesterol Restricted  No Beef  No Pork  ___________________    ==================>> VITAL SIGNS <<==================    T(C): 36.4 (05-18-25 @ 13:08), Max: 36.4 (05-17-25 @ 20:04)  HR: 90 (05-18-25 @ 13:08) (90 - 99)  BP: 122/71 (05-18-25 @ 13:08) (122/71 - 137/84)  RR: 20 (05-18-25 @ 13:08) (18 - 20)  SpO2: 95% (05-18-25 @ 13:08) (95% - 99%)     CAPILLARY BLOOD GLUCOSE  POCT Blood Glucose.: 291 mg/dL (18 May 2025 11:50)  POCT Blood Glucose.: 114 mg/dL (18 May 2025 07:50)  POCT Blood Glucose.: 235 mg/dL (17 May 2025 21:21)  POCT Blood Glucose.: 117 mg/dL (17 May 2025 17:08)     ==================>> LAB AND IMAGING <<==================                        7.4    8.08  )-----------( 591      ( 18 May 2025 06:20 )             23.4        Hemoglobin:   7.4 <<==,  7.4 <<==,  8.3 <<==      >> anemia panel + T/S ordered       05-18    137  |  108  |  13  ----------------------------<  132[H]  4.1   |  22  |  0.82    Ca    8.5      18 May 2025 06:20  Phos  2.8     05-18  Mg     2.0     05-18    TPro  6.6  /  Alb  1.8[L]  /  TBili  0.2  /  DBili  <0.1  /  AST  6[L]  /  ALT  7[L]  /  AlkPhos  54  05-18                 Urinalysis:  05-16-25 @ 18:20  Leuk. Est.: Negative  Nirite: Negative  WBC: 0  Blood: Negative     salt Crystal: --     calcium crystal: --     Bili: Negative     cast: --  color: Yellow  Bacteria: Few  Epith. cell: --  Yeast: --     Ketone: --     Protein: Trace     Glucose: Negative     sperm: --     Spec.Gravity: 1.018    HgA1C:   (05-17-25)          (05-17-25)      6.4    Urinalysis with Rflx Culture (collected 16 May 2025 18:20)    Culture - Blood (collected 16 May 2025 17:21)  Source: Blood Blood-Peripheral  Preliminary Report (17 May 2025 22:01):    No growth at 24 hours    Culture - Blood (collected 16 May 2025 17:20)  Source: Blood Blood-Peripheral  Preliminary Report (17 May 2025 22:01):    No growth at 24 hours    < from: CT Lumbar Spine No Cont (05.16.25 @ 17:44) >  IMPRESSION:  1.  Nonhealing ulcer with sacral osteomyelitis and erosion of the coccyx.  2.  MRI of the pelvis with and without gadolinium recommended.  3.  Rectal wall thickening with diffuse distention of the colon and small bowel.  < end of copied text >    ___________________________________________________________________________________  ===============>>  A S S E S S M E N T   A N D   P L A N <<===============  ------------------------------------------------------------------------------------------    70M DM,, TBI. seizure disorder, metabolic encephalopathy, stage IV sacral ulcer sent from [facility] for fevers for 3 days being admitted for sepsis; etiology wound vs blood vs urine        Problem/Plan - 1:  ·  Problem: Sepsis.   ·  Plan: p/w with fevers from NH, HR elevated initially   infected Decub and osteomyelitis of pelvis  ID on board  f/u cx.  broad spectrum antibiotics  MRi pelvis ordered as per recommendation      Problem/Plan - 2:  ·  Problem: TBI (traumatic brain injury).   supportive care  turn and position per nursing protocol   carter care     Problem/Plan - 3:  ·  Problem: Sacral wound.   ·  Plan: Has prior hx of Osteomyelitis of vertebra, sacral and sacrococcygeal region  Assessment and Plan of Treatment: you have a history of a sacral ulcer with osteomyelitis and had a debridement on previous admission on 12/20  continue meropenem 1 g every 8 hours until 1/25/24 via left arm PICC line  Folow up MRI  carter care  turn and position  nutrition / supplements /  vitamins     Problem/Plan - 4:  ·  Problem: DM (diabetes mellitus).   ·  Plan: iss.     Problem/Plan - 5:  ·  Problem: Prophylactic measure.   ·  Plan: Lovenox.  nutrition / supplements ..    --------------------------------------------  Case discussed with patient..   Education given on findings and plan of care  ___________________________  H. CHAD Juárez (covering for Dr. Sam)   Pager: [phone]

## 2025-05-18 NOTE — DIETITIAN INITIAL EVALUATION ADULT - NS FNS DIET ORDER
Diet, DASH/TLC:   Sodium & Cholesterol Restricted  No Beef  No Pork (05-16-25 @ 16:10) [Active]

## 2025-05-18 NOTE — DIETITIAN INITIAL EVALUATION ADULT - PERTINENT MEDS FT
MEDICATIONS  (STANDING):  acetaminophen   IVPB .. 1000 milliGRAM(s) IV Intermittent once  collagenase Ointment 1 Application(s) Topical every 8 hours  insulin lispro (ADMELOG) corrective regimen sliding scale   SubCutaneous three times a day before meals  insulin lispro (ADMELOG) corrective regimen sliding scale   SubCutaneous at bedtime  lactulose Syrup 10 Gram(s) Oral daily  levETIRAcetam 750 milliGRAM(s) Oral two times a day  meropenem  IVPB 1000 milliGRAM(s) IV Intermittent every 8 hours  multivitamin 1 Tablet(s) Oral daily  polyethylene glycol 3350 17 Gram(s) Oral two times a day  senna 2 Tablet(s) Oral at bedtime    MEDICATIONS  (PRN):  acetaminophen     Tablet .. 650 milliGRAM(s) Oral every 6 hours PRN Temp greater or equal to 38C (100.4F), Mild Pain (1 - 3)  aluminum hydroxide/magnesium hydroxide/simethicone Suspension 30 milliLiter(s) Oral every 4 hours PRN Dyspepsia  melatonin 3 milliGRAM(s) Oral at bedtime PRN Insomnia  ondansetron Injectable 4 milliGRAM(s) IV Push every 8 hours PRN Nausea and/or Vomiting

## 2025-05-18 NOTE — DIETITIAN INITIAL EVALUATION ADULT - ORAL INTAKE PTA/DIET HISTORY
Patient was visited at bedside and was alert but not able to provide information for the assessment. EMR was reviewed and transfer documents from [facility] were checked. Diet there was 2-3 gm Na NCS and Prosource no carb TID, Glucerna Shake or Boost Diabetic Control daily and Danny TID.  He said he likes everything and has no food dislikes however diet order includes no pork or beef.

## 2025-05-18 NOTE — DIETITIAN INITIAL EVALUATION ADULT - ORAL NUTRITION SUPPLEMENTS
Add Glucerna Shake 1 serving TID to provide 660 Abdiaziz and 30 gm protein  and Prosource No Carb BID to provide 30 gm protein and 120 abdiaziz per day.

## 2025-05-19 DIAGNOSIS — M86.159 OTHER ACUTE OSTEOMYELITIS, UNSPECIFIED FEMUR: ICD-10-CM

## 2025-05-19 DIAGNOSIS — Z75.8 OTHER PROBLEMS RELATED TO MEDICAL FACILITIES AND OTHER HEALTH CARE: ICD-10-CM

## 2025-05-19 DIAGNOSIS — K62.89 OTHER SPECIFIED DISEASES OF ANUS AND RECTUM: ICD-10-CM

## 2025-05-19 LAB
ALBUMIN SERPL ELPH-MCNC: 1.8 G/DL — LOW (ref 3.5–5)
ALP SERPL-CCNC: 55 U/L — SIGNIFICANT CHANGE UP (ref 40–120)
ALT FLD-CCNC: 13 U/L DA — SIGNIFICANT CHANGE UP (ref 10–60)
ANION GAP SERPL CALC-SCNC: 6 MMOL/L — SIGNIFICANT CHANGE UP (ref 5–17)
AST SERPL-CCNC: 7 U/L — LOW (ref 10–40)
BILIRUB DIRECT SERPL-MCNC: 0.1 MG/DL — SIGNIFICANT CHANGE UP (ref 0–0.3)
BILIRUB INDIRECT FLD-MCNC: 0.1 MG/DL — LOW (ref 0.2–1)
BILIRUB SERPL-MCNC: 0.2 MG/DL — SIGNIFICANT CHANGE UP (ref 0.2–1.2)
BLD GP AB SCN SERPL QL: SIGNIFICANT CHANGE UP
BUN SERPL-MCNC: 16 MG/DL — SIGNIFICANT CHANGE UP (ref 7–18)
CALCIUM SERPL-MCNC: 8.8 MG/DL — SIGNIFICANT CHANGE UP (ref 8.4–10.5)
CHLORIDE SERPL-SCNC: 107 MMOL/L — SIGNIFICANT CHANGE UP (ref 96–108)
CO2 SERPL-SCNC: 24 MMOL/L — SIGNIFICANT CHANGE UP (ref 22–31)
CREAT SERPL-MCNC: 0.8 MG/DL — SIGNIFICANT CHANGE UP (ref 0.5–1.3)
EGFR: 96 ML/MIN/1.73M2 — SIGNIFICANT CHANGE UP
EGFR: 96 ML/MIN/1.73M2 — SIGNIFICANT CHANGE UP
FERRITIN SERPL-MCNC: 352 NG/ML — SIGNIFICANT CHANGE UP (ref 30–400)
FOLATE SERPL-MCNC: 9.2 NG/ML — SIGNIFICANT CHANGE UP
GLUCOSE BLDC GLUCOMTR-MCNC: 117 MG/DL — HIGH (ref 70–99)
GLUCOSE BLDC GLUCOMTR-MCNC: 143 MG/DL — HIGH (ref 70–99)
GLUCOSE BLDC GLUCOMTR-MCNC: 192 MG/DL — HIGH (ref 70–99)
GLUCOSE BLDC GLUCOMTR-MCNC: 268 MG/DL — HIGH (ref 70–99)
GLUCOSE SERPL-MCNC: 137 MG/DL — HIGH (ref 70–99)
HCT VFR BLD CALC: 24.4 % — LOW (ref 39–50)
HGB BLD-MCNC: 7.7 G/DL — LOW (ref 13–17)
IRON SATN MFR SERPL: 22 % — SIGNIFICANT CHANGE UP (ref 20–55)
IRON SATN MFR SERPL: 29 UG/DL — LOW (ref 65–170)
LACTATE SERPL-SCNC: 1.4 MMOL/L — SIGNIFICANT CHANGE UP (ref 0.7–2)
LDH SERPL L TO P-CCNC: 124 U/L — SIGNIFICANT CHANGE UP (ref 120–225)
MAGNESIUM SERPL-MCNC: 2.1 MG/DL — SIGNIFICANT CHANGE UP (ref 1.6–2.6)
MCHC RBC-ENTMCNC: 23.8 PG — LOW (ref 27–34)
MCHC RBC-ENTMCNC: 31.6 G/DL — LOW (ref 32–36)
MCV RBC AUTO: 75.5 FL — LOW (ref 80–100)
NRBC BLD AUTO-RTO: 0 /100 WBCS — SIGNIFICANT CHANGE UP (ref 0–0)
PHOSPHATE SERPL-MCNC: 2.6 MG/DL — SIGNIFICANT CHANGE UP (ref 2.5–4.5)
PLATELET # BLD AUTO: 650 K/UL — HIGH (ref 150–400)
POTASSIUM SERPL-MCNC: 4.5 MMOL/L — SIGNIFICANT CHANGE UP (ref 3.5–5.3)
POTASSIUM SERPL-SCNC: 4.5 MMOL/L — SIGNIFICANT CHANGE UP (ref 3.5–5.3)
PROT SERPL-MCNC: 6.9 G/DL — SIGNIFICANT CHANGE UP (ref 6–8.3)
RBC # BLD: 3.23 M/UL — LOW (ref 4.2–5.8)
RBC # FLD: 16 % — HIGH (ref 10.3–14.5)
SODIUM SERPL-SCNC: 137 MMOL/L — SIGNIFICANT CHANGE UP (ref 135–145)
TIBC SERPL-MCNC: 131 UG/DL — LOW (ref 250–450)
UIBC SERPL-MCNC: 102 UG/DL — LOW (ref 110–370)
VIT B12 SERPL-MCNC: 927 PG/ML — SIGNIFICANT CHANGE UP (ref 232–1245)
WBC # BLD: 9.11 K/UL — SIGNIFICANT CHANGE UP (ref 3.8–10.5)
WBC # FLD AUTO: 9.11 K/UL — SIGNIFICANT CHANGE UP (ref 3.8–10.5)

## 2025-05-19 PROCEDURE — 72197 MRI PELVIS W/O & W/DYE: CPT | Mod: 26

## 2025-05-19 RX ORDER — LORAZEPAM 4 MG/ML
0.5 VIAL (ML) INJECTION ONCE
Refills: 0 | Status: DISCONTINUED | OUTPATIENT
Start: 2025-05-19 | End: 2025-05-21

## 2025-05-19 RX ADMIN — POLYETHYLENE GLYCOL 3350 17 GRAM(S): 17 POWDER, FOR SOLUTION ORAL at 17:48

## 2025-05-19 RX ADMIN — Medication 1 APPLICATION(S): at 13:38

## 2025-05-19 RX ADMIN — LEVETIRACETAM 750 MILLIGRAM(S): 10 INJECTION, SOLUTION INTRAVENOUS at 06:25

## 2025-05-19 RX ADMIN — Medication 1 APPLICATION(S): at 06:26

## 2025-05-19 RX ADMIN — LACTULOSE 10 GRAM(S): 10 SOLUTION ORAL at 12:43

## 2025-05-19 RX ADMIN — Medication 1000 MILLIGRAM(S): at 12:42

## 2025-05-19 RX ADMIN — MEROPENEM 100 MILLIGRAM(S): 1 INJECTION INTRAVENOUS at 22:50

## 2025-05-19 RX ADMIN — Medication 2 TABLET(S): at 22:50

## 2025-05-19 RX ADMIN — MEROPENEM 100 MILLIGRAM(S): 1 INJECTION INTRAVENOUS at 13:39

## 2025-05-19 RX ADMIN — POLYETHYLENE GLYCOL 3350 17 GRAM(S): 17 POWDER, FOR SOLUTION ORAL at 06:25

## 2025-05-19 RX ADMIN — INSULIN LISPRO 3: 100 INJECTION, SOLUTION INTRAVENOUS; SUBCUTANEOUS at 12:42

## 2025-05-19 RX ADMIN — Medication 220 MILLIGRAM(S): at 13:39

## 2025-05-19 RX ADMIN — Medication 1 TABLET(S): at 12:43

## 2025-05-19 RX ADMIN — LEVETIRACETAM 750 MILLIGRAM(S): 10 INJECTION, SOLUTION INTRAVENOUS at 17:48

## 2025-05-19 RX ADMIN — MEROPENEM 100 MILLIGRAM(S): 1 INJECTION INTRAVENOUS at 06:26

## 2025-05-19 NOTE — PROGRESS NOTE ADULT - ASSESSMENT
70M DM,, TBI. seizure disorder, metabolic encephalopathy, stage IV sacral ulcer sent from [facility] for fevers for 3 days being admitted for sepsis; etiology wound vs blood vs urine  70M DM,, TBI. seizure disorder, metabolic encephalopathy, stage IV sacral ulcer sent from [facility] for fevers for 3 days being admitted for sepsis; etiology wound vs blood vs urine   Surgery consulted, no surgical intervention recommended at this time, continue santyl dressing, ID consulted, continue meropenem and MRI of pelvis with IV contrast ordered to r/u OM  Pt is AO x 1, appears to be comfortable,

## 2025-05-19 NOTE — PROGRESS NOTE ADULT - ASSESSMENT
_________________________________________________________________________________________  ========>>  M E D I C A L   A T T E N D I N G    F O L L O W  U P  N O T E  <<=========  -----------------------------------------------------------------------------------------------------    - Patient seen and examined by me earlier today. (covering for Dr Sam)   - In summary,  LUISA MÁRQUEZ is a 70y year old man admitted with infected decub ulcer   - Patient today overall doing ok, comfortable, eating OK.     ==================>> REVIEW OF SYSTEM <<=================    GEN: no fever, no chills, no pain  RESP: no SOB, no cough  CVS: no chest pain, no palpitations  GI: no abdominal pain, no nausea  : no dysuria, no frequency  Neuro: no headache, no dizziness    ==================>> PHYSICAL EXAM <<=================    GEN: A&O X 3 , NAD , comfortable, pleasant, calm   HEENT: NCAT, MMM, hearing intact  CVS: S1S2 , regular , No M/R/G appreciated  PULM: CTA B/L,  no W/R/R appreciated  ABD.: soft. non tender, non distended  Extrem: intact pulses , no edema      carter in place..      chronic rt weakness post CVA         ( Note written / Date of service 05-19-25 ( This is certified to be the same as "ENTERED" date above ( for billing purposes)))    ==================>> MEDICATIONS <<====================    acetaminophen   IVPB .. 1000 milliGRAM(s) IV Intermittent once  ascorbic acid 1000 milliGRAM(s) Oral daily  collagenase Ointment 1 Application(s) Topical every 8 hours  insulin lispro (ADMELOG) corrective regimen sliding scale   SubCutaneous three times a day before meals  insulin lispro (ADMELOG) corrective regimen sliding scale   SubCutaneous at bedtime  lactulose Syrup 10 Gram(s) Oral daily  levETIRAcetam 750 milliGRAM(s) Oral two times a day  LORazepam   Injectable 0.5 milliGRAM(s) IV Push once  meropenem  IVPB 1000 milliGRAM(s) IV Intermittent every 8 hours  multivitamin 1 Tablet(s) Oral daily  polyethylene glycol 3350 17 Gram(s) Oral two times a day  senna 2 Tablet(s) Oral at bedtime  zinc sulfate 220 milliGRAM(s) Oral daily    MEDICATIONS  (PRN):  acetaminophen     Tablet .. 650 milliGRAM(s) Oral every 6 hours PRN Temp greater or equal to 38C (100.4F), Mild Pain (1 - 3)  aluminum hydroxide/magnesium hydroxide/simethicone Suspension 30 milliLiter(s) Oral every 4 hours PRN Dyspepsia  melatonin 3 milliGRAM(s) Oral at bedtime PRN Insomnia  ondansetron Injectable 4 milliGRAM(s) IV Push every 8 hours PRN Nausea and/or Vomiting    ___________  Active diet:  Diet, DASH/TLC:   Sodium & Cholesterol Restricted  No Beef  No Pork  Danny(7 Gm Arginine/7 Gm Glut/1.2 Gm HMB     Qty per Day:  1  Supplement Feeding Modality:  Oral  Glucerna Shake Cans or Servings Per Day:  2       Frequency:  Two Times a day  ___________________    ==================>> VITAL SIGNS <<==================  Height (cm): 167.6  Vital Signs Last 24 HrsT(C): 36.2 (05-19-25 @ 13:05)  T(F): 97.2 (05-19-25 @ 13:05), Max: 98.8 (05-18-25 @ 20:03)  HR: 98 (05-19-25 @ 13:05) (96 - 98)  BP: 116/68 (05-19-25 @ 13:05)  RR: 18 (05-19-25 @ 13:05) (18 - 18)  SpO2: 98% (05-19-25 @ 13:05) (96% - 98%)      CAPILLARY BLOOD GLUCOSE      POCT Blood Glucose.: 268 mg/dL (19 May 2025 11:45)  POCT Blood Glucose.: 143 mg/dL (19 May 2025 07:47)  POCT Blood Glucose.: 170 mg/dL (18 May 2025 21:28)  POCT Blood Glucose.: 105 mg/dL (18 May 2025 17:02)     ==================>> LAB AND IMAGING <<==================                        7.7    9.11  )-----------( 650      ( 19 May 2025 07:30 )             24.4        05-19    137  |  107  |  16  ----------------------------<  137[H]  4.5   |  24  |  0.80    Ca    8.8      19 May 2025 07:30  Phos  2.6     05-19  Mg     2.1     05-19    TPro  6.9  /  Alb  1.8[L]  /  TBili  0.2  /  DBili  0.1  /  AST  7[L]  /  ALT  13  /  AlkPhos  55  05-19    WBC count:   9.11 <<== ,  8.08 <<== ,  8.24 <<== ,  9.46 <<==   Hemoglobin:   7.7 <<==,  7.4 <<==,  7.4 <<==,  8.3 <<==  platelets:  650 <==, 591 <==, 609 <==, 552 <==    Creatinine:  0.80  <<==, 0.82  <<==, 0.73  <<==, 0.79  <<==  Sodium:   137  <==, 137  <==, 136  <==, 135  <==       AST:          7(05-19) <== , 6(05-18) <== , 7(05-16) <==      ALT:        13(05-19)  <== , 7(05-18)  <== , 9(05-16)  <==      AP:        55(05-19)  <=, 54(05-18)  <=, 64(05-16)  <=     Bili:        0.2(05-19)  <=, 0.2(05-18)  <=, 0.3(05-16)  <=    ____________________________    M I C R O B I O L O G Y :    Urinalysis with Rflx Culture (collected 16 May 2025 18:20)    Culture - Blood (collected 16 May 2025 17:21)  Source: Blood Blood-Peripheral  Preliminary Report (18 May 2025 22:01):    No growth at 48 Hours    Culture - Blood (collected 16 May 2025 17:20)  Source: Blood Blood-Peripheral  Preliminary Report (18 May 2025 22:01):    No growth at 48 Hours          PENDING MRI    < from: CT Lumbar Spine No Cont (05.16.25 @ 17:44) >  IMPRESSION:  1.  Nonhealing ulcer with sacral osteomyelitis and erosion of the coccyx.  2.  MRI of the pelvis with and without gadolinium recommended.  3.  Rectal wall thickening with diffuse distention of the colon and small bowel.  < end of copied text >    ___________________________________________________________________________________  ===============>>  A S S E S S M E N T   A N D   P L A N <<===============  ------------------------------------------------------------------------------------------    70M DM,, TBI. seizure disorder, metabolic encephalopathy, stage IV sacral ulcer sent from [facility] for fevers for 3 days being admitted for sepsis; etiology wound vs blood vs urine        Problem/Plan - 1:  ·  Problem: Sepsis.   ·  Plan: p/w with fevers from NH, HR elevated initially   infected Decub and osteomyelitis of pelvis  ID on board  f/u cx.  broad spectrum antibiotics  MRi pelvis ordered as per recommendation      Problem/Plan - 2:  ·  Problem: TBI (traumatic brain injury).   supportive care  turn and position per nursing protocol   carter care     Problem/Plan - 3:  ·  Problem: Sacral wound.   ·  Plan: Has prior hx of Osteomyelitis of vertebra, sacral and sacrococcygeal region  Assessment and Plan of Treatment: you have a history of a sacral ulcer with osteomyelitis and had a debridement on previous admission on 12/20  continue meropenem 1 g every 8 hours until 1/25/24 via left arm PICC line  Folow up MRI  carter care  turn and position  nutrition / supplements /  vitamins     Problem/Plan - 4:  ·  Problem: DM (diabetes mellitus).   ·  Plan: iss.     Problem/Plan - 5:  ·  Problem: Prophylactic measure.   ·  Plan: Lovenox.  nutrition / supplements ..    --------------------------------------------  Case discussed with patient..   Education given on findings and plan of care  ___________________________  HGenoveva Juárez D.O. (covering for Dr. Sam)   Pager: [phone]     _________________________________________________________________________________________  ========>>  M E D I C A L   A T T E N D I N G    F O L L O W  U P  N O T E  <<=========  -----------------------------------------------------------------------------------------------------    - Patient seen and examined by me earlier today. (covering for Dr Sam)   - In summary,  LUISA MÁRQUEZ is a 70y year old man admitted with infected decub ulcer   - Patient today overall doing ok, comfortable, eating OK.     ==================>> REVIEW OF SYSTEM <<=================    GEN: no fever, no chills, no pain  RESP: no SOB, no cough  CVS: no chest pain, no palpitations  GI: no abdominal pain, no nausea  : no dysuria, no frequency  Neuro: no headache, no dizziness    ==================>> PHYSICAL EXAM <<=================    GEN: A&O X 3 , NAD , comfortable, pleasant, calm   HEENT: NCAT, MMM, hearing intact  CVS: S1S2 , regular , No M/R/G appreciated  PULM: CTA B/L,  no W/R/R appreciated  ABD.: soft. non tender, non distended  Extrem: intact pulses , no edema      carter in place..      chronic rt weakness post CVA         ( Note written / Date of service 05-19-25 ( This is certified to be the same as "ENTERED" date above ( for billing purposes)))    ==================>> MEDICATIONS <<====================    acetaminophen   IVPB .. 1000 milliGRAM(s) IV Intermittent once  ascorbic acid 1000 milliGRAM(s) Oral daily  collagenase Ointment 1 Application(s) Topical every 8 hours  insulin lispro (ADMELOG) corrective regimen sliding scale   SubCutaneous three times a day before meals  insulin lispro (ADMELOG) corrective regimen sliding scale   SubCutaneous at bedtime  lactulose Syrup 10 Gram(s) Oral daily  levETIRAcetam 750 milliGRAM(s) Oral two times a day  LORazepam   Injectable 0.5 milliGRAM(s) IV Push once  meropenem  IVPB 1000 milliGRAM(s) IV Intermittent every 8 hours  multivitamin 1 Tablet(s) Oral daily  polyethylene glycol 3350 17 Gram(s) Oral two times a day  senna 2 Tablet(s) Oral at bedtime  zinc sulfate 220 milliGRAM(s) Oral daily    MEDICATIONS  (PRN):  acetaminophen     Tablet .. 650 milliGRAM(s) Oral every 6 hours PRN Temp greater or equal to 38C (100.4F), Mild Pain (1 - 3)  aluminum hydroxide/magnesium hydroxide/simethicone Suspension 30 milliLiter(s) Oral every 4 hours PRN Dyspepsia  melatonin 3 milliGRAM(s) Oral at bedtime PRN Insomnia  ondansetron Injectable 4 milliGRAM(s) IV Push every 8 hours PRN Nausea and/or Vomiting    ___________  Active diet:  Diet, DASH/TLC:   Sodium & Cholesterol Restricted  No Beef  No Pork  Danny(7 Gm Arginine/7 Gm Glut/1.2 Gm HMB     Qty per Day:  1  Supplement Feeding Modality:  Oral  Glucerna Shake Cans or Servings Per Day:  2       Frequency:  Two Times a day  ___________________    ==================>> VITAL SIGNS <<==================  Height (cm): 167.6  Vital Signs Last 24 HrsT(C): 36.2 (05-19-25 @ 13:05)  T(F): 97.2 (05-19-25 @ 13:05), Max: 98.8 (05-18-25 @ 20:03)  HR: 98 (05-19-25 @ 13:05) (96 - 98)  BP: 116/68 (05-19-25 @ 13:05)  RR: 18 (05-19-25 @ 13:05) (18 - 18)  SpO2: 98% (05-19-25 @ 13:05) (96% - 98%)      CAPILLARY BLOOD GLUCOSE      POCT Blood Glucose.: 268 mg/dL (19 May 2025 11:45)  POCT Blood Glucose.: 143 mg/dL (19 May 2025 07:47)  POCT Blood Glucose.: 170 mg/dL (18 May 2025 21:28)  POCT Blood Glucose.: 105 mg/dL (18 May 2025 17:02)     ==================>> LAB AND IMAGING <<==================                        7.7    9.11  )-----------( 650      ( 19 May 2025 07:30 )             24.4        05-19    137  |  107  |  16  ----------------------------<  137[H]  4.5   |  24  |  0.80    Ca    8.8      19 May 2025 07:30  Phos  2.6     05-19  Mg     2.1     05-19    TPro  6.9  /  Alb  1.8[L]  /  TBili  0.2  /  DBili  0.1  /  AST  7[L]  /  ALT  13  /  AlkPhos  55  05-19    WBC count:   9.11 <<== ,  8.08 <<== ,  8.24 <<== ,  9.46 <<==   Hemoglobin:   7.7 <<==,  7.4 <<==,  7.4 <<==,  8.3 <<==  platelets:  650 <==, 591 <==, 609 <==, 552 <==    Creatinine:  0.80  <<==, 0.82  <<==, 0.73  <<==, 0.79  <<==  Sodium:   137  <==, 137  <==, 136  <==, 135  <==       AST:          7(05-19) <== , 6(05-18) <== , 7(05-16) <==      ALT:        13(05-19)  <== , 7(05-18)  <== , 9(05-16)  <==      AP:        55(05-19)  <=, 54(05-18)  <=, 64(05-16)  <=     Bili:        0.2(05-19)  <=, 0.2(05-18)  <=, 0.3(05-16)  <=    ____________________________    M I C R O B I O L O G Y :    Urinalysis with Rflx Culture (collected 16 May 2025 18:20)    Culture - Blood (collected 16 May 2025 17:21)  Source: Blood Blood-Peripheral  Preliminary Report (18 May 2025 22:01):    No growth at 48 Hours    Culture - Blood (collected 16 May 2025 17:20)  Source: Blood Blood-Peripheral  Preliminary Report (18 May 2025 22:01):    No growth at 48 Hours        PENDING MRI    < from: CT Lumbar Spine No Cont (05.16.25 @ 17:44) >  IMPRESSION:  1.  Nonhealing ulcer with sacral osteomyelitis and erosion of the coccyx.  2.  MRI of the pelvis with and without gadolinium recommended.  3.  Rectal wall thickening with diffuse distention of the colon and small bowel.  < end of copied text >    ___________________________________________________________________________________  ===============>>  A S S E S S M E N T   A N D   P L A N <<===============  ------------------------------------------------------------------------------------------    70M DM,, TBI. seizure disorder, metabolic encephalopathy, stage IV sacral ulcer sent from [facility] for fevers for 3 days being admitted for sepsis; etiology wound vs blood vs urine        Problem/Plan - 1:  ·  Problem: Sepsis.   ·  Plan: p/w with fevers from NH, HR elevated initially   infected Decub and osteomyelitis of pelvis  ID on board  f/u cx.  broad spectrum antibiotics  MRi pelvis ordered as per recommendation      Problem/Plan - 2:  ·  Problem: TBI (traumatic brain injury).   supportive care  turn and position per nursing protocol   carter care     Problem/Plan - 3:  ·  Problem: Sacral wound.   ·  Plan: Has prior hx of Osteomyelitis of vertebra, sacral and sacrococcygeal region  Assessment and Plan of Treatment: you have a history of a sacral ulcer with osteomyelitis and had a debridement on previous admission on 12/20  continue meropenem 1 g every 8 hours until 1/25/24 via left arm PICC line  Folow up MRI  carter care  turn and position  nutrition / supplements /  vitamins     Problem/Plan - 4:  ·  Problem: DM (diabetes mellitus).   ·  Plan: iss.     Problem/Plan - 5:  ·  Problem: Prophylactic measure.   ·  Plan: Lovenox.  nutrition / supplements ..    --------------------------------------------  Case discussed with patient.. Nurse Practitioner   Education given on findings and plan of care  ___________________________  HGenoveva Juárez D.O. (covering for Dr. Sam)   Pager: [phone]

## 2025-05-19 NOTE — PROGRESS NOTE ADULT - SUBJECTIVE AND OBJECTIVE BOX
NP Note discussed with  Primary Attending    Patient is a 70y old  Male who presents with a chief complaint of Pressure injury of sacral region, stage 4     (18 May 2025 15:14)      INTERVAL HPI/OVERNIGHT EVENTS: no new complaints    MEDICATIONS  (STANDING):  acetaminophen   IVPB .. 1000 milliGRAM(s) IV Intermittent once  ascorbic acid 1000 milliGRAM(s) Oral daily  collagenase Ointment 1 Application(s) Topical every 8 hours  insulin lispro (ADMELOG) corrective regimen sliding scale   SubCutaneous three times a day before meals  insulin lispro (ADMELOG) corrective regimen sliding scale   SubCutaneous at bedtime  lactulose Syrup 10 Gram(s) Oral daily  levETIRAcetam 750 milliGRAM(s) Oral two times a day  meropenem  IVPB 1000 milliGRAM(s) IV Intermittent every 8 hours  multivitamin 1 Tablet(s) Oral daily  polyethylene glycol 3350 17 Gram(s) Oral two times a day  senna 2 Tablet(s) Oral at bedtime  zinc sulfate 220 milliGRAM(s) Oral daily    MEDICATIONS  (PRN):  acetaminophen     Tablet .. 650 milliGRAM(s) Oral every 6 hours PRN Temp greater or equal to 38C (100.4F), Mild Pain (1 - 3)  aluminum hydroxide/magnesium hydroxide/simethicone Suspension 30 milliLiter(s) Oral every 4 hours PRN Dyspepsia  melatonin 3 milliGRAM(s) Oral at bedtime PRN Insomnia  ondansetron Injectable 4 milliGRAM(s) IV Push every 8 hours PRN Nausea and/or Vomiting      __________________________________________________  REVIEW OF SYSTEMS:    CONSTITUTIONAL: No fever,   EYES: no acute visual disturbances  NECK: No pain or stiffness  RESPIRATORY: No cough; No shortness of breath  CARDIOVASCULAR: No chest pain, no palpitations  GASTROINTESTINAL: No pain. No nausea or vomiting; No diarrhea   NEUROLOGICAL: No headache or numbness, no tremors  MUSCULOSKELETAL: No joint pain, no muscle pain  GENITOURINARY: no dysuria, no frequency, no hesitancy  PSYCHIATRY: no depression , no anxiety  ALL OTHER  ROS negative        Vital Signs Last 24 Hrs  T(C): 36.2 (19 May 2025 13:05), Max: 37.1 (18 May 2025 20:03)  T(F): 97.2 (19 May 2025 13:05), Max: 98.8 (18 May 2025 20:03)  HR: 98 (19 May 2025 13:05) (96 - 98)  BP: 116/68 (19 May 2025 13:05) (116/68 - 137/84)  BP(mean): --  RR: 18 (19 May 2025 13:05) (18 - 18)  SpO2: 98% (19 May 2025 13:05) (96% - 98%)    Parameters below as of 19 May 2025 13:05  Patient On (Oxygen Delivery Method): room air        ________________________________________________  PHYSICAL EXAM:  GENERAL: NAD  HEENT: Normocephalic;  conjunctivae and sclerae clear; moist mucous membranes;   NECK : supple  CHEST/LUNG: Clear to auscultation bilaterally with good air entry   HEART: S1 S2  regular; no murmurs, gallops or rubs  ABDOMEN: Soft, Nontender, Nondistended; Bowel sounds present  EXTREMITIES: no cyanosis; no edema; no calf tenderness  SKIN: warm and dry; no rash  NERVOUS SYSTEM:  Awake and alert; Oriented  to place, person and time ; no new deficits    _________________________________________________  LABS:                        7.7    9.11  )-----------( 650      ( 19 May 2025 07:30 )             24.4     05-19    137  |  107  |  16  ----------------------------<  137[H]  4.5   |  24  |  0.80    Ca    8.8      19 May 2025 07:30  Phos  2.6     05-19  Mg     2.1     05-19    TPro  6.9  /  Alb  1.8[L]  /  TBili  0.2  /  DBili  0.1  /  AST  7[L]  /  ALT  13  /  AlkPhos  55  05-19      Urinalysis Basic - ( 19 May 2025 07:30 )    Color: x / Appearance: x / SG: x / pH: x  Gluc: 137 mg/dL / Ketone: x  / Bili: x / Urobili: x   Blood: x / Protein: x / Nitrite: x   Leuk Esterase: x / RBC: x / WBC x   Sq Epi: x / Non Sq Epi: x / Bacteria: x      CAPILLARY BLOOD GLUCOSE      POCT Blood Glucose.: 268 mg/dL (19 May 2025 11:45)  POCT Blood Glucose.: 143 mg/dL (19 May 2025 07:47)  POCT Blood Glucose.: 170 mg/dL (18 May 2025 21:28)  POCT Blood Glucose.: 105 mg/dL (18 May 2025 17:02)        RADIOLOGY & ADDITIONAL TESTS:    Imaging Personally Reviewed:  YES/NO    Consultant(s) Notes Reviewed:   YES/ No    Care Discussed with Consultants :     Plan of care was discussed with patient and /or primary care giver; all questions and concerns were addressed and care was aligned with patient's wishes.     NP Note discussed with  Primary Attending    Patient is a 70y old  Male who presents with a chief complaint of Pressure injury of sacral region, stage 4     (18 May 2025 15:14)      INTERVAL HPI/OVERNIGHT EVENTS: no new complaints    MEDICATIONS  (STANDING):  acetaminophen   IVPB .. 1000 milliGRAM(s) IV Intermittent once  ascorbic acid 1000 milliGRAM(s) Oral daily  collagenase Ointment 1 Application(s) Topical every 8 hours  insulin lispro (ADMELOG) corrective regimen sliding scale   SubCutaneous three times a day before meals  insulin lispro (ADMELOG) corrective regimen sliding scale   SubCutaneous at bedtime  lactulose Syrup 10 Gram(s) Oral daily  levETIRAcetam 750 milliGRAM(s) Oral two times a day  meropenem  IVPB 1000 milliGRAM(s) IV Intermittent every 8 hours  multivitamin 1 Tablet(s) Oral daily  polyethylene glycol 3350 17 Gram(s) Oral two times a day  senna 2 Tablet(s) Oral at bedtime  zinc sulfate 220 milliGRAM(s) Oral daily    MEDICATIONS  (PRN):  acetaminophen     Tablet .. 650 milliGRAM(s) Oral every 6 hours PRN Temp greater or equal to 38C (100.4F), Mild Pain (1 - 3)  aluminum hydroxide/magnesium hydroxide/simethicone Suspension 30 milliLiter(s) Oral every 4 hours PRN Dyspepsia  melatonin 3 milliGRAM(s) Oral at bedtime PRN Insomnia  ondansetron Injectable 4 milliGRAM(s) IV Push every 8 hours PRN Nausea and/or Vomiting      __________________________________________________  REVIEW OF SYSTEMS: patient is AO x 1 - answered to some simple questions - no pain, knows that he has a sister and brother but forgot their names     Vital Signs Last 24 Hrs  T(C): 36.2 (19 May 2025 13:05), Max: 37.1 (18 May 2025 20:03)  T(F): 97.2 (19 May 2025 13:05), Max: 98.8 (18 May 2025 20:03)  HR: 98 (19 May 2025 13:05) (96 - 98)  BP: 116/68 (19 May 2025 13:05) (116/68 - 137/84)  BP(mean): --  RR: 18 (19 May 2025 13:05) (18 - 18)  SpO2: 98% (19 May 2025 13:05) (96% - 98%)    Parameters below as of 19 May 2025 13:05  Patient On (Oxygen Delivery Method): room air        ________________________________________________  PHYSICAL EXAM:  GENERAL: NAD  HEENT: Normocephalic;  conjunctivae and sclerae clear; moist mucous membranes;   NECK : supple  CHEST/LUNG: Clear to auscultation bilaterally with good air entry   HEART: S1 S2  regular; no murmurs, gallops or rubs  ABDOMEN: Soft, Nontender, Nondistended; Bowel sounds present  EXTREMITIES:  had wound to Left thigh and sacraum - did not measure today, wound with slough inside but wound bed  is  pink  NERVOUS SYSTEM:  Awake and alert; Oriented  to himself     _________________________________________________  LABS:                        7.7    9.11  )-----------( 650      ( 19 May 2025 07:30 )             24.4     05-    137  |  107  |  16  ----------------------------<  137[H]  4.5   |  24  |  0.80    Ca    8.8      19 May 2025 07:30  Phos  2.6     -  Mg     2.1     -    TPro  6.9  /  Alb  1.8[L]  /  TBili  0.2  /  DBili  0.1  /  AST  7[L]  /  ALT  13  /  AlkPhos  55  -19      Urinalysis Basic - ( 19 May 2025 07:30 )    Color: x / Appearance: x / SG: x / pH: x  Gluc: 137 mg/dL / Ketone: x  / Bili: x / Urobili: x   Blood: x / Protein: x / Nitrite: x   Leuk Esterase: x / RBC: x / WBC x   Sq Epi: x / Non Sq Epi: x / Bacteria: x      CAPILLARY BLOOD GLUCOSE      POCT Blood Glucose.: 268 mg/dL (19 May 2025 11:45)  POCT Blood Glucose.: 143 mg/dL (19 May 2025 07:47)  POCT Blood Glucose.: 170 mg/dL (18 May 2025 21:28)  POCT Blood Glucose.: 105 mg/dL (18 May 2025 17:02)        RADIOLOGY & ADDITIONAL TESTS:  < from: CT Lumbar Spine No Cont (25 @ 17:44) >    ACC: [id number] EXAM:  CT LUMBAR SPINE   ORDERED BY: FLOR CLAY     PROCEDURE DATE:  2025          INTERPRETATION:  EXAMINATION: CT LUMBAR SPINE    CLINICAL INDICATION: Rule out osteo  TECHNIQUE: Thin section CT images were obtained without contrast. Coronal   and sagittal reconstructions were performed.  COMPARISON: Abdomen and pelvis CT 2023.    FINDINGS:    A nonhealing ulcer is noted at the edge of this examination, with   associated phlegmonous inflammation and subcutaneous gas.There is   erosion of the sacrum and coccyx at the level of the mid S4 vertebral   body, worsened compared to 2023. The appearance is consistent with   osteomyelitis, although this examination was not optimized to evaluate   the pelvis. Furthercharacterization with an MRI of the pelvis with and   without gadolinium is recommended.    There is extension of this process into the intraperitoneal soft tissues,   with associated thickening of the rectal wall. There is distention of the   colon with retained stool, correlate clinically to exclude impaction.   There are dilated fluid-filled loops of small bowel, not fully evaluated   here. There is prostatic hypertrophy with distention of the urinary   bladder and bladder wall thickening.    There is diffuse osteopenia. There is narrowing of the disc space at   T12-L1. There are degenerative changes in the sacroiliac joints, with   bridging osteophytes. There is mild narrowing of the central canal at   L2-3 and L3-4. There is mild to moderate bilateral L5-S1 foraminal   stenoses. There are degenerative changes in both hips.    No acute fractures identified. The facets show no evidence of dislocation   or perching. No evidence of a hematoma or edema in the paraspinal   musculature and soft tissues. Vertebral body height is intact throughout.   MRI would be more sensitive to soft tissue encroachment on neural   elements, ligamentous injury, subdural/epidural hemorrhage, and cord   contusion.    IMPRESSION:    1.  Nonhealing ulcer with sacral osteomyelitis and erosion of the coccyx.  2.  MRI of the pelvis with and without gadolinium recommended.  3.  Rectal wall thickening with diffuse distention of the colon and small   bowel.    Dr. Singh sent an email report to Dr. Juárez 25 11:54 PM.    Patient Name: LUISA MÁRQUEZ  MRN: [id number], : 59    --- End of Report ---            LESLIE SINGH MD; Attending Radiologist  This document has been electronically signed. May 17 2025 12:16AM    < end of copied text >  < from: Xray Chest 1 View- PORTABLE-Urgent (25 @ 14:08) >    ACC: [id number] EXAM:  XR CHEST PORTABLE URGENT 1V   ORDERED BY: JAMAR REYES     PROCEDURE DATE:  2025          INTERPRETATION:  CLINICAL INDICATION: 70 years  Male with Sepsis.    COMPARISON: Chest x-ray 2023    AP view of the chest demonstrates the lungs to be clear. There is no   pleural effusion. The pulmonary vasculature is normal. There is no   pneumothorax.    The heart is normal in size. There is no mediastinal or hilar mass.    Mild thoracic degenerative changes are present.    IMPRESSION:    No acute infiltrate.    --- End of Report ---            SABA NIEVES MD; Attending Radiologist  This document has been electronically signed. May 16 2025  4:48PM    < end of copied text >    Imaging Personally Reviewed:  YES/    Consultant(s) Notes Reviewed:   YES/     Care Discussed with Consultants : surgery, ID     Plan of care was discussed with patient and /or primary care giver; all questions and concerns were addressed and care was aligned with patient's wishes.

## 2025-05-19 NOTE — PROGRESS NOTE ADULT - SUBJECTIVE AND OBJECTIVE BOX
70y Male lying in bed, he is awake but confused and not answering any questions appropriately. He does not appear to be in any distress. Has not had any fevers and wbc count is normal.     MEDS:  meropenem  IVPB 1000 milliGRAM(s) IV Intermittent every 8 hours    ALLERGIES: Allergies    No Known Allergies    Intolerances    REVIEW OF SYSTEMS:  [ X ] Not able to elicit - due to mentation     VITALS:  Vital Signs Last 24 Hrs  T(C): 36.2 (19 May 2025 13:05), Max: 37.1 (18 May 2025 20:03)  T(F): 97.2 (19 May 2025 13:05), Max: 98.8 (18 May 2025 20:03)  HR: 98 (19 May 2025 13:05) (96 - 98)  BP: 116/68 (19 May 2025 13:05) (116/68 - 137/84)  BP(mean): --  RR: 18 (19 May 2025 13:05) (18 - 18)  SpO2: 98% (19 May 2025 13:05) (96% - 98%)    Parameters below as of 19 May 2025 13:05  Patient On (Oxygen Delivery Method): room air    PHYSICAL EXAM:  HEENT: normocephalic, conjunctivae and sclerae clear  Neck: supple no LN's   Respiratory: decreased breathe sounds bilaterally   Cardiovascular: S1 S2 reg no murmurs  Gastrointestinal: +BS with soft, nondistended abdomen; nontender  : incontinent with condom cath in place  Extremities: no edema; right arm contracted , right sides weakness  Skin: no rashes; unable to assess sacral decub   Ortho: no erythema or joint swelling  Neuro: AAO x 1 (self)    LABS/DIAGNOSTIC TESTS:                        7.7    9.11  )-----------( 650      ( 19 May 2025 07:30 )             24.4     WBC Count: 9.11 K/uL (05-19 @ 07:30)  WBC Count: 8.08 K/uL (05-18 @ 06:20)  WBC Count: 8.24 K/uL (05-17 @ 10:38)  WBC Count: 9.46 K/uL (05-16 @ 13:55)    05-19    137  |  107  |  16  ----------------------------<  137[H]  4.5   |  24  |  0.80    Ca    8.8      19 May 2025 07:30  Phos  2.6     05-19  Mg     2.1     05-19    TPro  6.9  /  Alb  1.8[L]  /  TBili  0.2  /  DBili  0.1  /  AST  7[L]  /  ALT  13  /  AlkPhos  55  05-19    Lactate, Blood: 1.4 mmol/L (05-19 @ 07:30)    CULTURES:   Blood Blood-Peripheral  05-16 @ 17:21   No growth at 48 Hours  --  --    Blood Blood-Peripheral  05-16 @ 17:20   No growth at 48 Hours  --  --    RADIOLOGY:  no new studies

## 2025-05-19 NOTE — PROGRESS NOTE ADULT - PROBLEM SELECTOR PLAN 3
2.6 YO M presents with abdominal pain x1 week. Pt complains of intermittent pain. Mother states when she presses on abdomen pt complains of pain. Appetite at baseline, had breakfast today. Having BM everyday. Stool is normal, soft and large. Denies fever, vomiting or diarrhea. No urinary symptoms. No medical problems. No further complaints. Has prior hx of Osteomyelitis of vertebra, sacral and sacrococcygeal region  Assessment and Plan of Treatment: you have a history of a sacral ulcer with osteomyelitis and had a debridement on previous admission on 12/20  continue meropenem 1 g every 8 hours until 1/25/24 via left arm PICC line    Folow up CT lumbar spine see the plans as above

## 2025-05-19 NOTE — PROGRESS NOTE ADULT - ASSESSMENT
Infected Sacral Decubitus Ulcer  Fevers - at the NH    Plan -   ·	Cont Meropenem 1gm iv q8hrs for now.

## 2025-05-19 NOTE — PROGRESS NOTE ADULT - PROBLEM SELECTOR PLAN 1
p/w with fevers from NH  HR elevated initally   RVP neg  CXR shows no infilitrate  Started on meropenem at nursing home  wound is Stage IV malordorous however mostly pink and nonpurulent  surgery recomends conservative mgt  Meropenem for now  ID Severo following  Follow up blood cx  Urine Culture Has prior hx of Osteomyelitis of vertebra, sacral and sacrococcygeal region  history of a sacral ulcer with osteomyelitis and had a debridement on previous admission on 12/20  was on meropenem 1 g every 8 hours until 1/25/24 via left arm PICC line  CT lumbar spine results with   Nonhealing ulcer with sacral osteomyelitis and erosion of the coccyx.  Rectal wall thickening with diffuse distention of the colon and small bowel.  MRI of the pelvis with and without gadolinium recommended- ordered MRI, ativan 0.5mg IVP before MRI   Surgery consulted -no surgical intervention at this time  Dressing order - Santyl, WTD gauze, dry gauze, medipore dressing  ID consulted- cont meropenem 1g q 8hours for now   afebrile, no leukocytosis

## 2025-05-20 LAB
ANION GAP SERPL CALC-SCNC: 5 MMOL/L — SIGNIFICANT CHANGE UP (ref 5–17)
BUN SERPL-MCNC: 21 MG/DL — HIGH (ref 7–18)
CALCIUM SERPL-MCNC: 8.9 MG/DL — SIGNIFICANT CHANGE UP (ref 8.4–10.5)
CHLORIDE SERPL-SCNC: 110 MMOL/L — HIGH (ref 96–108)
CO2 SERPL-SCNC: 24 MMOL/L — SIGNIFICANT CHANGE UP (ref 22–31)
CREAT SERPL-MCNC: 0.88 MG/DL — SIGNIFICANT CHANGE UP (ref 0.5–1.3)
EGFR: 94 ML/MIN/1.73M2 — SIGNIFICANT CHANGE UP
EGFR: 94 ML/MIN/1.73M2 — SIGNIFICANT CHANGE UP
GLUCOSE BLDC GLUCOMTR-MCNC: 121 MG/DL — HIGH (ref 70–99)
GLUCOSE BLDC GLUCOMTR-MCNC: 145 MG/DL — HIGH (ref 70–99)
GLUCOSE BLDC GLUCOMTR-MCNC: 166 MG/DL — HIGH (ref 70–99)
GLUCOSE BLDC GLUCOMTR-MCNC: 169 MG/DL — HIGH (ref 70–99)
GLUCOSE BLDC GLUCOMTR-MCNC: 175 MG/DL — HIGH (ref 70–99)
GLUCOSE SERPL-MCNC: 145 MG/DL — HIGH (ref 70–99)
HCT VFR BLD CALC: 26.4 % — LOW (ref 39–50)
HGB BLD-MCNC: 8.1 G/DL — LOW (ref 13–17)
MCHC RBC-ENTMCNC: 23.5 PG — LOW (ref 27–34)
MCHC RBC-ENTMCNC: 30.7 G/DL — LOW (ref 32–36)
MCV RBC AUTO: 76.5 FL — LOW (ref 80–100)
NRBC BLD AUTO-RTO: 0 /100 WBCS — SIGNIFICANT CHANGE UP (ref 0–0)
PLATELET # BLD AUTO: 504 K/UL — HIGH (ref 150–400)
POTASSIUM SERPL-MCNC: 4.3 MMOL/L — SIGNIFICANT CHANGE UP (ref 3.5–5.3)
POTASSIUM SERPL-SCNC: 4.3 MMOL/L — SIGNIFICANT CHANGE UP (ref 3.5–5.3)
RBC # BLD: 3.45 M/UL — LOW (ref 4.2–5.8)
RBC # FLD: 16.4 % — HIGH (ref 10.3–14.5)
SODIUM SERPL-SCNC: 139 MMOL/L — SIGNIFICANT CHANGE UP (ref 135–145)
WBC # BLD: 11.49 K/UL — HIGH (ref 3.8–10.5)
WBC # FLD AUTO: 11.49 K/UL — HIGH (ref 3.8–10.5)

## 2025-05-20 RX ADMIN — LACTULOSE 10 GRAM(S): 10 SOLUTION ORAL at 12:26

## 2025-05-20 RX ADMIN — INSULIN LISPRO 1: 100 INJECTION, SOLUTION INTRAVENOUS; SUBCUTANEOUS at 12:26

## 2025-05-20 RX ADMIN — Medication 1 APPLICATION(S): at 06:54

## 2025-05-20 RX ADMIN — LEVETIRACETAM 750 MILLIGRAM(S): 10 INJECTION, SOLUTION INTRAVENOUS at 07:33

## 2025-05-20 RX ADMIN — POLYETHYLENE GLYCOL 3350 17 GRAM(S): 17 POWDER, FOR SOLUTION ORAL at 06:54

## 2025-05-20 RX ADMIN — Medication 2 TABLET(S): at 21:10

## 2025-05-20 RX ADMIN — Medication 1 TABLET(S): at 12:26

## 2025-05-20 RX ADMIN — MEROPENEM 100 MILLIGRAM(S): 1 INJECTION INTRAVENOUS at 14:09

## 2025-05-20 RX ADMIN — Medication 1 APPLICATION(S): at 00:41

## 2025-05-20 RX ADMIN — POLYETHYLENE GLYCOL 3350 17 GRAM(S): 17 POWDER, FOR SOLUTION ORAL at 18:02

## 2025-05-20 RX ADMIN — Medication 1 APPLICATION(S): at 14:09

## 2025-05-20 RX ADMIN — MEROPENEM 100 MILLIGRAM(S): 1 INJECTION INTRAVENOUS at 21:10

## 2025-05-20 RX ADMIN — Medication 220 MILLIGRAM(S): at 14:09

## 2025-05-20 RX ADMIN — Medication 1 APPLICATION(S): at 21:10

## 2025-05-20 RX ADMIN — LEVETIRACETAM 750 MILLIGRAM(S): 10 INJECTION, SOLUTION INTRAVENOUS at 18:02

## 2025-05-20 RX ADMIN — MEROPENEM 100 MILLIGRAM(S): 1 INJECTION INTRAVENOUS at 06:52

## 2025-05-20 RX ADMIN — Medication 1000 MILLIGRAM(S): at 12:26

## 2025-05-20 NOTE — PROGRESS NOTE ADULT - SUBJECTIVE AND OBJECTIVE BOX
NP Note discussed with  primary attending    Patient is a 70y old  Male who presents with a chief complaint of sepsis (19 May 2025 15:23)      INTERVAL HPI/OVERNIGHT EVENTS: no new complaints. Patient seen at bedside with sacrum osteomyelitis. Continue with meropenam abx. Plan for wound cx to rule out MRSA.     MEDICATIONS  (STANDING):  acetaminophen   IVPB .. 1000 milliGRAM(s) IV Intermittent once  ascorbic acid 1000 milliGRAM(s) Oral daily  collagenase Ointment 1 Application(s) Topical every 8 hours  insulin lispro (ADMELOG) corrective regimen sliding scale   SubCutaneous three times a day before meals  insulin lispro (ADMELOG) corrective regimen sliding scale   SubCutaneous at bedtime  lactulose Syrup 10 Gram(s) Oral daily  levETIRAcetam 750 milliGRAM(s) Oral two times a day  LORazepam   Injectable 0.5 milliGRAM(s) IV Push once  meropenem  IVPB 1000 milliGRAM(s) IV Intermittent every 8 hours  multivitamin 1 Tablet(s) Oral daily  polyethylene glycol 3350 17 Gram(s) Oral two times a day  senna 2 Tablet(s) Oral at bedtime  zinc sulfate 220 milliGRAM(s) Oral daily    MEDICATIONS  (PRN):  acetaminophen     Tablet .. 650 milliGRAM(s) Oral every 6 hours PRN Temp greater or equal to 38C (100.4F), Mild Pain (1 - 3)  aluminum hydroxide/magnesium hydroxide/simethicone Suspension 30 milliLiter(s) Oral every 4 hours PRN Dyspepsia  melatonin 3 milliGRAM(s) Oral at bedtime PRN Insomnia  ondansetron Injectable 4 milliGRAM(s) IV Push every 8 hours PRN Nausea and/or Vomiting      __________________________________________________  REVIEW OF SYSTEMS:    CONSTITUTIONAL: No fever,   EYES: no acute visual disturbances  NECK: No pain or stiffness  RESPIRATORY: No cough; No shortness of breath  CARDIOVASCULAR: No chest pain, no palpitations  GASTROINTESTINAL: No pain. No nausea or vomiting; No diarrhea   NEUROLOGICAL: TREMORS   MUSCULOSKELETAL: functional quadriplegia   GENITOURINARY: no dysuria, no frequency, no hesitancy  PSYCHIATRY: no depression , no anxiety  ALL OTHER  ROS negative        Vital Signs Last 24 Hrs  T(C): 36.5 (20 May 2025 12:08), Max: 37 (19 May 2025 17:46)  T(F): 97.7 (20 May 2025 12:08), Max: 98.6 (19 May 2025 17:46)  HR: 91 (20 May 2025 12:08) (91 - 103)  BP: 114/71 (20 May 2025 12:08) (114/71 - 134/78)  BP(mean): 96 (20 May 2025 05:17) (96 - 96)  RR: 17 (20 May 2025 12:08) (16 - 18)  SpO2: 98% (20 May 2025 12:08) (96% - 98%)    Parameters below as of 20 May 2025 12:08  Patient On (Oxygen Delivery Method): room air        ________________________________________________  PHYSICAL EXAM:  GENERAL: NAD  HEENT: Normocephalic;  conjunctivae and sclerae clear; moist mucous membranes;   NECK : supple  CHEST/LUNG: Diminished to ausculitation bilaterally with good air entry   HEART: S1 S2  regular; no murmurs, gallops or rubs  ABDOMEN: Soft, Nontender, Nondistended; Bowel sounds present  EXTREMITIES: no cyanosis; no edema; no calf tenderness, functional quadriplegia   SKIN: warm and dry; no rash  NERVOUS SYSTEM:  Awake and alert; confused and forgetful    _________________________________________________  LABS:                        8.1    11.49 )-----------( 504      ( 20 May 2025 07:30 )             26.4     05-20    139  |  110[H]  |  21[H]  ----------------------------<  145[H]  4.3   |  24  |  0.88    Ca    8.9      20 May 2025 07:30  Phos  2.6     05-19  Mg     2.1     05-19    TPro  6.9  /  Alb  1.8[L]  /  TBili  0.2  /  DBili  0.1  /  AST  7[L]  /  ALT  13  /  AlkPhos  55  05-19      Urinalysis Basic - ( 20 May 2025 07:30 )    Color: x / Appearance: x / SG: x / pH: x  Gluc: 145 mg/dL / Ketone: x  / Bili: x / Urobili: x   Blood: x / Protein: x / Nitrite: x   Leuk Esterase: x / RBC: x / WBC x   Sq Epi: x / Non Sq Epi: x / Bacteria: x      CAPILLARY BLOOD GLUCOSE      POCT Blood Glucose.: 166 mg/dL (20 May 2025 11:35)  POCT Blood Glucose.: 145 mg/dL (20 May 2025 07:57)  POCT Blood Glucose.: 175 mg/dL (20 May 2025 06:53)  POCT Blood Glucose.: 192 mg/dL (19 May 2025 21:33)  POCT Blood Glucose.: 117 mg/dL (19 May 2025 17:51)        RADIOLOGY & ADDITIONAL TESTS:    < from: MR Pelvis Bony Only w/wo IV Cont (05.19.25 @ 17:55) >  IMPRESSION:  Osteomyelitis of the residual S4 segment with slight extension into the   S3 segment of the sacrum.  Large sacral decubitus ulcer with ulcer tracks/fluid extending in   proximity to the sacrotuberous ligaments and towards the ischial   tuberosities.        --- End of Report ---      < end of copied text >      Imaging Personally Reviewed:  YES/NO    Consultant(s) Notes Reviewed:   YES/ No    Care Discussed with Consultants :     Plan of care was discussed with patient and /or primary care giver; all questions and concerns were addressed and care was aligned with patient's wishes.

## 2025-05-20 NOTE — PROGRESS NOTE ADULT - PROBLEM SELECTOR PLAN 1
Has prior hx of Osteomyelitis of vertebra, sacral and sacrococcygeal region  history of a sacral ulcer with osteomyelitis and had a debridement on previous admission on 12/20  was on meropenem 1 g every 8 hours until 1/25/24 via left arm PICC line  CT lumbar spine results with   Nonhealing ulcer with sacral osteomyelitis and erosion of the coccyx.  Rectal wall thickening with diffuse distention of the colon and small bowel.  MRI of the pelvis positive for osteomyelitis   Follow up wound culture- ID will determine abx duration based off the wound cx   Surgery consulted -no surgical intervention at this time  Dressing order - Santyl, WTD gauze, dry gauze, medipore dressing  ID consulted- cont meropenem 1g q 8hours for now   afebrile, no leukocytosis

## 2025-05-20 NOTE — PROGRESS NOTE ADULT - ATTENDING COMMENTS
Precaution with applied enzymatic debridement ointment stressed as it can potentially be caustic and harmful with healthy tissue.

## 2025-05-21 DIAGNOSIS — L89.154 PRESSURE ULCER OF SACRAL REGION, STAGE 4: ICD-10-CM

## 2025-05-21 LAB
ALBUMIN SERPL ELPH-MCNC: 1.8 G/DL — LOW (ref 3.5–5)
ALP SERPL-CCNC: 59 U/L — SIGNIFICANT CHANGE UP (ref 40–120)
ALT FLD-CCNC: 10 U/L DA — SIGNIFICANT CHANGE UP (ref 10–60)
ANION GAP SERPL CALC-SCNC: 4 MMOL/L — LOW (ref 5–17)
AST SERPL-CCNC: 6 U/L — LOW (ref 10–40)
BILIRUB SERPL-MCNC: 0.3 MG/DL — SIGNIFICANT CHANGE UP (ref 0.2–1.2)
BUN SERPL-MCNC: 19 MG/DL — HIGH (ref 7–18)
CALCIUM SERPL-MCNC: 9.1 MG/DL — SIGNIFICANT CHANGE UP (ref 8.4–10.5)
CHLORIDE SERPL-SCNC: 107 MMOL/L — SIGNIFICANT CHANGE UP (ref 96–108)
CO2 SERPL-SCNC: 26 MMOL/L — SIGNIFICANT CHANGE UP (ref 22–31)
CREAT SERPL-MCNC: 0.85 MG/DL — SIGNIFICANT CHANGE UP (ref 0.5–1.3)
CULTURE RESULTS: SIGNIFICANT CHANGE UP
CULTURE RESULTS: SIGNIFICANT CHANGE UP
EGFR: 95 ML/MIN/1.73M2 — SIGNIFICANT CHANGE UP
EGFR: 95 ML/MIN/1.73M2 — SIGNIFICANT CHANGE UP
GLUCOSE BLDC GLUCOMTR-MCNC: 113 MG/DL — HIGH (ref 70–99)
GLUCOSE BLDC GLUCOMTR-MCNC: 121 MG/DL — HIGH (ref 70–99)
GLUCOSE BLDC GLUCOMTR-MCNC: 133 MG/DL — HIGH (ref 70–99)
GLUCOSE BLDC GLUCOMTR-MCNC: 159 MG/DL — HIGH (ref 70–99)
GLUCOSE SERPL-MCNC: 130 MG/DL — HIGH (ref 70–99)
GRAM STN FLD: ABNORMAL
GRAM STN FLD: SIGNIFICANT CHANGE UP
HCT VFR BLD CALC: 26.5 % — LOW (ref 39–50)
HGB BLD-MCNC: 8.2 G/DL — LOW (ref 13–17)
MCHC RBC-ENTMCNC: 23.8 PG — LOW (ref 27–34)
MCHC RBC-ENTMCNC: 30.9 G/DL — LOW (ref 32–36)
MCV RBC AUTO: 76.8 FL — LOW (ref 80–100)
NRBC BLD AUTO-RTO: 0 /100 WBCS — SIGNIFICANT CHANGE UP (ref 0–0)
PLATELET # BLD AUTO: 622 K/UL — HIGH (ref 150–400)
POTASSIUM SERPL-MCNC: 4 MMOL/L — SIGNIFICANT CHANGE UP (ref 3.5–5.3)
POTASSIUM SERPL-SCNC: 4 MMOL/L — SIGNIFICANT CHANGE UP (ref 3.5–5.3)
PROT SERPL-MCNC: 6.8 G/DL — SIGNIFICANT CHANGE UP (ref 6–8.3)
RBC # BLD: 3.45 M/UL — LOW (ref 4.2–5.8)
RBC # FLD: 16.9 % — HIGH (ref 10.3–14.5)
SODIUM SERPL-SCNC: 137 MMOL/L — SIGNIFICANT CHANGE UP (ref 135–145)
SPECIMEN SOURCE: SIGNIFICANT CHANGE UP
WBC # BLD: 12.96 K/UL — HIGH (ref 3.8–10.5)
WBC # FLD AUTO: 12.96 K/UL — HIGH (ref 3.8–10.5)

## 2025-05-21 RX ADMIN — Medication 1 TABLET(S): at 12:05

## 2025-05-21 RX ADMIN — POLYETHYLENE GLYCOL 3350 17 GRAM(S): 17 POWDER, FOR SOLUTION ORAL at 05:08

## 2025-05-21 RX ADMIN — MEROPENEM 100 MILLIGRAM(S): 1 INJECTION INTRAVENOUS at 15:02

## 2025-05-21 RX ADMIN — Medication 1 APPLICATION(S): at 15:00

## 2025-05-21 RX ADMIN — Medication 3 MILLIGRAM(S): at 22:00

## 2025-05-21 RX ADMIN — Medication 1000 MILLIGRAM(S): at 12:05

## 2025-05-21 RX ADMIN — Medication 1 APPLICATION(S): at 05:08

## 2025-05-21 RX ADMIN — Medication 220 MILLIGRAM(S): at 12:05

## 2025-05-21 RX ADMIN — LEVETIRACETAM 750 MILLIGRAM(S): 10 INJECTION, SOLUTION INTRAVENOUS at 19:08

## 2025-05-21 RX ADMIN — POLYETHYLENE GLYCOL 3350 17 GRAM(S): 17 POWDER, FOR SOLUTION ORAL at 18:51

## 2025-05-21 RX ADMIN — LACTULOSE 10 GRAM(S): 10 SOLUTION ORAL at 12:05

## 2025-05-21 RX ADMIN — Medication 2 TABLET(S): at 22:00

## 2025-05-21 RX ADMIN — MEROPENEM 100 MILLIGRAM(S): 1 INJECTION INTRAVENOUS at 22:01

## 2025-05-21 RX ADMIN — MEROPENEM 100 MILLIGRAM(S): 1 INJECTION INTRAVENOUS at 05:08

## 2025-05-21 RX ADMIN — LEVETIRACETAM 750 MILLIGRAM(S): 10 INJECTION, SOLUTION INTRAVENOUS at 05:08

## 2025-05-21 NOTE — PROGRESS NOTE ADULT - PROBLEM SELECTOR PLAN 6
On Metformin   Sliding Scale for now  A1C 6.4% on admission
 used
On Metformin   Sliding Scale for now  A1C 6.4% on admission
On Metformin   Sliding Scale for now  A1C 6.4% on admission

## 2025-05-21 NOTE — DISCHARGE NOTE PROVIDER - NSDCCPCAREPLAN_GEN_ALL_CORE_FT
PRINCIPAL DISCHARGE DIAGNOSIS  Diagnosis: Sepsis  Assessment and Plan of Treatment: You were found to have a bone infection in your sacral area. You were treated with antibiotics. Your cultures was negative. PICC line was placed by IR. Please continue with meropenam IV antibiotics for total of ?????      SECONDARY DISCHARGE DIAGNOSES  Diagnosis: Osteomyelitis of pelvic region or thigh, acute  Assessment and Plan of Treatment: You have a  known history of osteomyelitis. You previously underwent surgical debridement during a prior admission on 12/20. Also was  treated with Meropenem 1 g IV every 8 hours via a left arm PICC line, which remained in place until 1/25/24. During this admission MRI of the pelvis  positive for osteomyelitis. Wound cultures were negative for bacterial growth. Infectious Disease has been consulted and recc's????. Surgery was consulted, and no surgical intervention is recommended at this time. Interventional Radiology placed PICC on 5/22. Continue with meropenam???  Continue with wound care orders. Santyl, wet-to-dry gauze, dry gauze, and Medipore dressing. Please follow up with surgery within one week of discharge       Diagnosis: Pressure ulcer of sacral region, stage 4  Assessment and Plan of Treatment: plan as above    Diagnosis: TBI (traumatic brain injury)  Assessment and Plan of Treatment: Baseline you are alert and oriented x 1.    Diagnosis: HLD (hyperlipidemia)  Assessment and Plan of Treatment:      PRINCIPAL DISCHARGE DIAGNOSIS  Diagnosis: Sacral osteomyelitis  Assessment and Plan of Treatment: You have a large chronic sacral wound due to a Stage 4 Pressure Ulcer.   You had a MRI of your Pelvis and showed Osteomyelitis of the Sacrum.   Interventional Radiology placed PICC on 5/23.   Infectious Disease evaluated and you need to finish 6 weeks of antibiotics - meropenem 1g every 8 hours until 6/27/25.   Surgery Team evaluated and you did not need any debridement of the sacral wound.   Continue with wound care orders. Santyl, wet-to-dry gauze, dry gauze, and Medipore dressing. Please follow up with surgery within one week of discharge   Return back to the Hospital if you have any worsening fevers, foul drainage from wound, or redness.      SECONDARY DISCHARGE DIAGNOSES  Diagnosis: Sepsis  Assessment and Plan of Treatment: you were found to have sepsis due to recent sacral wound infection.   Sepsis happens when an infection spreads and causes your body to react strongly to germs.  Take all antibiotics as ordered.  Call you Health care provider upon arrival home to make a one week follow up appointment.  If you develop fever, chills, malaise, or change in mental status call your Health Care Provider or go to the Emergency Department.    Diagnosis: Pressure ulcer of sacral region, stage 4  Assessment and Plan of Treatment: you were found to have a chronic sacral ulcer from the nursing home, you need to clean the wound with dakins as a antibacterial wash daily.   apply santyl to the wound to promote healing.   take a multivitamin with minerals and vitamin C to aide in healing  continue glucerna shakes for protein  turn and reposition every 2 hours  clean your skin after having a bowel movement as this can irritate the skin further.   apply foam pads and use pillows to the sacral area to relieve pressure off the site.    Diagnosis: Diabetes type 2  Assessment and Plan of Treatment: your a1c was 6.4%  continue taking metformin 500 mg twice a day    Diagnosis: TBI (traumatic brain injury)  Assessment and Plan of Treatment: continue taking keppra 750 mg twice a day    Diagnosis: Anemia of chronic disease  Assessment and Plan of Treatment: you are anemic with low hemoglobin 8.2  continue taking multivitamin tab daily + vitamin C daily  Call your local emergency number (275 in the [place]), or have someone call if:  You lose consciousness.  You have severe chest pain.  When should I seek immediate care?  You have dark or bloody bowel movements.    Diagnosis: Functional quadriplegia  Assessment and Plan of Treatment: you need assistance with all activities of daily living due to your history of traumatic brain injury      Diagnosis: Severe protein-calorie malnutrition  Assessment and Plan of Treatment: you have low albumin levels due to poor nutritional intake  add glucerna shakes 2 bottles twice a day

## 2025-05-21 NOTE — PROGRESS NOTE ADULT - ASSESSMENT
Osteomyelitis of Sacrum  Infected Sacral Decubitus Ulcer  Fevers - at the NH    Plan -   ·	Cont Meropenem 1gm iv q8hrs x 6 weeks in total, till  Osteomyelitis of Sacrum  Infected Sacral Decubitus Ulcer  Fevers - at the NH    Plan -   ·	Cont Meropenem 1gm iv q8hrs x 6 weeks in total, till 6/27/25.  ·	Will need a PICC line  ·	DC planning.

## 2025-05-21 NOTE — PROGRESS NOTE ADULT - SUBJECTIVE AND OBJECTIVE BOX
NP Note discussed with  primary attending    Patient is a 70y old  Male who presents with a chief complaint of Pain (20 May 2025 12:35)      INTERVAL HPI/OVERNIGHT EVENTS: no new complaints. Patient seen at bedside laying comfortable. Found to have sacral osteomyelitis.     MEDICATIONS  (STANDING):  acetaminophen   IVPB .. 1000 milliGRAM(s) IV Intermittent once  ascorbic acid 1000 milliGRAM(s) Oral daily  collagenase Ointment 1 Application(s) Topical every 8 hours  insulin lispro (ADMELOG) corrective regimen sliding scale   SubCutaneous three times a day before meals  insulin lispro (ADMELOG) corrective regimen sliding scale   SubCutaneous at bedtime  lactulose Syrup 10 Gram(s) Oral daily  levETIRAcetam 750 milliGRAM(s) Oral two times a day  LORazepam   Injectable 0.5 milliGRAM(s) IV Push once  meropenem  IVPB 1000 milliGRAM(s) IV Intermittent every 8 hours  multivitamin 1 Tablet(s) Oral daily  polyethylene glycol 3350 17 Gram(s) Oral two times a day  senna 2 Tablet(s) Oral at bedtime  zinc sulfate 220 milliGRAM(s) Oral daily    MEDICATIONS  (PRN):  acetaminophen     Tablet .. 650 milliGRAM(s) Oral every 6 hours PRN Temp greater or equal to 38C (100.4F), Mild Pain (1 - 3)  aluminum hydroxide/magnesium hydroxide/simethicone Suspension 30 milliLiter(s) Oral every 4 hours PRN Dyspepsia  melatonin 3 milliGRAM(s) Oral at bedtime PRN Insomnia  ondansetron Injectable 4 milliGRAM(s) IV Push every 8 hours PRN Nausea and/or Vomiting      __________________________________________________  REVIEW OF SYSTEMS:    CONSTITUTIONAL: No fever,   EYES: no acute visual disturbances  NECK: No pain or stiffness  RESPIRATORY: No cough; No shortness of breath  CARDIOVASCULAR: No chest pain, no palpitations  GASTROINTESTINAL: No pain. No nausea or vomiting; No diarrhea   NEUROLOGICAL: No headache or numbness, no tremors  MUSCULOSKELETAL: No joint pain, no muscle pain. Sacral osteomyelitis.  Functional quadriplegia   GENITOURINARY: no dysuria, no frequency, no hesitancy.   PSYCHIATRY: no depression , no anxiety  ALL OTHER  ROS negative        Vital Signs Last 24 Hrs  T(C): 36.7 (21 May 2025 05:34), Max: 36.7 (20 May 2025 20:50)  T(F): 98 (21 May 2025 05:34), Max: 98.1 (20 May 2025 20:50)  HR: 101 (21 May 2025 05:34) (91 - 101)  BP: 126/77 (21 May 2025 05:34) (114/71 - 126/77)  BP(mean): --  RR: 16 (21 May 2025 05:34) (16 - 17)  SpO2: 98% (21 May 2025 05:34) (97% - 98%)    Parameters below as of 21 May 2025 05:34  Patient On (Oxygen Delivery Method): room air        ________________________________________________  PHYSICAL EXAM:  GENERAL: NAD  HEENT: Normocephalic;  conjunctivae and sclerae clear; moist mucous membranes;   NECK : supple  CHEST/LUNG: Clear to ausculitation bilaterally with good air entry   HEART: S1 S2  regular; no murmurs, gallops or rubs  ABDOMEN: Soft, Nontender, Nondistended; Bowel sounds present  EXTREMITIES: no cyanosis; no edema; no calf tenderness  SKIN: warm and dry; no rash  NERVOUS SYSTEM:  Awake and alert; forgetful at times     _________________________________________________  LABS:                        8.2    12.96 )-----------( 622      ( 21 May 2025 08:00 )             26.5     05-21    137  |  107  |  19[H]  ----------------------------<  130[H]  4.0   |  26  |  0.85    Ca    9.1      21 May 2025 08:00    TPro  6.8  /  Alb  1.8[L]  /  TBili  0.3  /  DBili  x   /  AST  6[L]  /  ALT  10  /  AlkPhos  59  05-21      Urinalysis Basic - ( 21 May 2025 08:00 )    Color: x / Appearance: x / SG: x / pH: x  Gluc: 130 mg/dL / Ketone: x  / Bili: x / Urobili: x   Blood: x / Protein: x / Nitrite: x   Leuk Esterase: x / RBC: x / WBC x   Sq Epi: x / Non Sq Epi: x / Bacteria: x      CAPILLARY BLOOD GLUCOSE      POCT Blood Glucose.: 133 mg/dL (21 May 2025 08:03)  POCT Blood Glucose.: 169 mg/dL (20 May 2025 20:58)  POCT Blood Glucose.: 121 mg/dL (20 May 2025 16:57)  POCT Blood Glucose.: 166 mg/dL (20 May 2025 11:35)        RADIOLOGY & ADDITIONAL TESTS:    < from: MR Pelvis Bony Only w/wo IV Cont (05.19.25 @ 17:55) >  IMPRESSION:  Osteomyelitis of the residual S4 segment with slight extension into the   S3 segment of the sacrum.  Large sacral decubitus ulcer with ulcer tracks/fluid extending in   proximity to the sacrotuberous ligaments and towards the ischial   tuberosities.    < end of copied text >      Imaging Personally Reviewed:  YES/NO    Consultant(s) Notes Reviewed:   YES/ No    Care Discussed with Consultants :     Plan of care was discussed with patient and /or primary care giver; all questions and concerns were addressed and care was aligned with patient's wishes.

## 2025-05-21 NOTE — PROGRESS NOTE ADULT - PROBLEM SELECTOR PLAN 1
: Has prior hx of Osteomyelitis of vertebra, sacral and sacrococcygeal region  history of a sacral ulcer with osteomyelitis and had a debridement on previous admission on 12/20  was on meropenem 1 g every 8 hours until 1/25/24 via left arm PICC line  CT lumbar spine results with   Nonhealing ulcer with sacral osteomyelitis and erosion of the coccyx.  Rectal wall thickening with diffuse distention of the colon and small bowel.  MRI of the pelvis positive for osteomyelitis   Follow up wound culture- ID will determine abx duration based off the wound cx   Surgery consulted -no surgical intervention at this time  IR consulted for PICC line  Dressing order - Santyl, WTD gauze, dry gauze, medipore dressing : Has prior hx of Osteomyelitis of vertebra, sacral and sacrococcygeal region  history of a sacral ulcer with osteomyelitis and had a debridement on previous admission on 12/20  was on meropenem 1 g every 8 hours until 1/25/24 via left arm PICC line  CT lumbar spine results with   Nonhealing ulcer with sacral osteomyelitis and erosion of the coccyx.  Rectal wall thickening with diffuse distention of the colon and small bowel.  MRI of the pelvis positive for osteomyelitis   wound cx negative for any findings   ID will determine abx duration based off the wound cx   Surgery consulted -no surgical intervention at this time  IR consulted for PICC line  Dressing order - Santyl, WTD gauze, dry gauze, medipore dressing

## 2025-05-21 NOTE — PROGRESS NOTE ADULT - PROBLEM SELECTOR PLAN 8
From [facility]  pending wound cx   Based off wound cx, ID will determine duration of abx therapy.  IR consulted for PICC line placement From [facility]  Based off wound cx, ID will determine duration of abx therapy.  IR consulted for PICC line placement

## 2025-05-21 NOTE — PHARMACOTHERAPY INTERVENTION NOTE - COMMENTS
IV Lorazepam 0.5mg 1 dose; recommended to substitute by iv diazepam or PO switch as pt qualifies
Age friendly medications checked and evaluated, no interventions needed
Patient identified by Safe Rx for Patients 64 y/o and Older Report. No intervention at this time.  
IV Lorazepam 0.5mg 1 dose; recommended PO switch as pt qualifies; order was d/c

## 2025-05-21 NOTE — DISCHARGE NOTE PROVIDER - HOSPITAL COURSE
70-year-old male with a past medical history of diabetes mellitus, traumatic brain injury, seizure disorder, metabolic encephalopathy, and a chronic stage IV sacral ulcer was transferred from [facility] due to persistent fevers for three days. He is being admitted for sepsis, with possible sources including the sacral wound, bloodstream, or urinary tract. The patient has a known history of sacral and sacrococcygeal osteomyelitis and previously underwent debridement on 12/20. He was treated with Meropenem 1 g IV every 8 hours via a left arm PICC line, which remained in place until 1/25/24.    On this admission, wound cultures, blood, and urine cx returned negative. CT of the lumbar spine revealed a nonhealing sacral ulcer with associated osteomyelitis and erosion of the coccyx. Additional findings included rectal wall thickening and diffuse distention of both the colon and small bowel. MRI of the pelvis confirmed ongoing osteomyelitis The Infectious Disease team has been consulted and recommended continuation of Meropenem. Antibiotic duration????????. Surgery was also consulted, but no surgical intervention is recommended at this time. Interventional Radiology placed a new PICC line for continued antibiotic administration. Wound care orders include Santyl, wet-to-dry gauze, dry gauze, and Medipore dressing.      ******incomplete*******   70-year-old male with a past medical history of diabetes mellitus, traumatic brain injury, seizure disorder, metabolic encephalopathy, and a chronic stage IV sacral ulcer was transferred from [facility] due to persistent fevers for three days. He is being admitted for sepsis, with possible sources including the sacral wound, bloodstream, or urinary tract. The patient has a known history of sacral and sacrococcygeal osteomyelitis and previously underwent debridement on 12/20. He was treated with Meropenem 1 g IV every 8 hours via a left arm PICC line, which remained in place until 1/25/24.    On this admission, wound cultures, blood, and urine cx returned negative. CT of the lumbar spine revealed a nonhealing sacral ulcer with associated osteomyelitis and erosion of the coccyx. Additional findings included rectal wall thickening and diffuse distention of both the colon and small bowel. MRI of the pelvis confirmed ongoing osteomyelitis The Infectious Disease team has been consulted and recommended continuation of Meropenem. Surgery was also consulted, but no surgical intervention is recommended at this time. Wound care orders include Santyl, wet-to-dry gauze, dry gauze, and Medipore dressing.    Pt is for Picc line placement on 5/23/25 with Meropenem 1gm iv q8hrs x 6 weeks in total, until 6/27/25.    Pt is now medically optimized for discharge back to [facility].

## 2025-05-21 NOTE — DISCHARGE NOTE PROVIDER - NSDCHOSPICE_GEN_A_CORE
Pt up in chair. RN set up breakfast for her per her request. Pt stated she wants to eat and then get back to bed. Instructed pt to call RN when done and RN will assist pt back to bed. Pt VU and oriented to call light-pt aware of placement of call light. No

## 2025-05-21 NOTE — PROGRESS NOTE ADULT - SUBJECTIVE AND OBJECTIVE BOX
70y Male    Meds:  meropenem  IVPB 1000 milliGRAM(s) IV Intermittent every 8 hours    Allergies    No Known Allergies    Intolerances        VITALS:  Vital Signs Last 24 Hrs  T(C): 36.9 (21 May 2025 20:14), Max: 37.1 (21 May 2025 12:18)  T(F): 98.5 (21 May 2025 20:14), Max: 98.7 (21 May 2025 12:18)  HR: 91 (21 May 2025 20:14) (91 - 101)  BP: 117/73 (21 May 2025 20:14) (117/73 - 153/91)  BP(mean): --  RR: 18 (21 May 2025 20:14) (16 - 18)  SpO2: 98% (21 May 2025 20:14) (97% - 99%)    Parameters below as of 21 May 2025 20:14  Patient On (Oxygen Delivery Method): room air        LABS/DIAGNOSTIC TESTS:                          8.2    12.96 )-----------( 622      ( 21 May 2025 08:00 )             26.5         05-21    137  |  107  |  19[H]  ----------------------------<  130[H]  4.0   |  26  |  0.85    Ca    9.1      21 May 2025 08:00    TPro  6.8  /  Alb  1.8[L]  /  TBili  0.3  /  DBili  x   /  AST  6[L]  /  ALT  10  /  AlkPhos  59  05-21      LIVER FUNCTIONS - ( 21 May 2025 08:00 )  Alb: 1.8 g/dL / Pro: 6.8 g/dL / ALK PHOS: 59 U/L / ALT: 10 U/L DA / AST: 6 U/L / GGT: x             CULTURES: Skin/Wound Skin/Wound  05-20 @ 16:50 --  --    No polymorphonuclear leukocytes per low power field  No organisms seen per oil power field      Blood Blood-Peripheral  05-16 @ 17:21   No growth at 4 days  --  --      Blood Blood-Peripheral  05-16 @ 17:20   No growth at 4 days  --  --            RADIOLOGY:< from: MR Pelvis Bony Only w/wo IV Cont (05.19.25 @ 17:55) >  ACC: [id number] EXAM:  MR PELVIS BONY ONLY WAW IC   ORDERED BY: TERESA WADSWORTH     PROCEDURE DATE:  05/19/2025          INTERPRETATION:  CLINICAL INFORMATION: Nonhealing ulcer with sacral   osteomyelitis.    COMPARISON: Lumbar CT 5/16/2025. CT abdomen pelvis 12/14/2023    CONTRAST:  IV Contrast: Gadavist  7.5 cc administered  0 cc discarded  .    TECHNIQUE: MRI of the bony pelvis/sacrum was performed.    FINDINGS:    Large sacral decubitus ulcer. There is decreased T1 marrow signal with   associated edema and enhancement of the remaining S4 segment of the bone   with slight extension into the inferior endplate of the S3 segment of the   sacrum and cystic with osteomyelitis. The distal portions of the   sacrum/coccyx are either completely eroded or have been resected. There   are bilateral extension of the ulcers with fluid extending in proximity   to the sacrotuberous ligaments and towards the ischial tuberosities   measuring 3.9 x 0.8 cm on the right and 1.9 x 1.8 cm on the left. No   evidence of osteomyelitis of the ischial tuberosities or hips. There is   diffuse increased muscle signal favored to represent sequela of   denervation changes. Ill-defined edema within the presacral fat.   Evaluation is not optimized to evaluate the pelvic viscera.      IMPRESSION:  Osteomyelitis of the residual S4 segment with slight extension into the   S3 segment of the sacrum.  Large sacral decubitus ulcer with ulcer tracks/fluid extending in   proximity to the sacrotuberous ligaments and towards the ischial   tuberosities.        --- End of Report ---            EAN GUIDRY MD; Attending Radiologist  This document has been electronically signed. May 19 2025  6:47PM    < end of copied text >        ROS:  [  ] UNABLE TO ELICIT 70y Male who is confused but does answer an occasional question or two, he c/o pain ad appears to be his back which he says  yes to after multiple attempts at asking him, he says no to all other questions posed to him. He was found to have Osteomyelitis of his sacrum and so as I discussed with Mireya the NP, he got a sacral culture to make sure no MRSA in the sacral wound ( so I do not need to add Vancomycin to his regimen) and recommended to get a PICC line which he will get tomorrow. He has no fevers but has a mildly elevated WBC count.     Meds:  meropenem  IVPB 1000 milliGRAM(s) IV Intermittent every 8 hours    Allergies    No Known Allergies    Intolerances        VITALS:  Vital Signs Last 24 Hrs  T(C): 36.9 (21 May 2025 20:14), Max: 37.1 (21 May 2025 12:18)  T(F): 98.5 (21 May 2025 20:14), Max: 98.7 (21 May 2025 12:18)  HR: 91 (21 May 2025 20:14) (91 - 101)  BP: 117/73 (21 May 2025 20:14) (117/73 - 153/91)  BP(mean): --  RR: 18 (21 May 2025 20:14) (16 - 18)  SpO2: 98% (21 May 2025 20:14) (97% - 99%)    Parameters below as of 21 May 2025 20:14  Patient On (Oxygen Delivery Method): room air        LABS/DIAGNOSTIC TESTS:                          8.2    12.96 )-----------( 622      ( 21 May 2025 08:00 )             26.5         05-21    137  |  107  |  19[H]  ----------------------------<  130[H]  4.0   |  26  |  0.85    Ca    9.1      21 May 2025 08:00    TPro  6.8  /  Alb  1.8[L]  /  TBili  0.3  /  DBili  x   /  AST  6[L]  /  ALT  10  /  AlkPhos  59  05-21      LIVER FUNCTIONS - ( 21 May 2025 08:00 )  Alb: 1.8 g/dL / Pro: 6.8 g/dL / ALK PHOS: 59 U/L / ALT: 10 U/L DA / AST: 6 U/L / GGT: x             CULTURES: Skin/Wound Skin/Wound  05-20 @ 16:50 --  --    No polymorphonuclear leukocytes per low power field  No organisms seen per oil power field      Blood Blood-Peripheral  05-16 @ 17:21   No growth at 4 days  --  --      Blood Blood-Peripheral  05-16 @ 17:20   No growth at 4 days  --  --            RADIOLOGY:< from: MR Pelvis Bony Only w/wo IV Cont (05.19.25 @ 17:55) >  ACC: [id number] EXAM:  MR PELVIS BONY ONLY WAW IC   ORDERED BY: TERESA ANANTH     PROCEDURE DATE:  05/19/2025          INTERPRETATION:  CLINICAL INFORMATION: Nonhealing ulcer with sacral   osteomyelitis.    COMPARISON: Lumbar CT 5/16/2025. CT abdomen pelvis 12/14/2023    CONTRAST:  IV Contrast: Gadavist  7.5 cc administered  0 cc discarded  .    TECHNIQUE: MRI of the bony pelvis/sacrum was performed.    FINDINGS:    Large sacral decubitus ulcer. There is decreased T1 marrow signal with   associated edema and enhancement of the remaining S4 segment of the bone   with slight extension into the inferior endplate of the S3 segment of the   sacrum and cystic with osteomyelitis. The distal portions of the   sacrum/coccyx are either completely eroded or have been resected. There   are bilateral extension of the ulcers with fluid extending in proximity   to the sacrotuberous ligaments and towards the ischial tuberosities   measuring 3.9 x 0.8 cm on the right and 1.9 x 1.8 cm on the left. No   evidence of osteomyelitis of the ischial tuberosities or hips. There is   diffuse increased muscle signal favored to represent sequela of   denervation changes. Ill-defined edema within the presacral fat.   Evaluation is not optimized to evaluate the pelvic viscera.      IMPRESSION:  Osteomyelitis of the residual S4 segment with slight extension into the   S3 segment of the sacrum.  Large sacral decubitus ulcer with ulcer tracks/fluid extending in   proximity to the sacrotuberous ligaments and towards the ischial   tuberosities.        --- End of Report ---            EAN GUIDRY MD; Attending Radiologist  This document has been electronically signed. May 19 2025  6:47PM    < end of copied text >        ROS:  [ x ] UNABLE TO ELICIT

## 2025-05-21 NOTE — PROGRESS NOTE ADULT - ASSESSMENT
70M DM,, TBI. seizure disorder, metabolic encephalopathy, stage IV sacral ulcer sent from [facility] for fevers for 3 days being admitted for sepsis; etiology wound vs blood vs urine   Surgery consulted, no surgical intervention recommended at this time, continue santyl dressing, ID consulted, continue meropenem and MRI of pelvis positive for osteomyelitis. Pending wound culture.   Depending on the wound cx, ID will determine duration of abx   Pt is AO x 1, appears to be comfortable,

## 2025-05-21 NOTE — DISCHARGE NOTE PROVIDER - CARE PROVIDERS DIRECT ADDRESSES
[email or web address]

## 2025-05-21 NOTE — DISCHARGE NOTE PROVIDER - CARE PROVIDER_API CALL
Basilio Reddy  Geriatric Medicine  [address] [place] [id number]  Phone: [phone]  Fax: [phone]  Follow Up Time: 1 week

## 2025-05-21 NOTE — DISCHARGE NOTE PROVIDER - NSDCMRMEDTOKEN_GEN_ALL_CORE_FT
acetaminophen 325 mg oral tablet: 2 tab(s) orally every 6 hours as needed for  mild pain  enoxaparin 40 mg/0.4 mL injectable solution: 40 milligram(s) subcutaneously once a day  lactulose 10 g/15 mL oral syrup: 15 milliliter(s) orally once a day  levETIRAcetam 750 mg oral tablet: 1 tab(s) orally 2 times a day  meropenem 1000 mg intravenous injection: 1,000 milligram(s) intravenously every 8 hours Until 1/25/24  metFORMIN 500 mg oral tablet: 1 tab(s) orally 2 times a day  Multiple Vitamins oral tablet: 1 tab(s) orally once a day  mupirocin 2% topical ointment: 1 application in each nostril 2 times a day until 12/31  Normal Saline Flush 0.9% injectable solution: 1 application intravenous prn Every 1 hour, PRN Pre/Post blood products, medications, and to maintain line  polyethylene glycol 3350 oral powder for reconstitution: 17 gram(s) orally 2 times a day  Santyl 250 units/g topical ointment: Apply topically to affected area every 8 hours  senna (sennosides) 8.6 mg oral tablet: 2 tab(s) orally once a day  sodium hypochlorite 0.125% topical solution: 1 Apply topically to affected area once a day  Vitamin C 500 mg oral capsule: 1 cap(s) orally once a day   acetaminophen 325 mg oral tablet: 2 tab(s) orally every 6 hours as needed for  mild pain  enoxaparin 40 mg/0.4 mL injectable solution: 40 milligram(s) subcutaneously once a day  lactulose 10 g/15 mL oral syrup: 15 milliliter(s) orally once a day  levETIRAcetam 750 mg oral tablet: 1 tab(s) orally 2 times a day  meropenem 1000 mg intravenous injection: 1,000 milligram(s) intravenously every 8 hours for sacral osteomyelitis until 6/27/25  metFORMIN 500 mg oral tablet: 1 tab(s) orally 2 times a day  Multiple Vitamins oral tablet: 1 tab(s) orally once a day  polyethylene glycol 3350 oral powder for reconstitution: 17 gram(s) orally 2 times a day  Santyl 250 units/g topical ointment: Apply topically to affected area every 8 hours for sacral wound  senna (sennosides) 8.6 mg oral tablet: 2 tab(s) orally once a day  sodium hypochlorite 0.125% topical solution: Apply topically to affected area once a day clean sacral wound daily  Vitamin C 500 mg oral capsule: 1 cap(s) orally once a day

## 2025-05-21 NOTE — PROGRESS NOTE ADULT - ASSESSMENT
_________________________________________________________________________________________  ========>>  M E D I C A L   A T T E N D I N G    F O L L O W  U P  N O T E  <<=========  -----------------------------------------------------------------------------------------------------    - Patient seen and examined by me earlier today. (covering for Dr Sam)   - Patient today overall doing ok, comfortable, eating OK.     ==================>> REVIEW OF SYSTEM <<=================    GEN: no fever, no chills, no pain  RESP: no SOB, no cough  CVS: no chest pain, no palpitations  GI: no abdominal pain, no nausea  : no dysuria, no frequency  Neuro: no headache, no dizziness    ==================>> PHYSICAL EXAM <<=================    GEN: alert and responsive.. , NAD , comfortable, pleasant, calm   HEENT: NCAT, MMM, hearing intact  CVS: S1S2 , regular , No M/R/G appreciated  PULM: CTA B/L,  no W/R/R appreciated  ABD.: soft. non tender, non distended  Extrem: intact pulses , no edema      carter in place..      chronic weakness post CVA       ( Note written / Date of service 05-21-25 ( This is certified to be the same as "ENTERED" date above ( for billing purposes)))    ==================>> MEDICATIONS <<====================    acetaminophen   IVPB .. 1000 milliGRAM(s) IV Intermittent once  ascorbic acid 1000 milliGRAM(s) Oral daily  collagenase Ointment 1 Application(s) Topical every 8 hours  insulin lispro (ADMELOG) corrective regimen sliding scale   SubCutaneous three times a day before meals  insulin lispro (ADMELOG) corrective regimen sliding scale   SubCutaneous at bedtime  lactulose Syrup 10 Gram(s) Oral daily  levETIRAcetam 750 milliGRAM(s) Oral two times a day  meropenem  IVPB 1000 milliGRAM(s) IV Intermittent every 8 hours  multivitamin 1 Tablet(s) Oral daily  polyethylene glycol 3350 17 Gram(s) Oral two times a day  senna 2 Tablet(s) Oral at bedtime  zinc sulfate 220 milliGRAM(s) Oral daily    MEDICATIONS  (PRN):  acetaminophen     Tablet .. 650 milliGRAM(s) Oral every 6 hours PRN Temp greater or equal to 38C (100.4F), Mild Pain (1 - 3)  aluminum hydroxide/magnesium hydroxide/simethicone Suspension 30 milliLiter(s) Oral every 4 hours PRN Dyspepsia  melatonin 3 milliGRAM(s) Oral at bedtime PRN Insomnia  ondansetron Injectable 4 milliGRAM(s) IV Push every 8 hours PRN Nausea and/or Vomiting    ___________  Active diet:  Diet, DASH/TLC:   Sodium & Cholesterol Restricted  No Beef  No Pork  Danny(7 Gm Arginine/7 Gm Glut/1.2 Gm HMB     Qty per Day:  1  Supplement Feeding Modality:  Oral  Glucerna Shake Cans or Servings Per Day:  2       Frequency:  Two Times a day  ___________________    ==================>> VITAL SIGNS <<==================    Vital Signs Last 24 HrsT(C): 37.1 (05-21-25 @ 12:18)  T(F): 98.7 (05-21-25 @ 12:18), Max: 98.7 (05-21-25 @ 12:18)  HR: 96 (05-21-25 @ 12:18) (93 - 101)  BP: 153/91 (05-21-25 @ 12:18)  RR: 17 (05-21-25 @ 12:18) (16 - 17)  SpO2: 99% (05-21-25 @ 12:18) (97% - 99%)      CAPILLARY BLOOD GLUCOSE  POCT Blood Glucose.: 121 mg/dL (21 May 2025 11:57)  POCT Blood Glucose.: 133 mg/dL (21 May 2025 08:03)  POCT Blood Glucose.: 169 mg/dL (20 May 2025 20:58)  POCT Blood Glucose.: 121 mg/dL (20 May 2025 16:57)     ==================>> LAB AND IMAGING <<==================                        8.2    12.96 )-----------( 622      ( 21 May 2025 08:00 )             26.5        05-21    137  |  107  |  19[H]  ----------------------------<  130[H]  4.0   |  26  |  0.85    Ca    9.1      21 May 2025 08:00    TPro  6.8  /  Alb  1.8[L]  /  TBili  0.3  /  DBili  x   /  AST  6[L]  /  ALT  10  /  AlkPhos  59  05-21    WBC count:   12.96 <<== ,  11.49 <<== ,  9.11 <<== ,  8.08 <<== ,  8.24 <<==   Hemoglobin:   8.2 <<==,  8.1 <<==,  7.7 <<==,  7.4 <<==,  7.4 <<==  platelets:  622 <==, 504 <==, 650 <==, 591 <==, 609 <==, 552 <==    Creatinine:  0.85  <<==, 0.88  <<==, 0.80  <<==, 0.82  <<==, 0.73  <<==, 0.79  <<==  Sodium:   137  <==, 139  <==, 137  <==, 137  <==, 136  <==, 135  <==       AST:          6(05-21) <== , 7(05-19) <== , 6(05-18) <== , 7(05-16) <==      ALT:        10(05-21)  <== , 13(05-19)  <== , 7(05-18)  <== , 9(05-16)  <==      AP:        59(05-21)  <=, 55(05-19)  <=, 54(05-18)  <=, 64(05-16)  <=     Bili:        0.3(05-21)  <=, 0.2(05-19)  <=, 0.2(05-18)  <=, 0.3(05-16)  <=    ____________________________    M I C R O B I O L O G Y :    Culture - Wound Aerobic/Anaerobic (collected 20 May 2025 16:50)  Source: Skin/Wound Skin/Wound  Gram Stain (21 May 2025 00:37):    No polymorphonuclear leukocytes per low power field    No organisms seen per oil power field    Urinalysis with Rflx Culture (collected 16 May 2025 18:20)    Culture - Blood (collected 16 May 2025 17:21)  Source: Blood Blood-Peripheral  Preliminary Report (20 May 2025 22:01):    No growth at 4 days    Culture - Blood (collected 16 May 2025 17:20)  Source: Blood Blood-Peripheral  Preliminary Report (20 May 2025 22:01):    No growth at 4 days    < from: MR Pelvis Bony Only w/wo IV Cont (05.19.25 @ 17:55) >  IMPRESSION:  Osteomyelitis of the residual S4 segment with slight extension into the S3 segment of the sacrum.  Large sacral decubitus ulcer with ulcer tracks/fluid extending in proximity to the sacrotuberous ligaments and towards the ischial tuberosities.  < end of copied text >    < from: CT Lumbar Spine No Cont (05.16.25 @ 17:44) >  IMPRESSION:  1.  Nonhealing ulcer with sacral osteomyelitis and erosion of the coccyx.  2.  MRI of the pelvis with and without gadolinium recommended.  3.  Rectal wall thickening with diffuse distention of the colon and small bowel.  < end of copied text >    ___________________________________________________________________________________  ===============>>  A S S E S S M E N T   A N D   P L A N <<===============  ------------------------------------------------------------------------------------------    70M DM,, TBI. seizure disorder, metabolic encephalopathy, stage IV sacral ulcer sent from [facility] for fevers for 3 days being admitted for sepsis; etiology wound vs blood vs urine        Problem/Plan - 1:  ·  Problem: Sepsis due to infected Decub and osteomyelitis of pelvis  ID on board  f/u cx.  broad spectrum antibiotics >> PICC line for long term antibiotics   surgical / wound care follow up      Problem/Plan - 2:  ·  Problem: TBI (traumatic brain injury).   supportive care  turn and position per nursing protocol   carter care     Problem/Plan - 3:  ·  Problem: Sacral wound.   as above   carter care  turn and position  nutrition / supplements /  vitamins     Problem/Plan - 4:  ·  Problem: DM (diabetes mellitus).   ·  Plan: iss.     Problem/Plan - 5:  ·  Problem: Prophylactic measure.   ·  Plan: Lovenox.  nutrition / supplements ..    --------------------------------------------  Case discussed with Nurse Practitioner   Education given on findings and plan of care  ___________________________  H. CHDA Juárez (covering for Dr. Sam)   Pager: [phone]

## 2025-05-22 ENCOUNTER — TRANSCRIPTION ENCOUNTER (OUTPATIENT)
Age: 70
End: 2025-05-22

## 2025-05-22 LAB
-  AMPICILLIN: SIGNIFICANT CHANGE UP
-  GENTAMICIN SYNERGY: SIGNIFICANT CHANGE UP
-  STREPTOMYCIN SYNERGY: SIGNIFICANT CHANGE UP
-  VANCOMYCIN: SIGNIFICANT CHANGE UP
GLUCOSE BLDC GLUCOMTR-MCNC: 103 MG/DL — HIGH (ref 70–99)
GLUCOSE BLDC GLUCOMTR-MCNC: 124 MG/DL — HIGH (ref 70–99)
GLUCOSE BLDC GLUCOMTR-MCNC: 136 MG/DL — HIGH (ref 70–99)
GLUCOSE BLDC GLUCOMTR-MCNC: 155 MG/DL — HIGH (ref 70–99)
GLUCOSE BLDC GLUCOMTR-MCNC: 155 MG/DL — HIGH (ref 70–99)
METHOD TYPE: SIGNIFICANT CHANGE UP

## 2025-05-22 RX ADMIN — MEROPENEM 100 MILLIGRAM(S): 1 INJECTION INTRAVENOUS at 05:49

## 2025-05-22 RX ADMIN — Medication 1 TABLET(S): at 12:45

## 2025-05-22 RX ADMIN — Medication 2 TABLET(S): at 22:28

## 2025-05-22 RX ADMIN — Medication 220 MILLIGRAM(S): at 12:45

## 2025-05-22 RX ADMIN — LACTULOSE 10 GRAM(S): 10 SOLUTION ORAL at 12:45

## 2025-05-22 RX ADMIN — POLYETHYLENE GLYCOL 3350 17 GRAM(S): 17 POWDER, FOR SOLUTION ORAL at 05:48

## 2025-05-22 RX ADMIN — Medication 1 APPLICATION(S): at 22:28

## 2025-05-22 RX ADMIN — LEVETIRACETAM 750 MILLIGRAM(S): 10 INJECTION, SOLUTION INTRAVENOUS at 18:07

## 2025-05-22 RX ADMIN — Medication 1 APPLICATION(S): at 14:17

## 2025-05-22 RX ADMIN — POLYETHYLENE GLYCOL 3350 17 GRAM(S): 17 POWDER, FOR SOLUTION ORAL at 18:07

## 2025-05-22 RX ADMIN — Medication 1 APPLICATION(S): at 05:48

## 2025-05-22 RX ADMIN — Medication 1000 MILLIGRAM(S): at 12:47

## 2025-05-22 RX ADMIN — LEVETIRACETAM 750 MILLIGRAM(S): 10 INJECTION, SOLUTION INTRAVENOUS at 05:48

## 2025-05-22 RX ADMIN — MEROPENEM 100 MILLIGRAM(S): 1 INJECTION INTRAVENOUS at 22:28

## 2025-05-22 RX ADMIN — MEROPENEM 100 MILLIGRAM(S): 1 INJECTION INTRAVENOUS at 14:17

## 2025-05-22 NOTE — PROGRESS NOTE ADULT - ASSESSMENT
Osteomyelitis of Sacrum  Infected Sacral Decubitus Ulcer  Fevers - resolved    Plan -   ·	Cont Meropenem 1gm iv q8hrs x 6 weeks in total, till 6/27/25.  ·	Will need a PICC line  ·	DC planning.

## 2025-05-22 NOTE — PROGRESS NOTE ADULT - ASSESSMENT
_________________________________________________________________________________________  ========>>  M E D I C A L   A T T E N D I N G    F O L L O W  U P  N O T E  <<=========  -----------------------------------------------------------------------------------------------------    - Patient seen and examined by me  (covering for Dr Sam)   - Patient today overall doing ok, comfortable, eating OK.      patient awaiting PICC line ( but im told no family / NOK available / responding for consent !!> to retry)     ==================>> REVIEW OF SYSTEM <<=================    GEN: no fever, no chills, no pain  RESP: no SOB, no cough  CVS: no chest pain, no palpitations  GI: no abdominal pain, no nausea  : no dysuria, no frequency  Neuro: no headache, no dizziness    ==================>> PHYSICAL EXAM <<=================    GEN: alert and responsive.. , NAD , comfortable, pleasant, calm   HEENT: NCAT, MMM, hearing intact  CVS: S1S2 , regular , No M/R/G appreciated  PULM: CTA B/L,  no W/R/R appreciated  ABD.: soft. non tender, non distended  Extrem: intact pulses , no edema      carter in place..      chronic weakness post CVA       ( Note written / Date of service 05-22-25 ( This is certified to be the same as "ENTERED" date above ( for billing purposes)))    ==================>> MEDICATIONS <<====================    acetaminophen   IVPB .. 1000 milliGRAM(s) IV Intermittent once  ascorbic acid 1000 milliGRAM(s) Oral daily  collagenase Ointment 1 Application(s) Topical every 8 hours  insulin lispro (ADMELOG) corrective regimen sliding scale   SubCutaneous three times a day before meals  insulin lispro (ADMELOG) corrective regimen sliding scale   SubCutaneous at bedtime  lactulose Syrup 10 Gram(s) Oral daily  levETIRAcetam 750 milliGRAM(s) Oral two times a day  meropenem  IVPB 1000 milliGRAM(s) IV Intermittent every 8 hours  multivitamin 1 Tablet(s) Oral daily  polyethylene glycol 3350 17 Gram(s) Oral two times a day  senna 2 Tablet(s) Oral at bedtime  zinc sulfate 220 milliGRAM(s) Oral daily    MEDICATIONS  (PRN):  acetaminophen     Tablet .. 650 milliGRAM(s) Oral every 6 hours PRN Temp greater or equal to 38C (100.4F), Mild Pain (1 - 3)  aluminum hydroxide/magnesium hydroxide/simethicone Suspension 30 milliLiter(s) Oral every 4 hours PRN Dyspepsia  melatonin 3 milliGRAM(s) Oral at bedtime PRN Insomnia  ondansetron Injectable 4 milliGRAM(s) IV Push every 8 hours PRN Nausea and/or Vomiting    ___________  Active diet:  Diet, DASH/TLC:   Sodium & Cholesterol Restricted  No Beef  No Pork  Danny(7 Gm Arginine/7 Gm Glut/1.2 Gm HMB     Qty per Day:  1  Supplement Feeding Modality:  Oral  Glucerna Shake Cans or Servings Per Day:  2       Frequency:  Two Times a day  ___________________    ==================>> VITAL SIGNS <<==================    Vital Signs Last 24 HrsT(C): 36.8 (05-22-25 @ 14:12)  T(F): 98.2 (05-22-25 @ 14:12), Max: 98.5 (05-21-25 @ 20:14)  HR: 88 (05-22-25 @ 14:12) (88 - 91)  BP: 126/68 (05-22-25 @ 14:12)  RR: 18 (05-22-25 @ 14:12) (18 - 19)  SpO2: 98% (05-22-25 @ 14:12) (98% - 98%)      CAPILLARY BLOOD GLUCOSE  POCT Blood Glucose.: 136 mg/dL (22 May 2025 12:58)  POCT Blood Glucose.: 155 mg/dL (22 May 2025 11:30)  POCT Blood Glucose.: 103 mg/dL (22 May 2025 08:00)  POCT Blood Glucose.: 159 mg/dL (21 May 2025 22:17)  POCT Blood Glucose.: 113 mg/dL (21 May 2025 17:14)     ==================>> LAB AND IMAGING <<==================                        8.2    12.96 )-----------( 622      ( 21 May 2025 08:00 )             26.5        05-21    137  |  107  |  19[H]  ----------------------------<  130[H]  4.0   |  26  |  0.85    Ca    9.1      21 May 2025 08:00    TPro  6.8  /  Alb  1.8[L]  /  TBili  0.3  /  DBili  x   /  AST  6[L]  /  ALT  10  /  AlkPhos  59  05-21    WBC count:   12.96 <<== ,  11.49 <<== ,  9.11 <<== ,  8.08 <<==   Hemoglobin:   8.2 <<==,  8.1 <<==,  7.7 <<==,  7.4 <<==  platelets:  622 <==, 504 <==, 650 <==, 591 <==, 609 <==    Creatinine:  0.85  <<==, 0.88  <<==, 0.80  <<==, 0.82  <<==, 0.73  <<==  Sodium:   137  <==, 139  <==, 137  <==, 137  <==, 136  <==       AST:          6(05-21) <== , 7(05-19) <== , 6(05-18) <== , 7(05-16) <==      ALT:        10(05-21)  <== , 13(05-19)  <== , 7(05-18)  <== , 9(05-16)  <==      AP:        59(05-21)  <=, 55(05-19)  <=, 54(05-18)  <=, 64(05-16)  <=     Bili:        0.3(05-21)  <=, 0.2(05-19)  <=, 0.2(05-18)  <=, 0.3(05-16)  <=    ____________________________    M I C R O B I O L O G Y :    Culture - Wound Aerobic/Anaerobic (collected 20 May 2025 16:50)  Source: Skin/Wound Skin/Wound  Gram Stain (21 May 2025 23:44):    No polymorphonuclear leukocytes per low power field    No organisms seen per oil power field  Preliminary Report (21 May 2025 23:44):    Moderate Staphylococcus haemolyticus    Few Enterococcus raffinosus    Moderate Corynebacterium striatum group    "Susceptibilities not performed"    Urinalysis with Rflx Culture (collected 16 May 2025 18:20)    Culture - Blood (collected 16 May 2025 17:21)  Source: Blood Blood-Peripheral  Final Report (21 May 2025 22:00):    No growth at 5 days    Culture - Blood (collected 16 May 2025 17:20)  Source: Blood Blood-Peripheral  Final Report (21 May 2025 22:00):    No growth at 5 days      < from: MR Pelvis Bony Only w/wo IV Cont (05.19.25 @ 17:55) >  IMPRESSION:  Osteomyelitis of the residual S4 segment with slight extension into the S3 segment of the sacrum.  Large sacral decubitus ulcer with ulcer tracks/fluid extending in proximity to the sacrotuberous ligaments and towards the ischial tuberosities.  < end of copied text >    < from: CT Lumbar Spine No Cont (05.16.25 @ 17:44) >  IMPRESSION:  1.  Nonhealing ulcer with sacral osteomyelitis and erosion of the coccyx.  2.  MRI of the pelvis with and without gadolinium recommended.  3.  Rectal wall thickening with diffuse distention of the colon and small bowel.  < end of copied text >    ___________________________________________________________________________________  ===============>>  A S S E S S M E N T   A N D   P L A N <<===============  ------------------------------------------------------------------------------------------    70M DM,, TBI. seizure disorder, metabolic encephalopathy, stage IV sacral ulcer sent from [facility] for fevers for 3 days being admitted for sepsis; etiology wound vs blood vs urine        Problem/Plan - 1:  ·  Problem: Sepsis due to infected Decub and osteomyelitis of pelvis  ID on board  f/u cx.  continue antibiotics per ID >> PICC line for long term antibiotics   surgical / wound care follow up      Problem/Plan - 2:  ·  Problem: TBI (traumatic brain injury).   supportive care  turn and position per nursing protocol   carter care     Problem/Plan - 3:  ·  Problem: Sacral wound.   as above   carter care  turn and position  nutrition / supplements /  vitamins     Problem/Plan - 4:  ·  Problem: DM (diabetes mellitus).   ·  Plan: iss.     Problem/Plan - 5:  ·  Problem: Prophylactic measure.   ·  Plan: Lovenox.  nutrition / supplements ..    Dispo planing back to SNF, pending PICC line    --------------------------------------------  Case discussed with Nurse Practitioner   Education given on findings and plan of care  ___________________________  H. CHAD Juárez (covering for Dr. Sam)   Pager: [phone]

## 2025-05-22 NOTE — PROGRESS NOTE ADULT - SUBJECTIVE AND OBJECTIVE BOX
70y Male    Meds:  meropenem  IVPB 1000 milliGRAM(s) IV Intermittent every 8 hours    Allergies    No Known Allergies    Intolerances        VITALS:  Vital Signs Last 24 Hrs  T(C): 36.8 (22 May 2025 14:12), Max: 36.9 (21 May 2025 20:14)  T(F): 98.2 (22 May 2025 14:12), Max: 98.5 (21 May 2025 20:14)  HR: 88 (22 May 2025 14:12) (88 - 91)  BP: 126/68 (22 May 2025 14:12) (117/73 - 126/68)  BP(mean): --  RR: 18 (22 May 2025 14:12) (18 - 19)  SpO2: 98% (22 May 2025 14:12) (98% - 98%)    Parameters below as of 22 May 2025 14:12  Patient On (Oxygen Delivery Method): room air        LABS/DIAGNOSTIC TESTS:                          8.2    12.96 )-----------( 622      ( 21 May 2025 08:00 )             26.5         05-21    137  |  107  |  19[H]  ----------------------------<  130[H]  4.0   |  26  |  0.85    Ca    9.1      21 May 2025 08:00    TPro  6.8  /  Alb  1.8[L]  /  TBili  0.3  /  DBili  x   /  AST  6[L]  /  ALT  10  /  AlkPhos  59  05-21      LIVER FUNCTIONS - ( 21 May 2025 08:00 )  Alb: 1.8 g/dL / Pro: 6.8 g/dL / ALK PHOS: 59 U/L / ALT: 10 U/L DA / AST: 6 U/L / GGT: x             CULTURES: Skin/Wound Skin/Wound  05-20 @ 16:50   Moderate Staphylococcus haemolyticus  Few Enterococcus raffinosus  Moderate Corynebacterium striatum group  "Susceptibilities not performed"  --    No polymorphonuclear leukocytes per low power field  No organisms seen per oil power field      Blood Blood-Peripheral  05-16 @ 17:21   No growth at 5 days  --  --      Blood Blood-Peripheral  05-16 @ 17:20   No growth at 5 days  --  --            RADIOLOGY:      ROS:  [  ] UNABLE TO ELICIT 70y Male who is awaiting a PICC line as his family was not answering the phone to give consent. He is awake and alert but totally confused, he isn't c/o back pain today. His sacral cultures did not grow out any MRSA and so will just continue with Meropenem x 6 weeks for his sacral Osteomyelitis. He has no fevers , his WBC count remains a little elevated still.    Meds:  meropenem  IVPB 1000 milliGRAM(s) IV Intermittent every 8 hours    Allergies    No Known Allergies    Intolerances        VITALS:  Vital Signs Last 24 Hrs  T(C): 36.8 (22 May 2025 14:12), Max: 36.9 (21 May 2025 20:14)  T(F): 98.2 (22 May 2025 14:12), Max: 98.5 (21 May 2025 20:14)  HR: 88 (22 May 2025 14:12) (88 - 91)  BP: 126/68 (22 May 2025 14:12) (117/73 - 126/68)  BP(mean): --  RR: 18 (22 May 2025 14:12) (18 - 19)  SpO2: 98% (22 May 2025 14:12) (98% - 98%)    Parameters below as of 22 May 2025 14:12  Patient On (Oxygen Delivery Method): room air        LABS/DIAGNOSTIC TESTS:                          8.2    12.96 )-----------( 622      ( 21 May 2025 08:00 )             26.5         05-21    137  |  107  |  19[H]  ----------------------------<  130[H]  4.0   |  26  |  0.85    Ca    9.1      21 May 2025 08:00    TPro  6.8  /  Alb  1.8[L]  /  TBili  0.3  /  DBili  x   /  AST  6[L]  /  ALT  10  /  AlkPhos  59  05-21      LIVER FUNCTIONS - ( 21 May 2025 08:00 )  Alb: 1.8 g/dL / Pro: 6.8 g/dL / ALK PHOS: 59 U/L / ALT: 10 U/L DA / AST: 6 U/L / GGT: x             CULTURES: Skin/Wound Skin/Wound  05-20 @ 16:50   Moderate Staphylococcus haemolyticus  Few Enterococcus raffinosus  Moderate Corynebacterium striatum group  "Susceptibilities not performed"  --    No polymorphonuclear leukocytes per low power field  No organisms seen per oil power field      Blood Blood-Peripheral  05-16 @ 17:21   No growth at 5 days  --  --      Blood Blood-Peripheral  05-16 @ 17:20   No growth at 5 days  --  --            RADIOLOGY:      ROS:  [ x ] UNABLE TO ELICIT

## 2025-05-23 ENCOUNTER — TRANSCRIPTION ENCOUNTER (OUTPATIENT)
Age: 70
End: 2025-05-23

## 2025-05-23 VITALS
OXYGEN SATURATION: 99 % | SYSTOLIC BLOOD PRESSURE: 124 MMHG | HEART RATE: 90 BPM | RESPIRATION RATE: 18 BRPM | TEMPERATURE: 98 F | DIASTOLIC BLOOD PRESSURE: 82 MMHG

## 2025-05-23 LAB
GLUCOSE BLDC GLUCOMTR-MCNC: 115 MG/DL — HIGH (ref 70–99)
GLUCOSE BLDC GLUCOMTR-MCNC: 128 MG/DL — HIGH (ref 70–99)
GLUCOSE BLDC GLUCOMTR-MCNC: 163 MG/DL — HIGH (ref 70–99)

## 2025-05-23 PROCEDURE — 36573 INSJ PICC RS&I 5 YR+: CPT

## 2025-05-23 PROCEDURE — 77001 FLUOROGUIDE FOR VEIN DEVICE: CPT | Mod: 26,59

## 2025-05-23 RX ORDER — MEROPENEM 1 G/30ML
1000 INJECTION INTRAVENOUS
Qty: 35 | Refills: 0
Start: 2025-05-23 | End: 2025-06-26

## 2025-05-23 RX ADMIN — Medication 220 MILLIGRAM(S): at 14:29

## 2025-05-23 RX ADMIN — Medication 1 APPLICATION(S): at 06:30

## 2025-05-23 RX ADMIN — LACTULOSE 10 GRAM(S): 10 SOLUTION ORAL at 14:27

## 2025-05-23 RX ADMIN — LEVETIRACETAM 750 MILLIGRAM(S): 10 INJECTION, SOLUTION INTRAVENOUS at 06:29

## 2025-05-23 RX ADMIN — POLYETHYLENE GLYCOL 3350 17 GRAM(S): 17 POWDER, FOR SOLUTION ORAL at 06:30

## 2025-05-23 RX ADMIN — Medication 1 APPLICATION(S): at 14:53

## 2025-05-23 RX ADMIN — Medication 1000 MILLIGRAM(S): at 14:54

## 2025-05-23 RX ADMIN — MEROPENEM 100 MILLIGRAM(S): 1 INJECTION INTRAVENOUS at 06:29

## 2025-05-23 RX ADMIN — MEROPENEM 100 MILLIGRAM(S): 1 INJECTION INTRAVENOUS at 14:40

## 2025-05-23 RX ADMIN — INSULIN LISPRO 1: 100 INJECTION, SOLUTION INTRAVENOUS; SUBCUTANEOUS at 14:27

## 2025-05-23 RX ADMIN — Medication 1 TABLET(S): at 14:28

## 2025-05-23 NOTE — DISCHARGE NOTE NURSING/CASE MANAGEMENT/SOCIAL WORK - FINANCIAL ASSISTANCE
[facility] provides services at a reduced cost to those who are determined to be eligible through [facility]’s financial assistance program. Information regarding [facility]’s financial assistance program can be found by going to [email or web address] or by calling [phone].

## 2025-05-23 NOTE — DISCHARGE NOTE NURSING/CASE MANAGEMENT/SOCIAL WORK - NSDCPEFALRISK_GEN_ALL_CORE
Patent
For information on Fall & Injury Prevention, visit: [email or web address] OR  [email or web address] OR  https://www.cdc.gov/steadi/patient.html

## 2025-05-23 NOTE — PROGRESS NOTE ADULT - PROVIDER SPECIALTY LIST ADULT
Infectious Disease
Infectious Disease
Internal Medicine
Infectious Disease
Infectious Disease
Internal Medicine

## 2025-05-23 NOTE — PROGRESS NOTE ADULT - ASSESSMENT
M E D I C A L   A T T E N D I N G    F O L L O W    U P   N O T E  (05-23-25 )                                     ------------------------------------------------------------------------------------------------    patient evaluated by me, case discussed with team, chart, medications, and physical exam reviewed, labs / tests  and vitals reviewed by me, as bellow.   Patient is stable for discharge today. patient post PICC line today, going back to facility to complete antibiotics as planned   Patient to follow up with  MD at nursing facility   See discharge document for full note (discussed with ACP).  [Greater than 35 min spent for these services. ]          ( Note written / Date of service 05-23-25 ( This is certified to be the same as "ENTERED" date above ( for billing purposes)))    ==================>> MEDICATIONS <<====================    acetaminophen   IVPB .. 1000 milliGRAM(s) IV Intermittent once  ascorbic acid 1000 milliGRAM(s) Oral daily  chlorhexidine 2% Cloths 1 Application(s) Topical <User Schedule>  collagenase Ointment 1 Application(s) Topical every 8 hours  insulin lispro (ADMELOG) corrective regimen sliding scale   SubCutaneous three times a day before meals  insulin lispro (ADMELOG) corrective regimen sliding scale   SubCutaneous at bedtime  lactulose Syrup 10 Gram(s) Oral daily  levETIRAcetam 750 milliGRAM(s) Oral two times a day  multivitamin 1 Tablet(s) Oral daily  polyethylene glycol 3350 17 Gram(s) Oral two times a day  senna 2 Tablet(s) Oral at bedtime  zinc sulfate 220 milliGRAM(s) Oral daily    MEDICATIONS  (PRN):  acetaminophen     Tablet .. 650 milliGRAM(s) Oral every 6 hours PRN Temp greater or equal to 38C (100.4F), Mild Pain (1 - 3)  aluminum hydroxide/magnesium hydroxide/simethicone Suspension 30 milliLiter(s) Oral every 4 hours PRN Dyspepsia  melatonin 3 milliGRAM(s) Oral at bedtime PRN Insomnia  ondansetron Injectable 4 milliGRAM(s) IV Push every 8 hours PRN Nausea and/or Vomiting  sodium chloride 0.9% lock flush 10 milliLiter(s) IV Push every 1 hour PRN Pre/post blood products, medications, blood draw, and to maintain line patency    ___________  Active diet:  Diet, DASH/TLC:   Sodium & Cholesterol Restricted  No Beef  No Pork  Danny(7 Gm Arginine/7 Gm Glut/1.2 Gm HMB     Qty per Day:  1  Supplement Feeding Modality:  Oral  Glucerna Shake Cans or Servings Per Day:  2       Frequency:  Two Times a day  ___________________    ==================>> VITAL SIGNS <<==================    T(C): 36.7 (05-23-25 @ 14:03), Max: 37.4 (05-22-25 @ 19:41)  HR: 90 (05-23-25 @ 14:03) (84 - 90)  BP: 124/82 (05-23-25 @ 14:03) (122/82 - 131/80)  BP(mean): 96 (05-23-25 @ 14:03)  RR: 18 (05-23-25 @ 14:03) (17 - 18)  SpO2: 99% (05-23-25 @ 14:03) (98% - 100%)     CAPILLARY BLOOD GLUCOSE      POCT Blood Glucose.: 115 mg/dL (23 May 2025 17:45)  POCT Blood Glucose.: 163 mg/dL (23 May 2025 13:37)  POCT Blood Glucose.: 128 mg/dL (23 May 2025 08:07)  POCT Blood Glucose.: 155 mg/dL (22 May 2025 21:45)     ==================>> LAB AND IMAGING <<==================       no labs today     HgA1C:   (05-17-25)          (05-17-25)      6.4    WBC count:   12.96 <<== ,  11.49 <<== ,  9.11 <<==   Hemoglobin:   8.2 <<==,  8.1 <<==,  7.7 <<==  platelets:  622 <==, 504 <==, 650 <==, 591 <==    Creatinine:  0.85  <<==, 0.88  <<==, 0.80  <<==, 0.82  <<==  Sodium:   137  <==, 139  <==, 137  <==, 137  <==       AST:          6(05-21) <== , 7(05-19) <== , 6(05-18) <== , 7(05-16) <==      ALT:        10(05-21)  <== , 13(05-19)  <== , 7(05-18)  <== , 9(05-16)  <==      AP:        59(05-21)  <=, 55(05-19)  <=, 54(05-18)  <=, 64(05-16)  <=     Bili:        0.3(05-21)  <=, 0.2(05-19)  <=, 0.2(05-18)  <=, 0.3(05-16)  <=    ____________________________    M I C R O B I O L O G Y :    Culture - Wound Aerobic/Anaerobic (collected 20 May 2025 16:50)  Source: Skin/Wound Skin/Wound  Gram Stain (21 May 2025 23:44):    No polymorphonuclear leukocytes per low power field    No organisms seen per oil power field  Preliminary Report (21 May 2025 23:44):    Moderate Staphylococcus haemolyticus    Few Enterococcus raffinosus    Moderate Corynebacterium striatum group    "Susceptibilities not performed"  Organism: Enterococcus raffinosus (22 May 2025 21:27)  Organism: Enterococcus raffinosus (22 May 2025 21:27)    Sensitivities:      -  Streptomycin synergy: S <=1000      -  Vancomycin: S 0.5      -  Ampicillin: R >8 Predicts results to ampicillin/sulbactam, amoxacillin-clavulanate and  piperacillin-tazobactam.      Method Type: RENETTA      -  Gentamicin synergy: S <=500    Urinalysis with Rflx Culture (collected 16 May 2025 18:20)    Culture - Blood (collected 16 May 2025 17:21)  Source: Blood Blood-Peripheral  Final Report (21 May 2025 22:00):    No growth at 5 days    Culture - Blood (collected 16 May 2025 17:20)  Source: Blood Blood-Peripheral  Final Report (21 May 2025 22:00):    No growth at 5 days              ( Note written / Date of service 05-23-25 ( This is certified to be the same as "ENTERED" date above ( for billing Purposes )))

## 2025-05-23 NOTE — PROCEDURE NOTE - NSICDXPROCEDURE_GEN_ALL_CORE_FT
How Severe Are Your Spot(S)?: moderate What Type Of Note Output Would You Prefer (Optional)?: Bullet Format What Is The Reason For Today's Visit?: Full Body Skin Examination What Is The Reason For Today's Visit? (Being Monitored For X): concerning skin lesions on an annual basis PROCEDURES:  FL guided PICC insertion 23-May-2025 12:35:50  Melba Gastelum

## 2025-05-23 NOTE — DISCHARGE NOTE NURSING/CASE MANAGEMENT/SOCIAL WORK - PATIENT PORTAL LINK FT
You can access the FollowMyHealth Patient Portal offered by [facility] by registering at the following website: [email or web address]. By joining [facility]’s FollowMyHealth portal, you will also be able to view your health information using other applications (apps) compatible with our system.

## 2025-05-23 NOTE — PROGRESS NOTE ADULT - SUBJECTIVE AND OBJECTIVE BOX
70y Male     MEDS:  meropenem  IVPB 1000 milliGRAM(s) IV Intermittent every 8 hours    ALLERGIES: Allergies    No Known Allergies    Intolerances    REVIEW OF SYSTEMS:  [  ] Not able to elicit  General:	  Chest:	  GI:	  :  Skin:	  Musculoskeletal:	  Neuro:	    VITALS:  Vital Signs Last 24 Hrs  T(C): 36.8 (23 May 2025 05:10), Max: 37.4 (22 May 2025 19:41)  T(F): 98.3 (23 May 2025 05:10), Max: 99.4 (22 May 2025 19:41)  HR: 84 (23 May 2025 05:10) (84 - 88)  BP: 131/80 (23 May 2025 05:10) (122/82 - 131/80)  BP(mean): --  RR: 18 (23 May 2025 05:10) (17 - 18)  SpO2: 100% (23 May 2025 05:10) (98% - 100%)    Parameters below as of 23 May 2025 05:10  Patient On (Oxygen Delivery Method): room air    PHYSICAL EXAM:  HEENT:  Neck:  Respiratory:  Cardiovascular:  Gastrointestinal:  :  Extremities:  Skin:  Ortho:  Neuro:    LABS/DIAGNOSTIC TESTS:    WBC Count: 12.96 K/uL (05-21 @ 08:00)  WBC Count: 11.49 K/uL (05-20 @ 07:30)  WBC Count: 9.11 K/uL (05-19 @ 07:30)    CULTURES:   Skin/Wound Skin/Wound  05-20 @ 16:50   Moderate Staphylococcus haemolyticus  Few Enterococcus raffinosus  Moderate Corynebacterium striatum group  "Susceptibilities not performed"  --  Enterococcus raffinosus    Blood Blood-Peripheral  05-16 @ 17:21   No growth at 5 days  --  --    Blood Blood-Peripheral  05-16 @ 17:20   No growth at 5 days  --  --    RADIOLOGY:  no new studies 70y Male lying in bed, sleeping but easily aroused by verbal stimuli, although remains confused and not answering questions appropriately. Awaiting PICC line placement today for 6 weeks antibiotic treatment.   Remains afebrile.     MEDS:  meropenem  IVPB 1000 milliGRAM(s) IV Intermittent every 8 hours    ALLERGIES: Allergies    No Known Allergies    Intolerances    REVIEW OF SYSTEMS:  [ X ] Not able to elicit    VITALS:  Vital Signs Last 24 Hrs  T(C): 36.8 (23 May 2025 05:10), Max: 37.4 (22 May 2025 19:41)  T(F): 98.3 (23 May 2025 05:10), Max: 99.4 (22 May 2025 19:41)  HR: 84 (23 May 2025 05:10) (84 - 88)  BP: 131/80 (23 May 2025 05:10) (122/82 - 131/80)  BP(mean): --  RR: 18 (23 May 2025 05:10) (17 - 18)  SpO2: 100% (23 May 2025 05:10) (98% - 100%)    Parameters below as of 23 May 2025 05:10  Patient On (Oxygen Delivery Method): room air    PHYSICAL EXAM:  HEENT: normocephalic, conjunctivae and sclerae clear  Neck: supple no LN's   Respiratory: lungs clear no rales  Cardiovascular: S1 S2 reg no murmurs  Gastrointestinal: +BS with soft, nondistended abdomen; nontender  Extremities: no edema  Skin: no rashes  Ortho: no erythema or joint swelling  Neuro: alert, awake, confused    LABS/DIAGNOSTIC TESTS:    WBC Count: 12.96 K/uL (05-21 @ 08:00)  WBC Count: 11.49 K/uL (05-20 @ 07:30)  WBC Count: 9.11 K/uL (05-19 @ 07:30)    CULTURES:   Skin/Wound Skin/Wound  05-20 @ 16:50   Moderate Staphylococcus haemolyticus  Few Enterococcus raffinosus  Moderate Corynebacterium striatum group  "Susceptibilities not performed"  --  Enterococcus raffinosus    Blood Blood-Peripheral  05-16 @ 17:21   No growth at 5 days  --  --    Blood Blood-Peripheral  05-16 @ 17:20   No growth at 5 days  --  --    RADIOLOGY:  no new studies

## 2025-05-25 LAB
CULTURE RESULTS: ABNORMAL
ORGANISM # SPEC MICROSCOPIC CNT: ABNORMAL
ORGANISM # SPEC MICROSCOPIC CNT: ABNORMAL
SPECIMEN SOURCE: SIGNIFICANT CHANGE UP

## 2025-06-02 PROCEDURE — 36415 COLL VENOUS BLD VENIPUNCTURE: CPT

## 2025-06-02 PROCEDURE — 83540 ASSAY OF IRON: CPT

## 2025-06-02 PROCEDURE — 36573 INSJ PICC RS&I 5 YR+: CPT

## 2025-06-02 PROCEDURE — 82746 ASSAY OF FOLIC ACID SERUM: CPT

## 2025-06-02 PROCEDURE — 84100 ASSAY OF PHOSPHORUS: CPT

## 2025-06-02 PROCEDURE — 72197 MRI PELVIS W/O & W/DYE: CPT

## 2025-06-02 PROCEDURE — 84484 ASSAY OF TROPONIN QUANT: CPT

## 2025-06-02 PROCEDURE — 87040 BLOOD CULTURE FOR BACTERIA: CPT

## 2025-06-02 PROCEDURE — 71045 X-RAY EXAM CHEST 1 VIEW: CPT

## 2025-06-02 PROCEDURE — 77001 FLUOROGUIDE FOR VEIN DEVICE: CPT

## 2025-06-02 PROCEDURE — A9585: CPT

## 2025-06-02 PROCEDURE — 83036 HEMOGLOBIN GLYCOSYLATED A1C: CPT

## 2025-06-02 PROCEDURE — 85730 THROMBOPLASTIN TIME PARTIAL: CPT

## 2025-06-02 PROCEDURE — 85027 COMPLETE CBC AUTOMATED: CPT

## 2025-06-02 PROCEDURE — C1769: CPT

## 2025-06-02 PROCEDURE — 82962 GLUCOSE BLOOD TEST: CPT

## 2025-06-02 PROCEDURE — 87637 SARSCOV2&INF A&B&RSV AMP PRB: CPT

## 2025-06-02 PROCEDURE — 93005 ELECTROCARDIOGRAM TRACING: CPT

## 2025-06-02 PROCEDURE — 85025 COMPLETE CBC W/AUTO DIFF WBC: CPT

## 2025-06-02 PROCEDURE — 83605 ASSAY OF LACTIC ACID: CPT

## 2025-06-02 PROCEDURE — 83735 ASSAY OF MAGNESIUM: CPT

## 2025-06-02 PROCEDURE — 86900 BLOOD TYPING SEROLOGIC ABO: CPT

## 2025-06-02 PROCEDURE — 83615 LACTATE (LD) (LDH) ENZYME: CPT

## 2025-06-02 PROCEDURE — 83550 IRON BINDING TEST: CPT

## 2025-06-02 PROCEDURE — 99285 EMERGENCY DEPT VISIT HI MDM: CPT

## 2025-06-02 PROCEDURE — 76937 US GUIDE VASCULAR ACCESS: CPT

## 2025-06-02 PROCEDURE — 72131 CT LUMBAR SPINE W/O DYE: CPT

## 2025-06-02 PROCEDURE — 82728 ASSAY OF FERRITIN: CPT

## 2025-06-02 PROCEDURE — 86850 RBC ANTIBODY SCREEN: CPT

## 2025-06-02 PROCEDURE — 87186 SC STD MICRODIL/AGAR DIL: CPT

## 2025-06-02 PROCEDURE — 80048 BASIC METABOLIC PNL TOTAL CA: CPT

## 2025-06-02 PROCEDURE — 85610 PROTHROMBIN TIME: CPT

## 2025-06-02 PROCEDURE — 81001 URINALYSIS AUTO W/SCOPE: CPT

## 2025-06-02 PROCEDURE — 86901 BLOOD TYPING SEROLOGIC RH(D): CPT

## 2025-06-02 PROCEDURE — 87077 CULTURE AEROBIC IDENTIFY: CPT

## 2025-06-02 PROCEDURE — 80076 HEPATIC FUNCTION PANEL: CPT

## 2025-06-02 PROCEDURE — 82607 VITAMIN B-12: CPT

## 2025-06-02 PROCEDURE — 87070 CULTURE OTHR SPECIMN AEROBIC: CPT

## 2025-06-02 PROCEDURE — C1751: CPT

## 2025-06-02 PROCEDURE — 80053 COMPREHEN METABOLIC PANEL: CPT

## (undated) DEVICE — GOWN LG

## (undated) DEVICE — TUBING IV SET GRAVITY 3Y 100" MACRO

## (undated) DEVICE — GLV 7.5 PROTEXIS (WHITE)

## (undated) DEVICE — DRSG BANDAID 0.75X3"

## (undated) DEVICE — PACK IV START WITH CHG

## (undated) DEVICE — SUT VICRYL PLUS 2-0 27" SH UNDYED

## (undated) DEVICE — DRAPE 1/2 SHEET 40X57"

## (undated) DEVICE — PACKING GAUZE IODOFORM 0.5"

## (undated) DEVICE — PACK MINOR NO DRAPE

## (undated) DEVICE — DRAPE LAPAROTOMY W POUCHES

## (undated) DEVICE — LUBRICATING JELLY HR ONE SHOT 3G

## (undated) DEVICE — ELCTR GROUNDING PAD ADULT COVIDIEN

## (undated) DEVICE — BASIN EMESIS 10IN GRADUATED MAUVE

## (undated) DEVICE — GLV 7 PROTEXIS (WHITE)

## (undated) DEVICE — NDL HYPO SAFE 25G X 1.5" (ORANGE)

## (undated) DEVICE — SALIVA EJECTOR (BLUE)

## (undated) DEVICE — DRSG CURITY GAUZE SPONGE 4 X 4" 12-PLY

## (undated) DEVICE — KIT ENDO PROCEDURE CUST W/VLV

## (undated) DEVICE — TRAP SUCTION POLYP ENDODYNAMIC

## (undated) DEVICE — DRSG GAUZE VASELINE PETROLEUM 3 X 9"

## (undated) DEVICE — CONTAINER FORMALIN 10% 20ML

## (undated) DEVICE — WARMING BLANKET UPPER ADULT

## (undated) DEVICE — TUBING MEDI-VAC W MAXIGRIP CONNECTORS 1/4"X6'

## (undated) DEVICE — MASK O2 MICROSTREAM SAMPLE LINE MED/LRG 10FT

## (undated) DEVICE — PREP BETADINE SPONGE STICKS

## (undated) DEVICE — TUBING SUCTION NONCONDUCTIVE 6MM X 12FT

## (undated) DEVICE — DRSG 2X2

## (undated) DEVICE — SOLIDIFIER ISOLYZER 2000CC

## (undated) DEVICE — VALVE ENDO SURESEAL II 0-5FR

## (undated) DEVICE — DRSG MASTISOL

## (undated) DEVICE — ELCTR ECG CONDUCTIVE ADHESIVE

## (undated) DEVICE — VENODYNE/SCD SLEEVE CALF MEDIUM

## (undated) DEVICE — BIOPSY FORCEP COLD DISP

## (undated) DEVICE — FOR-ESU VALLEYLAB T7E14999DX: Type: DURABLE MEDICAL EQUIPMENT

## (undated) DEVICE — DRAPE LIGHT HANDLE COVER (BLUE)

## (undated) DEVICE — SOL IRR POUR H2O 500ML

## (undated) DEVICE — DRAPE TOWEL BLUE 17" X 24"

## (undated) DEVICE — CATH IV SAFE BC 22G X 1" (BLUE)

## (undated) DEVICE — DRSG CURITY GAUZE SPONGE 4 X 4" 12-PLY NON-STERILE

## (undated) DEVICE — SNARE CAPTIVATOR COLD RND STIFF 10X2.4X2.8MM 240CM

## (undated) DEVICE — GOWN XL

## (undated) DEVICE — BIOPSY FORCEP RADIAL JAW 4 STANDARD WITH NEEDLE

## (undated) DEVICE — SOL IRR POUR NS 0.9% 1500ML